# Patient Record
Sex: FEMALE | Race: WHITE | NOT HISPANIC OR LATINO | Employment: OTHER | URBAN - METROPOLITAN AREA
[De-identification: names, ages, dates, MRNs, and addresses within clinical notes are randomized per-mention and may not be internally consistent; named-entity substitution may affect disease eponyms.]

---

## 2017-01-09 ENCOUNTER — TELEPHONE (OUTPATIENT)
Dept: FAMILY MEDICINE | Facility: CLINIC | Age: 32
End: 2017-01-09

## 2017-01-09 ENCOUNTER — OFFICE VISIT (OUTPATIENT)
Dept: FAMILY MEDICINE | Facility: CLINIC | Age: 32
End: 2017-01-09
Payer: COMMERCIAL

## 2017-01-09 VITALS
TEMPERATURE: 98.1 F | DIASTOLIC BLOOD PRESSURE: 78 MMHG | HEART RATE: 80 BPM | WEIGHT: 293 LBS | BODY MASS INDEX: 47.09 KG/M2 | SYSTOLIC BLOOD PRESSURE: 130 MMHG | HEIGHT: 66 IN

## 2017-01-09 DIAGNOSIS — F32.1 MODERATE SINGLE CURRENT EPISODE OF MAJOR DEPRESSIVE DISORDER (H): ICD-10-CM

## 2017-01-09 DIAGNOSIS — F17.200 TOBACCO USE DISORDER: ICD-10-CM

## 2017-01-09 DIAGNOSIS — Z30.015 ENCOUNTER FOR INITIAL PRESCRIPTION OF VAGINAL RING HORMONAL CONTRACEPTIVE: ICD-10-CM

## 2017-01-09 PROCEDURE — 99214 OFFICE O/P EST MOD 30 MIN: CPT | Performed by: PHYSICIAN ASSISTANT

## 2017-01-09 RX ORDER — BUPROPION HYDROCHLORIDE 150 MG/1
150 TABLET ORAL EVERY MORNING
Qty: 30 TABLET | Refills: 11 | Status: SHIPPED | OUTPATIENT
Start: 2017-01-09 | End: 2017-11-27

## 2017-01-09 RX ORDER — ETONOGESTREL AND ETHINYL ESTRADIOL VAGINAL RING .015; .12 MG/D; MG/D
RING VAGINAL
Qty: 3 EACH | Refills: 1 | Status: SHIPPED | OUTPATIENT
Start: 2017-01-09 | End: 2017-09-01

## 2017-01-09 ASSESSMENT — ANXIETY QUESTIONNAIRES
GAD7 TOTAL SCORE: 18
3. WORRYING TOO MUCH ABOUT DIFFERENT THINGS: MORE THAN HALF THE DAYS
7. FEELING AFRAID AS IF SOMETHING AWFUL MIGHT HAPPEN: NEARLY EVERY DAY
2. NOT BEING ABLE TO STOP OR CONTROL WORRYING: NEARLY EVERY DAY
6. BECOMING EASILY ANNOYED OR IRRITABLE: NEARLY EVERY DAY
1. FEELING NERVOUS, ANXIOUS, OR ON EDGE: NEARLY EVERY DAY
5. BEING SO RESTLESS THAT IT IS HARD TO SIT STILL: SEVERAL DAYS
IF YOU CHECKED OFF ANY PROBLEMS ON THIS QUESTIONNAIRE, HOW DIFFICULT HAVE THESE PROBLEMS MADE IT FOR YOU TO DO YOUR WORK, TAKE CARE OF THINGS AT HOME, OR GET ALONG WITH OTHER PEOPLE: SOMEWHAT DIFFICULT

## 2017-01-09 ASSESSMENT — PATIENT HEALTH QUESTIONNAIRE - PHQ9: 5. POOR APPETITE OR OVEREATING: NEARLY EVERY DAY

## 2017-01-09 NOTE — PROGRESS NOTES
"  SUBJECTIVE:                                                    Janine Salas is a 31 year old female who presents to clinic today for the following health issues:        Depression and Anxiety Follow-Up    Status since last visit: Worsened    Other associated symptoms: Panicking, not sleeping, sweating a lot     Complicating factors:     Significant life event: Yes- still having troubles with her son and holidays are always stressful for her       Current substance abuse: None    PHQ-9 SCORE 5/11/2016 6/8/2016   Total Score 4 2     No flowsheet data found.     PHQ-9  English      PHQ-9   Any Language     GAD7         Amount of exercise or physical activity: None    Problems taking medications regularly: No    Medication side effects: Side effects from birth control possibly?     Diet: regular (no restrictions)    Her period is also 6 days late and she is nervous about that.       Not doing well - says \"it's hard\"   Baby 8.5 months old - growing well. Standing on his own. Not sleeping well yet at night - best night was a few nights ago when he slept for 5 hours, woke, then went back to sleep for 6 more. Patient was able to get 3 hours and then another 4  Still living with her fiance and his dad which continues to be difficult  Baby shares a room with the father in law so they worry when baby cries  She has no car anymore - one of their cars doesn't run so esther takes her car which is the only one that fits a carseat so patient is unable to get out of the house  She thought about coming to clinic a few weeks ago with the baby to take us up on the offer to help her out for a little bit but she had no way to get here    She has no family support  Says over the holidays she went to her family's and as soon as she walked in the door she handed the baby over with his careseat and clothes and said \"take him, I can't do this anymore\" and then broke down crying  She says that not one famiy member has called to check in " "with her since that day to see if she is doing okay    She has not returned to work as they do not have the money to pay for   Her fiance works then comes home and rather than help with the baby he says he is tired and needs to rest, play video games and \"unwind\"   Mom will leave the baby with him so she can shower and baby will cry so she feels she can never get a break  She has no idea where they are with finances but says just today their internet went out so figures bills are not being paid    She mentions several times how hard it is but also mentions several times how much she loves him and has several pictures of them together  She says when she starts getting overwhelmed she will look at her son and think to herself that he would be so much better with a better mom than her which makes her feel worse    She doesn't feel the wellbutrin is doing much anymore  That is the first and only medication she has been on  She notes a long h/o depression in her teens and says she was even suicidal at one point (had thoughts, never acted) - says even then her mom ignored her concerns and just kept telling her she \"would be fine\"     She is also on the micronor for birth control but thinks it might be making her mood worse  Has had issues with the depo shot in the past - made her \"crazy\"   WAs put on the micronor because she was breastfeeding  Says she is only breastfeeding for his comfort but says he doesn't seem to eat much anymore  She has no time to pump and really feels she is ready to be over breastfeeding  She has been inconsistent with taking the pill - says she will miss doses and have to double up    Not involved in any support groups  Says she did go to a support group for post partum depression but when she started talking about how hard things were and not wanting to do it anymore one of the other participants told her to \"just drop her baby off or give it up for adoption.\"   Has been thinking about " "the [facility] but again transportation is an issue      Problem list and histories reviewed & adjusted, as indicated.  Additional history: as documented    Current Outpatient Prescriptions   Medication Sig Dispense Refill     buPROPion (WELLBUTRIN XL) 150 MG 24 hr tablet Take 1 tablet (150 mg) by mouth every morning Please schedule follow up appointment for further refills. 30 tablet 11     sertraline (ZOLOFT) 50 MG tablet Take 1/2 tablet (25 mg) for 1-2 weeks, then increase to 1 tablet orally daily 30 tablet 0     etonogestrel-ethinyl estradiol (NUVARING) 0.12-0.015 MG/24HR vaginal ring Insert 1 ring vaginally every 21 days then remove for 1 week then repeat with new ring. 3 each 1     labetalol (NORMODYNE) 100 MG tablet Take 3 tablets (300 mg) by mouth 2 times daily Follow up with PCP for further refills. 180 tablet 0     cyclobenzaprine (FLEXERIL) 10 MG tablet Take 0.5-1 tablets (5-10 mg) by mouth 3 times daily as needed for muscle spasms 60 tablet 1     Flax Oil-Fish Oil-Borage Oil (FISH OIL-FLAX OIL-BORAGE OIL PO)        ibuprofen (ADVIL,MOTRIN) 400 MG tablet Take 1-2 tablets (400-800 mg) by mouth every 6 hours as needed for other (cramping) 120 tablet 0     Prenatal Vit-Fe Fumarate-FA (PRENATAL MULTIVITAMIN  PLUS IRON) 27-0.8 MG TABS Take 1 tablet by mouth daily       LACTOBACILLUS PO Take 1 tablet by mouth daily       FOLIC ACID PO Take 400 mcg by mouth daily        glucosamine-chondroitin 500-400 MG CAPS Take 1 capsule by mouth daily       vitamin E 400 UNIT capsule Take 400 Units by mouth daily        Allergies   Allergen Reactions     Codeine Itching     Zofran [Ondansetron] Other (See Comments)     Headaches        ROS:  Remainder of ROS obtained and found to be negative other than that which was documented above      OBJECTIVE:                                                    /78 mmHg  Pulse 80  Temp(Src) 98.1  F (36.7  C) (Tympanic)  Ht 5' 6\" (1.676 m)  Wt 358 lb (162.388 kg)  BMI 57.81 kg/m2  Body " mass index is 57.81 kg/(m^2).  GENERAL: healthy, alert and no distress  PSYCH: Patient pleasant, engaged in conversation- tearful on and off throughout the exam. Stressed but also lights up when talking about her son and shows several pictures of him on her phone. She denies any thoughts of harming herself or her baby    Diagnostic Test Results:  none      ASSESSMENT/PLAN:                                                    (F53) Post partum depression  (primary encounter diagnosis)  Comment: New mom, no support at home or with family or friends. Patient unable to travel given lack of transportation. High level of concern for patient - she denies any thoughts of harming herself or her baby but patient needs any support we can offer. I encouraged several times that if she needs to reach out or needs help, that she can come to clinic. If it meant her getting a few moments to herself we will help out. I want her to know she has some place or somewhere she can go. I really encouraged her to look into the [facility] programs - they have free childcare which would again provide her with some time to herself. I also encouraged her to look in to [facility] programs. She needs to find a support group - other moms or other resources that she can turn to because she is unfortunately not getting any of that through her fiance or family. She is very isolated which is concerning. I am going to have our social work continue to reach out to her. We have had issues in the past where she had called in a panic and when we try to reach out to her she doesn't answer so we have to do a welfare check which has made her unhappy in the past. I explained today why we have to do this and that we are just very concerned. She seemd to understand.   We then discussed medication changes. We discussed starting a new medication which she was in agreement with understanding it will not work right away. Unclear if wellbutrin is not working at all or just not enough  but will not make changes to this right now until zoloft in system in the chance that it has been helping a little.   Plan: sertraline (ZOLOFT) 50 MG tablet    Patient made it clear she has no intention of hurting herself or her baby  We will start zoloft and I would like to see her back in 3-4 weeks but patient knows she can come to clinic whenever she needs   She will start therapy - first session scheduled for tomorrow  Aware that  will be in contact with her to help identify resources or other support systems    (Z30.015) Encounter for initial prescription of vaginal ring hormonal contraceptive  Comment: given she is nearly done with breastfeeding and concerned that pill may be contributing to mood lability, will switch to nuvaring which she has had in the past and done well with  Plan: etonogestrel-ethinyl estradiol (NUVARING)         0.12-0.015 MG/24HR vaginal ring            Total visit time today was 35 minutes with over 50% spent in face to face discussion of the above topics        Kavya Haider PA-C  [facility]

## 2017-01-09 NOTE — Clinical Note
[facility]  [address] [address]  Phone: [phone]    January 30, 2017        Janine Salas  [address]  [place] [address]          To whom it may concern:    RE: Janine Salas    This patient has been denied coverage of Nuvaring. I recommend approval of the Nuvaring for contraception for this patient.  This patient has PCOS and mental health concerns, has been on multiple combination and progestin only contraceptive pills in the past, all of which either worsened or did not help these medical issues.  She has done well with the nuvaring in the past and should be able to continue this medication. She needs this for these medical issues as well as contraception.    Please contact me for questions or concerns.      Sincerely,        Kavya Haider PA-C

## 2017-01-09 NOTE — TELEPHONE ENCOUNTER
wellbutrin       Last Written Prescription Date: 12/7/16  Last Fill Quantity: 30; # refills: 0  Last Office Visit with [facility], [facility] or [facility] prescribing provider:  8/7/15   Next 5 appointments (look out 90 days)     Jan 09, 2017  4:00 PM   SHORT with Kavya Haider PA-C   [facility]    [address] [address] [phone]                   Last PHQ-9 score on record=   PHQ-9 SCORE 6/8/2016   Total Score 2       AST       31   3/9/2016  ALT       41   3/9/2016    Routing refill request to provider for review/approval because:  Patient needs to be seen because it has been more than 1 year since last office visit.  Will also send to PCP

## 2017-01-09 NOTE — TELEPHONE ENCOUNTER
Prior Authorization Required on: nuvaring  Insurance: [employer]  Insurance Phone: [phone]  Patient ID: [id number]  Please Contact the Pharmacy with Prior Auth Status (approval/denial).   Thank You!    Cristina Nolan   Pharmacy Technician  [facility]  [phone]

## 2017-01-09 NOTE — MR AVS SNAPSHOT
"              After Visit Summary   1/9/2017    Janine Salas    MRN: [id number]           Patient Information     Date Of Birth          1985        Visit Information        Provider Department      1/9/2017 4:00 PM Kavya Haider PA-C [facility]        Today's Diagnoses     Post partum depression    -  1     Encounter for initial prescription of vaginal ring hormonal contraceptive            Follow-ups after your visit        Who to contact     Normal or non-critical lab and imaging results will be communicated to you by [employer], letter or phone within 4 business days after the clinic has received the results. If you do not hear from us within 7 days, please contact the clinic through exoro systemt or phone. If you have a critical or abnormal lab result, we will notify you by phone as soon as possible.  Submit refill requests through [employer]! or call your pharmacy and they will forward the refill request to us. Please allow 3 business days for your refill to be completed.          If you need to speak with a  for additional information , please call: [phone]             Additional Information About Your Visit        [employer] Board of Addiction Medicine (ABAM) Information     Q[employer]! gives you secure access to your electronic health record. If you see a primary care provider, you can also send messages to your care team and make appointments. If you have questions, please call your primary care clinic.  If you do not have a primary care provider, please call [phone] and they will assist you.        Care EveryWhere ID     This is your Care EveryWhere ID. This could be used by other organizations to access your [facility] medical records  [id number]        Your Vitals Were     Pulse Temperature Height BMI (Body Mass Index)          80 98.1  F (36.7  C) (Tympanic) 5' 6\" (1.676 m) 57.81 kg/m2         Blood Pressure from Last 3 Encounters:   01/09/17 130/78   10/28/16 130/84   08/09/16 132/82    Weight " from Last 3 Encounters:   01/09/17 358 lb (162.388 kg)   10/28/16 365 lb (165.563 kg)   08/09/16 363 lb (164.656 kg)              Today, you had the following     No orders found for display         Today's Medication Changes          These changes are accurate as of: 1/9/17  5:07 PM.  If you have any questions, ask your nurse or doctor.               Start taking these medicines.        Dose/Directions    etonogestrel-ethinyl estradiol 0.12-0.015 MG/24HR vaginal ring   Commonly known as:  NUVARING   Used for:  Encounter for initial prescription of vaginal ring hormonal contraceptive   Started by:  Kavya Haider PA-C        Insert 1 ring vaginally every 21 days then remove for 1 week then repeat with new ring.   Quantity:  3 each   Refills:  1       sertraline 50 MG tablet   Commonly known as:  ZOLOFT   Used for:  Post partum depression   Started by:  Kavya Haider PA-C        Take 1/2 tablet (25 mg) for 1-2 weeks, then increase to 1 tablet orally daily   Quantity:  30 tablet   Refills:  0         Stop taking these medicines if you haven't already. Please contact your care team if you have questions.     norethindrone 0.35 MG per tablet   Commonly known as:  MICRONOR   Stopped by:  Kavya Haider PA-C                Where to get your medicines      These medications were sent to [facility] [address]  [address] [address] [address]     Phone:  [phone]    - buPROPion 150 MG 24 hr tablet  - etonogestrel-ethinyl estradiol 0.12-0.015 MG/24HR vaginal ring  - sertraline 50 MG tablet             Primary Care Provider Office Phone # Fax #    Kavya Haider PA-C [phone]       [facility] [id number] [place], [place] [address] [address]        Thank you!     Thank you for choosing [facility]  for your care. Our goal is always to provide you with excellent care. Hearing back from our patients  is one way we can continue to improve our services. Please take a few minutes to complete the written survey that you may receive in the mail after your visit with us. Thank you!             Your Updated Medication List - Protect others around you: Learn how to safely use, store and throw away your medicines at www.disposemymeds.org.          This list is accurate as of: 1/9/17  5:07 PM.  Always use your most recent med list.                   Brand Name Dispense Instructions for use    buPROPion 150 MG 24 hr tablet    WELLBUTRIN XL    30 tablet    Take 1 tablet (150 mg) by mouth every morning Please schedule follow up appointment for further refills.       cyclobenzaprine 10 MG tablet    FLEXERIL    60 tablet    Take 0.5-1 tablets (5-10 mg) by mouth 3 times daily as needed for muscle spasms       etonogestrel-ethinyl estradiol 0.12-0.015 MG/24HR vaginal ring    NUVARING    3 each    Insert 1 ring vaginally every 21 days then remove for 1 week then repeat with new ring.       FISH OIL-FLAX OIL-BORAGE OIL PO          FOLIC ACID PO      Take 400 mcg by mouth daily       glucosamine-chondroitin 500-400 MG Caps per capsule      Take 1 capsule by mouth daily       ibuprofen 400 MG tablet    ADVIL/MOTRIN    120 tablet    Take 1-2 tablets (400-800 mg) by mouth every 6 hours as needed for other (cramping)       labetalol 100 MG tablet    NORMODYNE    180 tablet    Take 3 tablets (300 mg) by mouth 2 times daily Follow up with PCP for further refills.       LACTOBACILLUS PO      Take 1 tablet by mouth daily       prenatal multivitamin  plus iron 27-0.8 MG Tabs per tablet      Take 1 tablet by mouth daily       sertraline 50 MG tablet    ZOLOFT    30 tablet    Take 1/2 tablet (25 mg) for 1-2 weeks, then increase to 1 tablet orally daily       vitamin E 400 UNIT capsule      Take 400 Units by mouth daily

## 2017-01-10 ASSESSMENT — ANXIETY QUESTIONNAIRES: GAD7 TOTAL SCORE: 18

## 2017-01-10 ASSESSMENT — PATIENT HEALTH QUESTIONNAIRE - PHQ9: SUM OF ALL RESPONSES TO PHQ QUESTIONS 1-9: 15

## 2017-01-12 ENCOUNTER — CARE COORDINATION (OUTPATIENT)
Dept: CARE COORDINATION | Facility: CLINIC | Age: 32
End: 2017-01-12

## 2017-01-12 NOTE — Clinical Note
[facility]  [address] [place] [address]      January 12, 2017      Janine Salas  [address] [address] [address]    Dear Janine,  I am the Clinic Care Coordinator that works with your primary care provider's clinic. I wanted to introduce myself and provide you with my contact information for you to be able to call me with any questions or concerns. Below is a description of what Clinic Care Coordination is and how I can further assist you.     The Clinic Care Coordinator role is a Registered Nurse and/or  who understands the health care system. The goal of Clinic Care Coordination is to help you manage your health and improve access to the [facility] system in the most efficient manner.  The Registered Nurse can assist you in meeting your health care goals by providing education, coordinating services, and strengthening the communication among your providers. The  can assist you with financial, behavioral, psychosocial, and chemical dependency and counseling/psychiatric resources.    Please feel free to keep this letter and contact information to contact me at [phone] with any further questions or concerns that may arise. We at [facility] are focused on providing you with the highest-quality healthcare experience possible and that all starts with you.       Sincerely,     Ofelia Yoder  Social Work Care Coordinator  [facility]  [phone]

## 2017-01-12 NOTE — Clinical Note
Health Care Home - Access Care Plan    About Me  Patient Name:  Janine Salas    YOB: 1985  Age:                            31 year old   [facility] MRN:         [id number] Telephone Information:     Home Phone [phone]   Mobile [phone]       Address:    [address] [place] [address] Email address:  [email or web address]      Emergency Contact(s)  Name Relationship Lgl Grd Work Phone Home Phone Mobile Phone   1. HARJINDER MITCHELL Significant ot*  none none [phone]   2. MARILOU,CLAUDETTE* Mother  none [phone]             Health Maintenance:      My Access Plan  Medical Emergency 911   Questions or concerns during clinic hours Primary Clinic Line, I will call the clinic directly:     24 Hour Appointment Line [phone] or  [phone])  (toll free)   24 Hour Nurse Line [phone] (toll free)   Questions or concerns outside clinic hours 24 Hour Appointment Line, I will call the after-hours on-call line:   [facility] [phone] or [phone] ([phone]) (toll-free)   Preferred Urgent Care     Preferred Hospital     Preferred Pharmacy [facility] [place] - [address]     Behavioral Health Crisis Line Crisis Connection, [phone] or 911     My Care Team Members  Patient Care Team       Relationship Specialty Notifications Start End    Kavya Haider PA-C PCP - General Physician Assistant  4/6/15     Phone: [phone] Fax: [phone]         [facility] [address] [address]    Ofelia Huber   Admissions 1/12/17     Phone: [phone] Fax: [phone]            My Medical and Care Information  Problem List   Patient Active Problem List   Diagnosis     CARDIOVASCULAR SCREENING; LDL GOAL LESS THAN 160     Depression     Back pain associated with peripheral numbness     Morbid obesity due to excess calories (H)      Excessive weight gain during pregnancy in third trimester     Anxiety attack     Prenatal care in third trimester     Supervision of normal first pregnancy     Cephalopelvic disproportion due to mixed maternal and fetal factors     Essential hypertension     S/P  section      Current Medications and Allergies:  See printed Medication Report

## 2017-01-12 NOTE — PROGRESS NOTES
Clinic Care Coordination Contact  [facility]/Voicemail    Referral Source: Care Team  Clinical Data: Care Coordinator Outreach  Outreach attempted x 1.  Left message on voicemail with call back information and requested return call.  Plan: Care Coordinator mailed out care coordination introduction letter on 1/12/17. Care Coordinator will try to reach patient again in 3-5 business days.    Ofelia Yoder  Social Work Care Coordinator  [facility]  [phone]

## 2017-01-16 NOTE — TELEPHONE ENCOUNTER
Patient's insurance has denied nuvaring.  State patient needs to try and fail formulary alternatives before it can be considered for coverage.    CLAUDIA GORDON

## 2017-01-27 ENCOUNTER — OFFICE VISIT (OUTPATIENT)
Dept: FAMILY MEDICINE | Facility: CLINIC | Age: 32
End: 2017-01-27
Payer: COMMERCIAL

## 2017-01-27 VITALS
HEART RATE: 95 BPM | BODY MASS INDEX: 47.09 KG/M2 | SYSTOLIC BLOOD PRESSURE: 140 MMHG | HEIGHT: 66 IN | TEMPERATURE: 99.1 F | WEIGHT: 293 LBS | DIASTOLIC BLOOD PRESSURE: 90 MMHG

## 2017-01-27 DIAGNOSIS — M54.50 ACUTE BILATERAL LOW BACK PAIN WITHOUT SCIATICA: Primary | ICD-10-CM

## 2017-01-27 DIAGNOSIS — M62.838 MUSCLE SPASM: ICD-10-CM

## 2017-01-27 DIAGNOSIS — Z30.09 BIRTH CONTROL COUNSELING: ICD-10-CM

## 2017-01-27 DIAGNOSIS — O13.3 PIH (PREGNANCY INDUCED HYPERTENSION), THIRD TRIMESTER: ICD-10-CM

## 2017-01-27 DIAGNOSIS — F41.0 ANXIETY ATTACK: ICD-10-CM

## 2017-01-27 DIAGNOSIS — F32.A DEPRESSION, UNSPECIFIED DEPRESSION TYPE: ICD-10-CM

## 2017-01-27 PROCEDURE — 99214 OFFICE O/P EST MOD 30 MIN: CPT | Performed by: PHYSICIAN ASSISTANT

## 2017-01-27 RX ORDER — HYDROCODONE BITARTRATE AND ACETAMINOPHEN 5; 325 MG/1; MG/1
1 TABLET ORAL EVERY 8 HOURS PRN
Qty: 15 TABLET | Refills: 0 | Status: SHIPPED | OUTPATIENT
Start: 2017-01-27 | End: 2017-02-01

## 2017-01-27 RX ORDER — CYCLOBENZAPRINE HCL 10 MG
5-10 TABLET ORAL 3 TIMES DAILY PRN
Qty: 60 TABLET | Refills: 0 | Status: SHIPPED | OUTPATIENT
Start: 2017-01-27 | End: 2017-03-10

## 2017-01-27 RX ORDER — LABETALOL 100 MG/1
300 TABLET, FILM COATED ORAL 2 TIMES DAILY
Qty: 180 TABLET | Refills: 0 | Status: SHIPPED | OUTPATIENT
Start: 2017-01-27 | End: 2017-03-10

## 2017-01-27 ASSESSMENT — PATIENT HEALTH QUESTIONNAIRE - PHQ9: 5. POOR APPETITE OR OVEREATING: SEVERAL DAYS

## 2017-01-27 ASSESSMENT — ANXIETY QUESTIONNAIRES
3. WORRYING TOO MUCH ABOUT DIFFERENT THINGS: NOT AT ALL
5. BEING SO RESTLESS THAT IT IS HARD TO SIT STILL: NOT AT ALL
2. NOT BEING ABLE TO STOP OR CONTROL WORRYING: NOT AT ALL
1. FEELING NERVOUS, ANXIOUS, OR ON EDGE: NOT AT ALL
GAD7 TOTAL SCORE: 2
6. BECOMING EASILY ANNOYED OR IRRITABLE: NOT AT ALL
7. FEELING AFRAID AS IF SOMETHING AWFUL MIGHT HAPPEN: SEVERAL DAYS

## 2017-01-27 NOTE — PATIENT INSTRUCTIONS
For the first few days, use ice several times a day  - after that, can use heat in the morning, ice at night  Exercises - range of motion testing  Ibuprofen 600-800 mg 3 times a day  Norco - do not drive, operate heavy machinery, or work with this  Flexeril - can make you sleepy  Follow up if worsening or if not improving - follow up next week for a recheck

## 2017-01-27 NOTE — PROGRESS NOTES
SUBJECTIVE:                                                    Janine Salas is a 31 year old female who presents to clinic today for the following health issues:    Back Pain      Duration: 3 days        Specific cause: none    Description:   Location of pain: low back mainly right side with tailbone pain  and hip right  Character of pain: sharp, dull ache, stabbing, fullness and constant  Pain radiation:none  New numbness or weakness in legs, not attributed to pain:  YES- weakness    Intensity: Currently 3-4/10 when sitting, with moving pain increases 8-9/10  moderate    History:   Pain interferes with job: YES  History of back problems:  has broken tailbone 2 times  Any previous MRI or X-rays: yes for tailbone  Sees a specialist for back pain:  No  Therapies tried without relief: acetaminophen (Tylenol), cold, muscle relaxants and NSAIDs    Alleviating factors:   Improved by: None      Precipitating factors:  Worsened by: Lifting, Bending, Standing, Lying Flat and Walking    Functional and Psychosocial Screen ([facility] STarT Back):      Not performed today       Accompanying Signs & Symptoms:  Risk of Fracture:  None  Risk of Cauda Equina:  None  Risk of Infection:  None  Risk of Cancer:  None  Risk of Ankylosing Spondylitis:  Onset at age <35, male, AND morning back stiffness. no                  Has flexeril as needed for back muscle spasm - has 3 left    Doing a lot better off the micronor - stopped 1/10/17  Has PCOS - switched OCP for years every 3 months to get them under control, didn't work. Also felt like a zombie, in a cloud  She did well with the nuvaring    PHQ-9 ([employer]) 1/9/2017 1/27/2017   1.  Little interest or pleasure in doing things 2 0   2.  Feeling down, depressed, or hopeless 3 0   3.  Trouble falling or staying asleep, or sleeping too much 2 2   4.  Feeling tired or having little energy 2 1   5.  Poor appetite or overeating 2 0   6.  Feeling bad about yourself 3 0   7.  Trouble  concentrating 0 1   8.  Moving slowly or restless 0 0   9.  Suicidal or self-harm thoughts 1 0   PHQ-9 Total Score 15 4       Problem list and histories reviewed & adjusted, as indicated.  Additional history: none    Patient Active Problem List   Diagnosis     CARDIOVASCULAR SCREENING; LDL GOAL LESS THAN 160     Depression     Back pain associated with peripheral numbness     Morbid obesity due to excess calories (H)     Excessive weight gain during pregnancy in third trimester     Anxiety attack     Prenatal care in third trimester     Supervision of normal first pregnancy     Cephalopelvic disproportion due to mixed maternal and fetal factors     Essential hypertension     S/P  section     Past Surgical History   Procedure Laterality Date     Appendectomy       Laparoscopic cholecystectomy  2013     Procedure: LAPAROSCOPIC CHOLECYSTECTOMY;  Laparoscopic Cholecystectomy;  Surgeon: Lasha Garcias MD;  Location: [facility] OR     Colonoscopy       Upper gi endoscopy       Mouth surgery       wisdom teeth      section N/A 2016     Procedure:  SECTION;  Surgeon: Marco Marin MD;  Location: [facility] OR       Social History   Substance Use Topics     Smoking status: Former Smoker -- 0.50 packs/day     Types: Cigarettes     Start date: 2015     Smokeless tobacco: Current User     Alcohol Use: No      Comment: rare- quit with pregnancy     Family History   Problem Relation Age of Onset     Hypertension Mother      Depression Mother      OSTEOPOROSIS Mother      Thyroid Disease Mother      Hypertension Father      Alcohol/Drug Father      recovered alcohol     CANCER Father      melanoma     Hypertension Maternal Grandmother      Alzheimer Disease Maternal Grandmother      Cardiovascular Maternal Grandmother      triple bypass     CANCER Maternal Grandfather      lung     Alcohol/Drug Paternal Grandmother      alcohol     Cancer - colorectal Paternal Grandmother      colon  "    Alcohol/Drug Paternal Grandfather      alcohol     CANCER Paternal Grandfather      leukemia - adult onset     Cardiovascular Paternal Grandfather      stent     Obesity Sister      CANCER Sister      cervical cancer, hysterectomy     Obesity Sister      Depression Sister      Obesity Sister      Thyroid Disease Sister      Breast Cancer Maternal Aunt      Substance Abuse Sister      recovered drugs     Autism Spectrum Disorder       Aspergers     Bipolar Disorder           Problem list, Medication list, Allergies, and Medical/Social/Surgical histories reviewed in [facility] and updated as appropriate.    ROS:  Other than noted above, general, HEENT, respiratory, cardiac and gastrointestinal systems are negative.     OBJECTIVE:                                                    /90 mmHg  Pulse 95  Temp(Src) 99.1  F (37.3  C) (Tympanic)  Ht 5' 6\" (1.676 m)  Wt 355 lb 9.6 oz (161.299 kg)  BMI 57.42 kg/m2  LMP 01/09/2017  Breastfeeding? Yes Body mass index is 57.42 kg/(m^2).   GENERAL: healthy, alert, well nourished, well hydrated, no distress  RESP: lungs clear to auscultation - no rales, no rhonchi, no wheezes  CV: regular rates and rhythm, normal S1 S2, no S3 or S4 and no murmur, no click or rub -  ABDOMEN: soft, no tenderness, no  hepatosplenomegaly, no masses, normal bowel sounds  MS: extremities- no gross deformities noted, no edema  Comprehensive back pain exam:  Tenderness of right paralumbar, SI area, and trochanter area, Pain limits the following motions: all, Lower extremity strength functional and equal on both sides, Lower extremity reflexes within normal limits bilaterally, Lower extremity sensation normal and equal on both sides and Straight leg raise negative bilaterally   Hips full ROM     PSYCH: Alert and oriented times 3; speech- coherent , normal rate and volume; able to articulate logical thoughts, able to abstract reason, no tangential thoughts, no hallucinations or delusions, affect- " normal       ASSESSMENT/PLAN:                                                      ASSESSMENT/PLAN:      ICD-10-CM    1. Acute bilateral low back pain without sciatica M54.5 HYDROcodone-acetaminophen (NORCO) 5-325 MG per tablet   2. Muscle spasm M62.838 cyclobenzaprine (FLEXERIL) 10 MG tablet   3. PIH (pregnancy induced hypertension), third trimester O13.3 labetalol (NORMODYNE) 100 MG tablet   4. Birth control counseling Z30.9    5. Anxiety attack F41.0    6. Depression, unspecified depression type F32.9      Patient in a lot of pain with movements, okay once she gets into a position or walks a little bit. She appears comfortable when sitting til she has to get up then is quite painful. Discussed risks/side effects of medications, she is planning to stop breastfeeding she thinks while on them.  Will refill blood pressure meds and monitor blood pressure  Mood much improved - patient attributes more to stopping mini pill than starting zoloft but thinks both contribute. Will work on prior auth for nuvaring as she does not want to restart combo OCP due to all the trouble she had with that and mood in the past. Recommended use condoms until then.  Still recommended close follow up - next week    Patient Instructions   For the first few days, use ice several times a day  - after that, can use heat in the morning, ice at night  Exercises - range of motion testing  Ibuprofen 600-800 mg 3 times a day  Norco - do not drive, operate heavy machinery, or work with this  Flexeril - can make you sleepy  Follow up if worsening or if not improving - follow up next week for a recheck    Elisha Calvin PA-C   [facility]

## 2017-01-27 NOTE — MR AVS SNAPSHOT
After Visit Summary   1/27/2017    Janine Salas    MRN: [id number]           Patient Information     Date Of Birth          1985        Visit Information        Provider Department      1/27/2017 1:00 PM Elisha Calvin PA-C [facility]        Today's Diagnoses     PIH (pregnancy induced hypertension), third trimester    -  1     Acute bilateral low back pain without sciatica         Muscle spasm           Care Instructions    For the first few days, use ice several times a day  - after that, can use heat in the morning, ice at night  Exercises - range of motion testing  Ibuprofen 600-800 mg 3 times a day  Norco - do not drive, operate heavy machinery, or work with this  Flexeril - can make you sleepy  Follow up if worsening or if not improving - follow up next week for a recheck        Follow-ups after your visit        Who to contact     Normal or non-critical lab and imaging results will be communicated to you by [employer], letter or phone within 4 business days after the clinic has received the results. If you do not hear from us within 7 days, please contact the clinic through [employer] or phone. If you have a critical or abnormal lab result, we will notify you by phone as soon as possible.  Submit refill requests through [facility] or call your pharmacy and they will forward the refill request to us. Please allow 3 business days for your refill to be completed.          If you need to speak with a  for additional information , please call: [phone]             Additional Information About Your Visit        [facility] Information     [facility] gives you secure access to your electronic health record. If you see a primary care provider, you can also send messages to your care team and make appointments. If you have questions, please call your primary care clinic.  If you do not have a primary care provider, please call [phone] and they will assist you.       "  Care EveryWhere ID     This is your Care EveryWhere ID. This could be used by other organizations to access your [facility] medical records  [id number]        Your Vitals Were     Pulse Temperature Height BMI (Body Mass Index) Last Period Breastfeeding?    95 99.1  F (37.3  C) (Tympanic) 5' 6\" (1.676 m) 57.42 kg/m2 01/09/2017 Yes       Blood Pressure from Last 3 Encounters:   01/27/17 140/90   01/09/17 130/78   10/28/16 130/84    Weight from Last 3 Encounters:   01/27/17 355 lb 9.6 oz (161.299 kg)   01/09/17 358 lb (162.388 kg)   10/28/16 365 lb (165.563 kg)              Today, you had the following     No orders found for display         Today's Medication Changes          These changes are accurate as of: 1/27/17  2:01 PM.  If you have any questions, ask your nurse or doctor.               Start taking these medicines.        Dose/Directions    HYDROcodone-acetaminophen 5-325 MG per tablet   Commonly known as:  NORCO   Used for:  Acute bilateral low back pain without sciatica   Started by:  Elisha Calvin PA-C        Dose:  1 tablet   Take 1 tablet by mouth every 8 hours as needed for moderate to severe pain   Quantity:  15 tablet   Refills:  0            Where to get your medicines      These medications were sent to [facility] - [address] [facility] [address]  [address] , [facility] [address]     Phone:  [phone]    - cyclobenzaprine 10 MG tablet  - labetalol 100 MG tablet      Some of these will need a paper prescription and others can be bought over the counter.  Ask your nurse if you have questions.     Bring a paper prescription for each of these medications    - HYDROcodone-acetaminophen 5-325 MG per tablet             Primary Care Provider Office Phone # Fax #    Kavya Haider PA-C [phone]       [facility] [address] [facility] [address]        Thank you!     Thank you for choosing [facility]  for your care. Our " goal is always to provide you with excellent care. Hearing back from our patients is one way we can continue to improve our services. Please take a few minutes to complete the written survey that you may receive in the mail after your visit with us. Thank you!             Your Updated Medication List - Protect others around you: Learn how to safely use, store and throw away your medicines at www.disposemymeds.org.          This list is accurate as of: 1/27/17  2:01 PM.  Always use your most recent med list.                   Brand Name Dispense Instructions for use    buPROPion 150 MG 24 hr tablet    WELLBUTRIN XL    30 tablet    Take 1 tablet (150 mg) by mouth every morning Please schedule follow up appointment for further refills.       cyclobenzaprine 10 MG tablet    FLEXERIL    60 tablet    Take 0.5-1 tablets (5-10 mg) by mouth 3 times daily as needed for muscle spasms       etonogestrel-ethinyl estradiol 0.12-0.015 MG/24HR vaginal ring    NUVARING    3 each    Insert 1 ring vaginally every 21 days then remove for 1 week then repeat with new ring.       FISH OIL-FLAX OIL-BORAGE OIL PO          FOLIC ACID PO      Take 400 mcg by mouth daily       glucosamine-chondroitin 500-400 MG Caps per capsule      Take 1 capsule by mouth daily       HYDROcodone-acetaminophen 5-325 MG per tablet    NORCO    15 tablet    Take 1 tablet by mouth every 8 hours as needed for moderate to severe pain       ibuprofen 400 MG tablet    ADVIL/MOTRIN    120 tablet    Take 1-2 tablets (400-800 mg) by mouth every 6 hours as needed for other (cramping)       labetalol 100 MG tablet    NORMODYNE    180 tablet    Take 3 tablets (300 mg) by mouth 2 times daily Follow up with PCP for further refills.       LACTOBACILLUS PO      Take 1 tablet by mouth daily       prenatal multivitamin  plus iron 27-0.8 MG Tabs per tablet      Take 1 tablet by mouth daily       sertraline 50 MG tablet    ZOLOFT    30 tablet    Take 1/2 tablet (25 mg) for 1-2  weeks, then increase to 1 tablet orally daily       vitamin E 400 UNIT capsule      Take 400 Units by mouth daily

## 2017-01-28 ENCOUNTER — MYC MEDICAL ADVICE (OUTPATIENT)
Dept: FAMILY MEDICINE | Facility: CLINIC | Age: 32
End: 2017-01-28

## 2017-01-28 ASSESSMENT — PATIENT HEALTH QUESTIONNAIRE - PHQ9: SUM OF ALL RESPONSES TO PHQ QUESTIONS 1-9: 4

## 2017-01-28 ASSESSMENT — ANXIETY QUESTIONNAIRES: GAD7 TOTAL SCORE: 2

## 2017-01-31 ENCOUNTER — TELEPHONE (OUTPATIENT)
Dept: FAMILY MEDICINE | Facility: CLINIC | Age: 32
End: 2017-01-31

## 2017-01-31 NOTE — TELEPHONE ENCOUNTER
Janine is reporting that she is having lower back pain that goes down in to her right hip.  She states that it's difficult to sit, stand, move around.  She is due to go back to work on Thursday and doesn't know what to do about the pain, if its still like this she states that it will be very difficult to work.  Please call and assess.  Thank you..Tona Nuñez

## 2017-01-31 NOTE — TELEPHONE ENCOUNTER
Message left on answering machine to call the [facility] RN back to triage note below. 1/27/17 office visit note said to return this week for a recheck. Work letter and back exercises from Jackie are at the  available for .   Gonzalo Ellison RN

## 2017-01-31 NOTE — TELEPHONE ENCOUNTER
Letter and back exercises are placed at the . Patient is scheduled to see Jackie tomorrow morning.  Gonzalo Ellison RN

## 2017-01-31 NOTE — TELEPHONE ENCOUNTER
Can someone print this and put it at the ?  Elisha Calvin PA-C              Low Back Pain            What is low back pain?   Low back pain is pain and stiffness in the lower back. It is one of the most common reasons people miss work.   How does it occur?   Your lower back is called your lumbar spine. It is made up of 5 bones called lumbar vertebrae. In between the vertebrae are shock absorbers called disks. Back pain can occur from an injury to the vertebrae or when a disk bulges or herniates.   Low back pain is usually caused when a ligament or muscle holding a vertebra in its proper position is strained. Vertebrae are bones that make up the spinal column through which the spinal cord passes. When these muscles or ligaments become weak or strained, the spine loses its stability, resulting in pain.   Low back pain can occur if your job involves lifting and carrying heavy objects, or if you spend a lot of time sitting or standing in one position or bending over. It can be caused by a fall or by unusually strenuous exercise. It can be brought on by the tension and stress that cause headaches in some people. It can even be brought on by violent sneezing or coughing.   People who are overweight may have low back pain because of the added stress on their back.   Back pain may occur when the muscles, joints, bones, and connective tissues of the back become inflamed as a result of an infection or an immune system problem. Arthritic disorders as well as some congenital and degenerative conditions may cause back pain.   Back pain accompanied by loss of bladder or bowel control, trouble moving your legs, or numbness or tingling in your arms or legs requires immediate medical treatment.   What are the symptoms?   Symptoms include:   pain in the back or legs   stiffness, spasm, or limited motion   The pain may be constant or may happen only in certain positions. It may get worse when you cough, sneeze, bend,  twist, or strain during a bowel movement. The pain may be in only one spot or may spread to other areas, most commonly down the buttocks and into the back of the thigh.   A low back strain typically does not produce pain past the knee into the calf or foot. Tingling or numbness in the calf or foot may indicate a herniated disk or pinched nerve.   Be sure to see your healthcare provider if:   You have weakness in your leg, especially if you cannot lift your foot, because this may be a sign of nerve damage.   You have new bowel or bladder problems as well as back pain, which may be a sign of severe injury to your spinal cord.   You have pain that gets worse despite treatment.   How is it diagnosed?   Your healthcare provider will review your medical history and examine you. You may have X-rays, an MRI, CT scan, or a bone scan.   How is it treated?   To treat this condition:   Put an ice pack, gel pack, or package of frozen vegetables, wrapped in a cloth on the area every 3 to 4 hours, for up to 20 minutes at a time for the first 2 or 3 days.   Use a heating pad or hot water bottle. Don't let the heating pad get too hot, and don't fall asleep with it. You could get a burn.   Rest in bed on a firm mattress. Often it helps to lie on your back with your knees raised on a pillow. However, some people prefer to lie on their side with their knees bent. It's best to try to stay active, so try not to rest in bed longer than 1 to 2 days.   Take muscle relaxants as recommended by your healthcare provider.   Take an anti-inflammatory such as ibuprofen, or other medicine as directed by your provider. Nonsteroidal anti-inflammatory medicines (NSAIDs) may cause stomach bleeding and other problems. These risks increase with age. Read the label and take as directed. Unless recommended by your healthcare provider, do not take for more than 10 days.   Get a back massage by a trained person.   Wear a belt or corset to support your back.    Do the exercises recommended by your provider. Your provider may also prescribe physical therapy.   Talk with a counselor, if your back pain is related to tension caused by emotional problems.   When the pain is gone, ask your healthcare provider about starting an exercise program such as the following:   Exercise moderately every day, using stretching and warm-up exercises suggested by your provider or physical therapist.   Exercise vigorously for about 30 minutes 3 times a week by walking, swimming, using a stationary bicycle, or doing low-impact aerobics.   Exercising regularly will not only help your back, it will also help keep you healthier overall.   How long will the effects last?   The effects of back pain last as long as the cause exists or until your body recovers from the strain, usually a day or two but sometimes weeks.   How can I take care of myself?   In addition to the treatment described above, keep in mind these suggestions:   Practice good posture. Stand with your head up, shoulders straight, chest forward, weight balanced evenly on both feet, and pelvis tucked in.   Lose weight if you are overweight   Keep your core muscles strong. These are your abdominal and back muscles.   Sleep without a pillow under your head.   Pain is the best way to  the pace you should set in increasing your activity and exercise. Minor discomfort, stiffness, soreness, and mild aches need not interfere with activity. However, limit your activities temporarily if:   Your symptoms return.   The pain increases when you are more active.   The pain increases within 24 hours after a new or higher level of activity.   When can I return to my normal activities?   Everyone recovers from an injury at a different rate. Return to your activities depends on how soon your back recovers, not by how many days or weeks it has been since your injury has occurred. In general, the longer you have symptoms before you start treatment,  the longer it will take to get better. The goal is to return to your normal activities as soon as is safely possible. If you return too soon you may worsen your injury.   It is important that you have fully recovered from your low back pain before you return to any strenuous activity. You must be able to have the same range of motion that you had before your injury. You must be able to walk and twist without pain.   What can I do to help prevent low back pain?   You can reduce the strain on your back by doing the following:   Don't push with your arms when you move a heavy object. Turn around and push backwards so the strain is taken by your legs.   Whenever you sit, sit in a straight-backed chair and hold your spine against the back of the chair.   Bend your knees and hips and keep your back straight when you lift a heavy object.   Avoid lifting heavy objects higher than your waist.   Hold packages you carry close to your body, with your arms bent.   Use a footrest for one foot when you stand or sit in one spot for a long time. This keeps your back straight.   Bend your knees when you bend over.   Sit close to the pedals when you drive and use your seat belt and a hard backrest or pillow.   Lie on your side with your knees bent when you sleep or rest. It may help to put a pillow between your knees.   Put a pillow under your knees when you sleep on your back.   Raise the foot of the bed 8 inches to discourage sleeping on your stomach unless you have other problems that require that you keep your head elevated.   To rest your back, hold each of these positions for 5?minutes or longer:   Lie on your back, bend your knees, and put pillows under your knees.   Lie on your back on the floor with a pillow under your neck. Bend your knees to a 90-degree angle, and put your lower legs and feet on a chair.   Lie on your back, bend your knees, and bring one knee up to your chest and hold it there. Repeat with the other knee,  then bring both knees to your chest. When holding your knee to your chest, grab your thigh rather than your lower leg to avoid over flexing your knee.     Published by [facility].  This content is reviewed periodically and is subject to change as new health information becomes available. The information is intended to inform and educate and is not a replacement for medical evaluation, advice, diagnosis or treatment by a healthcare professional.   Developed by Day Gomez RN, [place], and [facility].   ? 2010 [facility] and/or its affiliates. All Rights Reserved.           Low Back Pain Exercise          Standing hamstring stretch: Put the heel of one leg on a stool about 15 inches high. Keep your leg straight. Lean forward, bending at the hips until you feel a mild stretch in the back of your thigh. Make sure you do not roll your shoulders or bend at the waist when doing this. You want to stretch your leg, not your lower back. Hold the stretch for 15 to 30 seconds. Repeat with each leg 3 times.   Cat and camel: Get down on your hands and knees. Let your stomach sag, allowing your back to curve downward. Hold this position for 5 seconds. Then arch your back and hold for 5 seconds. Do 3 sets of 10.   Quadruped arm and leg raise: Get down on your hands and knees. Pull in your belly button and tighten your abdominal muscles to stiffen your spine. While keeping your abdominals tight, raise one arm and the opposite leg away from you. Hold this position for 5 seconds. Lower your arm and leg slowly and change sides. Do this 10 times on each side.   Pelvic tilt: Lie on your back with your knees bent and your feet flat on the floor. Tighten your abdominal muscles and push your lower back into the floor. Hold this position for 5 seconds, then relax. Do 3 sets of 10.   Partial curl: Lie on your back with your knees bent and your feet flat on the floor. Tighten your stomach muscles. Tuck your chin to your chest. With your  hands stretched out in front of you, curl your upper body forward until your shoulders clear the floor. Hold this position for 3 seconds. Don't hold your breath. It helps to breathe out as you lift your shoulders up. Relax back to the floor. Repeat 10 times. Build to 3 sets of 10. To challenge yourself, clasp your hands behind your head and keep your elbows out to the side.   Gluteal stretch: Lie on your back with both knees bent. Rest the ankle of one leg over the knee of your other leg. Grasp the thigh of the bottom leg and pull toward your chest. You will feel a stretch along the buttocks and possibly along the outside of your hip. Hold the stretch for 15 to 30 seconds. Repeat 3 times with each leg.   Extension exercise:   0. Lie face down on the floor for 5 minutes. If this hurts too much, lie face down with a pillow under your stomach. This should relieve your leg or back pain. When you can lie on your stomach for 5 minutes without a pillow, you can continue with Part B of this exercise.   0. After lying on your stomach for 5 minutes, prop yourself up on your elbows for another 5 minutes. If you can do this without having more leg or buttock pain, you can start doing part C of this exercise.   0. Lie on your stomach with your hands under your shoulders. Then press down on your hands and extend your elbows while keeping your hips flat on the floor. Hold for 1 second and lower yourself to the floor. Do 3 to 5 sets of 10 repetitions. Rest for 1 minute between sets. You should have no pain in your legs when you do this, but it is normal to feel some pain in your lower back.   Do this exercise several times a day.   Side plank: Lie on your side with your legs, hips, and shoulders in a straight line. Prop yourself up onto your forearm so your elbow is directly under your shoulder. Lift your hips off the floor and balance on your forearm and the outside of your foot. Try to hold this position for 15 seconds, then  slowly lower your hip to the ground. Switch sides and repeat. Work up to holding for 1 minute or longer. This exercise can be made easier by starting with your knees and hips flexed toward your chest.   Published by [employer].  This content is reviewed periodically and is subject to change as new health information becomes available. The information is intended to inform and educate and is not a replacement for medical evaluation, advice, diagnosis or treatment by a healthcare professional.   Written by Liliana Lee, MS, PT, and Day Mendoza PT, [facility], [facility], for [employer]   ? 2010 [facility] and/or its affiliates. All Rights Reserved.         Copyright   [employer] 2011

## 2017-01-31 NOTE — TELEPHONE ENCOUNTER
Patient reports that the pain is the same as it was on 1/27/17. Notified of letter and back exercises are at the  to . Appointment made for tomorrow morning for recheck.  Gonzalo Ellison RN

## 2017-02-01 ENCOUNTER — OFFICE VISIT (OUTPATIENT)
Dept: FAMILY MEDICINE | Facility: CLINIC | Age: 32
End: 2017-02-01
Payer: COMMERCIAL

## 2017-02-01 ENCOUNTER — THERAPY VISIT (OUTPATIENT)
Dept: PHYSICAL THERAPY | Facility: CLINIC | Age: 32
End: 2017-02-01
Payer: COMMERCIAL

## 2017-02-01 VITALS
HEART RATE: 86 BPM | HEIGHT: 66 IN | BODY MASS INDEX: 47.09 KG/M2 | WEIGHT: 293 LBS | TEMPERATURE: 98.2 F | SYSTOLIC BLOOD PRESSURE: 122 MMHG | DIASTOLIC BLOOD PRESSURE: 84 MMHG

## 2017-02-01 DIAGNOSIS — F41.0 ANXIETY ATTACK: ICD-10-CM

## 2017-02-01 DIAGNOSIS — M54.50 ACUTE BILATERAL LOW BACK PAIN WITHOUT SCIATICA: Primary | ICD-10-CM

## 2017-02-01 DIAGNOSIS — M54.41 ACUTE BILATERAL LOW BACK PAIN WITH RIGHT-SIDED SCIATICA: Primary | ICD-10-CM

## 2017-02-01 PROCEDURE — 97162 PT EVAL MOD COMPLEX 30 MIN: CPT | Mod: GP | Performed by: PHYSICAL THERAPIST

## 2017-02-01 PROCEDURE — 97035 APP MDLTY 1+ULTRASOUND EA 15: CPT | Mod: GP | Performed by: PHYSICAL THERAPIST

## 2017-02-01 PROCEDURE — 99213 OFFICE O/P EST LOW 20 MIN: CPT | Performed by: PHYSICIAN ASSISTANT

## 2017-02-01 PROCEDURE — 97110 THERAPEUTIC EXERCISES: CPT | Mod: GP | Performed by: PHYSICAL THERAPIST

## 2017-02-01 RX ORDER — HYDROXYZINE HYDROCHLORIDE 25 MG/1
25-50 TABLET, FILM COATED ORAL EVERY 6 HOURS PRN
Qty: 30 TABLET | Refills: 0 | Status: SHIPPED | OUTPATIENT
Start: 2017-02-01 | End: 2017-09-01

## 2017-02-01 RX ORDER — HYDROCODONE BITARTRATE AND ACETAMINOPHEN 5; 325 MG/1; MG/1
1 TABLET ORAL EVERY 8 HOURS PRN
Qty: 15 TABLET | Refills: 0 | Status: SHIPPED | OUTPATIENT
Start: 2017-02-01 | End: 2017-03-14

## 2017-02-01 RX ORDER — NAPROXEN 500 MG/1
500 TABLET ORAL 2 TIMES DAILY PRN
Qty: 30 TABLET | Refills: 1 | Status: SHIPPED | OUTPATIENT
Start: 2017-02-01 | End: 2017-03-10

## 2017-02-01 NOTE — PROGRESS NOTES
"SUBJECTIVE:                                                    Janine Salas is a 31 year old female who presents to clinic today for the following health issues:    *  Follow up on back, wondering if she can get something stronger for pain.  Tita Mendoza CMA    Had acupuncture at chiropractor Friday, he told her she was inflamed. Recommended ice.  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 's dad lives with them but is disabled    Has not been breastfeeding    We wrote a letter for nuvaring appeal - can be up to a couple weeks to see if approved.  Recommended condoms in the meantime    Problem list and histories reviewed & adjusted, as indicated.  Additional history: none     Problem list, Medication list, Allergies, and Medical/Social/Surgical histories reviewed in EPIC and updated as appropriate.    ROS:  Other than noted above, general, HEENT, respiratory, cardiac and gastrointestinal systems are negative.     OBJECTIVE:                                                    /94 mmHg  Pulse 86  Temp(Src) 98.2  F (36.8  C) (Tympanic)  Ht 5' 6\" (1.676 m)  Wt 355 lb (161.027 kg)  BMI 57.33 kg/m2  LMP 01/09/2017 Body mass index is 57.33 kg/(m^2).   GENERAL: healthy, alert, well nourished, well hydrated, no distress  RESP: lungs clear to auscultation - no rales, no rhonchi, no wheezes  CV: regular rates and rhythm, normal S1 S2, no S3 or S4 and no murmur, no click or rub -  ABDOMEN: soft, no tenderness, no  hepatosplenomegaly, no masses, normal bowel sounds  Comprehensive back pain exam:  Tenderness of bilateral paralumbar muscles, SI areas, Pain limits the following motions: all,  , Lower extremity strength functional and equal on both sides, Lower extremity reflexes within normal limits bilaterally, Lower extremity sensation normal and equal on both " sides and Straight leg raise negative bilaterally       ASSESSMENT/PLAN:                                                      ASSESSMENT/PLAN:      ICD-10-CM    1. Acute bilateral low back pain without sciatica M54.5 HYDROcodone-acetaminophen (NORCO) 5-325 MG per tablet     [facility]     naproxen (NAPROSYN) 500 MG tablet   2. Anxiety attack F41.0 hydrOXYzine (ATARAX) 25 MG tablet     Patient appeared in pain with movements but okay with a position once she was able to get comfortabel  She set up physical therapy appointment after the visit, discussed again my concerns about narcotic usage. Limit them; she has tried 1/2 tablet which was okay    Patient Instructions   Range of motion stretches  For the first few days, use ice several times a day  - after that, can use heat in the morning, ice at night  Exercises and physical therapy referral  - stretching and core strength  Naproxen - not ibuprofen. Take with food. Be seen right away if abdominal/stomach pain  Norco - do not drive, operate heavy machinery, or work with this  Can use hydroxyzine at night - make sure no other groggy medications with this,  is home  Follow up if worsening or if not improving   No breastfeeding with these medications    Elisha Calvin PA-C   [facility]

## 2017-02-01 NOTE — Clinical Note
[facility]  [address] [address]  Phone: [phone]    February 1, 2017        Janine Salas  [address]  [place] [address]          To whom it may concern:    RE: Janine Salas    Patient was seen and treated 2/1/17 at our clinic.  Patient may return to work with the following:    Bend: Not at all (0 hours)  Squat: Not at all (0 hours)  Walk/Stand: Occasionally (1-3 hours)  Lift, carry, push, and pull no more than: 0-10 lbs.   These will be in place through 2/6/17.    Please contact me for questions or concerns.      Sincerely,        Elisha Calvin PA-C

## 2017-02-01 NOTE — PROGRESS NOTES
[facility] Initial Evaluation  Subjective:    Janine Salas is a 31 year old female with a lumbar condition.  Condition occurred with:  Insidious onset.  Condition occurred: for unknown reasons.  This is a new condition  Had upper back pain over the past 4-5 years. Has history of right hip pain from hockey injury. Has had lower back pain and tailbone pain for a week..    Patient reports pain:  Lower lumbar spine.  Radiates to:  Gluteals right and thigh right.  Pain is described as aching and shooting  and reported as 3/10 and 9/10.   Pain is the same all the time (not sleeping well).  Symptoms are exacerbated by bending, certain positions, lying down, sitting and walking and relieved by activity/movement, muscle relaxants and ice.  Since onset symptoms are unchanged.    Previous treatment includes chiropractic (acupuncture).  There was mild improvement following previous treatment.  General health as reported by patient is good.  Pertinent medical history includes:  Overweight.        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                                  Objective:    System         Lumbar/SI Evaluation  ROM:    AROM Lumbar:   Flexion:          Hands not even to mid thigh - pain in lower right SI  Ext:                    Severe loss in standing - shooting pain   Side Bend:        Left:     Right:   Rotation:           Left:     Right:   Side Glide:        Left:  Major loss, pain on right    Right:  Moderate loss - pain on right          Lumbar Myotomes:  normal                Lumbar Dermtomes:  normal                Neural Tension/Mobility:  Lumbar:  Not assessed        Lumbar Palpation:      Tenderness present at Right: Erector Spinae and PSIS        SI joint/Sacrum:    Too painful to do good testing           Sacral conclusion right:  Posterior inominate                                               Ezequiel Lumbar Evaluation        Test Movements:        EIL: During: centralizing   After: better  Mechanical Response: IncROM  Repeat EIL: During: centralizing  After: better  Mechanical Response: IncROM  SGIS R: During: produces  After: no worse    Repeat SGIS R: During: decreases  After: better  Mechanical Response: IncROM                                               ROS    Assessment/Plan:      Patient is a 31 year old female with lumbar complaints.    Patient has the following significant findings with corresponding treatment plan.                Diagnosis 1:  Low back pain, discogenic  Pain -  US and education  Decreased ROM/flexibility - manual therapy and therapeutic exercise    Therapy Evaluation Codes:   1) History comprised of:   Personal factors that impact the plan of care:      Past/current experiences.    Comorbidity factors that impact the plan of care are:      Overweight.     Medications impacting care: Anti-inflammatory, Muscle relaxant and Pain.  2) Examination of Body Systems comprised of:   Body structures and functions that impact the plan of care:      Hip, Lumbar spine, Pelvis and Sacral illiac joint.   Activity limitations that impact the plan of care are:      Bathing, Bending, Lifting, Sitting, Squatting/kneeling and Laying down.  3) Clinical presentation characteristics are:   Evolving/Changing.  4) Decision-Making    Moderate complexity using standardized patient assessment instrument and/or measureable assessment of functional outcome.  Cumulative Therapy Evaluation is: Moderate complexity.    Previous and current functional limitations:  (See Goal Flow Sheet for this information)    Short term and Long term goals: (See Goal Flow Sheet for this information)     Communication ability:  Patient appears to be able to clearly communicate and understand verbal and written communication and follow directions correctly.  Treatment Explanation - The following has been discussed with the patient:   RX ordered/plan of care  Anticipated outcomes  Possible risks and side  effects  This patient would benefit from PT intervention to resume normal activities.   Rehab potential is excellent.    Frequency:  1 X week, once daily  Duration:  for 4 weeks  Discharge Plan:  Achieve all LTG.  Independent in home treatment program.  Reach maximal therapeutic benefit.    Please refer to the daily flowsheet for treatment today, total treatment time and time spent performing 1:1 timed codes.

## 2017-02-01 NOTE — PATIENT INSTRUCTIONS
Range of motion stretches  For the first few days, use ice several times a day  - after that, can use heat in the morning, ice at night  Exercises and physical therapy referral  - stretching and core strength  Naproxen - not ibuprofen. Take with food. Be seen right away if abdominal/stomach pain  Norco - do not drive, operate heavy machinery, or work with this  Can use hydroxyzine at night - make sure no other groggy medications with this,  is home  Follow up if worsening or if not improving   No breastfeeding with these medications

## 2017-02-01 NOTE — NURSING NOTE
"Chief Complaint   Patient presents with     Back Pain       Initial /94 mmHg  Pulse 86  Temp(Src) 98.2  F (36.8  C) (Tympanic)  Ht 5' 6\" (1.676 m)  Wt 355 lb (161.027 kg)  BMI 57.33 kg/m2  LMP 01/09/2017 Estimated body mass index is 57.33 kg/(m^2) as calculated from the following:    Height as of this encounter: 5' 6\" (1.676 m).    Weight as of this encounter: 355 lb (161.027 kg).  BP completed using cuff size: regular  Tita Mendoza CMA  "

## 2017-02-01 NOTE — MR AVS SNAPSHOT
After Visit Summary   2/1/2017    Janine Salas    MRN: [id number]           Patient Information     Date Of Birth          1985        Visit Information        Provider Department      2/1/2017 8:20 AM Elisha Calvin PA-C [facility]        Today's Diagnoses     Acute bilateral low back pain without sciatica    -  1     Anxiety attack           Care Instructions    Range of motion stretches  For the first few days, use ice several times a day  - after that, can use heat in the morning, ice at night  Exercises and physical therapy referral  - stretching and core strength  Naproxen - not ibuprofen. Take with food. Be seen right away if abdominal/stomach pain  Norco - do not drive, operate heavy machinery, or work with this  Can use hydroxyzine at night - make sure no other groggy medications with this,  is home  Follow up if worsening or if not improving   No breastfeeding with these medications        Follow-ups after your visit        Additional Services     [facility] PT, HAND, AND CHIROPRACTIC REFERRAL       **This order will print in the [facility] Scheduling Office**    Physical Therapy, Hand Therapy and Chiropractic Care are available through:    *[facility]  *[facility]  *[facility]    Call one number to schedule at any of the above locations: [phone].    Your provider has referred you to: Physical Therapy at [facility] or [facility]    Indication/Reason for Referral: Low Back Pain  Onset of Illness: issues in the past, past week worse  Therapy Orders: Evaluate and Treat  Special Programs: None  Special Request: None    Carolina Figueroa      Additional Comments for the Therapist or Chiropractor: none    Please be aware that coverage of these services is subject to the terms and limitations of your health insurance plan.  Call member services at your health plan with any benefit or coverage questions.      Please bring the following  "to your appointment:    *Your personal calendar for scheduling future appointments  *Comfortable clothing                  Who to contact     Normal or non-critical lab and imaging results will be communicated to you by HighGroundhart, letter or phone within 4 business days after the clinic has received the results. If you do not hear from us within 7 days, please contact the clinic through HighGroundhart or phone. If you have a critical or abnormal lab result, we will notify you by phone as soon as possible.  Submit refill requests through Syapse or call your pharmacy and they will forward the refill request to us. Please allow 3 business days for your refill to be completed.          If you need to speak with a  for additional information , please call: [phone]             Additional Information About Your Visit        Syapse Information     Syapse gives you secure access to your electronic health record. If you see a primary care provider, you can also send messages to your care team and make appointments. If you have questions, please call your primary care clinic.  If you do not have a primary care provider, please call [phone] and they will assist you.        Care EveryWhere ID     This is your Care EveryWhere ID. This could be used by other organizations to access your [place] medical records  [id number]        Your Vitals Were     Pulse Temperature Height BMI (Body Mass Index) Last Period Breastfeeding?    86 98.2  F (36.8  C) (Tympanic) 5' 6\" (1.676 m) 57.33 kg/m2 01/09/2017 No       Blood Pressure from Last 3 Encounters:   02/01/17 122/84   01/27/17 140/90   01/09/17 130/78    Weight from Last 3 Encounters:   02/01/17 355 lb (161.027 kg)   01/27/17 355 lb 9.6 oz (161.299 kg)   01/09/17 358 lb (162.388 kg)              We Performed the Following     [facility] PT, HAND, AND CHIROPRACTIC REFERRAL          Today's Medication Changes          These changes are accurate as of: 2/1/17  9:20 AM.  If " you have any questions, ask your nurse or doctor.               Start taking these medicines.        Dose/Directions    hydrOXYzine 25 MG tablet   Commonly known as:  ATARAX   Used for:  Anxiety attack   Started by:  Elisha Calvin PA-C        Dose:  25-50 mg   Take 1-2 tablets (25-50 mg) by mouth every 6 hours as needed for itching   Quantity:  30 tablet   Refills:  0       naproxen 500 MG tablet   Commonly known as:  NAPROSYN   Used for:  Acute bilateral low back pain without sciatica   Started by:  Elisha Calvin PA-C        Dose:  500 mg   Take 1 tablet (500 mg) by mouth 2 times daily as needed for moderate pain   Quantity:  30 tablet   Refills:  1            Where to get your medicines      These medications were sent to [facility] [place], [place] - [address]  [address] [facility] [address]     Phone:  [phone]    - hydrOXYzine 25 MG tablet  - naproxen 500 MG tablet      Some of these will need a paper prescription and others can be bought over the counter.  Ask your nurse if you have questions.     Bring a paper prescription for each of these medications    - HYDROcodone-acetaminophen 5-325 MG per tablet             Primary Care Provider Office Phone # Fax #    Kavya Haider PA-C [phone]       [facility] [address] [facility] [address]        Thank you!     Thank you for choosing [facility]  for your care. Our goal is always to provide you with excellent care. Hearing back from our patients is one way we can continue to improve our services. Please take a few minutes to complete the written survey that you may receive in the mail after your visit with us. Thank you!             Your Updated Medication List - Protect others around you: Learn how to safely use, store and throw away your medicines at www.disposemymeds.org.          This list is accurate as of: 2/1/17  9:20 AM.  Always use your most recent med  list.                   Brand Name Dispense Instructions for use    buPROPion 150 MG 24 hr tablet    WELLBUTRIN XL    30 tablet    Take 1 tablet (150 mg) by mouth every morning Please schedule follow up appointment for further refills.       cyclobenzaprine 10 MG tablet    FLEXERIL    60 tablet    Take 0.5-1 tablets (5-10 mg) by mouth 3 times daily as needed for muscle spasms       etonogestrel-ethinyl estradiol 0.12-0.015 MG/24HR vaginal ring    NUVARING    3 each    Insert 1 ring vaginally every 21 days then remove for 1 week then repeat with new ring.       FISH OIL-FLAX OIL-BORAGE OIL PO          FOLIC ACID PO      Take 400 mcg by mouth daily       glucosamine-chondroitin 500-400 MG Caps per capsule      Take 1 capsule by mouth daily       HYDROcodone-acetaminophen 5-325 MG per tablet    NORCO    15 tablet    Take 1 tablet by mouth every 8 hours as needed for moderate to severe pain       hydrOXYzine 25 MG tablet    ATARAX    30 tablet    Take 1-2 tablets (25-50 mg) by mouth every 6 hours as needed for itching       ibuprofen 400 MG tablet    ADVIL/MOTRIN    120 tablet    Take 1-2 tablets (400-800 mg) by mouth every 6 hours as needed for other (cramping)       labetalol 100 MG tablet    NORMODYNE    180 tablet    Take 3 tablets (300 mg) by mouth 2 times daily Follow up with PCP for further refills.       LACTOBACILLUS PO      Take 1 tablet by mouth daily       naproxen 500 MG tablet    NAPROSYN    30 tablet    Take 1 tablet (500 mg) by mouth 2 times daily as needed for moderate pain       prenatal multivitamin  plus iron 27-0.8 MG Tabs per tablet      Take 1 tablet by mouth daily       sertraline 50 MG tablet    ZOLOFT    30 tablet    Take 1/2 tablet (25 mg) for 1-2 weeks, then increase to 1 tablet orally daily       vitamin E 400 UNIT capsule      Take 400 Units by mouth daily

## 2017-02-05 ENCOUNTER — MYC MEDICAL ADVICE (OUTPATIENT)
Dept: FAMILY MEDICINE | Facility: CLINIC | Age: 32
End: 2017-02-05

## 2017-02-06 ENCOUNTER — THERAPY VISIT (OUTPATIENT)
Dept: PHYSICAL THERAPY | Facility: CLINIC | Age: 32
End: 2017-02-06
Payer: COMMERCIAL

## 2017-02-06 DIAGNOSIS — M54.41 ACUTE BILATERAL LOW BACK PAIN WITH RIGHT-SIDED SCIATICA: Primary | ICD-10-CM

## 2017-02-06 PROCEDURE — 97035 APP MDLTY 1+ULTRASOUND EA 15: CPT | Mod: GP | Performed by: PHYSICAL THERAPIST

## 2017-02-06 PROCEDURE — 97140 MANUAL THERAPY 1/> REGIONS: CPT | Mod: GP | Performed by: PHYSICAL THERAPIST

## 2017-02-06 PROCEDURE — 97110 THERAPEUTIC EXERCISES: CPT | Mod: GP | Performed by: PHYSICAL THERAPIST

## 2017-02-06 NOTE — TELEPHONE ENCOUNTER
Kavya Haider,   Please review the my chart below as it looks like patient is responding only. Advise if you would want any further triage.  Thank you, Lizzy

## 2017-02-09 ENCOUNTER — CARE COORDINATION (OUTPATIENT)
Dept: CARE COORDINATION | Facility: CLINIC | Age: 32
End: 2017-02-09

## 2017-02-13 ENCOUNTER — THERAPY VISIT (OUTPATIENT)
Dept: PHYSICAL THERAPY | Facility: CLINIC | Age: 32
End: 2017-02-13
Payer: COMMERCIAL

## 2017-02-13 DIAGNOSIS — M54.41 ACUTE BILATERAL LOW BACK PAIN WITH RIGHT-SIDED SCIATICA: ICD-10-CM

## 2017-02-13 PROCEDURE — 97035 APP MDLTY 1+ULTRASOUND EA 15: CPT | Mod: GP | Performed by: PHYSICAL THERAPIST

## 2017-02-13 PROCEDURE — 97110 THERAPEUTIC EXERCISES: CPT | Mod: GP | Performed by: PHYSICAL THERAPIST

## 2017-02-13 NOTE — TELEPHONE ENCOUNTER
sertraline     Last Written Prescription Date: 1/9/17  Last Fill Quantity: 30, # refills: 0  [place] Office Visit with [facility] [place] provider:  2/1/17        Last PHQ-9 score on record=   PHQ-9 SCORE 1/27/2017   Total Score 4       Routing refill request to provider for review/approval because:  Tapering dose - not sure if patient is taking 50mg daily

## 2017-02-17 ENCOUNTER — TELEPHONE (OUTPATIENT)
Dept: OBGYN | Facility: CLINIC | Age: 32
End: 2017-02-17

## 2017-02-17 NOTE — TELEPHONE ENCOUNTER
Phentermine 37.5mg      Last Written Prescription Date:  N/A  Last Fill Quantity: N/A,   # refills: N/A  Last Office Visit with [facility] provider: 8/7/15  Future Office visit:       Routing refill request to provider for review/approval because:  Drug not on the [facility] refill protocol or controlled substance  Drug not active on patient's medication list    Kandi Edward CPhT  [facility]    On behalf of [facility]

## 2017-02-20 ENCOUNTER — THERAPY VISIT (OUTPATIENT)
Dept: PHYSICAL THERAPY | Facility: CLINIC | Age: 32
End: 2017-02-20
Payer: COMMERCIAL

## 2017-02-20 DIAGNOSIS — M54.41 ACUTE BILATERAL LOW BACK PAIN WITH RIGHT-SIDED SCIATICA: ICD-10-CM

## 2017-02-20 PROCEDURE — 97110 THERAPEUTIC EXERCISES: CPT | Mod: GP | Performed by: PHYSICAL THERAPY ASSISTANT

## 2017-02-20 PROCEDURE — 97140 MANUAL THERAPY 1/> REGIONS: CPT | Mod: GP | Performed by: PHYSICAL THERAPY ASSISTANT

## 2017-02-20 PROCEDURE — 97035 APP MDLTY 1+ULTRASOUND EA 15: CPT | Mod: GP | Performed by: PHYSICAL THERAPY ASSISTANT

## 2017-02-21 NOTE — TELEPHONE ENCOUNTER
Left message for patient to return call to clinic for below info.  Pharmacy notified.    Mary Painting   [facility]yn [facility]  RN

## 2017-03-01 NOTE — TELEPHONE ENCOUNTER
Will close encounter.  Unable to reach patient.  Patient not returning call.    Mary Painting   Ob[facility]Gyn Clinic  RN

## 2017-03-06 ENCOUNTER — THERAPY VISIT (OUTPATIENT)
Dept: PHYSICAL THERAPY | Facility: CLINIC | Age: 32
End: 2017-03-06
Payer: COMMERCIAL

## 2017-03-06 DIAGNOSIS — M54.41 ACUTE BILATERAL LOW BACK PAIN WITH RIGHT-SIDED SCIATICA: ICD-10-CM

## 2017-03-06 PROCEDURE — 97110 THERAPEUTIC EXERCISES: CPT | Mod: GP | Performed by: PHYSICAL THERAPY ASSISTANT

## 2017-03-06 PROCEDURE — 97530 THERAPEUTIC ACTIVITIES: CPT | Mod: GP | Performed by: PHYSICAL THERAPY ASSISTANT

## 2017-03-10 ENCOUNTER — MYC MEDICAL ADVICE (OUTPATIENT)
Dept: FAMILY MEDICINE | Facility: CLINIC | Age: 32
End: 2017-03-10

## 2017-03-10 DIAGNOSIS — M54.50 ACUTE BILATERAL LOW BACK PAIN WITHOUT SCIATICA: ICD-10-CM

## 2017-03-10 DIAGNOSIS — M62.838 MUSCLE SPASM: ICD-10-CM

## 2017-03-10 DIAGNOSIS — O13.3 PIH (PREGNANCY INDUCED HYPERTENSION), THIRD TRIMESTER: ICD-10-CM

## 2017-03-10 RX ORDER — NAPROXEN 500 MG/1
500 TABLET ORAL 2 TIMES DAILY PRN
Qty: 30 TABLET | Refills: 1 | Status: SHIPPED | OUTPATIENT
Start: 2017-03-10 | End: 2018-01-30

## 2017-03-10 RX ORDER — LABETALOL 100 MG/1
300 TABLET, FILM COATED ORAL 2 TIMES DAILY
Qty: 180 TABLET | Refills: 0 | Status: SHIPPED | OUTPATIENT
Start: 2017-03-10 | End: 2017-09-01

## 2017-03-10 RX ORDER — CYCLOBENZAPRINE HCL 10 MG
5-10 TABLET ORAL 3 TIMES DAILY PRN
Qty: 60 TABLET | Refills: 0 | Status: SHIPPED | OUTPATIENT
Start: 2017-03-10 | End: 2017-09-01

## 2017-03-14 ENCOUNTER — TELEPHONE (OUTPATIENT)
Dept: FAMILY MEDICINE | Facility: CLINIC | Age: 32
End: 2017-03-14

## 2017-03-14 ENCOUNTER — OFFICE VISIT (OUTPATIENT)
Dept: FAMILY MEDICINE | Facility: CLINIC | Age: 32
End: 2017-03-14
Payer: COMMERCIAL

## 2017-03-14 VITALS
BODY MASS INDEX: 47.09 KG/M2 | HEART RATE: 78 BPM | SYSTOLIC BLOOD PRESSURE: 128 MMHG | WEIGHT: 293 LBS | HEIGHT: 66 IN | DIASTOLIC BLOOD PRESSURE: 78 MMHG | TEMPERATURE: 98.4 F

## 2017-03-14 DIAGNOSIS — N97.9 FEMALE INFERTILITY: ICD-10-CM

## 2017-03-14 DIAGNOSIS — M54.41 ACUTE BILATERAL LOW BACK PAIN WITH RIGHT-SIDED SCIATICA: ICD-10-CM

## 2017-03-14 DIAGNOSIS — E66.01 MORBID OBESITY, UNSPECIFIED OBESITY TYPE (H): ICD-10-CM

## 2017-03-14 DIAGNOSIS — L85.8 KERATOSIS PILARIS: ICD-10-CM

## 2017-03-14 DIAGNOSIS — I10 ESSENTIAL HYPERTENSION: ICD-10-CM

## 2017-03-14 PROCEDURE — 99214 OFFICE O/P EST MOD 30 MIN: CPT | Performed by: PHYSICIAN ASSISTANT

## 2017-03-14 RX ORDER — PHENTERMINE HYDROCHLORIDE 37.5 MG/1
37.5 CAPSULE ORAL EVERY MORNING
Qty: 30 CAPSULE | Refills: 1 | Status: SHIPPED | OUTPATIENT
Start: 2017-03-14 | End: 2017-06-21

## 2017-03-14 NOTE — TELEPHONE ENCOUNTER
Please do not close this encounter until this has been addressed.  (prior auth approved/denied, prescriber refusal to complete prior auth or medication changed/discontinued)    Prior Authorization needed on: Phentermine 37.5mg Tabs  Drug NDC: [address]-99     Insurance: [facility]  Member ID: [id number]   Insurance phone #: [phone]    Pharmacy NPI: [id number]  Pharmacy Phone #: [phone]  Pharmacy Fax #: [phone]    Please send new RX for tabs, we have rx for capsules    Please let us know if the PA gets approved or denied or if medication is changed.     Tang Carrillo Pharmacy Technician  [facility]

## 2017-03-14 NOTE — MR AVS SNAPSHOT
After Visit Summary   3/14/2017    Janine Salas    MRN: [id number]           Patient Information     Date Of Birth          1985        Visit Information        Provider Department      3/14/2017 9:20 AM Kavya Haider PA-C [facility]        Today's Diagnoses     Keratosis pilaris    -  1    Essential hypertension        Obesity        Female infertility           Follow-ups after your visit        Additional Services     DERMATOLOGY REFERRAL       Your provider has referred you to: [facility]: [facility] [phone]   [email or web address]    Please be aware that coverage of these services is subject to the terms and limitations of your health insurance plan.  Call member services at your health plan with any benefit or coverage questions.      Please bring the following with you to your appointment:    (1) Any X-Rays, CTs or MRIs which have been performed.  Contact the facility where they were done to arrange for  prior to your scheduled appointment.    (2) List of current medications  (3) This referral request   (4) Any documents/labs given to you for this referral                  Your next 10 appointments already scheduled     Mar 20, 2017  2:50 PM CDT   [facility] Spine with Kim Britt, PT   [facility] ([facility])    [address] [address] [phone]              Who to contact     Normal or non-critical lab and imaging results will be communicated to you by MyChart, letter or phone within 4 business days after the clinic has received the results. If you do not hear from us within 7 days, please contact the clinic through MyChart or phone. If you have a critical or abnormal lab result, we will notify you by phone as soon as possible.  Submit refill requests through [email or web address] or call your pharmacy and they will forward the refill request to us. Please allow 3 business days for  "your refill to be completed.          If you need to speak with a  for additional information , please call: [phone]             Additional Information About Your Visit        Kinsa Inchart Information     Gigathlete gives you secure access to your electronic health record. If you see a primary care provider, you can also send messages to your care team and make appointments. If you have questions, please call your primary care clinic.  If you do not have a primary care provider, please call [phone] and they will assist you.        Care EveryWhere ID     This is your Care EveryWhere ID. This could be used by other organizations to access your [place] medical records  [id number]        Your Vitals Were     Pulse Temperature Height BMI (Body Mass Index)          78 98.4  F (36.9  C) (Tympanic) 5' 6\" (1.676 m) 56.01 kg/m2         Blood Pressure from Last 3 Encounters:   03/14/17 128/78   02/01/17 122/84   01/27/17 140/90    Weight from Last 3 Encounters:   03/14/17 (!) 347 lb (157.4 kg)   02/01/17 (!) 355 lb (161 kg)   01/27/17 (!) 355 lb 9.6 oz (161.3 kg)              We Performed the Following     Comprehensive metabolic panel (BMP + Alb, Alk Phos, ALT, AST, Total. Bili, TP)     DERMATOLOGY REFERRAL          Today's Medication Changes          These changes are accurate as of: 3/14/17 10:12 AM.  If you have any questions, ask your nurse or doctor.               Start taking these medicines.        Dose/Directions    phentermine 37.5 MG capsule   Used for:  Morbid obesity, unspecified obesity type (H), Female infertility   Started by:  Kavya Haider PA-C        Dose:  37.5 mg   Take 1 capsule (37.5 mg) by mouth every morning   Quantity:  30 capsule   Refills:  1            Where to get your medicines      Some of these will need a paper prescription and others can be bought over the counter.  Ask your nurse if you have questions.     Bring a paper prescription for each of these " medications     phentermine 37.5 MG capsule                Primary Care Provider Office Phone # Fax #    Kavay Haider PA-C [phone]       [facility] [address] [address] [address]        Thank you!     Thank you for choosing [facility]  for your care. Our goal is always to provide you with excellent care. Hearing back from our patients is one way we can continue to improve our services. Please take a few minutes to complete the written survey that you may receive in the mail after your visit with us. Thank you!             Your Updated Medication List - Protect others around you: Learn how to safely use, store and throw away your medicines at www.disposemymeds.org.          This list is accurate as of: 3/14/17 10:12 AM.  Always use your most recent med list.                   Brand Name Dispense Instructions for use    buPROPion 150 MG 24 hr tablet    WELLBUTRIN XL    30 tablet    Take 1 tablet (150 mg) by mouth every morning Please schedule follow up appointment for further refills.       cyclobenzaprine 10 MG tablet    FLEXERIL    60 tablet    Take 0.5-1 tablets (5-10 mg) by mouth 3 times daily as needed for muscle spasms       etonogestrel-ethinyl estradiol 0.12-0.015 MG/24HR vaginal ring    NUVARING    3 each    Insert 1 ring vaginally every 21 days then remove for 1 week then repeat with new ring.       FISH OIL-FLAX OIL-BORAGE OIL PO          FOLIC ACID PO      Take 400 mcg by mouth daily       glucosamine-chondroitin 500-400 MG Caps per capsule      Take 1 capsule by mouth daily       hydrOXYzine 25 MG tablet    ATARAX    30 tablet    Take 1-2 tablets (25-50 mg) by mouth every 6 hours as needed for itching       ibuprofen 400 MG tablet    ADVIL/MOTRIN    120 tablet    Take 1-2 tablets (400-800 mg) by mouth every 6 hours as needed for other (cramping)       labetalol 100 MG tablet    NORMODYNE    180 tablet    Take 3 tablets (300 mg) by mouth 2  times daily Follow up with PCP for further refills.       LACTOBACILLUS PO      Take 1 tablet by mouth daily       naproxen 500 MG tablet    NAPROSYN    30 tablet    Take 1 tablet (500 mg) by mouth 2 times daily as needed for moderate pain       phentermine 37.5 MG capsule     30 capsule    Take 1 capsule (37.5 mg) by mouth every morning       prenatal multivitamin  plus iron 27-0.8 MG Tabs per tablet      Take 1 tablet by mouth daily       sertraline 50 MG tablet    ZOLOFT    30 tablet    Take 1 tablet (50 mg) by mouth daily       vitamin E 400 UNIT capsule      Take 400 Units by mouth daily

## 2017-03-14 NOTE — PROGRESS NOTES
"  SUBJECTIVE:                                                    Janine Salas is a 31 year old female who presents to clinic today for the following health issues:      Medication Followup of Zoloft     Taking Medication as prescribed: yes, wondering if she should go down to a half of a dose     Side Effects: Headaches?     Medication Helping Symptoms:  yes     ? Headache from zoloft  Would like to try decreasing the dose anyway as she feels like her mood issues stemmed from the birth control - since stopping, mood improved significantly   Currently not using any birth control - relying on the rhythm method or \"pull out\"   Tried to get script for nuva ring but required a prior auth which is still processing      Back Pain      Duration: About 1-2 months         Specific cause: none    Description:   Location of pain: low back both  Character of pain: sharp at times and dull ache  Pain radiation:Both hips   New numbness or weakness in legs, not attributed to pain:  no     Intensity: moderate, severe    History:   Pain interferes with job: No  History of back problems: no prior back problems  Any previous MRI or X-rays: None  Sees a specialist for back pain:  No  Therapies tried without relief: She has tried physical therapy and it has helped somewhat     Alleviating factors:   Improved by: Physical Therapy, has helped a little bit      Precipitating factors:  Worsened by: Lifting, Bending and Walking    Functional and Psychosocial Screen (Carolina STarT Back):      Not performed today       Accompanying Signs & Symptoms:  Risk of Fracture:  None  Risk of Cauda Equina:  None  Risk of Infection:  None  Risk of Cancer:  None  Risk of Ankylosing Spondylitis:  Onset at age <35, male, AND morning back stiffness. no                      She also wants to discuss weight loss today.   Needs to lose weight  Would like to have at least one more child and was told she needs to lose weight beforehand  Was prescribed phentermine " by OB/GYN and did lose 10-15lb while on it. Would like to restart    She would like a dermatology referral too. She got one from Dr. Alvarado but thinks she needs another one.   She also has a wart on her left elbow she wants looked at.     Not on her blood pressure medications currently  Was placed on labetolol during pregnancy for HTN  Ran out and has been out of medication  Not checking bp's at home but have been okay in clinic  No headaches, dizziness, chest pain or pressure    Problem list and histories reviewed & adjusted, as indicated.  Additional history: as documented    Current Outpatient Prescriptions   Medication Sig Dispense Refill     naproxen (NAPROSYN) 500 MG tablet Take 1 tablet (500 mg) by mouth 2 times daily as needed for moderate pain 30 tablet 1     labetalol (NORMODYNE) 100 MG tablet Take 3 tablets (300 mg) by mouth 2 times daily Follow up with PCP for further refills. 180 tablet 0     cyclobenzaprine (FLEXERIL) 10 MG tablet Take 0.5-1 tablets (5-10 mg) by mouth 3 times daily as needed for muscle spasms 60 tablet 0     sertraline (ZOLOFT) 50 MG tablet Take 1 tablet (50 mg) by mouth daily 30 tablet 1     hydrOXYzine (ATARAX) 25 MG tablet Take 1-2 tablets (25-50 mg) by mouth every 6 hours as needed for itching 30 tablet 0     buPROPion (WELLBUTRIN XL) 150 MG 24 hr tablet Take 1 tablet (150 mg) by mouth every morning Please schedule follow up appointment for further refills. 30 tablet 11     Flax Oil-Fish Oil-Borage Oil (FISH OIL-FLAX OIL-BORAGE OIL PO)        ibuprofen (ADVIL,MOTRIN) 400 MG tablet Take 1-2 tablets (400-800 mg) by mouth every 6 hours as needed for other (cramping) 120 tablet 0     Prenatal Vit-Fe Fumarate-FA (PRENATAL MULTIVITAMIN  PLUS IRON) 27-0.8 MG TABS Take 1 tablet by mouth daily       LACTOBACILLUS PO Take 1 tablet by mouth daily       glucosamine-chondroitin 500-400 MG CAPS Take 1 capsule by mouth daily       vitamin E 400 UNIT capsule Take 400 Units by mouth daily         "FOLIC ACID PO Take 400 mcg by mouth daily        etonogestrel-ethinyl estradiol (NUVARING) 0.12-0.015 MG/24HR vaginal ring Insert 1 ring vaginally every 21 days then remove for 1 week then repeat with new ring. (Patient not taking: Reported on 3/14/2017) 3 each 1     Allergies   Allergen Reactions     Codeine Itching     Zofran [Ondansetron] Other (See Comments)     Headaches        Reviewed and updated as needed this visit by clinical staff       Reviewed and updated as needed this visit by Provider         ROS:  Remainder of ROS obtained and found to be negative other than that which was documented above      OBJECTIVE:                                                    /78 (BP Location: Right arm, Patient Position: Chair, Cuff Size: Adult Large)  Pulse 78  Temp 98.4  F (36.9  C) (Tympanic)  Ht 5' 6\" (1.676 m)  Wt (!) 347 lb (157.4 kg)  BMI 56.01 kg/m2  Body mass index is 56.01 kg/(m^2).  GENERAL: healthy, alert and no distress  RESP: lungs clear to auscultation - no rales, rhonchi or wheezes  CV: regular rates and rhythm, normal S1 S2, no S3 or S4 and no murmur, click or rub  Comprehensive back pain exam:  No tenderness, Range of motion not limited by pain, Lower extremity strength functional and equal on both sides, Lower extremity reflexes within normal limits bilaterally and Lower extremity sensation normal and equal on both sides  PSYCH: mentation appears normal, affect normal/bright    Diagnostic Test Results:  none      ASSESSMENT/PLAN:                                                    (F53) Post-partum depression  (primary encounter diagnosis)  Comment: on zoloft. Patient feels related to contraception as mood improved dramatically when it was stopped  Plan: Will decrease dose to 25mg  Also advised she move the medication to nighttime dosing so that if she does have a headache it occurs while sleeping  Monitor closely for change in mood with reduction in dose    (I10) Essential " hypertension  Comment: bp well controlled today. Will hold on restarting beta blocker and follow closely - will need to monitor with restarting phentermine  Plan: CANCELED: Comprehensive metabolic panel (BMP +         Alb, Alk Phos, ALT, AST, Total. Bili, TP)            (L85.8) Keratosis pilaris  Comment:   Plan: DERMATOLOGY REFERRAL            (E66.01) Obesity  Comment: has been on before with + weight loss. Will restart. Referral to meet with dietician as well  Plan: phentermine 37.5 MG capsule, NUTRITION REFERRAL            (N97.9) Female infertility  Comment:   Plan: phentermine 37.5 MG capsule            (M54.41) Acute bilateral low back pain with right-sided sciatica  Comment:   Plan: Needs to continue with PT as she has been doing - has noticed some improvement. WEight loss would also benefit         Kavya Haider PA-C  [facility]  марина

## 2017-03-14 NOTE — NURSING NOTE
"Chief Complaint   Patient presents with     Back Pain       Initial /78 (BP Location: Right arm, Patient Position: Chair, Cuff Size: Adult Large)  Pulse 78  Temp 98.4  F (36.9  C) (Tympanic)  Ht 5' 6\" (1.676 m)  Wt (!) 347 lb (157.4 kg)  BMI 56.01 kg/m2 Estimated body mass index is 56.01 kg/(m^2) as calculated from the following:    Height as of this encounter: 5' 6\" (1.676 m).    Weight as of this encounter: 347 lb (157.4 kg).  Medication Reconciliation: complete   Isra Cervantes CMA    "

## 2017-03-28 NOTE — TELEPHONE ENCOUNTER
Prior authorization denied    See Judah  Pharmacy Technician, [place]  [facility]  Phone: [phone]  Fax: [phone]

## 2017-04-11 NOTE — TELEPHONE ENCOUNTER
SERTRALINE HCL 50MG TABS       Last Written Prescription Date: 2/14/17  Last Fill Quantity: 30, # refills: 1  Last Office Visit with [facility] primary care provider:  3/14/17        Last PHQ-9 score on record=   PHQ-9 SCORE 1/27/2017   Total Score 4

## 2017-05-23 ENCOUNTER — TELEPHONE (OUTPATIENT)
Dept: FAMILY MEDICINE | Facility: CLINIC | Age: 32
End: 2017-05-23

## 2017-05-23 ENCOUNTER — OFFICE VISIT (OUTPATIENT)
Dept: DERMATOLOGY | Facility: CLINIC | Age: 32
End: 2017-05-23
Payer: COMMERCIAL

## 2017-05-23 DIAGNOSIS — D22.9 NEVUS: ICD-10-CM

## 2017-05-23 DIAGNOSIS — R10.11 ABDOMINAL PAIN, RIGHT UPPER QUADRANT: Primary | ICD-10-CM

## 2017-05-23 DIAGNOSIS — L85.8 KP (KERATOSIS PILARIS): ICD-10-CM

## 2017-05-23 DIAGNOSIS — L92.0 GRANULOMA ANNULARE: ICD-10-CM

## 2017-05-23 DIAGNOSIS — L30.9 CHRONIC DERMATITIS OF HANDS: Primary | ICD-10-CM

## 2017-05-23 PROCEDURE — 99203 OFFICE O/P NEW LOW 30 MIN: CPT | Performed by: DERMATOLOGY

## 2017-05-23 RX ORDER — FLUOCINONIDE 0.5 MG/G
CREAM TOPICAL
Qty: 120 G | Refills: 0 | Status: SHIPPED | OUTPATIENT
Start: 2017-05-23 | End: 2017-07-31

## 2017-05-23 RX ORDER — DOXYCYCLINE 100 MG/1
100 CAPSULE ORAL 2 TIMES DAILY
Qty: 60 CAPSULE | Refills: 3 | Status: SHIPPED | OUTPATIENT
Start: 2017-05-23 | End: 2017-08-01

## 2017-05-23 RX ORDER — CLINDAMYCIN AND BENZOYL PEROXIDE 10; 50 MG/G; MG/G
GEL TOPICAL 2 TIMES DAILY
Qty: 50 G | Refills: 11 | Status: SHIPPED | OUTPATIENT
Start: 2017-05-23 | End: 2017-06-05

## 2017-05-23 RX ORDER — DOXYCYCLINE 100 MG/1
100 CAPSULE ORAL 2 TIMES DAILY
Qty: 60 CAPSULE | Refills: 3 | Status: SHIPPED | OUTPATIENT
Start: 2017-05-23 | End: 2017-05-23

## 2017-05-23 RX ORDER — CLINDAMYCIN AND BENZOYL PEROXIDE 10; 50 MG/G; MG/G
GEL TOPICAL 2 TIMES DAILY
Qty: 50 G | Refills: 11 | Status: SHIPPED | OUTPATIENT
Start: 2017-05-23 | End: 2017-05-23

## 2017-05-23 RX ORDER — FLUOCINONIDE 0.5 MG/G
CREAM TOPICAL
Qty: 120 G | Refills: 0 | Status: SHIPPED | OUTPATIENT
Start: 2017-05-23 | End: 2017-05-23

## 2017-05-23 NOTE — PATIENT INSTRUCTIONS
**A prescription cream was sent to your primary pharmacy to put on your arms and the affected areas    **A prescription antibiotic was also sent to treat the possible Granuloma Annulare    **A prescription cream was sent to treat the eczema on your fingers/hands    **We will you have you complete blood work today and then contact you with the results    **Please follow up with Dr. DANNA Khan in 4-6 weeks

## 2017-05-23 NOTE — NURSING NOTE
Chief Complaint   Patient presents with     Derm Problem     multiple concerns       There were no vitals filed for this visit.  Wt Readings from Last 1 Encounters:   03/14/17 (!) 157.4 kg (347 lb)       Kanchan Pichardo LPN.................5/23/2017

## 2017-05-23 NOTE — TELEPHONE ENCOUNTER
I guess I'm not sure what labs she is referring to.   I had wanted her to come back to clinic to monitor her blood pressure as we started a new medication (phentermine) at her last visit on 3/14 and we need to monitor blood pressure closely, especially with her history of high blood pressure.     Kavya

## 2017-05-23 NOTE — PROGRESS NOTES
Janine Salas is a 31 year old year old female patient here today for spots on skin.   .  Patient states this has been present for a while.  .  Patient reports the following symptoms:  Spreaing.  Worse in sun.  .  Patient reports the following previous treatments none.  Patient reports the following modifying factors none.  Associated symptoms: none.  Patient has no other skin complaints today.  Remainder of the HPI, Meds, PMH, Allergies, FH, and SH was reviewed in chart.      Past Medical History:   Diagnosis Date     Acute pancreatitis 1/15     Anxiety      Chickenpox      PONV (postoperative nausea and vomiting)        Past Surgical History:   Procedure Laterality Date     APPENDECTOMY        SECTION N/A 2016    Procedure:  SECTION;  Surgeon: Marco Marin MD;  Location: [facility] OR     COLONOSCOPY       LAPAROSCOPIC CHOLECYSTECTOMY  2013    Procedure: LAPAROSCOPIC CHOLECYSTECTOMY;  Laparoscopic Cholecystectomy;  Surgeon: Lasha Garcias MD;  Location: [facility] OR     MOUTH SURGERY      wisdom teeth     UPPER GI ENDOSCOPY          Family History   Problem Relation Age of Onset     Hypertension Mother      Depression Mother      OSTEOPOROSIS Mother      Thyroid Disease Mother      Hypertension Father      Alcohol/Drug Father      recovered alcohol     CANCER Father      melanoma     Hypertension Maternal Grandmother      Alzheimer Disease Maternal Grandmother      Cardiovascular Maternal Grandmother      triple bypass     CANCER Maternal Grandfather      lung     Alcohol/Drug Paternal Grandmother      alcohol     Cancer - colorectal Paternal Grandmother      colon     Alcohol/Drug Paternal Grandfather      alcohol     CANCER Paternal Grandfather      leukemia - adult onset     Cardiovascular Paternal Grandfather      stent     Obesity Sister      Obesity Sister      Obesity Sister      Thyroid Disease Sister      Breast Cancer Maternal Aunt      CANCER Sister      cervical  cancer, hysterectomy     Substance Abuse Sister      recovered drugs     Depression Sister      Autism Spectrum Disorder Other      Aspergers     Bipolar Disorder Other        Social History     Social History     Marital status: Single     Spouse name: N/A     Number of children: 1     Years of education: N/A     Occupational History     Not on file.     Social History Main Topics     Smoking status: Former Smoker     Packs/day: 0.50     Types: Cigarettes     Start date: 5/7/2015     Smokeless tobacco: Current User     Alcohol use No      Comment: rare- quit with pregnancy     Drug use: No     Sexual activity: Yes     Partners: Male     Birth control/ protection: Pill     Other Topics Concern     Parent/Sibling W/ Cabg, Mi Or Angioplasty Before 65f 55m? No     Social History Narrative       Outpatient Encounter Prescriptions as of 5/23/2017   Medication Sig Dispense Refill     fluocinonide (LIDEX) 0.05 % cream Apply sparingly to affected area twice daily as needed.  Do not apply to face. 120 g 0     doxycycline (VIBRAMYCIN) 100 MG capsule Take 1 capsule (100 mg) by mouth 2 times daily 60 capsule 3     clindamycin-benzoyl peroxide (BENZACLIN) gel Apply topically 2 times daily 50 g 11     phentermine 37.5 MG capsule Take 1 capsule (37.5 mg) by mouth every morning 30 capsule 1     naproxen (NAPROSYN) 500 MG tablet Take 1 tablet (500 mg) by mouth 2 times daily as needed for moderate pain 30 tablet 1     buPROPion (WELLBUTRIN XL) 150 MG 24 hr tablet Take 1 tablet (150 mg) by mouth every morning Please schedule follow up appointment for further refills. 30 tablet 11     Flax Oil-Fish Oil-Borage Oil (FISH OIL-FLAX OIL-BORAGE OIL PO)        ibuprofen (ADVIL,MOTRIN) 400 MG tablet Take 1-2 tablets (400-800 mg) by mouth every 6 hours as needed for other (cramping) 120 tablet 0     Prenatal Vit-Fe Fumarate-FA (PRENATAL MULTIVITAMIN  PLUS IRON) 27-0.8 MG TABS Take 1 tablet by mouth daily       sertraline (ZOLOFT) 50 MG tablet  TAKE ONE TABLET BY MOUTH EVERY DAY (Patient not taking: Reported on 5/23/2017) 90 tablet 1     labetalol (NORMODYNE) 100 MG tablet Take 3 tablets (300 mg) by mouth 2 times daily Follow up with PCP for further refills. (Patient not taking: Reported on 5/23/2017) 180 tablet 0     cyclobenzaprine (FLEXERIL) 10 MG tablet Take 0.5-1 tablets (5-10 mg) by mouth 3 times daily as needed for muscle spasms (Patient not taking: Reported on 5/23/2017) 60 tablet 0     hydrOXYzine (ATARAX) 25 MG tablet Take 1-2 tablets (25-50 mg) by mouth every 6 hours as needed for itching (Patient not taking: Reported on 5/23/2017) 30 tablet 0     etonogestrel-ethinyl estradiol (NUVARING) 0.12-0.015 MG/24HR vaginal ring Insert 1 ring vaginally every 21 days then remove for 1 week then repeat with new ring. (Patient not taking: Reported on 3/14/2017) 3 each 1     LACTOBACILLUS PO Take 1 tablet by mouth daily Reported on 5/23/2017       glucosamine-chondroitin 500-400 MG CAPS Take 1 capsule by mouth daily Reported on 5/23/2017       vitamin E 400 UNIT capsule Take 400 Units by mouth daily Reported on 5/23/2017       FOLIC ACID PO Take 400 mcg by mouth daily Reported on 5/23/2017       No facility-administered encounter medications on file as of 5/23/2017.              Review Of Systems  Skin: As above  Eyes: negative  Ears/Nose/Throat: negative  Respiratory: No shortness of breath, dyspnea on exertion, cough, or hemoptysis  Cardiovascular: negative  Gastrointestinal: negative  Genitourinary: negative  Musculoskeletal: negative  Neurologic: negative  Psychiatric: negative  Hematologic/Lymphatic/Immunologic: negative  Endocrine: negative      O:   NAD, WDWN, Alert & Oriented, Mood & Affect wnl, Vitals stable   Here today alone   There were no vitals taken for this visit.   General appearance normal   Vitals stable   Alert, oriented and in no acute distress      Following lymph nodes palpated: Occipital, Cervical, Supraclavicular no lad   keraottic  papules on arms, annular red plaque on left elbow, crusted red papules valerie iqra and back,    Fried egg papules and 2-3mm pigmented macules on trunk and ext    Eczematous plaque son hands         The remainder of the full exam was unremarkable; the following areas were examined:  conjunctiva/lids, oral mucosa, neck, peripheral vascular system, abdomen, lymph nodes, digits/nails, eccrine and apocrine glands, scalp/hair, face, neck, chest, abdomen, buttocks, back, RUE, LUE, RLE, LLE       Eyes: Conjunctivae/lids:Normal     ENT: Lips, buccal mucosa, tongue: normal    MSK:Normal    Cardiovascular: peripheral edema none    Pulm: Breathing Normal    Lymph Nodes: No Head and Neck Lymphadenopathy     Neuro/Psych: Orientation:Normal; Mood/Affect:Normal      A/P:  1. Hand dermatitis   Lidex prn  Skin care discussed with patient   2. 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 Interstitial on back   Lidex twice daily  Pathophysiology discussed with pateint   3. keratotis pilaris , nevi  4. Folliclitis?  Doxy twice daily  benzaclin daily to trunk  Return to clinic 6 weeks  BENIGN LESIONS DISCUSSED WITH PATIENT:  I discussed the specifics of tumor, prognosis, and genetics of benign lesions.  I explained that treatment of these lesions would be purely cosmetic and not medically neccessary.  I discussed with patient different removal options including excision, cautery and /or laser.      Nature and genetics of benign skin lesions dicussed with patient.  Signs and Symptoms of skin cancer discussed with patient.  Patient encouraged to perform monthly skin exams.  UV precautions reviewed with patient.  Skin care regimen reviewed with patient: Eliminate harsh soaps, i.e. Dial, zest, irsih spring; Mild soaps such as Cetaphil or Dove sensitive skin, avoid hot or cold showers, aggressive use of emollients including vanicream, cetaphil or cerave discussed with patient.    Risks of non-melanoma skin cancer discussed with patient   Return to clinic 6  weeks

## 2017-05-23 NOTE — MR AVS SNAPSHOT
After Visit Summary   5/23/2017    Janine Salas    MRN: [id number]           Patient Information     Date Of Birth          1985        Visit Information        Provider Department      5/23/2017 12:30 PM Miguel Khan MD [facility]        Care Instructions    **A prescription cream was sent to your primary pharmacy to put on your arms and the affected areas    **A prescription antibiotic was also sent to treat the possible Granuloma Annulare    **A prescription cream was sent to treat the eczema on your fingers/hands    **We will you have you complete blood work today and then contact you with the results    **Please follow up with Dr. DANNA Khan in 4-6 weeks        Follow-ups after your visit        Who to contact     If you have questions or need follow up information about today's clinic visit or your schedule please contact [facility] directly at [phone].  Normal or non-critical lab and imaging results will be communicated to you by MyChart, letter or phone within 4 business days after the clinic has received the results. If you do not hear from us within 7 days, please contact the clinic through MyChart or phone. If you have a critical or abnormal lab result, we will notify you by phone as soon as possible.  Submit refill requests through [employer] or call your pharmacy and they will forward the refill request to us. Please allow 3 business days for your refill to be completed.          Additional Information About Your Visit        MyChart Information     [employer] gives you secure access to your electronic health record. If you see a primary care provider, you can also send messages to your care team and make appointments. If you have questions, please call your primary care clinic.  If you do not have a primary care provider, please call [phone] and they will assist you.        Care EveryWhere ID     This is your Care EveryWhere ID. This  could be used by other organizations to access your [place] medical records  [id number]         Blood Pressure from Last 3 Encounters:   03/14/17 128/78   02/01/17 122/84   01/27/17 140/90    Weight from Last 3 Encounters:   03/14/17 (!) 157.4 kg (347 lb)   02/01/17 (!) 161 kg (355 lb)   01/27/17 (!) 161.3 kg (355 lb 9.6 oz)              Today, you had the following     No orders found for display       Primary Care Provider Office Phone # Fax #    Kavya Haider PA-C [phone]       [facility] [address] [facility] [address]        Thank you!     Thank you for choosing [facility]  for your care. Our goal is always to provide you with excellent care. Hearing back from our patients is one way we can continue to improve our services. Please take a few minutes to complete the written survey that you may receive in the mail after your visit with us. Thank you!             Your Updated Medication List - Protect others around you: Learn how to safely use, store and throw away your medicines at www.disposemymeds.org.          This list is accurate as of: 5/23/17  1:30 PM.  Always use your most recent med list.                   Brand Name Dispense Instructions for use    buPROPion 150 MG 24 hr tablet    WELLBUTRIN XL    30 tablet    Take 1 tablet (150 mg) by mouth every morning Please schedule follow up appointment for further refills.       cyclobenzaprine 10 MG tablet    FLEXERIL    60 tablet    Take 0.5-1 tablets (5-10 mg) by mouth 3 times daily as needed for muscle spasms       etonogestrel-ethinyl estradiol 0.12-0.015 MG/24HR vaginal ring    NUVARING    3 each    Insert 1 ring vaginally every 21 days then remove for 1 week then repeat with new ring.       FISH OIL-FLAX OIL-BORAGE OIL PO          FOLIC ACID PO      Take 400 mcg by mouth daily Reported on 5/23/2017       glucosamine-chondroitin 500-400 MG Caps per capsule      Take 1 capsule by mouth daily  Reported on 5/23/2017       hydrOXYzine 25 MG tablet    ATARAX    30 tablet    Take 1-2 tablets (25-50 mg) by mouth every 6 hours as needed for itching       ibuprofen 400 MG tablet    ADVIL/MOTRIN    120 tablet    Take 1-2 tablets (400-800 mg) by mouth every 6 hours as needed for other (cramping)       labetalol 100 MG tablet    NORMODYNE    180 tablet    Take 3 tablets (300 mg) by mouth 2 times daily Follow up with PCP for further refills.       LACTOBACILLUS PO      Take 1 tablet by mouth daily Reported on 5/23/2017       naproxen 500 MG tablet    NAPROSYN    30 tablet    Take 1 tablet (500 mg) by mouth 2 times daily as needed for moderate pain       phentermine 37.5 MG capsule     30 capsule    Take 1 capsule (37.5 mg) by mouth every morning       prenatal multivitamin  plus iron 27-0.8 MG Tabs per tablet      Take 1 tablet by mouth daily       sertraline 50 MG tablet    ZOLOFT    90 tablet    TAKE ONE TABLET BY MOUTH EVERY DAY       vitamin E 400 UNIT capsule      Take 400 Units by mouth daily Reported on 5/23/2017

## 2017-05-23 NOTE — TELEPHONE ENCOUNTER
"Anaid in lab said that patient stopped to get blood drawn \"because Kavya had ordered lab work.\" Not sure what to order. Janine told Anaid that she will come back to get blood drawn after orders are placed. Thank you  Gonzalo Ellison RN    "

## 2017-05-23 NOTE — TELEPHONE ENCOUNTER
Message left on answering machine to call the [facility] RN back.  Gonzalo Ellison RN

## 2017-05-24 ENCOUNTER — ALLIED HEALTH/NURSE VISIT (OUTPATIENT)
Dept: FAMILY MEDICINE | Facility: CLINIC | Age: 32
End: 2017-05-24
Payer: COMMERCIAL

## 2017-05-24 VITALS
SYSTOLIC BLOOD PRESSURE: 123 MMHG | WEIGHT: 293 LBS | HEART RATE: 76 BPM | DIASTOLIC BLOOD PRESSURE: 78 MMHG | BODY MASS INDEX: 53.88 KG/M2

## 2017-05-24 DIAGNOSIS — L30.9 CHRONIC DERMATITIS OF HANDS: ICD-10-CM

## 2017-05-24 DIAGNOSIS — R10.11 ABDOMINAL PAIN, RIGHT UPPER QUADRANT: ICD-10-CM

## 2017-05-24 DIAGNOSIS — I10 ESSENTIAL HYPERTENSION: ICD-10-CM

## 2017-05-24 DIAGNOSIS — E66.01 MORBID OBESITY DUE TO EXCESS CALORIES (H): Primary | ICD-10-CM

## 2017-05-24 DIAGNOSIS — L85.8 KP (KERATOSIS PILARIS): ICD-10-CM

## 2017-05-24 DIAGNOSIS — L92.0 GRANULOMA ANNULARE: ICD-10-CM

## 2017-05-24 DIAGNOSIS — D22.9 NEVUS: ICD-10-CM

## 2017-05-24 LAB
ALBUMIN SERPL-MCNC: 3.8 G/DL (ref 3.4–5)
ALP SERPL-CCNC: 94 U/L (ref 40–150)
ALT SERPL W P-5'-P-CCNC: 39 U/L (ref 0–50)
ANION GAP SERPL CALCULATED.3IONS-SCNC: 7 MMOL/L (ref 3–14)
AST SERPL W P-5'-P-CCNC: 29 U/L (ref 0–45)
BILIRUB SERPL-MCNC: 0.5 MG/DL (ref 0.2–1.3)
BUN SERPL-MCNC: 12 MG/DL (ref 7–30)
CALCIUM SERPL-MCNC: 9.1 MG/DL (ref 8.5–10.1)
CHLORIDE SERPL-SCNC: 108 MMOL/L (ref 94–109)
CO2 SERPL-SCNC: 25 MMOL/L (ref 20–32)
CREAT SERPL-MCNC: 0.71 MG/DL (ref 0.52–1.04)
GFR SERPL CREATININE-BSD FRML MDRD: NORMAL ML/MIN/1.7M2
GLUCOSE SERPL-MCNC: 82 MG/DL (ref 70–99)
LIPASE SERPL-CCNC: 101 U/L (ref 73–393)
POTASSIUM SERPL-SCNC: 3.9 MMOL/L (ref 3.4–5.3)
PROT SERPL-MCNC: 7 G/DL (ref 6.8–8.8)
SODIUM SERPL-SCNC: 140 MMOL/L (ref 133–144)

## 2017-05-24 PROCEDURE — 36415 COLL VENOUS BLD VENIPUNCTURE: CPT | Performed by: DERMATOLOGY

## 2017-05-24 PROCEDURE — 83690 ASSAY OF LIPASE: CPT | Performed by: DERMATOLOGY

## 2017-05-24 PROCEDURE — 99207 ZZC NO CHARGE NURSE ONLY: CPT

## 2017-05-24 PROCEDURE — 80053 COMPREHEN METABOLIC PANEL: CPT | Performed by: DERMATOLOGY

## 2017-05-24 PROCEDURE — 86038 ANTINUCLEAR ANTIBODIES: CPT | Performed by: DERMATOLOGY

## 2017-05-24 NOTE — MR AVS SNAPSHOT
After Visit Summary   5/24/2017    Janine Salas    MRN: [id number]           Patient Information     Date Of Birth          1985        Visit Information        Provider Department      5/24/2017 2:30 PM [place]  CMA/LPN [facility]        Today's Diagnoses     Morbid obesity due to excess calories (H)    -  1    Essential hypertension           Follow-ups after your visit        Follow-up notes from your care team     Return for Nurse.      Your next 10 appointments already scheduled     May 30, 2017 10:40 AM CDT   SHORT with Kavya Haider PA-C   [facility]    [address]  [facility] [address]   [phone]            Jun 20, 2017  2:15 PM CDT   [facility]    [address] [address]   [phone]              Who to contact     Normal or non-critical lab and imaging results will be communicated to you by [employer], letter or phone within 4 business days after the clinic has received the results. If you do not hear from us within 7 days, please contact the clinic through OneToucht or phone. If you have a critical or abnormal lab result, we will notify you by phone as soon as possible.  Submit refill requests through [employer] Traders or call your pharmacy and they will forward the refill request to us. Please allow 3 business days for your refill to be completed.          If you need to speak with a  for additional information , please call: [phone]             Additional Information About Your Visit        [employer] Information     [employer] gives you secure access to your electronic health record. If you see a primary care provider, you can also send messages to your care team and make appointments. If you have questions, please call your primary care clinic.  If you do not have a primary care provider, please  call [phone] and they will assist you.        Care EveryWhere ID     This is your Care EveryWhere ID. This could be used by other organizations to access your [place] medical records  [id number]        Your Vitals Were     Pulse BMI (Body Mass Index)                76 53.88 kg/m2           Blood Pressure from Last 3 Encounters:   05/24/17 123/78   03/14/17 128/78   02/01/17 122/84    Weight from Last 3 Encounters:   05/24/17 (!) 333 lb 12.8 oz (151.4 kg)   03/14/17 (!) 347 lb (157.4 kg)   02/01/17 (!) 355 lb (161 kg)              Today, you had the following     No orders found for display       Primary Care Provider Office Phone # Fax #    Kavya Haider PA-C [phone]       [facility] [address] [facility] [address]        Thank you!     Thank you for choosing [facility]  for your care. Our goal is always to provide you with excellent care. Hearing back from our patients is one way we can continue to improve our services. Please take a few minutes to complete the written survey that you may receive in the mail after your visit with us. Thank you!             Your Updated Medication List - Protect others around you: Learn how to safely use, store and throw away your medicines at www.disposemymeds.org.          This list is accurate as of: 5/24/17  2:40 PM.  Always use your most recent med list.                   Brand Name Dispense Instructions for use    buPROPion 150 MG 24 hr tablet    WELLBUTRIN XL    30 tablet    Take 1 tablet (150 mg) by mouth every morning Please schedule follow up appointment for further refills.       clindamycin-benzoyl peroxide gel    BENZACLIN    50 g    Apply topically 2 times daily       cyclobenzaprine 10 MG tablet    FLEXERIL    60 tablet    Take 0.5-1 tablets (5-10 mg) by mouth 3 times daily as needed for muscle spasms       doxycycline 100 MG capsule    VIBRAMYCIN    60 capsule    Take 1 capsule (100 mg) by mouth 2  times daily       etonogestrel-ethinyl estradiol 0.12-0.015 MG/24HR vaginal ring    NUVARING    3 each    Insert 1 ring vaginally every 21 days then remove for 1 week then repeat with new ring.       FISH OIL-FLAX OIL-BORAGE OIL PO          fluocinonide 0.05 % cream    LIDEX    120 g    Apply sparingly to affected area twice daily as needed.  Do not apply to face.       FOLIC ACID PO      Take 400 mcg by mouth daily Reported on 5/23/2017       glucosamine-chondroitin 500-400 MG Caps per capsule      Take 1 capsule by mouth daily Reported on 5/23/2017       hydrOXYzine 25 MG tablet    ATARAX    30 tablet    Take 1-2 tablets (25-50 mg) by mouth every 6 hours as needed for itching       ibuprofen 400 MG tablet    ADVIL/MOTRIN    120 tablet    Take 1-2 tablets (400-800 mg) by mouth every 6 hours as needed for other (cramping)       labetalol 100 MG tablet    NORMODYNE    180 tablet    Take 3 tablets (300 mg) by mouth 2 times daily Follow up with PCP for further refills.       LACTOBACILLUS PO      Take 1 tablet by mouth daily Reported on 5/23/2017       naproxen 500 MG tablet    NAPROSYN    30 tablet    Take 1 tablet (500 mg) by mouth 2 times daily as needed for moderate pain       phentermine 37.5 MG capsule     30 capsule    Take 1 capsule (37.5 mg) by mouth every morning       prenatal multivitamin  plus iron 27-0.8 MG Tabs per tablet      Take 1 tablet by mouth daily       sertraline 50 MG tablet    ZOLOFT    90 tablet    TAKE ONE TABLET BY MOUTH EVERY DAY       vitamin E 400 UNIT capsule      Take 400 Units by mouth daily Reported on 5/23/2017

## 2017-05-24 NOTE — TELEPHONE ENCOUNTER
Labs placed for CMP and lipase. She can have those drawn but would suggest a follow up office visit sometime after that to review results and follow up on the abdominal pain if it's persisting  Kavya

## 2017-05-24 NOTE — TELEPHONE ENCOUNTER
Kavya, called the patient back and patient stated she remembers at the last March visit that PCP discussed ordering labs to check hemoglobin, pancreatic labs as patient states intermittent right sided abdominal pain  was an issue then. Patient states her blood sugar drops and she gets dizzy. Per last 3-14-17 office visit note it states the following:    Plan: CANCELED: Comprehensive metabolic panel (BMP +         Alb, Alk Phos, ALT, AST, Total. Bili, TP)    Notified patient should have B/P check as stated below, but patient is wondering if PCP wants to have other labs as well. Patient also states saw dermatology yesterday and that her new diagnosis could be related to pancreas issues and was wondering if PCP could review the notes from Dr. Khan to see if there is an association with right sided pain.  Please advise.  Lizzy

## 2017-05-24 NOTE — TELEPHONE ENCOUNTER
Patient called and notified, has scheduled a lab and B/P for today, and a office visit with Kavya next Tuesday for intermittent abdominal concerns. Lizzy

## 2017-05-24 NOTE — PROGRESS NOTES
Patient in clinic today for Blood Pressure recheck, was recently prescribed phentermine and needs to have BP monitored.   Tita Mendoza CMA

## 2017-05-26 LAB — ANA SER QL IA: NORMAL

## 2017-06-04 ENCOUNTER — MYC MEDICAL ADVICE (OUTPATIENT)
Dept: FAMILY MEDICINE | Facility: CLINIC | Age: 32
End: 2017-06-04

## 2017-06-04 ENCOUNTER — MYC MEDICAL ADVICE (OUTPATIENT)
Dept: DERMATOLOGY | Facility: CLINIC | Age: 32
End: 2017-06-04

## 2017-06-04 DIAGNOSIS — D22.9 NEVUS: ICD-10-CM

## 2017-06-04 DIAGNOSIS — R39.15 URINARY URGENCY: Primary | ICD-10-CM

## 2017-06-04 DIAGNOSIS — N89.8 VAGINAL DISCHARGE: ICD-10-CM

## 2017-06-04 DIAGNOSIS — L30.9 CHRONIC DERMATITIS OF HANDS: ICD-10-CM

## 2017-06-04 DIAGNOSIS — L92.0 GRANULOMA ANNULARE: ICD-10-CM

## 2017-06-04 DIAGNOSIS — N89.8 VAGINAL ITCHING: ICD-10-CM

## 2017-06-04 DIAGNOSIS — L85.8 KP (KERATOSIS PILARIS): ICD-10-CM

## 2017-06-05 RX ORDER — CLINDAMYCIN AND BENZOYL PEROXIDE 10; 50 MG/G; MG/G
GEL TOPICAL 2 TIMES DAILY
Qty: 200 G | Refills: 11 | Status: SHIPPED | OUTPATIENT
Start: 2017-06-05 | End: 2017-08-01

## 2017-06-05 NOTE — TELEPHONE ENCOUNTER
Dr Khan-    I called her Pharmacy to check how much was dispensed and they gave her 50 g on 5-23-17.     See my chart message. Increase quantity to 100 g? (sign order cued in case) Or?..    Please advise. Tabby Patel RN

## 2017-06-06 DIAGNOSIS — R39.15 URINARY URGENCY: ICD-10-CM

## 2017-06-06 DIAGNOSIS — N89.8 VAGINAL ITCHING: ICD-10-CM

## 2017-06-06 DIAGNOSIS — N89.8 VAGINAL DISCHARGE: ICD-10-CM

## 2017-06-06 LAB
ALBUMIN UR-MCNC: NEGATIVE MG/DL
APPEARANCE UR: CLEAR
BACTERIA #/AREA URNS HPF: ABNORMAL /HPF
BILIRUB UR QL STRIP: NEGATIVE
COLOR UR AUTO: YELLOW
GLUCOSE UR STRIP-MCNC: NEGATIVE MG/DL
HGB UR QL STRIP: ABNORMAL
KETONES UR STRIP-MCNC: NEGATIVE MG/DL
LEUKOCYTE ESTERASE UR QL STRIP: NEGATIVE
MICRO REPORT STATUS: NORMAL
NITRATE UR QL: NEGATIVE
PH UR STRIP: 5.5 PH (ref 5–7)
RBC #/AREA URNS AUTO: ABNORMAL /HPF (ref 0–2)
SP GR UR STRIP: 1.02 (ref 1–1.03)
SPECIMEN SOURCE: NORMAL
URN SPEC COLLECT METH UR: ABNORMAL
UROBILINOGEN UR STRIP-ACNC: 0.2 EU/DL (ref 0.2–1)
WBC #/AREA URNS AUTO: ABNORMAL /HPF (ref 0–2)
WET PREP SPEC: NORMAL

## 2017-06-06 PROCEDURE — 81001 URINALYSIS AUTO W/SCOPE: CPT | Performed by: PHYSICIAN ASSISTANT

## 2017-06-06 PROCEDURE — 87210 SMEAR WET MOUNT SALINE/INK: CPT | Performed by: PHYSICIAN ASSISTANT

## 2017-06-21 DIAGNOSIS — N97.9 FEMALE INFERTILITY: ICD-10-CM

## 2017-06-21 DIAGNOSIS — E66.01 MORBID OBESITY, UNSPECIFIED OBESITY TYPE (H): ICD-10-CM

## 2017-06-21 NOTE — TELEPHONE ENCOUNTER
phentermine      Last Written Prescription Date:  3/14/17  Last Fill Quantity: 30,   # refills: 1  Last Office Visit with [facility] prescribing provider: 3/14/17  Future Office visit:       Routing refill request to provider for review/approval because:  Drug not on the [facility], [facility] or [facility] refill protocol or controlled substance    [facility]   [phone]

## 2017-06-22 RX ORDER — PHENTERMINE HYDROCHLORIDE 37.5 MG/1
37.5 CAPSULE ORAL EVERY MORNING
Qty: 30 CAPSULE | Refills: 1 | Status: SHIPPED | OUTPATIENT
Start: 2017-06-22 | End: 2017-09-01

## 2017-07-31 ENCOUNTER — TELEPHONE (OUTPATIENT)
Dept: DERMATOLOGY | Facility: CLINIC | Age: 32
End: 2017-07-31

## 2017-07-31 DIAGNOSIS — L85.8 KP (KERATOSIS PILARIS): ICD-10-CM

## 2017-07-31 DIAGNOSIS — L30.9 CHRONIC DERMATITIS OF HANDS: ICD-10-CM

## 2017-07-31 DIAGNOSIS — L92.0 GRANULOMA ANNULARE: Primary | ICD-10-CM

## 2017-07-31 DIAGNOSIS — L92.0 GRANULOMA ANNULARE: ICD-10-CM

## 2017-07-31 DIAGNOSIS — D22.9 NEVUS: ICD-10-CM

## 2017-07-31 RX ORDER — FLUOCINONIDE 0.5 MG/G
CREAM TOPICAL
Qty: 120 G | Refills: 0 | Status: SHIPPED | OUTPATIENT
Start: 2017-07-31 | End: 2017-11-27

## 2017-07-31 NOTE — LETTER
[facility]  [address]  [facility] [address]  Phone: [phone]    July 31, 2017    Janine Salas                                                                                                               [address]  [facility] [address]            Dear Ms. Salas,    We are concerned about your health care.  We recently provided you with a medication refill.  Many medications require routine follow-up with your Dermatology Provider.      At this time we ask that: You schedule a routine office visit with your Dermatology Provider to follow your Hand dermatitis.    Your prescription: Has been refilled so you may have time for the above noted follow-up.  Per 5-23-17 Dermatology office visit dictation, you were to return in 6 weeks for a recheck Hand dermatitis appointment.    We are currently booking appointments 4 weeks in advance.    Thank you,      Miguel Khan MD/ [facility]

## 2017-07-31 NOTE — TELEPHONE ENCOUNTER
Please do not close encounter until Prior Authorization has been addressed    Prior Authorization Required on: Doxycycline & Clindamycin  Insurance: [facility]  Insurance Phone: [phone]  Patient ID: [id number]  Please Contact the Pharmacy with Prior Auth Status (approval/denial).     Rejection: MR-Product Not on Formulary     Thank You!      Cristina Nolan   Pharmacy Technician  [facility]  [phone]

## 2017-08-01 ENCOUNTER — TELEPHONE (OUTPATIENT)
Dept: DERMATOLOGY | Facility: CLINIC | Age: 32
End: 2017-08-01

## 2017-08-01 DIAGNOSIS — L30.9 CHRONIC DERMATITIS: Primary | ICD-10-CM

## 2017-08-01 RX ORDER — DOXYCYCLINE 100 MG/1
1 CAPSULE ORAL 2 TIMES DAILY
Qty: 60 CAPSULE | Refills: 0 | Status: SHIPPED | OUTPATIENT
Start: 2017-08-01 | End: 2017-09-25

## 2017-08-01 RX ORDER — BENZOYL PEROXIDE 5 G/100G
GEL TOPICAL DAILY
Qty: 180 G | Refills: 0 | Status: SHIPPED | OUTPATIENT
Start: 2017-08-01 | End: 2017-12-04

## 2017-08-01 RX ORDER — CLINDAMYCIN PHOSPHATE 10 MG/G
GEL TOPICAL
Qty: 180 G | Refills: 11 | Status: SHIPPED | OUTPATIENT
Start: 2017-08-01 | End: 2017-12-04

## 2017-08-01 RX ORDER — CLINDAMYCIN PHOSPHATE 11.9 MG/ML
SOLUTION TOPICAL 2 TIMES DAILY
Qty: 180 ML | Refills: 0 | Status: SHIPPED | OUTPATIENT
Start: 2017-08-01 | End: 2017-12-04

## 2017-08-01 NOTE — TELEPHONE ENCOUNTER
Jonathan Herman,     Can you clarify the refills on the clindamycin gel for Janine? The initial Cleocin solution you stated 0 refills and needed to be seen, however with the new order for the gel you sent you ordered 11 refills, but looks as though she is needing to follow up.    Thanks,    Becca Engel, Pharm. D.  [facility]  [facility]  [phone]

## 2017-08-01 NOTE — TELEPHONE ENCOUNTER
Patient was to return in 6 weeks for recheck appt and has no appt scheduled....     Please sign cued orders if appropriate.     Tabby Patel RN

## 2017-08-01 NOTE — TELEPHONE ENCOUNTER
Janine's insurance will cover the Benzoyl peroxide 5% gel (or wash) to mix with the clindamycin.. Can you please send an order for that? Just wondering if you wanted to change that clindamycin solution to a gel? It'll be easier to mix both gels together than a solution and a wash/gel.    See Judah  Pharmacy Technician, [facility]  Phone: [phone]  Fax: [phone]

## 2017-08-01 NOTE — TELEPHONE ENCOUNTER
Contacted insurance and spoke with Tiffanie who informed that insurance prefers to separate the clindamycin and benzoyl peroxide and change doxycycline to hcl rather than mono

## 2017-09-01 ENCOUNTER — OFFICE VISIT (OUTPATIENT)
Dept: FAMILY MEDICINE | Facility: CLINIC | Age: 32
End: 2017-09-01
Payer: COMMERCIAL

## 2017-09-01 VITALS
DIASTOLIC BLOOD PRESSURE: 74 MMHG | WEIGHT: 293 LBS | HEART RATE: 82 BPM | SYSTOLIC BLOOD PRESSURE: 130 MMHG | TEMPERATURE: 99 F | HEIGHT: 66 IN | BODY MASS INDEX: 47.09 KG/M2

## 2017-09-01 DIAGNOSIS — H92.02 EAR PAIN, LEFT: ICD-10-CM

## 2017-09-01 DIAGNOSIS — E66.01 MORBID OBESITY, UNSPECIFIED OBESITY TYPE (H): Primary | ICD-10-CM

## 2017-09-01 DIAGNOSIS — R10.11 ABDOMINAL PAIN, RIGHT UPPER QUADRANT: ICD-10-CM

## 2017-09-01 PROCEDURE — 99214 OFFICE O/P EST MOD 30 MIN: CPT | Performed by: PHYSICIAN ASSISTANT

## 2017-09-01 RX ORDER — PHENTERMINE HYDROCHLORIDE 37.5 MG/1
37.5 CAPSULE ORAL EVERY MORNING
Qty: 30 CAPSULE | Refills: 1 | Status: SHIPPED | OUTPATIENT
Start: 2017-09-01 | End: 2017-10-30

## 2017-09-01 NOTE — NURSING NOTE
"Chief Complaint   Patient presents with     Recheck Medication     Weight Problem       Initial /74 (BP Location: Right arm, Patient Position: Chair, Cuff Size: Adult Large)  Pulse 82  Temp 99  F (37.2  C) (Tympanic)  Ht 5' 6\" (1.676 m)  Wt (!) 320 lb (145.2 kg)  BMI 51.65 kg/m2 Estimated body mass index is 51.65 kg/(m^2) as calculated from the following:    Height as of this encounter: 5' 6\" (1.676 m).    Weight as of this encounter: 320 lb (145.2 kg).  Medication Reconciliation: complete     Isra Cervantes CMA    "

## 2017-09-01 NOTE — MR AVS SNAPSHOT
After Visit Summary   9/1/2017    Janine Salas    MRN: [id number]           Patient Information     Date Of Birth          1985        Visit Information        Provider Department      9/1/2017 9:20 AM Kavya Haider PA-C [facility]        Today's Diagnoses     Abdominal pain, right upper quadrant    -  1    Obesity        Female infertility        Ear pain, left          Care Instructions    Continue the phentermine    Check your weight 1-2x/month      For stomach pains    - start zantac 300mg at night    - check back with me in 2-3 weeks. If no change or if pain worsening, let me know so we can work up further          Follow-ups after your visit        Additional Services     BARIATRIC ADULT REFERRAL       Your provider has referred you to: [facility] [place]: [facility] [place] [phone]   [email or web address]    Please be aware that coverage of these services is subject to the terms and limitations of your health insurance plan.  Call member services at your health plan with any benefit or coverage questions.      Please bring the following with you to your appointment:      (1) List of current medications   (2) This referral request   (3) Any documents/labs given to you for this referral            OTOLARYNGOLOGY REFERRAL       Your provider has referred you to: [facility]: [facility] [place] [phone]   [email or web address]    Please be aware that coverage of these services is subject to the terms and limitations of your health insurance plan.  Call member services at your health plan with any benefit or coverage questions.      Please bring the following with you to your appointment:    (1) Any X-Rays, CTs or MRIs which have been performed.  Contact the facility where they were done to arrange for  prior to your scheduled appointment.   (2) List of  "current medications  (3) This referral request   (4) Any documents/labs given to you for this referral                  Your next 10 appointments already scheduled     Oct 02, 2017  1:30 PM CDT   [facility] Return with Miguel Khan MD   [facility] ([facility]    [address] [address]   [phone]              Who to contact     Normal or non-critical lab and imaging results will be communicated to you by Proxeonhart, letter or phone within 4 business days after the clinic has received the results. If you do not hear from us within 7 days, please contact the clinic through Pepperdatat or phone. If you have a critical or abnormal lab result, we will notify you by phone as soon as possible.  Submit refill requests through W-locate or call your pharmacy and they will forward the refill request to us. Please allow 3 business days for your refill to be completed.          If you need to speak with a  for additional information , please call: [phone]             Additional Information About Your Visit        W-locate Information     W-locate gives you secure access to your electronic health record. If you see a primary care provider, you can also send messages to your care team and make appointments. If you have questions, please call your primary care clinic.  If you do not have a primary care provider, please call [phone] and they will assist you.        Care EveryWhere ID     This is your Care EveryWhere ID. This could be used by other organizations to access your [facility] medical records  [id number]        Your Vitals Were     Pulse Temperature Height BMI (Body Mass Index)          82 99  F (37.2  C) (Tympanic) 5' 6\" (1.676 m) 51.65 kg/m2         Blood Pressure from Last 3 Encounters:   09/01/17 130/74   05/24/17 123/78   03/14/17 128/78    Weight from Last 3 Encounters:   09/01/17 (!) 320 lb (145.2 kg)   05/24/17 (!) 333 lb " 12.8 oz (151.4 kg)   03/14/17 (!) 347 lb (157.4 kg)              We Performed the Following     BARIATRIC ADULT REFERRAL     OTOLARYNGOLOGY REFERRAL          Today's Medication Changes          These changes are accurate as of: 9/1/17 10:27 AM.  If you have any questions, ask your nurse or doctor.               Start taking these medicines.        Dose/Directions    ranitidine 300 MG tablet   Commonly known as:  ZANTAC   Used for:  Abdominal pain, right upper quadrant   Started by:  Kavya Haider PA-C        Dose:  300 mg   Take 1 tablet (300 mg) by mouth At Bedtime   Quantity:  30 tablet   Refills:  1            Where to get your medicines      [facility] [place] [facility] [address]  [address] [place] [address]     Phone:  [phone]     ranitidine 300 MG tablet         Some of these will need a paper prescription and others can be bought over the counter.  Ask your nurse if you have questions.     Bring a paper prescription for each of these medications     phentermine 37.5 MG capsule                Primary Care Provider Office Phone # Fax #    Kavya Haider PA-C [phone] [address] [place] [address]        Equal Access to Services     GEO BONILLA AH: Hadii melvi ku hadasho Soomaali, waaxda luqadaha, qaybta kaalmada adeegyada, waxay idiin haykhurramn david goldberg. So [facility] [phone].    ATENCIÓN: Si habla español, tiene a pacheco disposición servicios gratuitos de asistencia lingüística. Llame al [phone].    We comply with applicable federal civil rights laws and [place] laws. We do not discriminate on the basis of race, color, national origin, age, disability sex, sexual orientation or gender identity.            Thank you!     Thank you for choosing [facility]  for your care. Our goal is always to provide you with excellent care. Hearing back from our patients is one way  we can continue to improve our services. Please take a few minutes to complete the written survey that you may receive in the mail after your visit with us. Thank you!             Your Updated Medication List - Protect others around you: Learn how to safely use, store and throw away your medicines at www.disposemymeds.org.          This list is accurate as of: 9/1/17 10:27 AM.  Always use your most recent med list.                   Brand Name Dispense Instructions for use Diagnosis    benzoyl peroxide 5 % topical gel     180 g    Apply topically daily Apply topically 2 times daily Mix with equal parts clindamycin 1%    Chronic dermatitis       buPROPion 150 MG 24 hr tablet    WELLBUTRIN XL    30 tablet    Take 1 tablet (150 mg) by mouth every morning Please schedule follow up appointment for further refills.    Tobacco use disorder, Moderate single current episode of major depressive disorder (H)       * clindamycin 1 % solution    CLEOCIN T    180 mL    Apply topically 2 times daily Mix with equal parts 5% over the counter Benzoyl peroxide. Needs follow up appt: [phone] to schedule.    Chronic dermatitis of hands       * clindamycin 1 % topical gel    CLINDAMAX    180 g    Apply topically 2 times daily Mix with equal parts of benzoyl peroxide    Chronic dermatitis       doxycycline Monohydrate 100 MG Caps     60 capsule    Take 1 capsule (100 mg) by mouth 2 times daily    Chronic dermatitis of hands       FISH OIL-FLAX OIL-BORAGE OIL PO           fluocinonide 0.05 % cream    LIDEX    120 g    Apply sparingly to affected area twice daily as needed.  Do not apply to face.    Chronic dermatitis of hands, Granuloma annulare, KP (keratosis pilaris), Nevus       FOLIC ACID PO      Take 400 mcg by mouth daily Reported on 5/23/2017        glucosamine-chondroitin 500-400 MG Caps per capsule      Take 1 capsule by mouth daily Reported on 5/23/2017    Other sign and symptom in breast, Mastodynia       ibuprofen 400 MG  tablet    ADVIL/MOTRIN    120 tablet    Take 1-2 tablets (400-800 mg) by mouth every 6 hours as needed for other (cramping)    S/P  section       LACTOBACILLUS PO      Take 1 tablet by mouth daily Reported on 2017        naproxen 500 MG tablet    NAPROSYN    30 tablet    Take 1 tablet (500 mg) by mouth 2 times daily as needed for moderate pain    Acute bilateral low back pain without sciatica       phentermine 37.5 MG capsule     30 capsule    Take 1 capsule (37.5 mg) by mouth every morning    Morbid obesity, unspecified obesity type (H), Female infertility       prenatal multivitamin plus iron 27-0.8 MG Tabs per tablet      Take 1 tablet by mouth daily        ranitidine 300 MG tablet    ZANTAC    30 tablet    Take 1 tablet (300 mg) by mouth At Bedtime    Abdominal pain, right upper quadrant       sertraline 50 MG tablet    ZOLOFT    90 tablet    TAKE ONE TABLET BY MOUTH EVERY DAY    Post partum depression       vitamin E 400 UNIT capsule      Take 400 Units by mouth daily Reported on 2017    Other sign and symptom in breast, Mastodynia       * Notice:  This list has 2 medication(s) that are the same as other medications prescribed for you. Read the directions carefully, and ask your doctor or other care provider to review them with you.

## 2017-09-01 NOTE — PROGRESS NOTES
SUBJECTIVE:   Janine Salas is a 31 year old female who presents to clinic today for the following health issues:      Medication Followup of Phentermine 37.5 mg     Taking Medication as prescribed: yes    Side Effects: Having some issues with nausea and throwing up, wondering if it is from that. Also restless legs.    Medication Helping Symptoms:  yes     -Patient is here today also to get weight checked and her blood pressure checked.   -She also is having some pain in her left ear that has been going on more frequently.    Started phentermine in mid March  Weight down 27lbs  Tolerating well  Blood pressures stable    Not set exercise plan but gets a lot of physical activity with her son and with household chores  Admits she needs to work on diet - skips meals    Goal weight - 170-180    Also having pain that comes and goes in her left ear  A few years ago was exposed to a loud noise  Ended up in urgent care because she was having a lot of pain  They told her it would get better  Has noticed that she can't stand loud noises since - will get pain      Also having issues with right upper quadrant pain  Has had pain before post cholecystectomy  New is that she is having bouts of nausea and emesis  Not related to eating - at times her stomach will be empty  Will get a wave of nausea and then mouth will start watering so she knows she is going to vomit  Only a small amount of phlegm or white saliva will come out  Occurring about 1x/week  Started about 4 months ago      Problem list and histories reviewed & adjusted, as indicated.  Additional history: as documented    Current Outpatient Prescriptions   Medication Sig Dispense Refill     doxycycline Monohydrate 100 MG CAPS Take 1 capsule (100 mg) by mouth 2 times daily 60 capsule 0     clindamycin (CLEOCIN T) 1 % solution Apply topically 2 times daily Mix with equal parts 5% over the counter Benzoyl peroxide. Needs follow up appt: [phone] to schedule. 180 mL 0      clindamycin (CLINDAMAX) 1 % topical gel Apply topically 2 times daily Mix with equal parts of benzoyl peroxide 180 g 11     benzoyl peroxide 5 % topical gel Apply topically daily Apply topically 2 times daily Mix with equal parts clindamycin 1% 180 g 0     fluocinonide (LIDEX) 0.05 % cream Apply sparingly to affected area twice daily as needed.  Do not apply to face. 120 g 0     phentermine 37.5 MG capsule Take 1 capsule (37.5 mg) by mouth every morning 30 capsule 1     sertraline (ZOLOFT) 50 MG tablet TAKE ONE TABLET BY MOUTH EVERY DAY 90 tablet 1     buPROPion (WELLBUTRIN XL) 150 MG 24 hr tablet Take 1 tablet (150 mg) by mouth every morning Please schedule follow up appointment for further refills. 30 tablet 11     Flax Oil-Fish Oil-Borage Oil (FISH OIL-FLAX OIL-BORAGE OIL PO)        ibuprofen (ADVIL,MOTRIN) 400 MG tablet Take 1-2 tablets (400-800 mg) by mouth every 6 hours as needed for other (cramping) 120 tablet 0     Prenatal Vit-Fe Fumarate-FA (PRENATAL MULTIVITAMIN  PLUS IRON) 27-0.8 MG TABS Take 1 tablet by mouth daily       LACTOBACILLUS PO Take 1 tablet by mouth daily Reported on 5/23/2017       glucosamine-chondroitin 500-400 MG CAPS Take 1 capsule by mouth daily Reported on 5/23/2017       vitamin E 400 UNIT capsule Take 400 Units by mouth daily Reported on 5/23/2017       FOLIC ACID PO Take 400 mcg by mouth daily Reported on 5/23/2017       naproxen (NAPROSYN) 500 MG tablet Take 1 tablet (500 mg) by mouth 2 times daily as needed for moderate pain (Patient not taking: Reported on 9/1/2017) 30 tablet 1     Allergies   Allergen Reactions     Codeine Itching     Zofran [Ondansetron] Other (See Comments)     Headaches        Reviewed and updated as needed this visit by clinical staff       Reviewed and updated as needed this visit by Provider         ROS:  Remainder of ROS obtained and found to be negative other than that which was documented above      OBJECTIVE:     /74 (BP Location: Right arm,  "Patient Position: Chair, Cuff Size: Adult Large)  Pulse 82  Temp 99  F (37.2  C) (Tympanic)  Ht 5' 6\" (1.676 m)  Wt (!) 320 lb (145.2 kg)  BMI 51.65 kg/m2  Body mass index is 51.65 kg/(m^2).  GENERAL: healthy, alert and no distress  EYES: Eyes grossly normal to inspection  ENT - ear canals and TM normal b/l  RESP: lungs clear to auscultation - no rales, rhonchi or wheezes  CV: regular rates and rhythm, normal S1 S2, no S3 or S4 and no murmur, click or rub  ABDOMEN: bowel sounds normal, mild right upper quadrant tenderness as well as just above umbilicus. No masses. No rebound    Diagnostic Test Results:  none     ASSESSMENT/PLAN:     (E66.01) Obesity  (primary encounter diagnosis)  Comment: 27lb weight loss in 6 months. Discussed this is the average weight loss with this medication meaning some people do lose more but some lose less so she has had a good response so far. Will continue but realizing at some point we may not continue to see further loss and will need to decide on another course of action. I encouraged her to follow up in the bariatric clinic for ongoing guidance and the nutrition component which is necessary.   Plan: phentermine 37.5 MG capsule, BARIATRIC ADULT         REFERRAL            (R10.11) Abdominal pain, right upper quadrant  Comment: unclear cause. Will try nightly zantac for possible reflux or esophagitis component. If no change or worsening despite this, could consider imaging or GI referral  Plan: ranitidine (ZANTAC) 300 MG tablet            (H92.02) Ear pain, left  Comment: exam unremarkable. H/o exposure to loud noise. Unclear if related or not. Given sx bothersome and worsening, will refer to ENT  Plan: OTOLARYNGOLOGY REFERRAL                Kavya Haider PA-C  [facility]  "

## 2017-09-01 NOTE — PATIENT INSTRUCTIONS
Continue the phentermine    Check your weight 1-2x/month      For stomach pains    - start zantac 300mg at night    - check back with me in 2-3 weeks. If no change or if pain worsening, let me know so we can work up further

## 2017-09-25 DIAGNOSIS — L30.9 CHRONIC DERMATITIS OF HANDS: ICD-10-CM

## 2017-09-26 RX ORDER — DOXYCYCLINE 100 MG/1
1 CAPSULE ORAL 2 TIMES DAILY
Qty: 60 CAPSULE | Refills: 0 | Status: SHIPPED | OUTPATIENT
Start: 2017-09-26 | End: 2017-10-03

## 2017-10-02 ENCOUNTER — MYC MEDICAL ADVICE (OUTPATIENT)
Dept: DERMATOLOGY | Facility: CLINIC | Age: 32
End: 2017-10-02

## 2017-10-03 ENCOUNTER — OFFICE VISIT (OUTPATIENT)
Dept: DERMATOLOGY | Facility: CLINIC | Age: 32
End: 2017-10-03
Payer: COMMERCIAL

## 2017-10-03 VITALS — HEART RATE: 79 BPM | SYSTOLIC BLOOD PRESSURE: 134 MMHG | DIASTOLIC BLOOD PRESSURE: 87 MMHG | HEIGHT: 66 IN

## 2017-10-03 DIAGNOSIS — L73.9 FOLLICULITIS: ICD-10-CM

## 2017-10-03 DIAGNOSIS — L30.9 CHRONIC DERMATITIS OF HANDS: ICD-10-CM

## 2017-10-03 DIAGNOSIS — L92.0 GRANULOMA ANNULARE: Primary | ICD-10-CM

## 2017-10-03 PROCEDURE — 11900 INJECT SKIN LESIONS </W 7: CPT | Performed by: DERMATOLOGY

## 2017-10-03 PROCEDURE — 99213 OFFICE O/P EST LOW 20 MIN: CPT | Mod: 25 | Performed by: DERMATOLOGY

## 2017-10-03 RX ORDER — DOXYCYCLINE 100 MG/1
100 CAPSULE ORAL 2 TIMES DAILY
Qty: 60 CAPSULE | Refills: 3 | Status: SHIPPED | OUTPATIENT
Start: 2017-10-03 | End: 2017-11-27

## 2017-10-03 NOTE — PROGRESS NOTES
Janine Salas is a 31 year old year old female patient here today for f/u folliculitis and GA was doing ok on doxy but stopped and lesions came back,  Back and elbow have gotten slightly better with topical lidex for GA.  Patient reports the following modifying factors none.  Associated symptoms: none.  Patient has no other skin complaints today.  Remainder of the HPI, Meds, PMH, Allergies, FH, and SH was reviewed in chart.      Past Medical History:   Diagnosis Date     Acute pancreatitis 1/15     Anxiety      Chickenpox      PONV (postoperative nausea and vomiting)        Past Surgical History:   Procedure Laterality Date     APPENDECTOMY        SECTION N/A 2016    Procedure:  SECTION;  Surgeon: Marco Marin MD;  Location: [facility]     COLONOSCOPY       LAPAROSCOPIC CHOLECYSTECTOMY  2013    Procedure: LAPAROSCOPIC CHOLECYSTECTOMY;  Laparoscopic Cholecystectomy;  Surgeon: Lasha Garcias MD;  Location: [facility]     MOUTH SURGERY      wisdom teeth     UPPER GI ENDOSCOPY          Family History   Problem Relation Age of Onset     Hypertension Mother      Depression Mother      OSTEOPOROSIS Mother      Thyroid Disease Mother      Hypertension Father      Alcohol/Drug Father      recovered alcohol     CANCER Father      melanoma     Hypertension Maternal Grandmother      Alzheimer Disease Maternal Grandmother      Cardiovascular Maternal Grandmother      triple bypass     CANCER Maternal Grandfather      lung     Alcohol/Drug Paternal Grandmother      alcohol     Cancer - colorectal Paternal Grandmother      colon     Alcohol/Drug Paternal Grandfather      alcohol     CANCER Paternal Grandfather      leukemia - adult onset     Cardiovascular Paternal Grandfather      stent     Obesity Sister      Obesity Sister      Obesity Sister      Thyroid Disease Sister      Breast Cancer Maternal Aunt      CANCER Sister      cervical cancer, hysterectomy     Substance Abuse Sister       recovered drugs     Depression Sister      Autism Spectrum Disorder Other      Aspergers     Bipolar Disorder Other        Social History     Social History     Marital status: Single     Spouse name: N/A     Number of children: 1     Years of education: N/A     Occupational History     Not on file.     Social History Main Topics     Smoking status: Former Smoker     Packs/day: 0.50     Types: Cigarettes     Start date: 5/7/2015     Smokeless tobacco: Current User     Alcohol use No      Comment: rare- quit with pregnancy     Drug use: No     Sexual activity: Yes     Partners: Male     Birth control/ protection: Pill     Other Topics Concern     Parent/Sibling W/ Cabg, Mi Or Angioplasty Before 65f 55m? No     Social History Narrative       Outpatient Encounter Prescriptions as of 10/3/2017   Medication Sig Dispense Refill     doxycycline Monohydrate 100 MG CAPS Take 1 capsule (100 mg) by mouth 2 times daily 60 capsule 0     phentermine 37.5 MG capsule Take 1 capsule (37.5 mg) by mouth every morning 30 capsule 1     ranitidine (ZANTAC) 300 MG tablet Take 1 tablet (300 mg) by mouth At Bedtime 30 tablet 1     clindamycin (CLEOCIN T) 1 % solution Apply topically 2 times daily Mix with equal parts 5% over the counter Benzoyl peroxide. Needs follow up appt: [phone] to schedule. 180 mL 0     clindamycin (CLINDAMAX) 1 % topical gel Apply topically 2 times daily Mix with equal parts of benzoyl peroxide 180 g 11     benzoyl peroxide 5 % topical gel Apply topically daily Apply topically 2 times daily Mix with equal parts clindamycin 1% 180 g 0     fluocinonide (LIDEX) 0.05 % cream Apply sparingly to affected area twice daily as needed.  Do not apply to face. 120 g 0     sertraline (ZOLOFT) 50 MG tablet TAKE ONE TABLET BY MOUTH EVERY DAY 90 tablet 1     naproxen (NAPROSYN) 500 MG tablet Take 1 tablet (500 mg) by mouth 2 times daily as needed for moderate pain (Patient not taking: Reported on 9/1/2017) 30 tablet 1      "buPROPion (WELLBUTRIN XL) 150 MG 24 hr tablet Take 1 tablet (150 mg) by mouth every morning Please schedule follow up appointment for further refills. 30 tablet 11     Flax Oil-Fish Oil-Borage Oil (FISH OIL-FLAX OIL-BORAGE OIL PO)        ibuprofen (ADVIL,MOTRIN) 400 MG tablet Take 1-2 tablets (400-800 mg) by mouth every 6 hours as needed for other (cramping) 120 tablet 0     Prenatal Vit-Fe Fumarate-FA (PRENATAL MULTIVITAMIN  PLUS IRON) 27-0.8 MG TABS Take 1 tablet by mouth daily       LACTOBACILLUS PO Take 1 tablet by mouth daily Reported on 5/23/2017       glucosamine-chondroitin 500-400 MG CAPS Take 1 capsule by mouth daily Reported on 5/23/2017       vitamin E 400 UNIT capsule Take 400 Units by mouth daily Reported on 5/23/2017       FOLIC ACID PO Take 400 mcg by mouth daily Reported on 5/23/2017       No facility-administered encounter medications on file as of 10/3/2017.              Review Of Systems  Skin: As above  Eyes: negative  Ears/Nose/Throat: negative  Respiratory: No shortness of breath, dyspnea on exertion, cough, or hemoptysis  Cardiovascular: negative  Gastrointestinal: negative  Genitourinary: negative  Musculoskeletal: negative  Neurologic: negative  Psychiatric: negative  Hematologic/Lymphatic/Immunologic: negative  Endocrine: negative      O:   NAD, WDWN, Alert & Oriented, Mood & Affect wnl, Vitals stable   Here today alone   /87  Pulse 79  Ht 1.676 m (5' 6\")   General appearance normal   Vitals stable   Alert, oriented and in no acute distress     Post inflammatory changes with rare inflammatory papules on back   L elbow annular plaque        The remainder of expanded problem focused exam was unremarkable; the following areas were examined:  scalp/hair, conjunctiva/lids, face, neck, lips, chest, digits/nails, RUE, LUE.      Eyes: Conjunctivae/lids:Normal     ENT: Lips, buccal mucosa, tongue: normal    MSK:Normal    Cardiovascular: peripheral edema none    Pulm: Breathing " Normal    Neuro/Psych: Orientation:Normal; Mood/Affect:Normal      A/P:  1. folllicultis  Cont bpo wash and doxy   2. GA  IL TAC: PGACAC discussed.  Risks including but not limited to injection site reaction, bruising, no resolution.  All questions answered and entertained to patient s satisfaction.  Informed consent obtained.  IL TAC in concentration of 40mg/ml was injected ID to L elbow.  Total injected was  0.3 ml.  Patient tolerated without complications and given wound care instructions, including not to move product around.  Return in 4 weeks for follow-up and possible additional IL TAC.      Skin care regimen reviewed with patient: Eliminate harsh soaps, i.e. Dial, zest, irsih spring; Mild soaps such as Cetaphil or Dove sensitive skin, avoid hot or cold showers, aggressive use of emollients including vanicream, cetaphil or cerave discussed with patient.

## 2017-10-03 NOTE — MR AVS SNAPSHOT
"              After Visit Summary   10/3/2017    Janine Salas    MRN: [id number]           Patient Information     Date Of Birth          1985        Visit Information        Provider Department      10/3/2017 1:00 PM Miguel Khan MD [facility]        Today's Diagnoses     Granuloma annulare    -  1    Folliculitis        Chronic dermatitis of hands           Follow-ups after your visit        Who to contact     If you have questions or need follow up information about today's clinic visit or your schedule please contact [facility] directly at [phone].  Normal or non-critical lab and imaging results will be communicated to you by INFIMEThart, letter or phone within 4 business days after the clinic has received the results. If you do not hear from us within 7 days, please contact the clinic through Sweetie Hight or phone. If you have a critical or abnormal lab result, we will notify you by phone as soon as possible.  Submit refill requests through [employer] or call your pharmacy and they will forward the refill request to us. Please allow 3 business days for your refill to be completed.          Additional Information About Your Visit        MyChart Information     [employer] gives you secure access to your electronic health record. If you see a primary care provider, you can also send messages to your care team and make appointments. If you have questions, please call your primary care clinic.  If you do not have a primary care provider, please call [phone] and they will assist you.        Care EveryWhere ID     This is your Care EveryWhere ID. This could be used by other organizations to access your [place] medical records  [id number]        Your Vitals Were     Pulse Height                79 1.676 m (5' 6\")           Blood Pressure from Last 3 Encounters:   10/03/17 134/87   09/01/17 130/74   05/24/17 123/78    Weight from Last 3 Encounters:   09/01/17 (!) 145.2 kg " (320 lb)   05/24/17 (!) 151.4 kg (333 lb 12.8 oz)   03/14/17 (!) 157.4 kg (347 lb)              We Performed the Following     INJECTION INTO SKIN LESIONS <=7     TRIAMCINOLONE ACET INJ NOS          Today's Medication Changes          These changes are accurate as of: 10/3/17  1:49 PM.  If you have any questions, ask your nurse or doctor.               Start taking these medicines.        Dose/Directions    doxycycline 100 MG capsule   Commonly known as:  VIBRAMYCIN   Used for:  Chronic dermatitis of hands, Granuloma annulare, Folliculitis   Replaces:  doxycycline Monohydrate 100 MG Caps        Dose:  100 mg   Take 1 capsule (100 mg) by mouth 2 times daily   Quantity:  60 capsule   Refills:  3         Stop taking these medicines if you haven't already. Please contact your care team if you have questions.     doxycycline Monohydrate 100 MG Caps   Replaced by:  doxycycline 100 MG capsule                Where to get your medicines      These medications were sent to [facility] [place], [place] - [address] [facility]  [address] [facility] [address]     Phone:  [phone]     doxycycline 100 MG capsule                Primary Care Provider Office Phone # Fax #    Kavya Haider PA-C [phone] [address] [facility] [address]        Equal Access to Services     GEO BONILLA AH: Hadii melvi mann hadasho Soomaali, waaxda luqadaha, qaybta kaalmada adeegyada, anali goldberg. So [facility] [phone].    ATENCIÓN: Si habla español, tiene a pacheco disposición servicios gratuitos de asistencia lingüística. Llame al [phone].    We comply with applicable federal civil rights laws and [place] laws. We do not discriminate on the basis of race, color, national origin, age, disability, sex, sexual orientation, or gender identity.            Thank you!     Thank you for choosing [facility]  for your care. Our goal is always to provide you with  excellent care. Hearing back from our patients is one way we can continue to improve our services. Please take a few minutes to complete the written survey that you may receive in the mail after your visit with us. Thank you!             Your Updated Medication List - Protect others around you: Learn how to safely use, store and throw away your medicines at www.disposemymeds.org.          This list is accurate as of: 10/3/17  1:49 PM.  Always use your most recent med list.                   Brand Name Dispense Instructions for use Diagnosis    benzoyl peroxide 5 % topical gel     180 g    Apply topically daily Apply topically 2 times daily Mix with equal parts clindamycin 1%    Chronic dermatitis       buPROPion 150 MG 24 hr tablet    WELLBUTRIN XL    30 tablet    Take 1 tablet (150 mg) by mouth every morning Please schedule follow up appointment for further refills.    Tobacco use disorder, Moderate single current episode of major depressive disorder (H)       * clindamycin 1 % solution    CLEOCIN T    180 mL    Apply topically 2 times daily Mix with equal parts 5% over the counter Benzoyl peroxide. Needs follow up appt: [phone] to schedule.    Chronic dermatitis of hands       * clindamycin 1 % topical gel    CLINDAMAX    180 g    Apply topically 2 times daily Mix with equal parts of benzoyl peroxide    Chronic dermatitis       doxycycline 100 MG capsule    VIBRAMYCIN    60 capsule    Take 1 capsule (100 mg) by mouth 2 times daily    Chronic dermatitis of hands, Granuloma annulare, Folliculitis       FISH OIL-FLAX OIL-BORAGE OIL PO           fluocinonide 0.05 % cream    LIDEX    120 g    Apply sparingly to affected area twice daily as needed.  Do not apply to face.    Chronic dermatitis of hands, Granuloma annulare, KP (keratosis pilaris), Nevus       FOLIC ACID PO      Take 400 mcg by mouth daily Reported on 5/23/2017        glucosamine-chondroitin 500-400 MG Caps per capsule      Take 1 capsule by mouth  daily Reported on 2017    Other sign and symptom in breast, Mastodynia       ibuprofen 400 MG tablet    ADVIL/MOTRIN    120 tablet    Take 1-2 tablets (400-800 mg) by mouth every 6 hours as needed for other (cramping)    S/P  section       LACTOBACILLUS PO      Take 1 tablet by mouth daily Reported on 2017        naproxen 500 MG tablet    NAPROSYN    30 tablet    Take 1 tablet (500 mg) by mouth 2 times daily as needed for moderate pain    Acute bilateral low back pain without sciatica       phentermine 37.5 MG capsule     30 capsule    Take 1 capsule (37.5 mg) by mouth every morning    Morbid obesity, unspecified obesity type (H)       prenatal multivitamin plus iron 27-0.8 MG Tabs per tablet      Take 1 tablet by mouth daily        ranitidine 300 MG tablet    ZANTAC    30 tablet    Take 1 tablet (300 mg) by mouth At Bedtime    Abdominal pain, right upper quadrant       sertraline 50 MG tablet    ZOLOFT    90 tablet    TAKE ONE TABLET BY MOUTH EVERY DAY    Post partum depression       vitamin E 400 UNIT capsule      Take 400 Units by mouth daily Reported on 2017    Other sign and symptom in breast, Mastodynia       * Notice:  This list has 2 medication(s) that are the same as other medications prescribed for you. Read the directions carefully, and ask your doctor or other care provider to review them with you.

## 2017-10-03 NOTE — NURSING NOTE
"Initial /87  Pulse 79  Ht 1.676 m (5' 6\") Estimated body mass index is 51.65 kg/(m^2) as calculated from the following:    Height as of 9/1/17: 1.676 m (5' 6\").    Weight as of 9/1/17: 145.2 kg (320 lb). .      "

## 2017-10-24 NOTE — TELEPHONE ENCOUNTER
Prescription approved per [facility] Refill Protocol.  Gonzalo Ellison RN

## 2017-10-26 ENCOUNTER — HOSPITAL ENCOUNTER (EMERGENCY)
Facility: CLINIC | Age: 32
Discharge: HOME | End: 2017-10-26
Attending: PHYSICIAN ASSISTANT | Admitting: PHYSICIAN ASSISTANT
Payer: COMMERCIAL

## 2017-10-26 ENCOUNTER — APPOINTMENT (OUTPATIENT)
Dept: GENERAL RADIOLOGY | Facility: CLINIC | Age: 32
End: 2017-10-26
Attending: PHYSICIAN ASSISTANT
Payer: COMMERCIAL

## 2017-10-26 VITALS
DIASTOLIC BLOOD PRESSURE: 97 MMHG | HEIGHT: 66 IN | OXYGEN SATURATION: 98 % | HEART RATE: 95 BPM | BODY MASS INDEX: 47.09 KG/M2 | TEMPERATURE: 98 F | RESPIRATION RATE: 18 BRPM | SYSTOLIC BLOOD PRESSURE: 174 MMHG | WEIGHT: 293 LBS

## 2017-10-26 DIAGNOSIS — M79.604 PAIN OF RIGHT LOWER EXTREMITY: ICD-10-CM

## 2017-10-26 PROCEDURE — 99213 OFFICE O/P EST LOW 20 MIN: CPT

## 2017-10-26 PROCEDURE — 99213 OFFICE O/P EST LOW 20 MIN: CPT | Performed by: PHYSICIAN ASSISTANT

## 2017-10-26 PROCEDURE — 73590 X-RAY EXAM OF LOWER LEG: CPT | Mod: RT

## 2017-10-26 NOTE — ED PROVIDER NOTES
History     Chief Complaint   Patient presents with     Leg Pain     RLE, lump and pain R anterior shin area     HPI  Janine Salas is a 31 year old female who resents to the urgent care with concerns of her right lower leg pain for the last week.  Patient states that she has had persistent localized swelling in the leg since she injured it  More than 15 years ago.  She states that several months ago she hit the anterior shin and then swelling, ecchymosis, tenderness to palpation which had been resolving.  In the last three days she has noted increased pain which is exacerbated by dorsiflexion of the foot and palpation and alleviated by nothing.  He also states that area is warm to the touch.  She has not noticed any overlying erythema, rashes or skin changes.  She denies any pain in the calf itself.  No fever, chills, myalgias, cough, dyspnea, wheezing.  She initially denied any significant changes in footwear, activity level however then did later admit that she recently helped to re roof her house and was walking on incline.  She states she did have some pains at that time which felt somewhat similar to what she has in the last week.      Problem List:    Patient Active Problem List    Diagnosis Date Noted     Acute bilateral low back pain with right-sided sciatica 2017     Priority: Medium     S/P  section 2016     Priority: Medium     Cephalopelvic disproportion due to mixed maternal and fetal factors 2016     Priority: Medium     Essential hypertension 2016     Priority: Medium     Supervision of normal first pregnancy 2016     Priority: Medium     Prenatal care in third trimester 2016     Priority: Medium     SANDRA 2016 by LMP c/w 6w0d        Excessive weight gain during pregnancy in third trimester 02/10/2016     Priority: Medium     2016: 30+5 weeks 51 # weight gain to date Body mass index is 53.18 kg/(m^2).   Evaluated by anesthesia and  tentatively approved. They would like to see her again as her term approaches  Marco Marin         Anxiety attack 02/10/2016     Priority: Medium     Morbid obesity due to excess calories (H) 2016     Priority: Medium     Back pain associated with peripheral numbness 2016     Priority: Medium     Depression 2015     Priority: Medium     wellbutrin  Counseling         CARDIOVASCULAR SCREENING; LDL GOAL LESS THAN 160 2012     Priority: Medium      Past Medical History:    Past Medical History:   Diagnosis Date     Acute pancreatitis 1/15     Anxiety      Chickenpox      PONV (postoperative nausea and vomiting)      Past Surgical History:    Past Surgical History:   Procedure Laterality Date     APPENDECTOMY        SECTION N/A 2016    Procedure:  SECTION;  Surgeon: Marco Marin MD;  Location: [facility]     COLONOSCOPY       LAPAROSCOPIC CHOLECYSTECTOMY  2013    Procedure: LAPAROSCOPIC CHOLECYSTECTOMY;  Laparoscopic Cholecystectomy;  Surgeon: Lasha Garcias MD;  Location: [facility]     MOUTH SURGERY      wisdom teeth     UPPER GI ENDOSCOPY       Family History:    Family History   Problem Relation Age of Onset     Hypertension Mother      Depression Mother      OSTEOPOROSIS Mother      Thyroid Disease Mother      Hypertension Father      Alcohol/Drug Father      recovered alcohol     CANCER Father      melanoma     Hypertension Maternal Grandmother      Alzheimer Disease Maternal Grandmother      Cardiovascular Maternal Grandmother      triple bypass     CANCER Maternal Grandfather      lung     Alcohol/Drug Paternal Grandmother      alcohol     Cancer - colorectal Paternal Grandmother      colon     Alcohol/Drug Paternal Grandfather      alcohol     CANCER Paternal Grandfather      leukemia - adult onset     Cardiovascular Paternal Grandfather      stent     Obesity Sister      Obesity Sister      Obesity Sister      Thyroid Disease Sister       "Breast Cancer Maternal Aunt      CANCER Sister      cervical cancer, hysterectomy     Substance Abuse Sister      recovered drugs     Depression Sister      Autism Spectrum Disorder Other      Aspergers     Bipolar Disorder Other      Social History:  Marital Status:  Single [1]  Social History   Substance Use Topics     Smoking status: Former Smoker     Packs/day: 0.50     Types: Cigarettes     Start date: 5/7/2015     Smokeless tobacco: Current User     Alcohol use No      Comment: rare- quit with pregnancy      Medications:      sertraline (ZOLOFT) 50 MG tablet   doxycycline (VIBRAMYCIN) 100 MG capsule   phentermine 37.5 MG capsule   ranitidine (ZANTAC) 300 MG tablet   clindamycin (CLEOCIN T) 1 % solution   clindamycin (CLINDAMAX) 1 % topical gel   benzoyl peroxide 5 % topical gel   fluocinonide (LIDEX) 0.05 % cream   naproxen (NAPROSYN) 500 MG tablet   buPROPion (WELLBUTRIN XL) 150 MG 24 hr tablet   Flax Oil-Fish Oil-Borage Oil (FISH OIL-FLAX OIL-BORAGE OIL PO)   ibuprofen (ADVIL,MOTRIN) 400 MG tablet   Prenatal Vit-Fe Fumarate-FA (PRENATAL MULTIVITAMIN  PLUS IRON) 27-0.8 MG TABS   LACTOBACILLUS PO   glucosamine-chondroitin 500-400 MG CAPS   vitamin E 400 UNIT capsule   FOLIC ACID PO     Review of Systems  CONSTITUTIONAL:NEGATIVE for fever, chills, change in weight  INTEGUMENTARY/SKIN: POSITIVE for resolved ecchymosis NEGATIVE for erythema, worrisome rashes or skin changes   RESP:NEGATIVE for significant cough or SOB  MUSCULOSKELETAL: POSITIVE  for right lower leg pain and NEGATIVE for other significant arthralgias or myalgias   NEURO: NEGATIVE for numbness or paresthesias of lower leg  Physical Exam   BP: (!) 174/97  Pulse: 95  Temp: 98  F (36.7  C)  Resp: 18  Height: 167.6 cm (5' 6\")  Weight: (!) 145.2 kg (320 lb)  SpO2: 98 %    Physical Exam   Constitutional: She appears well-developed and well-nourished. She appears distressed (patient anxious appearing, crying).   Cardiovascular: Normal rate, regular rhythm " and normal heart sounds.  Exam reveals no gallop and no friction rub.    No murmur heard.  Pulmonary/Chest: Effort normal and breath sounds normal. No respiratory distress. She has no wheezes. She has no rales.   Musculoskeletal:        Right knee: Normal.        Right ankle: She exhibits decreased range of motion (actively wtih dorsiflexion secondary to pain). She exhibits no swelling, no ecchymosis, no deformity, no laceration and normal pulse. No tenderness.        Right lower leg: She exhibits tenderness and swelling. She exhibits no edema, no deformity and no laceration.        Legs:  Neurological: No cranial nerve deficit or sensory deficit.   Skin: Skin is warm and dry. No rash noted. No erythema.     ED Course     ED Course     Procedures          Critical Care time:  none            Results for orders placed or performed during the hospital encounter of 10/26/17   Tib/Fib XR, right    Narrative    RIGHT TIBIA-FIBULA TWO VIEWS 10/26/2017 5:49 PM     HISTORY: pain, injury several weeks ago    COMPARISON: None.      Impression    IMPRESSION: Normal.    JATIN DENSON MD     Labs Ordered and Resulted from Time of ED Arrival Up to the Time of Departure from the ED - No data to display    Assessments & Plan (with Medical Decision Making)     I have reviewed the nursing notes.    I have reviewed the findings, diagnosis, plan and need for follow up with the patient.       Discharge Medication List as of 10/26/2017  6:08 PM        Final diagnoses:   Pain of right lower extremity     31-year-old female presents to urgent care with concerns over right lower leg pain, localized swelling for the last week with several remote traumas to the leg previously.  She had stable vital signs upon arrival.  Physical exam findings as described above.  As part of evaluation x-ray of her right lower leg which is negative for acute fracture, dislocation, radiopaque foreign body.  She did not have any generalized swelling, edema and  leg.  I do not suspect DVT however I did discuss risk/benefits of getting ultrasound and patient agreed to defer.  She did not have any erythema to suggest cellulitis, abscess or other acute infectious process.  Differential for her pain and swelling would be consistent with a small lipoma, cyst.  Would also consider possibility of tendonitis from walking on uneven surface while [place] house.  She was discharged in stable instructions for since her treatment is needed with ice/heat and ibuprofen/tylenol.  Follow up with PCP if no improvement in 5-7 days.  Worrisome reasons to return to ER/UC sooner discussed.      Disclaimer: This note consists of symbols derived from keyboarding, dictation, and/or voice recognition software. As a result, there may be errors in the script that have gone undetected.  Please consider this when interpreting information found in the chart.    10/26/2017   [facility] EMERGENCY DEPARTMENT     Phoebe Peter PA-C  10/29/17 1451

## 2017-10-26 NOTE — ED AVS SNAPSHOT
[facility] Emergency Department    [address] [address]    Phone:  [phone]    Fax:  [phone]                                       Janine Salas   MRN: [id number]    Department:  [facility] Emergency Department   Date of Visit:  10/26/2017           After Visit Summary Signature Page     I have received my discharge instructions, and my questions have been answered. I have discussed any challenges I see with this plan with the nurse or doctor.    ..........................................................................................................................................  Patient/Patient Representative Signature      ..........................................................................................................................................  Patient Representative Print Name and Relationship to Patient    ..................................................               ................................................  Date                                            Time    ..........................................................................................................................................  Reviewed by Signature/Title    ...................................................              ..............................................  Date                                                            Time

## 2017-10-26 NOTE — ED AVS SNAPSHOT
[facility] Emergency Department    [address] [address]    Phone:  [phone]    Fax:  [phone]                                       Janine Salas   MRN: [id number]    Department:  [facility] Emergency Department   Date of Visit:  10/26/2017           Patient Information     Date Of Birth          1985        Your diagnoses for this visit were:     Pain of right lower extremity        You were seen by Phoebe Peter PA-C.      Follow-up Information     Follow up with Kavya Haider PA-C In 1 week.    Specialty:  Physician Assistant    Why:  if no improvement or sooner if new or worsening symptoms     Contact information:    [address] [facility] [address]  [phone]        Discharge References/Attachments     TENDONITIS (ENGLISH)    PLANTARFLEXION (STRENGTH) (ENGLISH)      Future Appointments        Provider Department Dept Phone Center    11/27/2017 11:00 AM Miguel Khan MD [facility] [phone] [facility]      24 Hour Appointment Hotline       To make an appointment at any [facility] call [phone] ([phone]). If you don't have a family doctor or clinic, we will help you find one. [facility] are conveniently located to serve the needs of you and your family.             Review of your medicines      Our records show that you are taking the medicines listed below. If these are incorrect, please call your family doctor or clinic.        Dose / Directions Last dose taken    benzoyl peroxide 5 % topical gel   Quantity:  180 g        Apply topically daily Apply topically 2 times daily Mix with equal parts clindamycin 1%   Refills:  0        buPROPion 150 MG 24 hr tablet   Commonly known as:  WELLBUTRIN XL   Dose:  150 mg   Quantity:  30 tablet        Take 1 tablet (150 mg) by mouth every morning Please schedule follow up appointment for further refills.   Refills:  11        * clindamycin 1 % solution    Commonly known as:  CLEOCIN T   Quantity:  180 mL        Apply topically 2 times daily Mix with equal parts 5% over the counter Benzoyl peroxide. Needs follow up appt: [phone] to schedule.   Refills:  0        * clindamycin 1 % topical gel   Commonly known as:  CLINDAMAX   Quantity:  180 g        Apply topically 2 times daily Mix with equal parts of benzoyl peroxide   Refills:  11        doxycycline 100 MG capsule   Commonly known as:  VIBRAMYCIN   Dose:  100 mg   Quantity:  60 capsule        Take 1 capsule (100 mg) by mouth 2 times daily   Refills:  3        FISH OIL-FLAX OIL-BORAGE OIL PO        Refills:  0        fluocinonide 0.05 % cream   Commonly known as:  LIDEX   Quantity:  120 g        Apply sparingly to affected area twice daily as needed.  Do not apply to face.   Refills:  0        FOLIC ACID PO   Dose:  400 mcg        Take 400 mcg by mouth daily Reported on 5/23/2017   Refills:  0        glucosamine-chondroitin 500-400 MG Caps per capsule   Dose:  1 capsule        Take 1 capsule by mouth daily Reported on 5/23/2017   Refills:  0        ibuprofen 400 MG tablet   Commonly known as:  ADVIL/MOTRIN   Dose:  400-800 mg   Quantity:  120 tablet        Take 1-2 tablets (400-800 mg) by mouth every 6 hours as needed for other (cramping)   Refills:  0        LACTOBACILLUS PO   Dose:  1 tablet        Take 1 tablet by mouth daily Reported on 5/23/2017   Refills:  0        naproxen 500 MG tablet   Commonly known as:  NAPROSYN   Dose:  500 mg   Quantity:  30 tablet        Take 1 tablet (500 mg) by mouth 2 times daily as needed for moderate pain   Refills:  1        phentermine 37.5 MG capsule   Dose:  37.5 mg   Quantity:  30 capsule        Take 1 capsule (37.5 mg) by mouth every morning   Refills:  1        prenatal multivitamin plus iron 27-0.8 MG Tabs per tablet   Dose:  1 tablet        Take 1 tablet by mouth daily   Refills:  0        ranitidine 300 MG tablet   Commonly known as:  ZANTAC   Dose:  300 mg    Quantity:  30 tablet        Take 1 tablet (300 mg) by mouth At Bedtime   Refills:  1        sertraline 50 MG tablet   Commonly known as:  ZOLOFT   Quantity:  90 tablet        TAKE ONE TABLET BY MOUTH EVERY DAY   Refills:  0        vitamin E 400 UNIT capsule   Dose:  400 Units        Take 400 Units by mouth daily Reported on 5/23/2017   Refills:  0        * Notice:  This list has 2 medication(s) that are the same as other medications prescribed for you. Read the directions carefully, and ask your doctor or other care provider to review them with you.            Procedures and tests performed during your visit     Tib/Fib XR, right      Orders Needing Specimen Collection     None      Pending Results     No orders found from 10/24/2017 to 10/27/2017.            Pending Culture Results     No orders found from 10/24/2017 to 10/27/2017.            Pending Results Instructions     If you had any lab results that were not finalized at the time of your Discharge, you can call the ED Lab Result RN at [phone]. You will be contacted by this team for any positive Lab results or changes in treatment. The nurses are available 7 days a week from 10A to 6:30P.  You can leave a message 24 hours per day and they will return your call.        Test Results From Your Hospital Stay        10/26/2017  5:51 PM      Narrative     RIGHT TIBIA-FIBULA TWO VIEWS 10/26/2017 5:49 PM     HISTORY: pain, injury several weeks ago    COMPARISON: None.        Impression     IMPRESSION: Normal.    JATIN DENSON MD                Thank you for choosing [facility]       Thank you for choosing [facility] for your care. Our goal is always to provide you with excellent care. Hearing back from our patients is one way we can continue to improve our services. Please take a few minutes to complete the written survey that you may receive in the mail after you visit with us. Thank you!        MetaCDN[employer] Information     [employer] gives you secure access to your  electronic health record. If you see a primary care provider, you can also send messages to your care team and make appointments. If you have questions, please call your primary care clinic.  If you do not have a primary care provider, please call [phone] and they will assist you.        Care EveryWhere ID     This is your Care EveryWhere ID. This could be used by other organizations to access your [facility] medical records  [id number]        Equal Access to Services     GEO BONILLA : Darin Pierce, alexis craft, khari lee, anali dick . So [facility] [phone].    ATENCIÓN: Si habla español, tiene a pacheco disposición servicios gratuitos de asistencia lingüística. Llame al [phone].    We comply with applicable federal civil rights laws and [place] laws. We do not discriminate on the basis of race, color, national origin, age, disability, sex, sexual orientation, or gender identity.            After Visit Summary       This is your record. Keep this with you and show to your community pharmacist(s) and doctor(s) at your next visit.

## 2017-10-30 DIAGNOSIS — E66.01 MORBID OBESITY, UNSPECIFIED OBESITY TYPE (H): ICD-10-CM

## 2017-10-31 RX ORDER — PHENTERMINE HYDROCHLORIDE 37.5 MG/1
37.5 CAPSULE ORAL EVERY MORNING
Qty: 30 CAPSULE | Refills: 0 | Status: SHIPPED | OUTPATIENT
Start: 2017-10-31 | End: 2017-12-04

## 2017-10-31 NOTE — TELEPHONE ENCOUNTER
Refill x1 more month then need to have visit to discuss plans/ongoing treatment as well as weight check  yovanny

## 2017-10-31 NOTE — TELEPHONE ENCOUNTER
Patient called back and verbalized understanding. She will make a future visit with Carol Mario   [facility]

## 2017-11-27 ENCOUNTER — OFFICE VISIT (OUTPATIENT)
Dept: DERMATOLOGY | Facility: CLINIC | Age: 32
End: 2017-11-27
Payer: COMMERCIAL

## 2017-11-27 VITALS — DIASTOLIC BLOOD PRESSURE: 90 MMHG | OXYGEN SATURATION: 97 % | SYSTOLIC BLOOD PRESSURE: 150 MMHG | HEART RATE: 105 BPM

## 2017-11-27 DIAGNOSIS — L92.0 GRANULOMA ANNULARE: Primary | ICD-10-CM

## 2017-11-27 DIAGNOSIS — L70.0 ACNE VULGARIS: ICD-10-CM

## 2017-11-27 PROCEDURE — 11900 INJECT SKIN LESIONS </W 7: CPT | Performed by: DERMATOLOGY

## 2017-11-27 PROCEDURE — 99213 OFFICE O/P EST LOW 20 MIN: CPT | Mod: 25 | Performed by: DERMATOLOGY

## 2017-11-27 RX ORDER — DOXYCYCLINE HYCLATE 100 MG
TABLET ORAL
COMMUNITY
Start: 2017-11-24 | End: 2017-11-27

## 2017-11-27 RX ORDER — DOXYCYCLINE 100 MG/1
100 CAPSULE ORAL 2 TIMES DAILY
Qty: 60 CAPSULE | Refills: 3 | Status: SHIPPED | OUTPATIENT
Start: 2017-11-27 | End: 2018-04-23

## 2017-11-27 RX ORDER — BUPROPION HYDROCHLORIDE 150 MG/1
150 TABLET ORAL
COMMUNITY
End: 2018-01-25

## 2017-11-27 NOTE — LETTER
2017         RE: Janine Salas  [address]  [place] [address]        Dear Colleague,    Thank you for referring your patient, Janine Salas, to the [facility]. Please see a copy of my visit note below.    Janine Salas is a 32 year old year old female patient here today for f/u GA and acne.  GA almost clear after ILTAC,  She notes doxy did control her acne but she stopped and it camce.  Back.  She plans to try and get pregnant in about 3-4 months.  Associated symptoms: none.  Patient has no other skin complaints today.  Remainder of the HPI, Meds, PMH, Allergies, FH, and SH was reviewed in chart.      Past Medical History:   Diagnosis Date     Acute pancreatitis 1/15     Anxiety      Chickenpox      PONV (postoperative nausea and vomiting)        Past Surgical History:   Procedure Laterality Date     APPENDECTOMY        SECTION N/A 2016    Procedure:  SECTION;  Surgeon: Marco Marin MD;  Location: [facility] OR     COLONOSCOPY       LAPAROSCOPIC CHOLECYSTECTOMY  2013    Procedure: LAPAROSCOPIC CHOLECYSTECTOMY;  Laparoscopic Cholecystectomy;  Surgeon: Lasha Garcias MD;  Location: [facility] OR     MOUTH SURGERY      wisdom teeth     UPPER GI ENDOSCOPY          Family History   Problem Relation Age of Onset     Hypertension Mother      Depression Mother      OSTEOPOROSIS Mother      Thyroid Disease Mother      Hypertension Father      Alcohol/Drug Father      recovered alcohol     CANCER Father      melanoma     Hypertension Maternal Grandmother      Alzheimer Disease Maternal Grandmother      Cardiovascular Maternal Grandmother      triple bypass     CANCER Maternal Grandfather      lung     Alcohol/Drug Paternal Grandmother      alcohol     Cancer - colorectal Paternal Grandmother      colon     Alcohol/Drug Paternal Grandfather      alcohol     CANCER Paternal Grandfather      leukemia - adult onset     Cardiovascular Paternal  Grandfather      stent     Obesity Sister      Obesity Sister      Obesity Sister      Thyroid Disease Sister      Breast Cancer Maternal Aunt      CANCER Sister      cervical cancer, hysterectomy     Substance Abuse Sister      recovered drugs     Depression Sister      Autism Spectrum Disorder Other      Aspergers     Bipolar Disorder Other        Social History     Social History     Marital status: Single     Spouse name: N/A     Number of children: 1     Years of education: N/A     Occupational History     Not on file.     Social History Main Topics     Smoking status: Former Smoker     Packs/day: 0.50     Types: Cigarettes     Start date: 5/7/2015     Smokeless tobacco: Current User     Alcohol use No      Comment: rare- quit with pregnancy     Drug use: No     Sexual activity: Yes     Partners: Male     Birth control/ protection: Pill     Other Topics Concern     Parent/Sibling W/ Cabg, Mi Or Angioplasty Before 65f 55m? No     Social History Narrative       Outpatient Encounter Prescriptions as of 11/27/2017   Medication Sig Dispense Refill     buPROPion (WELLBUTRIN XL) 150 MG 24 hr tablet Take 150 mg by mouth       azelaic acid (AZELEX) 20 % cream Apply topically 2 times daily 50 g 11     doxycycline (VIBRAMYCIN) 100 MG capsule Take 1 capsule (100 mg) by mouth 2 times daily 60 capsule 3     phentermine 37.5 MG capsule Take 1 capsule (37.5 mg) by mouth every morning 30 capsule 0     sertraline (ZOLOFT) 50 MG tablet TAKE ONE TABLET BY MOUTH EVERY DAY 90 tablet 0     naproxen (NAPROSYN) 500 MG tablet Take 1 tablet (500 mg) by mouth 2 times daily as needed for moderate pain 30 tablet 1     Prenatal Vit-Fe Fumarate-FA (PRENATAL MULTIVITAMIN  PLUS IRON) 27-0.8 MG TABS Take 1 tablet by mouth daily       [DISCONTINUED] doxycycline (VIBRA-TABS) 100 MG tablet        [DISCONTINUED] doxycycline (VIBRAMYCIN) 100 MG capsule Take 1 capsule (100 mg) by mouth 2 times daily 60 capsule 3     [DISCONTINUED] ranitidine (ZANTAC)  300 MG tablet Take 1 tablet (300 mg) by mouth At Bedtime 30 tablet 1     clindamycin (CLEOCIN T) 1 % solution Apply topically 2 times daily Mix with equal parts 5% over the counter Benzoyl peroxide. Needs follow up appt: [phone] to schedule. (Patient not taking: Reported on 11/27/2017) 180 mL 0     clindamycin (CLINDAMAX) 1 % topical gel Apply topically 2 times daily Mix with equal parts of benzoyl peroxide (Patient not taking: Reported on 11/27/2017) 180 g 11     benzoyl peroxide 5 % topical gel Apply topically daily Apply topically 2 times daily Mix with equal parts clindamycin 1% (Patient not taking: Reported on 11/27/2017) 180 g 0     [DISCONTINUED] fluocinonide (LIDEX) 0.05 % cream Apply sparingly to affected area twice daily as needed.  Do not apply to face. 120 g 0     [DISCONTINUED] buPROPion (WELLBUTRIN XL) 150 MG 24 hr tablet Take 1 tablet (150 mg) by mouth every morning Please schedule follow up appointment for further refills. 30 tablet 11     [DISCONTINUED] Flax Oil-Fish Oil-Borage Oil (FISH OIL-FLAX OIL-BORAGE OIL PO)        [DISCONTINUED] ibuprofen (ADVIL,MOTRIN) 400 MG tablet Take 1-2 tablets (400-800 mg) by mouth every 6 hours as needed for other (cramping) 120 tablet 0     [DISCONTINUED] LACTOBACILLUS PO Take 1 tablet by mouth daily Reported on 5/23/2017       [DISCONTINUED] glucosamine-chondroitin 500-400 MG CAPS Take 1 capsule by mouth daily Reported on 5/23/2017       [DISCONTINUED] vitamin E 400 UNIT capsule Take 400 Units by mouth daily Reported on 5/23/2017       [DISCONTINUED] FOLIC ACID PO Take 400 mcg by mouth daily Reported on 5/23/2017       No facility-administered encounter medications on file as of 11/27/2017.              Review Of Systems  Skin: As above  Eyes: negative  Ears/Nose/Throat: negative  Respiratory: No shortness of breath, dyspnea on exertion, cough, or hemoptysis  Cardiovascular: negative  Gastrointestinal: negative  Genitourinary: negative  Musculoskeletal:  negative  Neurologic: negative  Psychiatric: negative  Hematologic/Lymphatic/Immunologic: negative  Endocrine: negative      O:   NAD, WDWN, Alert & Oriented, Mood & Affect wnl, Vitals stable   Here today alone   /90  Pulse 105  SpO2 97%   General appearance normal   Vitals stable   Alert, oriented and in no acute distress     Annular pih with rim of firm plaque on left elbow  Inflammatory papules onjawline an dback       The remainder of expanded problem focused exam was unremarkable; the following areas were examined:  scalp/hair, conjunctiva/lids, face, neck, lips, back , chest, digits/nails, RUE, LUE.      Eyes: Conjunctivae/lids:Normal     ENT: Lips, buccal mucosa, tongue: normal    MSK:Normal    Cardiovascular: peripheral edema none    Pulm: Breathing Normal    Neuro/Psych: Orientation:Normal; Mood/Affect:Normal      A/P:  1. GA  IL TAC: PGACAC discussed.  Risks including but not limited to injection site reaction, bruising, no resolution.  All questions answered and entertained to patient s satisfaction.  Informed consent obtained.  IL TAC in concentration of 40mg/ml was injected ID to L elbow .  Total injected was  0.1 ml.  Patient tolerated without complications and given wound care instructions, including not to move product around.  Return in 4 weeks for follow-up and possible additional IL TAC.      2. Acne  Acne vulgaris    Pathophysiology discussed with pateint and information provided   I discussed with patient Oral Abx, Aldactone, Topical creams, light therapies and OCT  Treating acne is preventative    Acne can be effectively treated, although response may sometimes be slow.   Where possible, avoid excessively humid conditions such as a sauna, working in an unventilated kitchen or tropical vacations.   If you smoke, stop. Nicotine increases sebum retention and increased scale within the follicles, forming comedones (black and whiteheads).    Minimize the application of oils and cosmetics to  the affected skin.   Abrasive skin treatments can aggravate acne.   Try not to scratch or pick the spots.   To avoid sunburn, protect your skin outdoors using a sunscreen and protective clothing.  No relationship between particular foods and acne has been proven. However, reports suggest low glycemic and low dairy diet are helpful for some people.    May take 3-4 months to see 50% improvement  May get worse during initial phase of treatment    Pregnacy and safe acne meds discussed with patient   Cont doxy for now  STOP DOXY ONCE SHE TRYS TO GET PREGNANT  finacea twice daily  OTC bpo wash prn  Return to clinic once she completes pregnancy      Again, thank you for allowing me to participate in the care of your patient.        Sincerely,        Miguel Khan MD

## 2017-11-27 NOTE — MR AVS SNAPSHOT
After Visit Summary   11/27/2017    Janine Salas    MRN: [id number]           Patient Information     Date Of Birth          1985        Visit Information        Provider Department      11/27/2017 11:00 AM Miguel Khan MD [facility]        Today's Diagnoses     Granuloma annulare    -  1    Acne vulgaris           Follow-ups after your visit        Who to contact     If you have questions or need follow up information about today's clinic visit or your schedule please contact [facility] directly at [phone].  Normal or non-critical lab and imaging results will be communicated to you by Vesti[employer]hart, letter or phone within 4 business days after the clinic has received the results. If you do not hear from us within 7 days, please contact the clinic through [employer] or phone. If you have a critical or abnormal lab result, we will notify you by phone as soon as possible.  Submit refill requests through [employer] or call your pharmacy and they will forward the refill request to us. Please allow 3 business days for your refill to be completed.          Additional Information About Your Visit        MyChart Information     [employer] gives you secure access to your electronic health record. If you see a primary care provider, you can also send messages to your care team and make appointments. If you have questions, please call your primary care clinic.  If you do not have a primary care provider, please call [phone] and they will assist you.        Care EveryWhere ID     This is your Care EveryWhere ID. This could be used by other organizations to access your [facility] medical records  [id number]        Your Vitals Were     Pulse Pulse Oximetry                105 97%           Blood Pressure from Last 3 Encounters:   11/27/17 150/90   10/26/17 (!) 174/97   10/03/17 134/87    Weight from Last 3 Encounters:   10/26/17 (!) 145.2 kg (320 lb)   09/01/17 (!) 145.2  kg (320 lb)   05/24/17 (!) 151.4 kg (333 lb 12.8 oz)              We Performed the Following     INJECTION INTO SKIN LESIONS <=7     TRIAMCINOLONE ACET INJ NOS          Today's Medication Changes          These changes are accurate as of: 11/27/17 11:43 AM.  If you have any questions, ask your nurse or doctor.               Start taking these medicines.        Dose/Directions    azelaic acid 20 % cream   Commonly known as:  AZELEX   Used for:  Granuloma annulare, Acne vulgaris   Started by:  Miguel Khan MD        Apply topically 2 times daily   Quantity:  50 g   Refills:  11            Where to get your medicines      These medications were sent to [facility]  [place] [address]  [address] [place] [address]     Phone:  [phone]     azelaic acid 20 % cream    doxycycline 100 MG capsule                Primary Care Provider Office Phone # Fax #    Kavya Haider PA-C [phone]       [address] [facility] [address]        Equal Access to Services     [facility] AH: Hadii aad ku hadasho Soomaali, waaxda luqadaha, qaybta kaalmada adeegyada, waxay idiin hayaan david dick . So [facility] [phone].    ATENCIÓN: Si habla español, tiene a pacheco disposición servicios gratuitos de asistencia lingüística. Llame al [phone].    We comply with applicable federal civil rights laws and [place] laws. We do not discriminate on the basis of race, color, national origin, age, disability, sex, sexual orientation, or gender identity.            Thank you!     Thank you for choosing [facility]  for your care. Our goal is always to provide you with excellent care. Hearing back from our patients is one way we can continue to improve our services. Please take a few minutes to complete the written survey that you may receive in the mail after your visit with us. Thank you!             Your Updated Medication List - Protect  others around you: Learn how to safely use, store and throw away your medicines at www.disposemymeds.org.          This list is accurate as of: 11/27/17 11:43 AM.  Always use your most recent med list.                   Brand Name Dispense Instructions for use Diagnosis    azelaic acid 20 % cream    AZELEX    50 g    Apply topically 2 times daily    Granuloma annulare, Acne vulgaris       benzoyl peroxide 5 % topical gel     180 g    Apply topically daily Apply topically 2 times daily Mix with equal parts clindamycin 1%    Chronic dermatitis       buPROPion 150 MG 24 hr tablet    WELLBUTRIN XL     Take 150 mg by mouth    Granuloma annulare, Acne vulgaris       * clindamycin 1 % solution    CLEOCIN T    180 mL    Apply topically 2 times daily Mix with equal parts 5% over the counter Benzoyl peroxide. Needs follow up appt: [phone] to schedule.    Chronic dermatitis of hands       * clindamycin 1 % topical gel    CLINDAMAX    180 g    Apply topically 2 times daily Mix with equal parts of benzoyl peroxide    Chronic dermatitis       doxycycline 100 MG capsule    VIBRAMYCIN    60 capsule    Take 1 capsule (100 mg) by mouth 2 times daily    Granuloma annulare, Acne vulgaris       naproxen 500 MG tablet    NAPROSYN    30 tablet    Take 1 tablet (500 mg) by mouth 2 times daily as needed for moderate pain    Acute bilateral low back pain without sciatica       phentermine 37.5 MG capsule     30 capsule    Take 1 capsule (37.5 mg) by mouth every morning    Morbid obesity, unspecified obesity type (H)       prenatal multivitamin plus iron 27-0.8 MG Tabs per tablet      Take 1 tablet by mouth daily        sertraline 50 MG tablet    ZOLOFT    90 tablet    TAKE ONE TABLET BY MOUTH EVERY DAY    Post partum depression       * Notice:  This list has 2 medication(s) that are the same as other medications prescribed for you. Read the directions carefully, and ask your doctor or other care provider to review them with you.

## 2017-11-27 NOTE — PROGRESS NOTES
Janine Salas is a 32 year old year old female patient here today for f/u GA and acne.  GA almost clear after ILTAC,  She notes doxy did control her acne but she stopped and it camce.  Back.  She plans to try and get pregnant in about 3-4 months.  Associated symptoms: none.  Patient has no other skin complaints today.  Remainder of the HPI, Meds, PMH, Allergies, FH, and SH was reviewed in chart.      Past Medical History:   Diagnosis Date     Acute pancreatitis 1/15     Anxiety      Chickenpox      PONV (postoperative nausea and vomiting)        Past Surgical History:   Procedure Laterality Date     APPENDECTOMY        SECTION N/A 2016    Procedure:  SECTION;  Surgeon: Marco Marin MD;  Location: [facility]     COLONOSCOPY       LAPAROSCOPIC CHOLECYSTECTOMY  2013    Procedure: LAPAROSCOPIC CHOLECYSTECTOMY;  Laparoscopic Cholecystectomy;  Surgeon: Lasha Garcias MD;  Location: [facility]     MOUTH SURGERY      wisdom teeth     UPPER GI ENDOSCOPY          Family History   Problem Relation Age of Onset     Hypertension Mother      Depression Mother      OSTEOPOROSIS Mother      Thyroid Disease Mother      Hypertension Father      Alcohol/Drug Father      recovered alcohol     CANCER Father      melanoma     Hypertension Maternal Grandmother      Alzheimer Disease Maternal Grandmother      Cardiovascular Maternal Grandmother      triple bypass     CANCER Maternal Grandfather      lung     Alcohol/Drug Paternal Grandmother      alcohol     Cancer - colorectal Paternal Grandmother      colon     Alcohol/Drug Paternal Grandfather      alcohol     CANCER Paternal Grandfather      leukemia - adult onset     Cardiovascular Paternal Grandfather      stent     Obesity Sister      Obesity Sister      Obesity Sister      Thyroid Disease Sister      Breast Cancer Maternal Aunt      CANCER Sister      cervical cancer, hysterectomy     Substance Abuse Sister      recovered drugs      Depression Sister      Autism Spectrum Disorder Other      Aspergers     Bipolar Disorder Other        Social History     Social History     Marital status: Single     Spouse name: N/A     Number of children: 1     Years of education: N/A     Occupational History     Not on file.     Social History Main Topics     Smoking status: Former Smoker     Packs/day: 0.50     Types: Cigarettes     Start date: 5/7/2015     Smokeless tobacco: Current User     Alcohol use No      Comment: rare- quit with pregnancy     Drug use: No     Sexual activity: Yes     Partners: Male     Birth control/ protection: Pill     Other Topics Concern     Parent/Sibling W/ Cabg, Mi Or Angioplasty Before 65f 55m? No     Social History Narrative       Outpatient Encounter Prescriptions as of 11/27/2017   Medication Sig Dispense Refill     buPROPion (WELLBUTRIN XL) 150 MG 24 hr tablet Take 150 mg by mouth       azelaic acid (AZELEX) 20 % cream Apply topically 2 times daily 50 g 11     doxycycline (VIBRAMYCIN) 100 MG capsule Take 1 capsule (100 mg) by mouth 2 times daily 60 capsule 3     phentermine 37.5 MG capsule Take 1 capsule (37.5 mg) by mouth every morning 30 capsule 0     sertraline (ZOLOFT) 50 MG tablet TAKE ONE TABLET BY MOUTH EVERY DAY 90 tablet 0     naproxen (NAPROSYN) 500 MG tablet Take 1 tablet (500 mg) by mouth 2 times daily as needed for moderate pain 30 tablet 1     Prenatal Vit-Fe Fumarate-FA (PRENATAL MULTIVITAMIN  PLUS IRON) 27-0.8 MG TABS Take 1 tablet by mouth daily       [DISCONTINUED] doxycycline (VIBRA-TABS) 100 MG tablet        [DISCONTINUED] doxycycline (VIBRAMYCIN) 100 MG capsule Take 1 capsule (100 mg) by mouth 2 times daily 60 capsule 3     [DISCONTINUED] ranitidine (ZANTAC) 300 MG tablet Take 1 tablet (300 mg) by mouth At Bedtime 30 tablet 1     clindamycin (CLEOCIN T) 1 % solution Apply topically 2 times daily Mix with equal parts 5% over the counter Benzoyl peroxide. Needs follow up appt: [phone] to schedule.  (Patient not taking: Reported on 11/27/2017) 180 mL 0     clindamycin (CLINDAMAX) 1 % topical gel Apply topically 2 times daily Mix with equal parts of benzoyl peroxide (Patient not taking: Reported on 11/27/2017) 180 g 11     benzoyl peroxide 5 % topical gel Apply topically daily Apply topically 2 times daily Mix with equal parts clindamycin 1% (Patient not taking: Reported on 11/27/2017) 180 g 0     [DISCONTINUED] fluocinonide (LIDEX) 0.05 % cream Apply sparingly to affected area twice daily as needed.  Do not apply to face. 120 g 0     [DISCONTINUED] buPROPion (WELLBUTRIN XL) 150 MG 24 hr tablet Take 1 tablet (150 mg) by mouth every morning Please schedule follow up appointment for further refills. 30 tablet 11     [DISCONTINUED] Flax Oil-Fish Oil-Borage Oil (FISH OIL-FLAX OIL-BORAGE OIL PO)        [DISCONTINUED] ibuprofen (ADVIL,MOTRIN) 400 MG tablet Take 1-2 tablets (400-800 mg) by mouth every 6 hours as needed for other (cramping) 120 tablet 0     [DISCONTINUED] LACTOBACILLUS PO Take 1 tablet by mouth daily Reported on 5/23/2017       [DISCONTINUED] glucosamine-chondroitin 500-400 MG CAPS Take 1 capsule by mouth daily Reported on 5/23/2017       [DISCONTINUED] vitamin E 400 UNIT capsule Take 400 Units by mouth daily Reported on 5/23/2017       [DISCONTINUED] FOLIC ACID PO Take 400 mcg by mouth daily Reported on 5/23/2017       No facility-administered encounter medications on file as of 11/27/2017.              Review Of Systems  Skin: As above  Eyes: negative  Ears/Nose/Throat: negative  Respiratory: No shortness of breath, dyspnea on exertion, cough, or hemoptysis  Cardiovascular: negative  Gastrointestinal: negative  Genitourinary: negative  Musculoskeletal: negative  Neurologic: negative  Psychiatric: negative  Hematologic/Lymphatic/Immunologic: negative  Endocrine: negative      O:   NAD, WDWN, Alert & Oriented, Mood & Affect wnl, Vitals stable   Here today alone   /90  Pulse 105  SpO2  97%   General appearance normal   Vitals stable   Alert, oriented and in no acute distress     Annular pih with rim of firm plaque on left elbow  Inflammatory papules onjawline an dback       The remainder of expanded problem focused exam was unremarkable; the following areas were examined:  scalp/hair, conjunctiva/lids, face, neck, lips, back , chest, digits/nails, RUE, LUE.      Eyes: Conjunctivae/lids:Normal     ENT: Lips, buccal mucosa, tongue: normal    MSK:Normal    Cardiovascular: peripheral edema none    Pulm: Breathing Normal    Neuro/Psych: Orientation:Normal; Mood/Affect:Normal      A/P:  1. GA  IL TAC: PGACAC discussed.  Risks including but not limited to injection site reaction, bruising, no resolution.  All questions answered and entertained to patient s satisfaction.  Informed consent obtained.  IL TAC in concentration of 40mg/ml was injected ID to L elbow .  Total injected was  0.1 ml.  Patient tolerated without complications and given wound care instructions, including not to move product around.  Return in 4 weeks for follow-up and possible additional IL TAC.      2. Acne  Acne vulgaris    Pathophysiology discussed with pateint and information provided   I discussed with patient Oral Abx, Aldactone, Topical creams, light therapies and OCT  Treating acne is preventative    Acne can be effectively treated, although response may sometimes be slow.   Where possible, avoid excessively humid conditions such as a sauna, working in an unventilated kitchen or tropical vacations.   If you smoke, stop. Nicotine increases sebum retention and increased scale within the follicles, forming comedones (black and whiteheads).    Minimize the application of oils and cosmetics to the affected skin.   Abrasive skin treatments can aggravate acne.   Try not to scratch or pick the spots.   To avoid sunburn, protect your skin outdoors using a sunscreen and protective clothing.  No relationship between particular foods and  acne has been proven. However, reports suggest low glycemic and low dairy diet are helpful for some people.    May take 3-4 months to see 50% improvement  May get worse during initial phase of treatment    Pregnacy and safe acne meds discussed with patient   Cont doxy for now  STOP DOXY ONCE SHE TRYS TO GET PREGNANT  finacea twice daily  OTC bpo wash prn  Return to clinic once she completes pregnancy

## 2017-12-04 ENCOUNTER — OFFICE VISIT (OUTPATIENT)
Dept: FAMILY MEDICINE | Facility: CLINIC | Age: 32
End: 2017-12-04
Payer: COMMERCIAL

## 2017-12-04 VITALS
SYSTOLIC BLOOD PRESSURE: 130 MMHG | WEIGHT: 293 LBS | TEMPERATURE: 98.5 F | BODY MASS INDEX: 47.09 KG/M2 | DIASTOLIC BLOOD PRESSURE: 72 MMHG | HEIGHT: 66 IN | HEART RATE: 78 BPM

## 2017-12-04 DIAGNOSIS — G44.219 EPISODIC TENSION-TYPE HEADACHE, NOT INTRACTABLE: ICD-10-CM

## 2017-12-04 DIAGNOSIS — E66.01 MORBID OBESITY, UNSPECIFIED OBESITY TYPE (H): ICD-10-CM

## 2017-12-04 DIAGNOSIS — M54.50 ACUTE BILATERAL LOW BACK PAIN WITHOUT SCIATICA: Primary | ICD-10-CM

## 2017-12-04 PROCEDURE — 99214 OFFICE O/P EST MOD 30 MIN: CPT | Performed by: PHYSICIAN ASSISTANT

## 2017-12-04 RX ORDER — PHENTERMINE HYDROCHLORIDE 37.5 MG/1
37.5 CAPSULE ORAL EVERY MORNING
Qty: 30 CAPSULE | Refills: 0 | Status: SHIPPED | OUTPATIENT
Start: 2017-12-04 | End: 2018-01-09

## 2017-12-04 NOTE — NURSING NOTE
"Chief Complaint   Patient presents with     Multiple Concerns       Initial /72  Pulse 78  Temp 98.5  F (36.9  C) (Tympanic)  Ht 5' 6\" (1.676 m)  Wt (!) 309 lb (140.2 kg)  BMI 49.87 kg/m2 Estimated body mass index is 49.87 kg/(m^2) as calculated from the following:    Height as of this encounter: 5' 6\" (1.676 m).    Weight as of this encounter: 309 lb (140.2 kg).  Medication Reconciliation: complete     Isra Cervantes CMA    "

## 2017-12-04 NOTE — MR AVS SNAPSHOT
After Visit Summary   12/4/2017    Janine Salas    MRN: [id number]           Patient Information     Date Of Birth          1985        Visit Information        Provider Department      12/4/2017 2:40 PM K[facility]ider [facility]C [facility]        Today's Diagnoses     Acute bilateral low back pain without sciatica    -  1    Obesity           Follow-ups after your visit        Additional Services     [facility] PT, HAND, AND CHIROPRACTIC REFERRAL       **This order will print in the [facility] Scheduling Office**    Physical Therapy, Hand Therapy and Chiropractic Care are available through:    *[facility]  *[facility]  *[facility]    Call one number to schedule at any of the above locations: [phone].    Your provider has referred you to: Physical Therapy at [facility] or [facility]    Indication/Reason for Referral: Low back pain; SI joint pain ? Pelvic floor weakness  Onset of Illness: 1+ years - was seen before for SI joint issues  Therapy Orders: Evaluate and Treat  Special Programs: None; possibly assess pelvic floor weakness/dysfunction  Special Request: None    Carolina Figueroa      Additional Comments for the Therapist or Chiropractor: was evaluated before for SI joint issues - continues to have pain that starts over the left SI area and then shoots upward to her neck, triggering migraines. Wondering if there isn't some pelvic floor dysfunction as well?     Please be aware that coverage of these services is subject to the terms and limitations of your health insurance plan.  Call member services at your health plan with any benefit or coverage questions.      Please bring the following to your appointment:    *Your personal calendar for scheduling future appointments  *Comfortable clothing                  Your next 10 appointments already scheduled     Dec 06, 2017  8:10 AM CST   (Arrive by 7:55 AM)   [facility] with [facility]  [facility]    [address]  [facility] [address]   [phone]            Dec 13, 2017  9:30 AM CST   [facility] with [facility]    [address] [address]   [phone]            Dec 20, 2017  9:30 AM CST   [facility] with [facility]    [address]  [facility] [address]   [phone]            Dec 27, 2017  8:50 AM CST   [facility] with Angle [facility]    [address]  [facility] [address]   [phone]              Who to contact     Normal or non-critical lab and imaging results will be communicated to you by Cantimerhart, letter or phone within 4 business days after the clinic has received the results. If you do not hear from us within 7 days, please contact the clinic through [facility] or phone. If you have a critical or abnormal lab result, we will notify you by phone as soon as possible.  Submit refill requests through Mango or call your pharmacy and they will forward the refill request to us. Please allow 3 business days for your refill to be completed.          If you need to speak with a  for additional information , please call: [phone]             Additional Information About Your Visit        Mango Information     Mango gives you secure access to your electronic health record. If you see a primary care provider, you can also send messages to your care team and make appointments. If you have questions, please call your primary care clinic.  If you do not have a primary care provider, please call [phone] and they will assist you.        Care EveryWhere ID     This is your Care EveryWhere ID. This could be used by other organizations to access your [place] medical records  [id number]        Your Vitals Were     Pulse  "Temperature Height BMI (Body Mass Index)          78 98.5  F (36.9  C) (Tympanic) 5' 6\" (1.676 m) 49.87 kg/m2         Blood Pressure from Last 3 Encounters:   12/04/17 130/72   11/27/17 150/90   10/26/17 (!) 174/97    Weight from Last 3 Encounters:   12/04/17 (!) 309 lb (140.2 kg)   10/26/17 (!) 320 lb (145.2 kg)   09/01/17 (!) 320 lb (145.2 kg)              We Performed the Following     [facility] PT, HAND, AND CHIROPRACTIC REFERRAL          Today's Medication Changes          These changes are accurate as of: 12/4/17  3:42 PM.  If you have any questions, ask your nurse or doctor.               Start taking these medicines.        Dose/Directions    tiZANidine 4 MG tablet   Commonly known as:  ZANAFLEX   Used for:  Acute bilateral low back pain without sciatica   Started by:  Kavya Haider PA-C        Dose:  4-8 mg   Take 1-2 tablets (4-8 mg) by mouth 3 times daily   Quantity:  90 tablet   Refills:  1            Where to get your medicines      [facility] [address] [facility] [facility]  [address] [facility] [address]     Phone:  [phone]     tiZANidine 4 MG tablet         Some of these will need a paper prescription and others can be bought over the counter.  Ask your nurse if you have questions.     Bring a paper prescription for each of these medications     phentermine 37.5 MG capsule                Primary Care Provider Office Phone # Fax #    Kavya Haider PA-C [phone] [address] [facility] [address]        Equal Access to Services     [facility] AH: Darin Pierce, waaxda luqadaha, qaybta kaalmada adeegyada, anali goldberg. So [facility] [phone].    ATENCIÓN: Si habla español, tiene a pacheco disposición servicios gratuitos de asistencia lingüística. Llame al [phone].    We comply with applicable federal civil rights laws and [place] laws. We do not " discriminate on the basis of race, color, national origin, age, disability, sex, sexual orientation, or gender identity.            Thank you!     Thank you for choosing [facility]  for your care. Our goal is always to provide you with excellent care. Hearing back from our patients is one way we can continue to improve our services. Please take a few minutes to complete the written survey that you may receive in the mail after your visit with us. Thank you!             Your Updated Medication List - Protect others around you: Learn how to safely use, store and throw away your medicines at www.disposemymeds.org.          This list is accurate as of: 12/4/17  3:42 PM.  Always use your most recent med list.                   Brand Name Dispense Instructions for use Diagnosis    azelaic acid 20 % cream    AZELEX    50 g    Apply topically 2 times daily    Granuloma annulare, Acne vulgaris       buPROPion 150 MG 24 hr tablet    WELLBUTRIN XL     Take 150 mg by mouth    Granuloma annulare, Acne vulgaris       doxycycline 100 MG capsule    VIBRAMYCIN    60 capsule    Take 1 capsule (100 mg) by mouth 2 times daily    Granuloma annulare, Acne vulgaris       naproxen 500 MG tablet    NAPROSYN    30 tablet    Take 1 tablet (500 mg) by mouth 2 times daily as needed for moderate pain    Acute bilateral low back pain without sciatica       phentermine 37.5 MG capsule     30 capsule    Take 1 capsule (37.5 mg) by mouth every morning    Morbid obesity, unspecified obesity type (H)       prenatal multivitamin plus iron 27-0.8 MG Tabs per tablet      Take 1 tablet by mouth daily        sertraline 50 MG tablet    ZOLOFT    90 tablet    TAKE ONE TABLET BY MOUTH EVERY DAY    Post partum depression       tiZANidine 4 MG tablet    ZANAFLEX    90 tablet    Take 1-2 tablets (4-8 mg) by mouth 3 times daily    Acute bilateral low back pain without sciatica

## 2017-12-04 NOTE — PROGRESS NOTES
SUBJECTIVE:   Janine Salas is a 32 year old female who presents to clinic today for the following health issues:      Patient is here today to address multiple concerns:     -Patient would like her phentermine refilled today.     -She would like to discuss migraines. Has been getting them once or twice a month but they will last for 3-7 days.    -Has been having issues with her back as well.     -Wondering if she is having a hormone imbalance with after ovulating.     Here for follow up of phentermine. Weight down over 30lb since the start  Tolerating well    Tearful today in clinic due to back pain  Says she has been having pain that shoots up from her left low back/SI region all the way up to her neck. Muscles are tight in her shoulders and neck, triggering headaches  H/o migraines but occurring more often now because of pain in back. Lasting longer  Tired of being in pain  Has been going to chiropractor with little relief  Has been trying her PT exercises to reposition her SI joint (has been seen before for that issue). Feels like it helps briefly but if she turns or sits the wrong way, or twists the wrong way, she will get a sharp stabbing pain shooting up ot her head/neck    Also feeling like her hormones are all over, especially around the time of her period  Wondering if there is something she can take prn during that time      Problem list and histories reviewed & adjusted, as indicated.  Additional history: as documented    Current Outpatient Prescriptions   Medication Sig Dispense Refill     buPROPion (WELLBUTRIN XL) 150 MG 24 hr tablet Take 150 mg by mouth       azelaic acid (AZELEX) 20 % cream Apply topically 2 times daily 50 g 11     doxycycline (VIBRAMYCIN) 100 MG capsule Take 1 capsule (100 mg) by mouth 2 times daily 60 capsule 3     phentermine 37.5 MG capsule Take 1 capsule (37.5 mg) by mouth every morning 30 capsule 0     sertraline (ZOLOFT) 50 MG tablet TAKE ONE TABLET BY MOUTH EVERY DAY 90  "tablet 0     naproxen (NAPROSYN) 500 MG tablet Take 1 tablet (500 mg) by mouth 2 times daily as needed for moderate pain 30 tablet 1     Prenatal Vit-Fe Fumarate-FA (PRENATAL MULTIVITAMIN  PLUS IRON) 27-0.8 MG TABS Take 1 tablet by mouth daily       Allergies   Allergen Reactions     Codeine Itching     Zofran [Ondansetron] Other (See Comments)     Other reaction(s): Headache  Headaches        Reviewed and updated as needed this visit by clinical staff       Reviewed and updated as needed this visit by Provider         ROS:  Remainder of ROS obtained and found to be negative other than that which was documented above      OBJECTIVE:     /72  Pulse 78  Temp 98.5  F (36.9  C) (Tympanic)  Ht 5' 6\" (1.676 m)  Wt (!) 309 lb (140.2 kg)  BMI 49.87 kg/m2  Body mass index is 49.87 kg/(m^2).  GENERAL: healthy, alert and no distress  EYES: Eyes grossly normal to inspection. EOMI. PERRL  RESP: lungs clear to auscultation - no rales, rhonchi or wheezes  CV: regular rates and rhythm, normal S1 S2, no S3 or S4 and no murmur, click or rub  BACK: tender over left SI joint. Tender to touch along multiple muscles up and down spine, around trapezius      Diagnostic Test Results:  none     ASSESSMENT/PLAN:     (M54.5) Acute bilateral low back pain without sciatica  (primary encounter diagnosis)  Comment: has been an issue in the past. Advised going back to PT to re look at exercises. Consider looking at pelvic floor dysfunction as possible component.   Plan: tiZANidine (ZANAFLEX) 4 MG tablet, [facility] PT,         HAND, AND CHIROPRACTIC REFERRAL            (E66.01) Obesity  Comment: down 30lb, tolerating well. Weight loss beneficial for overall health, especially with ongoing low back issues  Plan: phentermine 37.5 MG capsule            (G44.219) Episodic tension-type headache, not intractable  Comment: h/o headaches, worse lately likely due to tension/muscle spasm  Plan: try tizanidine as noted above. Treating back with " PT        Kavya Haider PA-C  [facility]

## 2018-01-09 DIAGNOSIS — E66.01 MORBID OBESITY, UNSPECIFIED OBESITY TYPE (H): ICD-10-CM

## 2018-01-10 NOTE — TELEPHONE ENCOUNTER
Phentermine      Last Written Prescription Date:  12/04/2017  Last Fill Quantity: 30,   # refills: 0  Last Office Visit:   Future Office visit:       Routing refill request to provider for review/approval because:  Drug not on the [facility], [facility] or [facility] refill protocol or controlled substance      See Judah  Pharmacy Technician, [facility]  Phone: [phone]  Fax: [phone]

## 2018-01-11 RX ORDER — PHENTERMINE HYDROCHLORIDE 37.5 MG/1
37.5 CAPSULE ORAL EVERY MORNING
Qty: 30 CAPSULE | Refills: 0 | Status: SHIPPED | OUTPATIENT
Start: 2018-01-11 | End: 2018-03-09

## 2018-01-21 DIAGNOSIS — M54.50 ACUTE BILATERAL LOW BACK PAIN WITHOUT SCIATICA: ICD-10-CM

## 2018-01-22 NOTE — TELEPHONE ENCOUNTER
"TIZANIDINE HCL 4MG TABS        Last Written Prescription Date:  12/4/17  Last Fill Quantity: 90,   # refills: 1  Last Office Visit: 12/4/17  Future Office visit:       Routing refill request to provider for review/approval because:  Drug not on the [facility] or [facility] refill protocol or controlled substance    sertraline (ZOLOFT) 50 MG tablet      Last Written Prescription Date:  10/24/17  Last Fill Quantity: 90,   # refills: 0  Last Office Visit: 12/4/17  Future Office visit:       Requested Prescriptions   Pending Prescriptions Disp Refills     tiZANidine (ZANAFLEX) 4 MG tablet [Pharmacy Med Name: TIZANIDINE HCL 4MG TABS] 90 tablet 1     Sig: TAKE ONE TO TWO TABLETS BY MOUTH THREE TIMES A DAY    There is no refill protocol information for this order        sertraline (ZOLOFT) 50 MG tablet [Pharmacy Med Name: SERTRALINE HCL 50MG TABS] 90 tablet 0     Sig: TAKE ONE TABLET BY MOUTH EVERY DAY    SSRIs Protocol Failed    1/21/2018  8:50 AM       Failed - PHQ-9 score less than 5 in past 6 months    The PHQ-9 criteria is meant to fail. It requires a PHQ-9 score review  PHQ-9 SCORE 6/8/2016 1/9/2017 1/27/2017   Total Score 2 15 4            Passed - Patient is age 18 or older       Passed - No active pregnancy on record       Passed - No positive pregnancy test in last 12 months       Passed - Recent (6 mo) or future visit with authorizing provider's specialty    Patient had office visit in the last 6 months or has a visit in the next 30 days with authorizing provider.  See \"Patient Info\" tab in inbasket, or \"Choose Columns\" in Meds & Orders section of the refill encounter.                "

## 2018-01-25 DIAGNOSIS — L70.0 ACNE VULGARIS: ICD-10-CM

## 2018-01-25 DIAGNOSIS — L92.0 GRANULOMA ANNULARE: ICD-10-CM

## 2018-01-25 NOTE — TELEPHONE ENCOUNTER
Call to pt to notify of below.  Unable to reach.  Left message for pt to call back     Mary Painting   [facility]  RN      Medication was DC'd on 11/27/17 at Dermatology appointment.    Is patient currently taking?  PHQ-9 and ELAINA-7 need updating.  Patient needs appointment to be seen by Dr. Marin for additional refills-patient  may want to consider having PCP assume prescription.    Patient has not seen Dr. Marin since 6/2016.

## 2018-01-26 RX ORDER — BUPROPION HYDROCHLORIDE 150 MG/1
150 TABLET ORAL EVERY MORNING
Qty: 90 TABLET | Refills: 0 | Status: SHIPPED | OUTPATIENT
Start: 2018-01-26 | End: 2018-03-09

## 2018-01-26 NOTE — TELEPHONE ENCOUNTER
Pt returns call and reports that Wellbutrin was originally prescribed by Dr. Bustos then Dr. Marin authorized the last refill.  She was advised that additional refills with have to come from her pcp.  She reports she had called her pcp office and reports she has not heard anything back yet and is now completely out of this medication.  Advised pt that she will need to contact her pcp office again and if her pcp is out of the office there should be another provider that would authorized refills in her absence.  Pt will contact her pcp office.    Janeth Sethi  [facility] RN

## 2018-01-26 NOTE — TELEPHONE ENCOUNTER
Patient calling stating she spoke to Michelle Haider about this in September. Kavya told her to finish her old rx that was still valid and she would send in a new rx when needed.     Patient took her last pill today.

## 2018-01-26 NOTE — TELEPHONE ENCOUNTER
This medication has not been filled by a provider here before. Please advise. Thank you.  Stephanie Collins RN

## 2018-01-30 ENCOUNTER — OFFICE VISIT (OUTPATIENT)
Dept: AUDIOLOGY | Facility: CLINIC | Age: 33
End: 2018-01-30
Payer: COMMERCIAL

## 2018-01-30 ENCOUNTER — OFFICE VISIT (OUTPATIENT)
Dept: OTOLARYNGOLOGY | Facility: CLINIC | Age: 33
End: 2018-01-30
Payer: COMMERCIAL

## 2018-01-30 VITALS
TEMPERATURE: 98.3 F | HEIGHT: 66 IN | SYSTOLIC BLOOD PRESSURE: 136 MMHG | BODY MASS INDEX: 47.09 KG/M2 | WEIGHT: 293 LBS | HEART RATE: 56 BPM | DIASTOLIC BLOOD PRESSURE: 89 MMHG

## 2018-01-30 DIAGNOSIS — H93.212: Primary | ICD-10-CM

## 2018-01-30 DIAGNOSIS — H93.13 TINNITUS, BILATERAL: Primary | ICD-10-CM

## 2018-01-30 PROCEDURE — 92550 TYMPANOMETRY & REFLEX THRESH: CPT | Performed by: AUDIOLOGIST

## 2018-01-30 PROCEDURE — 92556 SPEECH AUDIOMETRY COMPLETE: CPT | Performed by: AUDIOLOGIST

## 2018-01-30 PROCEDURE — 92552 PURE TONE AUDIOMETRY AIR: CPT | Performed by: AUDIOLOGIST

## 2018-01-30 PROCEDURE — 99203 OFFICE O/P NEW LOW 30 MIN: CPT | Performed by: OTOLARYNGOLOGY

## 2018-01-30 ASSESSMENT — PAIN SCALES - GENERAL: PAINLEVEL: MILD PAIN (2)

## 2018-01-30 NOTE — PROGRESS NOTES
History of Present Illness - Janine Salas is a 32 year old female who presents with left intermittent ear pain.  This began following exposure to a loud alarm at her place of work.  Since that time, she frequently has stabbing pain in her left ear.  She also finds that some sounds are very uncomfortable to her.  Sometimes she has some nonpulsatile tinnitus which accompanies the pain in the ear.  She denies any ear drainage.  She has no prior ear surgery.    Past Medical History -   Patient Active Problem List   Diagnosis     CARDIOVASCULAR SCREENING; LDL GOAL LESS THAN 160     Depression     Back pain associated with peripheral numbness     Morbid obesity due to excess calories (H)     Excessive weight gain during pregnancy in third trimester     Anxiety attack     Prenatal care in third trimester     Supervision of normal first pregnancy     Cephalopelvic disproportion due to mixed maternal and fetal factors     Essential hypertension     S/P  section     Acute bilateral low back pain with right-sided sciatica       Current Medications -   Current Outpatient Prescriptions:      buPROPion (WELLBUTRIN XL) 150 MG 24 hr tablet, Take 1 tablet (150 mg) by mouth every morning, Disp: 90 tablet, Rfl: 0     tiZANidine (ZANAFLEX) 4 MG tablet, TAKE ONE TO TWO TABLETS BY MOUTH THREE TIMES A DAY, Disp: 90 tablet, Rfl: 1     sertraline (ZOLOFT) 50 MG tablet, TAKE ONE TABLET BY MOUTH EVERY DAY, Disp: 90 tablet, Rfl: 3     phentermine 37.5 MG capsule, Take 1 capsule (37.5 mg) by mouth every morning, Disp: 30 capsule, Rfl: 0     azelaic acid (AZELEX) 20 % cream, Apply topically 2 times daily, Disp: 50 g, Rfl: 11     doxycycline (VIBRAMYCIN) 100 MG capsule, Take 1 capsule (100 mg) by mouth 2 times daily, Disp: 60 capsule, Rfl: 3     Prenatal Vit-Fe Fumarate-FA (PRENATAL MULTIVITAMIN  PLUS IRON) 27-0.8 MG TABS, Take 1 tablet by mouth daily, Disp: , Rfl:     Allergies -   Allergies   Allergen Reactions     Codeine  Itching     Zofran [Ondansetron] Other (See Comments)     Other reaction(s): Headache  Headaches        Social History -   Social History     Social History     Marital status: Single     Spouse name: N/A     Number of children: 1     Years of education: N/A     Social History Main Topics     Smoking status: Former Smoker     Packs/day: 0.50     Types: Cigarettes     Start date: 5/7/2015     Smokeless tobacco: Current User     Alcohol use No      Comment: rare- quit with pregnancy     Drug use: No     Sexual activity: Yes     Partners: Male     Birth control/ protection: Pill     Other Topics Concern     Parent/Sibling W/ Cabg, Mi Or Angioplasty Before 65f 55m? No     Social History Narrative     Family History -   Family History   Problem Relation Age of Onset     Hypertension Mother      Depression Mother      OSTEOPOROSIS Mother      Thyroid Disease Mother      Hypertension Father      Alcohol/Drug Father      recovered alcohol     CANCER Father      melanoma     Hypertension Maternal Grandmother      Alzheimer Disease Maternal Grandmother      Cardiovascular Maternal Grandmother      triple bypass     CANCER Maternal Grandfather      lung     Alcohol/Drug Paternal Grandmother      alcohol     Cancer - colorectal Paternal Grandmother      colon     Alcohol/Drug Paternal Grandfather      alcohol     CANCER Paternal Grandfather      leukemia - adult onset     Cardiovascular Paternal Grandfather      stent     Obesity Sister      Obesity Sister      Obesity Sister      Thyroid Disease Sister      Breast Cancer Maternal Aunt      CANCER Sister      cervical cancer, hysterectomy     Substance Abuse Sister      recovered drugs     Depression Sister      Autism Spectrum Disorder Other      Aspergers     Bipolar Disorder Other        Review of Systems - As per HPI and PMHx, otherwise 7 system review of the head and neck negative. 10+ system review negative.    Physical Exam  /89 (BP Location: Left arm, Patient  "Position: Sitting, Cuff Size: Adult Large)  Pulse 56  Temp 98.3  F (36.8  C) (Oral)  Ht 1.676 m (5' 6\")  Wt 134.3 kg (296 lb)  BMI 47.78 kg/m2  General - The patient is well nourished and well developed, and appears to have good nutritional status.  Alert and oriented to person and place, answers questions and cooperates with examination appropriately.   Head and Face - Normocephalic and atraumatic, with no gross asymmetry noted of the contour of the facial features.  The facial nerve is intact, with strong symmetric movements.  Voice and Breathing - The patient was breathing comfortably without the use of accessory muscles. There was no wheezing, stridor, or stertor.  The patients voice was clear and strong, and had appropriate pitch and quality.  Ears - Bilateral pinna and EACs with normal appearing overlying skin. Tympanic membrane intact with good mobility on pneumatic otoscopy bilaterally. Bony landmarks of the ossicular chain are normal. The tympanic membranes are normal in appearance. No retraction, perforation, or masses.  No fluid or purulence was seen in the external canal or the middle ear.   Eyes - Extraocular movements intact.  Sclera were not icteric or injected, conjunctiva were pink and moist.  Mouth - Examination of the oral cavity showed pink, healthy oral mucosa. No lesions or ulcerations noted.  The tongue was mobile and midline, and the dentition were in good condition.    Throat - The walls of the oropharynx were smooth, pink, moist, symmetric, and had no lesions or ulcerations.  The tonsillar pillars and soft palate were symmetric.  The uvula was midline on elevation.  Neck - Normal midline excursion of the laryngotracheal complex during swallowing.  Full range of motion on passive movement.  Palpation of the occipital, submental, submandibular, internal jugular chain, and supraclavicular nodes did not demonstrate any abnormal lymph nodes or masses.  The carotid pulse was palpable " bilaterally.  Palpation of the thyroid was soft and smooth, with no nodules or goiter appreciated.  The trachea was mobile and midline.  Nose - External contour is symmetric, no gross deflection or scars.  Nasal mucosa is pink and moist with no abnormal mucus.  The septum was midline and non-obstructive, turbinates of normal size and position.  No polyps, masses, or purulence noted on examination.    Audiogram 01/30/18  Normal hearing bilaterally.  No evidence of noise-induced hearing loss.        Assessment - Janine Salas is a 32 year old female with intermittent left ear pain and tinnitus.  She also seems to have to have some symptoms of hyperacusis.  However, her hearing appears to be normal.  I reassured her that there is no dangerous etiology identified on examination.  Unfortunately, there is also no clear path to effective treatment for her symptoms.    We spent the remainder of today's visit discussing the current leading theory on tinnitus, in that it originates from the central nervous system itself, similar to Phantom Limb Syndrome.  Also discussed were steps that can be taken to mask the noise, such as a low volume de-tuned radio, a fan in the background, and hearing aids.  Correlation with stress, anxiety, depression, and high blood pressure were also discussed.  The patient was also cautioned on the numerous expensive non-pharmaceutical options that are advertised, and have no proven benefit.    The patient will follow up as necessary for worsening symptoms, changes in symptoms, or signs of infection.  I have also recommended yearly audiograms, and consideration of a hearing aid evaluation.        Dr. Isabel Nuñez MD  Otolaryngology  [facility]

## 2018-01-30 NOTE — LETTER
2018         RE: Janine Salas  [address]  [place] [address]        Dear Colleague,    Thank you for referring your patient, Janine Salas, to the [facility]. Please see a copy of my visit note below.        History of Present Illness - Jainne Salas is a 32 year old female who presents with left intermittent ear pain.  This began following exposure to a loud alarm at her place of work.  Since that time, she frequently has stabbing pain in her left ear.  She also finds that some sounds are very uncomfortable to her.  Sometimes she has some nonpulsatile tinnitus which accompanies the pain in the ear.  She denies any ear drainage.  She has no prior ear surgery.    Past Medical History -   Patient Active Problem List   Diagnosis     CARDIOVASCULAR SCREENING; LDL GOAL LESS THAN 160     Depression     Back pain associated with peripheral numbness     Morbid obesity due to excess calories (H)     Excessive weight gain during pregnancy in third trimester     Anxiety attack     Prenatal care in third trimester     Supervision of normal first pregnancy     Cephalopelvic disproportion due to mixed maternal and fetal factors     Essential hypertension     S/P  section     Acute bilateral low back pain with right-sided sciatica       Current Medications -   Current Outpatient Prescriptions:      buPROPion (WELLBUTRIN XL) 150 MG 24 hr tablet, Take 1 tablet (150 mg) by mouth every morning, Disp: 90 tablet, Rfl: 0     tiZANidine (ZANAFLEX) 4 MG tablet, TAKE ONE TO TWO TABLETS BY MOUTH THREE TIMES A DAY, Disp: 90 tablet, Rfl: 1     sertraline (ZOLOFT) 50 MG tablet, TAKE ONE TABLET BY MOUTH EVERY DAY, Disp: 90 tablet, Rfl: 3     phentermine 37.5 MG capsule, Take 1 capsule (37.5 mg) by mouth every morning, Disp: 30 capsule, Rfl: 0     azelaic acid (AZELEX) 20 % cream, Apply topically 2 times daily, Disp: 50 g, Rfl: 11     doxycycline (VIBRAMYCIN) 100 MG capsule, Take 1  capsule (100 mg) by mouth 2 times daily, Disp: 60 capsule, Rfl: 3     Prenatal Vit-Fe Fumarate-FA (PRENATAL MULTIVITAMIN  PLUS IRON) 27-0.8 MG TABS, Take 1 tablet by mouth daily, Disp: , Rfl:     Allergies -   Allergies   Allergen Reactions     Codeine Itching     Zofran [Ondansetron] Other (See Comments)     Other reaction(s): Headache  Headaches        Social History -   Social History     Social History     Marital status: Single     Spouse name: N/A     Number of children: 1     Years of education: N/A     Social History Main Topics     Smoking status: Former Smoker     Packs/day: 0.50     Types: Cigarettes     Start date: 5/7/2015     Smokeless tobacco: Current User     Alcohol use No      Comment: rare- quit with pregnancy     Drug use: No     Sexual activity: Yes     Partners: Male     Birth control/ protection: Pill     Other Topics Concern     Parent/Sibling W/ Cabg, Mi Or Angioplasty Before 65f 55m? No     Social History Narrative     Family History -   Family History   Problem Relation Age of Onset     Hypertension Mother      Depression Mother      OSTEOPOROSIS Mother      Thyroid Disease Mother      Hypertension Father      Alcohol/Drug Father      recovered alcohol     CANCER Father      melanoma     Hypertension Maternal Grandmother      Alzheimer Disease Maternal Grandmother      Cardiovascular Maternal Grandmother      triple bypass     CANCER Maternal Grandfather      lung     Alcohol/Drug Paternal Grandmother      alcohol     Cancer - colorectal Paternal Grandmother      colon     Alcohol/Drug Paternal Grandfather      alcohol     CANCER Paternal Grandfather      leukemia - adult onset     Cardiovascular Paternal Grandfather      stent     Obesity Sister      Obesity Sister      Obesity Sister      Thyroid Disease Sister      Breast Cancer Maternal Aunt      CANCER Sister      cervical cancer, hysterectomy     Substance Abuse Sister      recovered drugs     Depression Sister      Autism Spectrum  "Disorder Other      Aspergers     Bipolar Disorder Other        Review of Systems - As per HPI and PMHx, otherwise 7 system review of the head and neck negative. 10+ system review negative.    Physical Exam  /89 (BP Location: Left arm, Patient Position: Sitting, Cuff Size: Adult Large)  Pulse 56  Temp 98.3  F (36.8  C) (Oral)  Ht 1.676 m (5' 6\")  Wt 134.3 kg (296 lb)  BMI 47.78 kg/m2  General - The patient is well nourished and well developed, and appears to have good nutritional status.  Alert and oriented to person and place, answers questions and cooperates with examination appropriately.   Head and Face - Normocephalic and atraumatic, with no gross asymmetry noted of the contour of the facial features.  The facial nerve is intact, with strong symmetric movements.  Voice and Breathing - The patient was breathing comfortably without the use of accessory muscles. There was no wheezing, stridor, or stertor.  The patients voice was clear and strong, and had appropriate pitch and quality.  Ears - Bilateral pinna and EACs with normal appearing overlying skin. Tympanic membrane intact with good mobility on pneumatic otoscopy bilaterally. Bony landmarks of the ossicular chain are normal. The tympanic membranes are normal in appearance. No retraction, perforation, or masses.  No fluid or purulence was seen in the external canal or the middle ear.   Eyes - Extraocular movements intact.  Sclera were not icteric or injected, conjunctiva were pink and moist.  Mouth - Examination of the oral cavity showed pink, healthy oral mucosa. No lesions or ulcerations noted.  The tongue was mobile and midline, and the dentition were in good condition.    Throat - The walls of the oropharynx were smooth, pink, moist, symmetric, and had no lesions or ulcerations.  The tonsillar pillars and soft palate were symmetric.  The uvula was midline on elevation.  Neck - Normal midline excursion of the laryngotracheal complex during " swallowing.  Full range of motion on passive movement.  Palpation of the occipital, submental, submandibular, internal jugular chain, and supraclavicular nodes did not demonstrate any abnormal lymph nodes or masses.  The carotid pulse was palpable bilaterally.  Palpation of the thyroid was soft and smooth, with no nodules or goiter appreciated.  The trachea was mobile and midline.  Nose - External contour is symmetric, no gross deflection or scars.  Nasal mucosa is pink and moist with no abnormal mucus.  The septum was midline and non-obstructive, turbinates of normal size and position.  No polyps, masses, or purulence noted on examination.    Audiogram 01/30/18  Normal hearing bilaterally.  No evidence of noise-induced hearing loss.        Assessment - Janine Salas is a 32 year old female with intermittent left ear pain and tinnitus.  She also seems to have to have some symptoms of hyperacusis.  However, her hearing appears to be normal.  I reassured her that there is no dangerous etiology identified on examination.  Unfortunately, there is also no clear path to effective treatment for her symptoms.    We spent the remainder of today's visit discussing the current leading theory on tinnitus, in that it originates from the central nervous system itself, similar to Phantom Limb Syndrome.  Also discussed were steps that can be taken to mask the noise, such as a low volume de-tuned radio, a fan in the background, and hearing aids.  Correlation with stress, anxiety, depression, and high blood pressure were also discussed.  The patient was also cautioned on the numerous expensive non-pharmaceutical options that are advertised, and have no proven benefit.    The patient will follow up as necessary for worsening symptoms, changes in symptoms, or signs of infection.  I have also recommended yearly audiograms, and consideration of a hearing aid evaluation.        Dr. Isabel Nuñez MD  Otolaryngology  [facility]        Again, thank you for allowing me to participate in the care of your patient.        Sincerely,        Isabel Nuñez MD

## 2018-01-30 NOTE — MR AVS SNAPSHOT
After Visit Summary   1/30/2018    Janine Salas    MRN: [id number]           Patient Information     Date Of Birth          1985        Visit Information        Provider Department      1/30/2018 3:00 PM Isabel Nuñez MD [facility]        Today's Diagnoses     Tinnitus, bilateral    -  1      Care Instructions    Per Physician's instructions            Follow-ups after your visit        Additional Services     AUDIOLOGY ADULT REFERRAL       Your provider has referred you to: [facility]: [facility] [phone]   [email or web address]    Treatment:  Evaluation & Treatment  Specialty Testing:  Audiogram w/Tymps and Reflexes    Please be aware that coverage of these services is subject to the terms and limitations of your health insurance plan.  Call member services at your health plan with any benefit or coverage questions.      Please bring the following to your appointment:  >>   Any x-rays, CTs or MRIs which have been performed.  Contact the facility where they were done to arrange for  prior to your scheduled appointment.  Any new CT, MRI or other procedures ordered by your specialist must be performed at a [place] facility or coordinated by your clinic's referral office.   >>   List of current medications  >>   This referral request   >>   Any documents/labs given to you for this referral                  Your next 10 appointments already scheduled     Feb 28, 2018  2:15 PM CST   MyChart Dermatology Return with Miguel Khan MD   [facility] ([facility])    [address] [address] [phone]              Who to contact     If you have questions or need follow up information about today's clinic visit or your schedule please contact [facility] directly at [phone].  Normal or non-critical lab and imaging results will be communicated to you by  "MyChart, letter or phone within 4 business days after the clinic has received the results. If you do not hear from us within 7 days, please contact the clinic through [employer] or phone. If you have a critical or abnormal lab result, we will notify you by phone as soon as possible.  Submit refill requests through [employer] or call your pharmacy and they will forward the refill request to us. Please allow 3 business days for your refill to be completed.          Additional Information About Your Visit        [employer] Information     [employer] gives you secure access to your electronic health record. If you see a primary care provider, you can also send messages to your care team and make appointments. If you have questions, please call your primary care clinic.  If you do not have a primary care provider, please call [phone] and they will assist you.        Care EveryWhere ID     This is your Care EveryWhere ID. This could be used by other organizations to access your [place] medical records  [id number]        Your Vitals Were     Pulse Temperature Height BMI (Body Mass Index)          56 98.3  F (36.8  C) (Oral) 1.676 m (5' 6\") 47.78 kg/m2         Blood Pressure from Last 3 Encounters:   01/30/18 136/89   12/04/17 130/72   11/27/17 150/90    Weight from Last 3 Encounters:   01/30/18 134.3 kg (296 lb)   12/04/17 (!) 140.2 kg (309 lb)   10/26/17 (!) 145.2 kg (320 lb)              We Performed the Following     AUDIOLOGY ADULT REFERRAL          Today's Medication Changes          These changes are accurate as of 1/30/18  4:38 PM.  If you have any questions, ask your nurse or doctor.               Stop taking these medicines if you haven't already. Please contact your care team if you have questions.     naproxen 500 MG tablet   Commonly known as:  NAPROSYN   Stopped by:  Isabel Nuñez MD                    Primary Care Provider Office Phone # Fax #    Kavya Haider PA-C [phone]       " [address] [facility] [address]        Equal Access to Services     ASHLIERASHEED IRENE : Hadii melvi mann emanuelterence Soarturali, waaxda luqadaha, qaybta kaalmaseamus lee, anali vyasizaiahpeewee goldberg. So [facility] [phone].    ATENCIÓN: Si habla español, tiene a pacheco disposición servicios gratuitos de asistencia lingüística. [facility] [phone].    We comply with applicable federal civil rights laws and [place] laws. We do not discriminate on the basis of race, color, national origin, age, disability, sex, sexual orientation, or gender identity.            Thank you!     Thank you for choosing [facility]  for your care. Our goal is always to provide you with excellent care. Hearing back from our patients is one way we can continue to improve our services. Please take a few minutes to complete the written survey that you may receive in the mail after your visit with us. Thank you!             Your Updated Medication List - Protect others around you: Learn how to safely use, store and throw away your medicines at www.disposemymeds.org.          This list is accurate as of 1/30/18  4:38 PM.  Always use your most recent med list.                   Brand Name Dispense Instructions for use Diagnosis    azelaic acid 20 % cream    AZELEX    50 g    Apply topically 2 times daily    Granuloma annulare, Acne vulgaris       buPROPion 150 MG 24 hr tablet    WELLBUTRIN XL    90 tablet    Take 1 tablet (150 mg) by mouth every morning    Granuloma annulare, Acne vulgaris       doxycycline 100 MG capsule    VIBRAMYCIN    60 capsule    Take 1 capsule (100 mg) by mouth 2 times daily    Granuloma annulare, Acne vulgaris       phentermine 37.5 MG capsule     30 capsule    Take 1 capsule (37.5 mg) by mouth every morning    Morbid obesity, unspecified obesity type (H)       prenatal multivitamin plus iron 27-0.8 MG Tabs per tablet      Take 1 tablet by mouth daily        sertraline 50 MG tablet    ZOLOFT    90 tablet     TAKE ONE TABLET BY MOUTH EVERY DAY    Post partum depression       tiZANidine 4 MG tablet    ZANAFLEX    90 tablet    TAKE ONE TO TWO TABLETS BY MOUTH THREE TIMES A DAY    Acute bilateral low back pain without sciatica

## 2018-01-30 NOTE — PROGRESS NOTES
AUDIOLOGY REPORT    SUBJECTIVE:  Janine Salas is a 32 year old female who was seen in the Audiology Clinic at [facility] for an audiologic evaluation, referred by Dr. Nuñez.  No previous audiograms are available at today's appointment.  The patient reports sensitivity to loud sounds in the left ear following an acoustic trauma 3 years ago. She reports the left ear feels muffled with otalgia when in louder noise. The patient denies bilateral tinnitus and bilateral drainage.     OBJECTIVE:  Otoscopic exam indicates ears are clear of cerumen bilaterally     Pure Tone Thresholds assessed using conventional audiometry with good  reliability from 250-8000 Hz bilaterally using circumaural headphones     RIGHT:  normal hearing thresholds    LEFT:    normal hearing thresholds    Tympanogram:    RIGHT: normal eardrum mobility, 1000 Hz ipsi/contra reflexes present    LEFT:   normal eardrum mobility, 1000 Hz ipsi/contra reflexes present    Speech Reception Threshold:    RIGHT: 10 dB HL    LEFT:   15 dB HL  Word Recognition Score:     RIGHT: 100% at 50 dB HL using W22 recorded word list.    LEFT:   100% at 55 dB HL using W22 recorded word list.      ASSESSMENT:   Normal hearing sensitivity bilaterally.    Today s results were discussed with the patient in detail.     PLAN: It is recommended that the patient be seen by Dr. Nuñez for medical evaluation of their ears and hearing evaluation. Reviewed when to use ear protection.  Please call this clinic with questions regarding these results or recommendations.        Audelia Mcfadden M.A. F-AAA  Clinical audiologist [place] # [id number]  1/30/2018

## 2018-01-30 NOTE — NURSING NOTE
"Initial /89 (BP Location: Left arm, Patient Position: Sitting, Cuff Size: Adult Large)  Pulse 56  Temp 98.3  F (36.8  C) (Oral)  Ht 1.676 m (5' 6\")  Wt 134.3 kg (296 lb)  BMI 47.78 kg/m2 Estimated body mass index is 47.78 kg/(m^2) as calculated from the following:    Height as of this encounter: 1.676 m (5' 6\").    Weight as of this encounter: 134.3 kg (296 lb). .    Bianca De Oliveira CMA    "

## 2018-01-30 NOTE — MR AVS SNAPSHOT
After Visit Summary   1/30/2018    Janine Salas    MRN: [id number]           Patient Information     Date Of Birth          1985        Visit Information        Provider Department      1/30/2018 2:30 PM Audelia Mcfadden AuD [facility]        Today's Diagnoses     Auditory recruitment of left ear    -  1       Follow-ups after your visit        Your next 10 appointments already scheduled     Feb 28, 2018  2:15 PM CST   MyChart Dermatology Return with Miguel Khan MD   [facility] ([facility])    [address] [address] [phone]              Who to contact     If you have questions or need follow up information about today's clinic visit or your schedule please contact [facility] directly at [phone].  Normal or non-critical lab and imaging results will be communicated to you by [email or web address], letter or phone within 4 business days after the clinic has received the results. If you do not hear from us within 7 days, please contact the clinic through [email or web address] or phone. If you have a critical or abnormal lab result, we will notify you by phone as soon as possible.  Submit refill requests through [employer] or call your pharmacy and they will forward the refill request to us. Please allow 3 business days for your refill to be completed.          Additional Information About Your Visit        MyChart Information     [employer] gives you secure access to your electronic health record. If you see a primary care provider, you can also send messages to your care team and make appointments. If you have questions, please call your primary care clinic.  If you do not have a primary care provider, please call [phone] and they will assist you.        Care EveryWhere ID     This is your Care EveryWhere ID. This could be used by other organizations to access your [place] medical records  [id number]         Blood  Pressure from Last 3 Encounters:   01/30/18 136/89   12/04/17 130/72   11/27/17 150/90    Weight from Last 3 Encounters:   01/30/18 296 lb (134.3 kg)   12/04/17 (!) 309 lb (140.2 kg)   10/26/17 (!) 320 lb (145.2 kg)              We Performed the Following     AUDIOGRAM/TYMPANOGRAM - INTERFACE     PURE TONE AUDIOMETRY, AIR     SPEECH AUDIOMETRY, COMPLETE     TYMPANOMETRY AND REFLEX THRESHOLD MEASUREMENTS          Today's Medication Changes          These changes are accurate as of 1/30/18  3:24 PM.  If you have any questions, ask your nurse or doctor.               Stop taking these medicines if you haven't already. Please contact your care team if you have questions.     naproxen 500 MG tablet   Commonly known as:  NAPROSYN   Stopped by:  Isabel Nuñez MD                    Primary Care Provider Office Phone # Fax #    Kavya Thelma Haider PA-C [phone] [address] [facility] [address]        Equal Access to Services     [facility] AH: Hadii aad ku hadasho Soomaali, waaxda luqadaha, qaybta kaalmada adeegyada, waxay linusin hayesequiel dick . So [facility] [phone].    ATENCIÓN: Si habla español, tiene a pacheco disposición servicios gratuitos de asistencia lingüística. Llame al [phone].    We comply with applicable federal civil rights laws and [place] laws. We do not discriminate on the basis of race, color, national origin, age, disability, sex, sexual orientation, or gender identity.            Thank you!     Thank you for choosing [facility]  for your care. Our goal is always to provide you with excellent care. Hearing back from our patients is one way we can continue to improve our services. Please take a few minutes to complete the written survey that you may receive in the mail after your visit with us. Thank you!             Your Updated Medication List - Protect others around you: Learn how to safely use, store and throw away your medicines at  www.disposemymeds.org.          This list is accurate as of 1/30/18  3:24 PM.  Always use your most recent med list.                   Brand Name Dispense Instructions for use Diagnosis    azelaic acid 20 % cream    AZELEX    50 g    Apply topically 2 times daily    Granuloma annulare, Acne vulgaris       buPROPion 150 MG 24 hr tablet    WELLBUTRIN XL    90 tablet    Take 1 tablet (150 mg) by mouth every morning    Granuloma annulare, Acne vulgaris       doxycycline 100 MG capsule    VIBRAMYCIN    60 capsule    Take 1 capsule (100 mg) by mouth 2 times daily    Granuloma annulare, Acne vulgaris       phentermine 37.5 MG capsule     30 capsule    Take 1 capsule (37.5 mg) by mouth every morning    Morbid obesity, unspecified obesity type (H)       prenatal multivitamin plus iron 27-0.8 MG Tabs per tablet      Take 1 tablet by mouth daily        sertraline 50 MG tablet    ZOLOFT    90 tablet    TAKE ONE TABLET BY MOUTH EVERY DAY    Post partum depression       tiZANidine 4 MG tablet    ZANAFLEX    90 tablet    TAKE ONE TO TWO TABLETS BY MOUTH THREE TIMES A DAY    Acute bilateral low back pain without sciatica

## 2018-02-16 DIAGNOSIS — E66.01 MORBID OBESITY, UNSPECIFIED OBESITY TYPE (H): ICD-10-CM

## 2018-02-16 NOTE — TELEPHONE ENCOUNTER
Phentermine      Last Written Prescription Date:  01/11/2018  Last Fill Quantity: 30,   # refills: 0  Last Office Visit:   Future Office visit:       Routing refill request to provider for review/approval because:  Drug not on the [facility], [facility] or [facility] refill protocol or controlled substance    See Judah  Pharmacy Technician, [facility]  Phone: [phone]  Fax: [phone]

## 2018-02-22 RX ORDER — PHENTERMINE HYDROCHLORIDE 37.5 MG/1
37.5 CAPSULE ORAL EVERY MORNING
Qty: 30 CAPSULE | Refills: 0 | OUTPATIENT
Start: 2018-02-22

## 2018-02-22 NOTE — TELEPHONE ENCOUNTER
Patient needs appointment as it has been a couple months since I have seen her and we need to check vitals/follow up

## 2018-03-09 ENCOUNTER — OFFICE VISIT (OUTPATIENT)
Dept: FAMILY MEDICINE | Facility: CLINIC | Age: 33
End: 2018-03-09
Payer: COMMERCIAL

## 2018-03-09 ENCOUNTER — RADIANT APPOINTMENT (OUTPATIENT)
Dept: GENERAL RADIOLOGY | Facility: CLINIC | Age: 33
End: 2018-03-09
Attending: PHYSICIAN ASSISTANT
Payer: COMMERCIAL

## 2018-03-09 VITALS
DIASTOLIC BLOOD PRESSURE: 83 MMHG | HEIGHT: 66 IN | BODY MASS INDEX: 47.09 KG/M2 | HEART RATE: 86 BPM | SYSTOLIC BLOOD PRESSURE: 142 MMHG | WEIGHT: 293 LBS

## 2018-03-09 DIAGNOSIS — E66.01 MORBID OBESITY, UNSPECIFIED OBESITY TYPE (H): Primary | ICD-10-CM

## 2018-03-09 DIAGNOSIS — F33.1 MODERATE EPISODE OF RECURRENT MAJOR DEPRESSIVE DISORDER (H): ICD-10-CM

## 2018-03-09 DIAGNOSIS — M25.532 LEFT WRIST PAIN: ICD-10-CM

## 2018-03-09 PROCEDURE — 73110 X-RAY EXAM OF WRIST: CPT | Mod: LT

## 2018-03-09 PROCEDURE — 99214 OFFICE O/P EST MOD 30 MIN: CPT | Performed by: PHYSICIAN ASSISTANT

## 2018-03-09 RX ORDER — PHENTERMINE HYDROCHLORIDE 37.5 MG/1
37.5 CAPSULE ORAL EVERY MORNING
Qty: 30 CAPSULE | Refills: 0 | Status: SHIPPED | OUTPATIENT
Start: 2018-03-09 | End: 2018-04-06

## 2018-03-09 RX ORDER — BUPROPION HYDROCHLORIDE 150 MG/1
150 TABLET ORAL EVERY MORNING
Qty: 90 TABLET | Refills: 1 | Status: SHIPPED | OUTPATIENT
Start: 2018-03-09 | End: 2018-07-19

## 2018-03-09 RX ORDER — IBUPROFEN 800 MG/1
800 TABLET, FILM COATED ORAL EVERY 8 HOURS PRN
Qty: 90 TABLET | Refills: 1 | Status: SHIPPED | OUTPATIENT
Start: 2018-03-09 | End: 2018-05-21

## 2018-03-09 NOTE — PROGRESS NOTES
SUBJECTIVE:   Janine Salas is a 32 year old female who presents to clinic today for the following health issues:      Patient is here today for a follow up.     -Needs a refill on the Phentermine 37.5 mg.     -Also needs to talk about getting you to be the prescriber on her Wellbutrin 150 mg.     -She slipped and fell on her front step this morning. Her left hand and wrist are bothering her. Landed on her butt and it is radiating down in to her legs. Wondering if she can get prescription ibuprofen.      Was losing weight on the phentermine. Would gregorio to continue for now  No headaches  No side effects that she can tell    Slipped this morning on her front step   Was holding her son's hand in the right and fell back landing on her outstretched left hand  Sore in the wrist and pain in the butt down her leg    Needs a refill on wellbutrin  Currently working with a therapist who has been helping her process some tough issues  Does feel the wellbutrin is helpful        Problem list and histories reviewed & adjusted, as indicated.  Additional history: as documented    Current Outpatient Prescriptions   Medication Sig Dispense Refill     phentermine 37.5 MG capsule Take 1 capsule (37.5 mg) by mouth every morning 30 capsule 0     buPROPion (WELLBUTRIN XL) 150 MG 24 hr tablet Take 1 tablet (150 mg) by mouth every morning 90 tablet 1     ibuprofen (ADVIL/MOTRIN) 800 MG tablet Take 1 tablet (800 mg) by mouth every 8 hours as needed for moderate pain 90 tablet 1     sertraline (ZOLOFT) 50 MG tablet TAKE ONE TABLET BY MOUTH EVERY DAY 90 tablet 3     azelaic acid (AZELEX) 20 % cream Apply topically 2 times daily 50 g 11     doxycycline (VIBRAMYCIN) 100 MG capsule Take 1 capsule (100 mg) by mouth 2 times daily 60 capsule 3     Prenatal Vit-Fe Fumarate-FA (PRENATAL MULTIVITAMIN  PLUS IRON) 27-0.8 MG TABS Take 1 tablet by mouth daily       tiZANidine (ZANAFLEX) 4 MG tablet TAKE ONE TO TWO TABLETS BY MOUTH THREE TIMES A DAY 90  "tablet 1     Allergies   Allergen Reactions     Codeine Itching     Zofran [Ondansetron] Other (See Comments)     Other reaction(s): Headache  Headaches      BP Readings from Last 3 Encounters:   03/09/18 142/83   01/30/18 136/89   12/04/17 130/72    Wt Readings from Last 3 Encounters:   03/09/18 293 lb (132.9 kg)   01/30/18 296 lb (134.3 kg)   12/04/17 (!) 309 lb (140.2 kg)                    Reviewed and updated as needed this visit by clinical staff       Reviewed and updated as needed this visit by Provider         ROS:  Remainder of ROS obtained and found to be negative other than that which was documented above      OBJECTIVE:     /83  Pulse 86  Ht 5' 6\" (1.676 m)  Wt 293 lb (132.9 kg)  BMI 47.29 kg/m2  Body mass index is 47.29 kg/(m^2).  GENERAL: healthy, alert and no distress  EYES: Eyes grossly normal to inspection  RESP: lungs clear to auscultation - no rales, rhonchi or wheezes  CV: regular rates and rhythm, normal S1 S2, no S3 or S4 and no murmur, click or rub  WRIST, left:   No significant swelling or redness  Decreased ROM in all directions due to pain  Tender to touch more on thumb side of wrist    Diagnostic Test Results:  Recent Results (from the past 744 hour(s))   XR Wrist Left G/E 3 Views    Narrative    WRIST LEFT THREE OR MORE VIEWS 3/9/2018 4:12 PM     HISTORY: Fall on outstretched hand. Left wrist pain.       Impression    IMPRESSION: Three views left wrist. No fracture or dislocation. No  significant soft tissue swelling. Scaphoid bone appears intact.    TU MARTIN MD       ASSESSMENT/PLAN:     (E66.01) Obesity  (primary encounter diagnosis)  Comment: has been losing weight. Need to contniue to monitor blood pressure   Plan: phentermine 37.5 MG capsule            (M25.532) Left wrist pain  Comment: no fracture on xray. Ice, ibuprofen prn, avoid aggravating activities  Plan: ibuprofen (ADVIL/MOTRIN) 800 MG tablet, XR         Wrist Left G/E 3 Views            (F33.1) Moderate " episode of recurrent major depressive disorder (H)  Comment: working with a  Therapist - dx with PTSD and currently working through some things which she does feel is helping  Plan: buPROPion (WELLBUTRIN XL) 150 MG 24 hr tablet                  Kavya Haider PA-C  [facility]

## 2018-03-09 NOTE — NURSING NOTE
"Chief Complaint   Patient presents with     Follow Up       Initial /83  Pulse 86  Ht 5' 6\" (1.676 m)  Wt 293 lb (132.9 kg)  BMI 47.29 kg/m2 Estimated body mass index is 47.29 kg/(m^2) as calculated from the following:    Height as of this encounter: 5' 6\" (1.676 m).    Weight as of this encounter: 293 lb (132.9 kg).  Medication Reconciliation: jennifer Cervantes CMA    "

## 2018-03-09 NOTE — MR AVS SNAPSHOT
After Visit Summary   3/9/2018    Janine Salas    MRN: [id number]           Patient Information     Date Of Birth          1985        Visit Information        Provider Department      3/9/2018 3:20 PM Kavya Haider PA-C [facility]        Today's Diagnoses     Left wrist pain    -  1    Obesity        Granuloma annulare        Acne vulgaris           Follow-ups after your visit        Your next 10 appointments already scheduled     Apr 03, 2018  1:15 PM CDT   [facility] Dermatology Return with Miguel Khan MD   [facility]    [address] [address]   [phone]              Who to contact     Normal or non-critical lab and imaging results will be communicated to you by Zeerhart, letter or phone within 4 business days after the clinic has received the results. If you do not hear from us within 7 days, please contact the clinic through Zeer[employer] or phone. If you have a critical or abnormal lab result, we will notify you by phone as soon as possible.  Submit refill requests through [employer] or call your pharmacy and they will forward the refill request to us. Please allow 3 business days for your refill to be completed.          If you need to speak with a  for additional information , please call: [phone]             Additional Information About Your Visit        [employer] Information     [employer] gives you secure access to your electronic health record. If you see a primary care provider, you can also send messages to your care team and make appointments. If you have questions, please call your primary care clinic.  If you do not have a primary care provider, please call [phone] and they will assist you.        Care EveryWhere ID     This is your Care EveryWhere ID. This could be used by other organizations to access your [place] medical records  [id number]        Your  "Vitals Were     Pulse Height BMI (Body Mass Index)             86 5' 6\" (1.676 m) 47.29 kg/m2          Blood Pressure from Last 3 Encounters:   03/09/18 142/83   01/30/18 136/89   12/04/17 130/72    Weight from Last 3 Encounters:   03/09/18 293 lb (132.9 kg)   01/30/18 296 lb (134.3 kg)   12/04/17 (!) 309 lb (140.2 kg)                 Today's Medication Changes          These changes are accurate as of 3/9/18  4:22 PM.  If you have any questions, ask your nurse or doctor.               Start taking these medicines.        Dose/Directions    ibuprofen 800 MG tablet   Commonly known as:  ADVIL/MOTRIN   Used for:  Left wrist pain   Started by:  Kavya Haider PA-C        Dose:  800 mg   Take 1 tablet (800 mg) by mouth every 8 hours as needed for moderate pain   Quantity:  90 tablet   Refills:  1            Where to get your medicines      These medications were sent to [facility] [place], [place] - [address]  [address] [facility] [address]     Phone:  [phone]     buPROPion 150 MG 24 hr tablet    ibuprofen 800 MG tablet         Some of these will need a paper prescription and others can be bought over the counter.  Ask your nurse if you have questions.     Bring a paper prescription for each of these medications     phentermine 37.5 MG capsule                Primary Care Provider Office Phone # Fax #    Kavya Haider PA-C [phone] [address] [facility] [address]        Equal Access to Services     [facility] AH: Hadii melvi ku hadasho Soomaali, waaxda luqadaha, qaybta kaalmada adeegyada, waxay idibipin goldberg. So [facility] [phone].    ATENCIÓN: Si habla español, tiene a pacheco disposición servicios gratuitos de asistencia lingüística. Llame al [phone].    We comply with applicable federal civil rights laws and [place] laws. We do not discriminate on the basis of race, color, national origin, age, disability, " sex, sexual orientation, or gender identity.            Thank you!     Thank you for choosing [facility]  for your care. Our goal is always to provide you with excellent care. Hearing back from our patients is one way we can continue to improve our services. Please take a few minutes to complete the written survey that you may receive in the mail after your visit with us. Thank you!             Your Updated Medication List - Protect others around you: Learn how to safely use, store and throw away your medicines at www.disposemymeds.org.          This list is accurate as of 3/9/18  4:22 PM.  Always use your most recent med list.                   Brand Name Dispense Instructions for use Diagnosis    azelaic acid 20 % cream    AZELEX    50 g    Apply topically 2 times daily    Granuloma annulare, Acne vulgaris       buPROPion 150 MG 24 hr tablet    WELLBUTRIN XL    90 tablet    Take 1 tablet (150 mg) by mouth every morning    Granuloma annulare, Acne vulgaris       doxycycline 100 MG capsule    VIBRAMYCIN    60 capsule    Take 1 capsule (100 mg) by mouth 2 times daily    Granuloma annulare, Acne vulgaris       ibuprofen 800 MG tablet    ADVIL/MOTRIN    90 tablet    Take 1 tablet (800 mg) by mouth every 8 hours as needed for moderate pain    Left wrist pain       phentermine 37.5 MG capsule     30 capsule    Take 1 capsule (37.5 mg) by mouth every morning    Morbid obesity, unspecified obesity type (H)       prenatal multivitamin plus iron 27-0.8 MG Tabs per tablet      Take 1 tablet by mouth daily        sertraline 50 MG tablet    ZOLOFT    90 tablet    TAKE ONE TABLET BY MOUTH EVERY DAY    Post partum depression       tiZANidine 4 MG tablet    ZANAFLEX    90 tablet    TAKE ONE TO TWO TABLETS BY MOUTH THREE TIMES A DAY    Acute bilateral low back pain without sciatica

## 2018-03-11 DIAGNOSIS — M54.50 ACUTE BILATERAL LOW BACK PAIN WITHOUT SCIATICA: ICD-10-CM

## 2018-03-12 NOTE — TELEPHONE ENCOUNTER
TIZANIDINE HCL 4MG TABS        Last Written Prescription Date:  1/25/18  Last Fill Quantity: 90,   # refills: 1  Last Office Visit: 3/9/18  Future Office visit:       Routing refill request to provider for review/approval because:  Drug not on the [facility], [facility] or [facility] refill protocol or controlled substance

## 2018-03-12 NOTE — TELEPHONE ENCOUNTER
Routing refill request to provider for review/approval because:  Drug not on the [facility] refill protocol     Celine SALDIVAR RN

## 2018-03-15 PROBLEM — F33.1 MODERATE EPISODE OF RECURRENT MAJOR DEPRESSIVE DISORDER (H): Status: ACTIVE | Noted: 2018-03-15

## 2018-04-06 ENCOUNTER — OFFICE VISIT (OUTPATIENT)
Dept: FAMILY MEDICINE | Facility: CLINIC | Age: 33
End: 2018-04-06
Payer: COMMERCIAL

## 2018-04-06 VITALS
WEIGHT: 293 LBS | TEMPERATURE: 98.2 F | BODY MASS INDEX: 47.09 KG/M2 | SYSTOLIC BLOOD PRESSURE: 130 MMHG | HEART RATE: 74 BPM | HEIGHT: 66 IN | DIASTOLIC BLOOD PRESSURE: 76 MMHG

## 2018-04-06 DIAGNOSIS — E66.01 MORBID OBESITY, UNSPECIFIED OBESITY TYPE (H): ICD-10-CM

## 2018-04-06 DIAGNOSIS — R10.13 ABDOMINAL PAIN, EPIGASTRIC: Primary | ICD-10-CM

## 2018-04-06 PROCEDURE — 80053 COMPREHEN METABOLIC PANEL: CPT | Performed by: PHYSICIAN ASSISTANT

## 2018-04-06 PROCEDURE — 83690 ASSAY OF LIPASE: CPT | Performed by: PHYSICIAN ASSISTANT

## 2018-04-06 PROCEDURE — 36415 COLL VENOUS BLD VENIPUNCTURE: CPT | Performed by: PHYSICIAN ASSISTANT

## 2018-04-06 PROCEDURE — 99214 OFFICE O/P EST MOD 30 MIN: CPT | Performed by: PHYSICIAN ASSISTANT

## 2018-04-06 RX ORDER — LANSOPRAZOLE 30 MG/1
30 CAPSULE, DELAYED RELEASE ORAL DAILY
Qty: 90 CAPSULE | Refills: 1 | Status: SHIPPED | OUTPATIENT
Start: 2018-04-06 | End: 2018-05-07

## 2018-04-06 RX ORDER — PHENTERMINE HYDROCHLORIDE 37.5 MG/1
37.5 CAPSULE ORAL EVERY MORNING
Qty: 30 CAPSULE | Refills: 0 | Status: SHIPPED | OUTPATIENT
Start: 2018-04-06 | End: 2018-05-07

## 2018-04-06 ASSESSMENT — ANXIETY QUESTIONNAIRES
6. BECOMING EASILY ANNOYED OR IRRITABLE: SEVERAL DAYS
3. WORRYING TOO MUCH ABOUT DIFFERENT THINGS: NOT AT ALL
2. NOT BEING ABLE TO STOP OR CONTROL WORRYING: NOT AT ALL
1. FEELING NERVOUS, ANXIOUS, OR ON EDGE: SEVERAL DAYS
GAD7 TOTAL SCORE: 3
7. FEELING AFRAID AS IF SOMETHING AWFUL MIGHT HAPPEN: NOT AT ALL
5. BEING SO RESTLESS THAT IT IS HARD TO SIT STILL: NOT AT ALL
IF YOU CHECKED OFF ANY PROBLEMS ON THIS QUESTIONNAIRE, HOW DIFFICULT HAVE THESE PROBLEMS MADE IT FOR YOU TO DO YOUR WORK, TAKE CARE OF THINGS AT HOME, OR GET ALONG WITH OTHER PEOPLE: SOMEWHAT DIFFICULT

## 2018-04-06 ASSESSMENT — PATIENT HEALTH QUESTIONNAIRE - PHQ9: 5. POOR APPETITE OR OVEREATING: SEVERAL DAYS

## 2018-04-06 NOTE — NURSING NOTE
"Chief Complaint   Patient presents with     Recheck Medication     Abdominal Pain       Initial /76  Pulse 74  Temp 98.2  F (36.8  C) (Tympanic)  Ht 5' 6\" (1.676 m)  Wt 294 lb (133.4 kg)  BMI 47.45 kg/m2 Estimated body mass index is 47.45 kg/(m^2) as calculated from the following:    Height as of this encounter: 5' 6\" (1.676 m).    Weight as of this encounter: 294 lb (133.4 kg).  Medication Reconciliation: complete     Isra Cervantes, ADEN    "

## 2018-04-06 NOTE — PROGRESS NOTES
SUBJECTIVE:   Janine Salas is a 32 year old female who presents to clinic today for the following health issues:      Medication Followup of Phentermine 37.5 mg     Taking Medication as prescribed: yes    Side Effects: Not sure     Medication Helping Symptoms:  yes       ABDOMINAL PAIN     Onset: off and on for a few weeks     Description:   Character: Sharp and Stabbing at times, Dull otherwise   Location: right upper quadrant  Radiation: None    Intensity: moderate    Progression of Symptoms:  intermittent    Accompanying Signs & Symptoms:  Fever/Chills?: YES- off and on   Gas/Bloating: no   Nausea: YES- at ;east once a day   Vomitting: YES- off and on   Diarrhea?: no   Constipation:no   Dysuria or Hematuria: no    History:   Trauma: no   Previous similar pain: YES- she has had it before    Previous tests done: Labs     Precipitating factors:   Does the pain change with:     Food: no      BM: no     Urination: no     Alleviating factors:  None    Therapies Tried and outcome: Antacids     LMP: She will be getting it tomorrow        Problem list and histories reviewed & adjusted, as indicated.  Additional history: as documented    Current Outpatient Prescriptions   Medication Sig Dispense Refill     phentermine 37.5 MG capsule Take 1 capsule (37.5 mg) by mouth every morning 30 capsule 0     LANsoprazole (PREVACID) 30 MG CR capsule Take 1 capsule (30 mg) by mouth daily Take 30-60 minutes before a meal. 90 capsule 1     tiZANidine (ZANAFLEX) 4 MG tablet TAKE ONE TO TWO TABLETS BY MOUTH THREE TIMES A DAY 90 tablet 1     buPROPion (WELLBUTRIN XL) 150 MG 24 hr tablet Take 1 tablet (150 mg) by mouth every morning 90 tablet 1     ibuprofen (ADVIL/MOTRIN) 800 MG tablet Take 1 tablet (800 mg) by mouth every 8 hours as needed for moderate pain 90 tablet 1     sertraline (ZOLOFT) 50 MG tablet TAKE ONE TABLET BY MOUTH EVERY DAY 90 tablet 3     azelaic acid (AZELEX) 20 % cream Apply topically 2 times daily 50 g 11      "doxycycline (VIBRAMYCIN) 100 MG capsule Take 1 capsule (100 mg) by mouth 2 times daily 60 capsule 3     Prenatal Vit-Fe Fumarate-FA (PRENATAL MULTIVITAMIN  PLUS IRON) 27-0.8 MG TABS Take 1 tablet by mouth daily       Allergies   Allergen Reactions     Codeine Itching     Zofran [Ondansetron] Other (See Comments)     Other reaction(s): Headache  Headaches        Reviewed and updated as needed this visit by clinical staff       Reviewed and updated as needed this visit by Provider         ROS:  Remainder of ROS obtained and found to be negative other than that which was documented above      OBJECTIVE:     /76  Pulse 74  Temp 98.2  F (36.8  C) (Tympanic)  Ht 5' 6\" (1.676 m)  Wt 294 lb (133.4 kg)  BMI 47.45 kg/m2  Body mass index is 47.45 kg/(m^2).  GENERAL: healthy, alert and no distress  EYES: Eyes grossly normal to inspection  RESP: lungs clear to auscultation - no rales, rhonchi or wheezes  CV: regular rates and rhythm, normal S1 S2, no S3 or S4 and no murmur, click or rub  ABDOMEN: soft, nontender and bowel sounds normal    Diagnostic Test Results:  none     ASSESSMENT/PLAN:     (R10.13) Abdominal pain, epigastric  (primary encounter diagnosis)  Comment: on/off since cholecystectomy. Suspect weight loss will help. Labs today and will try prevacid  Plan: Comprehensive metabolic panel (BMP + Alb, Alk         Phos, ALT, AST, Total. Bili, TP), Lipase,         LANsoprazole (PREVACID) 30 MG CR capsule            (E66.01) Obesity  Comment: losing weight - does help with appetite suppression. Vitals okay. Continue for now with close monitoring  Plan: phentermine 37.5 MG capsule                Kavya Haider PA-C  [facility]    "

## 2018-04-06 NOTE — MR AVS SNAPSHOT
After Visit Summary   4/6/2018    Janine Salas    MRN: [id number]           Patient Information     Date Of Birth          1985        Visit Information        Provider Department      4/6/2018 2:20 PM Kavya Haider PA-C [facility]        Today's Diagnoses     Abdominal pain, epigastric    -  1    Obesity           Follow-ups after your visit        Your next 10 appointments already scheduled     May 01, 2018  1:30 PM CDT   MyChart Dermatology Return with Miguel Khan MD   [facility] ([facility]    [address] [address]   [phone]              Who to contact     Normal or non-critical lab and imaging results will be communicated to you by Forensic Logichart, letter or phone within 4 business days after the clinic has received the results. If you do not hear from us within 7 days, please contact the clinic through Forensic Logichart or phone. If you have a critical or abnormal lab result, we will notify you by phone as soon as possible.  Submit refill requests through Semprius or call your pharmacy and they will forward the refill request to us. Please allow 3 business days for your refill to be completed.          If you need to speak with a  for additional information , please call: [phone]             Additional Information About Your Visit        Semprius Information     Semprius gives you secure access to your electronic health record. If you see a primary care provider, you can also send messages to your care team and make appointments. If you have questions, please call your primary care clinic.  If you do not have a primary care provider, please call [phone] and they will assist you.        Care EveryWhere ID     This is your Care EveryWhere ID. This could be used by other organizations to access your [place] medical records  [id number]        Your Vitals Were     Pulse Temperature  "Height BMI (Body Mass Index)          74 98.2  F (36.8  C) (Tympanic) 5' 6\" (1.676 m) 47.45 kg/m2         Blood Pressure from Last 3 Encounters:   04/06/18 130/76   03/09/18 142/83   01/30/18 136/89    Weight from Last 3 Encounters:   04/06/18 294 lb (133.4 kg)   03/09/18 293 lb (132.9 kg)   01/30/18 296 lb (134.3 kg)              We Performed the Following     Comprehensive metabolic panel (BMP + Alb, Alk Phos, ALT, AST, Total. Bili, TP)     DEPRESSION ACTION PLAN (DAP)     Lipase          Today's Medication Changes          These changes are accurate as of 4/6/18  3:16 PM.  If you have any questions, ask your nurse or doctor.               Start taking these medicines.        Dose/Directions    LANsoprazole 30 MG CR capsule   Commonly known as:  PREVACID   Used for:  Abdominal pain, epigastric   Started by:  Kavya Haider PA-C        Dose:  30 mg   Take 1 capsule (30 mg) by mouth daily Take 30-60 minutes before a meal.   Quantity:  90 capsule   Refills:  1            Where to get your medicines      These medications were sent to [facility] [address]  [address] [facility] [address]     Phone:  [phone]     LANsoprazole 30 MG CR capsule         Some of these will need a paper prescription and others can be bought over the counter.  Ask your nurse if you have questions.     Bring a paper prescription for each of these medications     phentermine 37.5 MG capsule                Primary Care Provider Office Phone # Fax #    Kavya Haider PA-C [phone] [address]  [facility] [address]        Equal Access to Services     [facility] Center[facility]IAELENA AH: Darin Pierce, wacarmeloda luqadaha, qaybta kaalmada adeegyada, anali goldberg. So [facility] [phone].    ATENCIÓN: Si habla español, tiene a pacheco disposición servicios gratuitos de asistencia lingüística. Llame al [phone].    We comply with " applicable federal civil rights laws and [place] laws. We do not discriminate on the basis of race, color, national origin, age, disability, sex, sexual orientation, or gender identity.            Thank you!     Thank you for choosing [facility]  for your care. Our goal is always to provide you with excellent care. Hearing back from our patients is one way we can continue to improve our services. Please take a few minutes to complete the written survey that you may receive in the mail after your visit with us. Thank you!             Your Updated Medication List - Protect others around you: Learn how to safely use, store and throw away your medicines at www.disposemymeds.org.          This list is accurate as of 4/6/18  3:16 PM.  Always use your most recent med list.                   Brand Name Dispense Instructions for use Diagnosis    azelaic acid 20 % cream    AZELEX    50 g    Apply topically 2 times daily    Granuloma annulare, Acne vulgaris       buPROPion 150 MG 24 hr tablet    WELLBUTRIN XL    90 tablet    Take 1 tablet (150 mg) by mouth every morning    Moderate episode of recurrent major depressive disorder (H)       doxycycline 100 MG capsule    VIBRAMYCIN    60 capsule    Take 1 capsule (100 mg) by mouth 2 times daily    Granuloma annulare, Acne vulgaris       ibuprofen 800 MG tablet    ADVIL/MOTRIN    90 tablet    Take 1 tablet (800 mg) by mouth every 8 hours as needed for moderate pain    Left wrist pain       LANsoprazole 30 MG CR capsule    PREVACID    90 capsule    Take 1 capsule (30 mg) by mouth daily Take 30-60 minutes before a meal.    Abdominal pain, epigastric       phentermine 37.5 MG capsule     30 capsule    Take 1 capsule (37.5 mg) by mouth every morning    Morbid obesity, unspecified obesity type (H)       prenatal multivitamin plus iron 27-0.8 MG Tabs per tablet      Take 1 tablet by mouth daily        sertraline 50 MG tablet    ZOLOFT    90 tablet    TAKE ONE TABLET BY MOUTH  EVERY DAY    Post partum depression       tiZANidine 4 MG tablet    ZANAFLEX    90 tablet    TAKE ONE TO TWO TABLETS BY MOUTH THREE TIMES A DAY    Acute bilateral low back pain without sciatica

## 2018-04-06 NOTE — LETTER
My Depression Action Plan  Name: Janine Salas   Date of Birth 1985  Date: 4/6/2018    My doctor: Kavya Haider   My clinic: [facility]  [id number] [place] [address] [phone]          GREEN    ZONE   Good Control    What it looks like:     Things are going generally well. You have normal up s and down s. You may even feel depressed from time to time, but bad moods usually last less than a day.   What you need to do:  1. Continue to care for yourself (see self care plan)  2. Check your depression survival kit and update it as needed  3. Follow your physician s recommendations including any medication.  4. Do not stop taking medication unless you consult with your physician first.           YELLOW         ZONE Getting Worse    What it looks like:     Depression is starting to interfere with your life.     It may be hard to get out of bed; you may be starting to isolate yourself from others.    Symptoms of depression are starting to last most all day and this has happened for several days.     You may have suicidal thoughts but they are not constant.   What you need to do:     1. Call your care team, your response to treatment will improve if you keep your care team informed of your progress. Yellow periods are signs an adjustment may need to be made.     2. Continue your self-care, even if you have to fake it!    3. Talk to someone in your support network    4. Open up your depression survival kit           RED    ZONE Medical Alert - Get Help    What it looks like:     Depression is seriously interfering with your life.     You may experience these or other symptoms: You can t get out of bed most days, can t work or engage in other necessary activities, you have trouble taking care of basic hygiene, or basic responsibilities, thoughts of suicide or death that will not go away, self-injurious behavior.     What you need to do:  1. Call your care team  and request a same-day appointment. If they are not available (weekends or after hours) call your local crisis line, emergency room or 911.            Depression Self Care Plan / Survival Kit    Self-Care for Depression  Here s the deal. Your body and mind are really not as separate as most people think.  What you do and think affects how you feel and how you feel influences what you do and think. This means if you do things that people who feel good do, it will help you feel better.  Sometimes this is all it takes.  There is also a place for medication and therapy depending on how severe your depression is, so be sure to consult with your medical provider and/ or Behavioral Health Consultant if your symptoms are worsening or not improving.     In order to better manage my stress, I will:    Exercise  Get some form of exercise, every day. This will help reduce pain and release endorphins, the  feel good  chemicals in your brain. This is almost as good as taking antidepressants!  This is not the same as joining a gym and then never going! (they count on that by the way ) It can be as simple as just going for a walk or doing some gardening, anything that will get you moving.      Hygiene   Maintain good hygiene (Get out of bed in the morning, Make your bed, Brush your teeth, Take a shower, and Get dressed like you were going to work, even if you are unemployed).  If your clothes don't fit try to get ones that do.    Diet  I will strive to eat foods that are good for me, drink plenty of water, and avoid excessive sugar, caffeine, alcohol, and other mood-altering substances.  Some foods that are helpful in depression are: complex carbohydrates, B vitamins, flaxseed, fish or fish oil, fresh fruits and vegetables.    Psychotherapy  I agree to participate in Individual Therapy (if recommended).    Medication  If prescribed medications, I agree to take them.  Missing doses can result in serious side effects.  I understand  that drinking alcohol, or other illicit drug use, may cause potential side effects.  I will not stop my medication abruptly without first discussing it with my provider.    Staying Connected With Others  I will stay in touch with my friends, family members, and my primary care provider/team.    Use your imagination  Be creative.  We all have a creative side; it doesn t matter if it s oil painting, sand castles, or mud pies! This will also kick up the endorphins.    Witness Beauty  (AKA stop and smell the roses) Take a look outside, even in mid-winter. Notice colors, textures. Watch the squirrels and birds.     Service to others  Be of service to others.  There is always someone else in need.  By helping others we can  get out of ourselves  and remember the really important things.  This also provides opportunities for practicing all the other parts of the program.    Humor  Laugh and be silly!  Adjust your TV habits for less news and crime-drama and more comedy.    Control your stress  Try breathing deep, massage therapy, biofeedback, and meditation. Find time to relax each day.     My support system    Clinic Contact:  Phone number:    Contact 1:  Phone number:    Contact 2:  Phone number:    Caodaism/:  Phone number:    Therapist:  Phone number:    Local crisis center:    Phone number:    Other community support:  Phone number:

## 2018-04-07 LAB
ALBUMIN SERPL-MCNC: 4.1 G/DL (ref 3.4–5)
ALP SERPL-CCNC: 87 U/L (ref 40–150)
ALT SERPL W P-5'-P-CCNC: 30 U/L (ref 0–50)
ANION GAP SERPL CALCULATED.3IONS-SCNC: 5 MMOL/L (ref 3–14)
AST SERPL W P-5'-P-CCNC: 25 U/L (ref 0–45)
BILIRUB SERPL-MCNC: 0.4 MG/DL (ref 0.2–1.3)
BUN SERPL-MCNC: 18 MG/DL (ref 7–30)
CALCIUM SERPL-MCNC: 8.7 MG/DL (ref 8.5–10.1)
CHLORIDE SERPL-SCNC: 107 MMOL/L (ref 94–109)
CO2 SERPL-SCNC: 27 MMOL/L (ref 20–32)
CREAT SERPL-MCNC: 0.73 MG/DL (ref 0.52–1.04)
GFR SERPL CREATININE-BSD FRML MDRD: >90 ML/MIN/1.7M2
GLUCOSE SERPL-MCNC: 81 MG/DL (ref 70–99)
LIPASE SERPL-CCNC: 109 U/L (ref 73–393)
POTASSIUM SERPL-SCNC: 4.2 MMOL/L (ref 3.4–5.3)
PROT SERPL-MCNC: 7.1 G/DL (ref 6.8–8.8)
SODIUM SERPL-SCNC: 139 MMOL/L (ref 133–144)

## 2018-04-07 ASSESSMENT — PATIENT HEALTH QUESTIONNAIRE - PHQ9: SUM OF ALL RESPONSES TO PHQ QUESTIONS 1-9: 7

## 2018-04-07 ASSESSMENT — ANXIETY QUESTIONNAIRES: GAD7 TOTAL SCORE: 3

## 2018-04-15 DIAGNOSIS — M54.50 ACUTE BILATERAL LOW BACK PAIN WITHOUT SCIATICA: ICD-10-CM

## 2018-04-16 NOTE — TELEPHONE ENCOUNTER
Routing refill request to provider for review/approval because:  Drug not on the [facility] refill protocol   Gonzalo Ellison RN

## 2018-04-16 NOTE — TELEPHONE ENCOUNTER
TIZANIDINE HCL 4MG TABS        Last Written Prescription Date:  3/12/18  Last Fill Quantity: 90,   # refills: 1  Last Office Visit: 4/6/18  Future Office visit:       Routing refill request to provider for review/approval because:  Drug not on the [facility], [facility] or [facility] refill protocol or controlled substance

## 2018-04-23 DIAGNOSIS — L70.0 ACNE VULGARIS: ICD-10-CM

## 2018-04-23 DIAGNOSIS — L92.0 GRANULOMA ANNULARE: ICD-10-CM

## 2018-04-24 RX ORDER — DOXYCYCLINE 100 MG/1
100 CAPSULE ORAL 2 TIMES DAILY
Qty: 60 CAPSULE | Refills: 3 | Status: SHIPPED | OUTPATIENT
Start: 2018-04-24 | End: 2018-06-20

## 2018-04-24 NOTE — TELEPHONE ENCOUNTER
LOV 11/2017. Per office visit:     Pregnacy and safe acne meds discussed with patient   Cont doxy for now  STOP DOXY ONCE SHE TRYS TO GET PREGNANT  finacea twice daily  OTC bpo wash prn  Return to clinic once she completes pregnancy    Called patient. She is not currently pregnant and not trying to get pregnant. Refill sent to [facility] pharmacy per request. Has follow-up appointment with Dr. Khan 5/1/18.     Lukas FRASER   [facility] RN

## 2018-05-07 ENCOUNTER — OFFICE VISIT (OUTPATIENT)
Dept: FAMILY MEDICINE | Facility: CLINIC | Age: 33
End: 2018-05-07
Payer: COMMERCIAL

## 2018-05-07 VITALS
BODY MASS INDEX: 46.93 KG/M2 | SYSTOLIC BLOOD PRESSURE: 134 MMHG | HEART RATE: 86 BPM | WEIGHT: 292 LBS | TEMPERATURE: 98.3 F | DIASTOLIC BLOOD PRESSURE: 68 MMHG | HEIGHT: 66 IN

## 2018-05-07 DIAGNOSIS — E66.01 MORBID OBESITY, UNSPECIFIED OBESITY TYPE (H): ICD-10-CM

## 2018-05-07 DIAGNOSIS — Z30.011 ENCOUNTER FOR INITIAL PRESCRIPTION OF CONTRACEPTIVE PILLS: ICD-10-CM

## 2018-05-07 DIAGNOSIS — K21.9 GASTROESOPHAGEAL REFLUX DISEASE, ESOPHAGITIS PRESENCE NOT SPECIFIED: Primary | ICD-10-CM

## 2018-05-07 PROCEDURE — 99214 OFFICE O/P EST MOD 30 MIN: CPT | Performed by: PHYSICIAN ASSISTANT

## 2018-05-07 RX ORDER — LANSOPRAZOLE 30 MG/1
30 CAPSULE, DELAYED RELEASE ORAL DAILY
Qty: 30 CAPSULE | Refills: 1 | Status: SHIPPED | OUTPATIENT
Start: 2018-05-07 | End: 2018-06-20

## 2018-05-07 RX ORDER — PHENTERMINE HYDROCHLORIDE 37.5 MG/1
37.5 CAPSULE ORAL EVERY MORNING
Qty: 30 CAPSULE | Refills: 0 | Status: SHIPPED | OUTPATIENT
Start: 2018-05-07 | End: 2018-06-20

## 2018-05-07 RX ORDER — LEVONORGESTREL/ETHIN.ESTRADIOL 0.1-0.02MG
1 TABLET ORAL DAILY
Qty: 84 TABLET | Refills: 1 | Status: SHIPPED | OUTPATIENT
Start: 2018-05-07 | End: 2018-06-20

## 2018-05-07 NOTE — PROGRESS NOTES
"  SUBJECTIVE:   Janine Salas is a 32 year old female who presents to clinic today for the following health issues:      Medication Followup of Phentermine 37.5 mg     Taking Medication as prescribed: yes    Side Effects:  None    Medication Helping Symptoms:  yes       -Would like to talk about acid reflux.   -Also wondering about birth control and wanting to maybe start it again, low dose.    Problem list and histories reviewed & adjusted, as indicated.  Additional history:     Doing well on phentermine.   Blood pressures have bene okay  No effect on sleep  No headaches  Has helped with her appetite suppression    Would like to get back on meds for acid reflux  Was started on prevacid and zantac but misunderstood the instructions and was taking more prevacid than she should have so ran out early  Does feel like it was helping  Ran out of zantac a couple days ago and has felt her symptoms returning    Did have an EGD through [facility] a few years ago that was normal    Would like to discuss birth control  Doesn't think she wants to get pregnant right now  Worried though that she will go \"crazy\" again on hormones  Does not do well on progesterone only contraception  Does not smoke    Current Outpatient Prescriptions   Medication Sig Dispense Refill     azelaic acid (AZELEX) 20 % cream Apply topically 2 times daily 50 g 11     buPROPion (WELLBUTRIN XL) 150 MG 24 hr tablet Take 1 tablet (150 mg) by mouth every morning 90 tablet 1     doxycycline (VIBRAMYCIN) 100 MG capsule Take 1 capsule (100 mg) by mouth 2 times daily 60 capsule 3     ibuprofen (ADVIL/MOTRIN) 800 MG tablet Take 1 tablet (800 mg) by mouth every 8 hours as needed for moderate pain 90 tablet 1     LANsoprazole (PREVACID) 30 MG CR capsule Take 1 capsule (30 mg) by mouth daily Take 30-60 minutes before a meal. 30 capsule 1     levonorgestrel-ethinyl estradiol (AVIANE,ALESSE,LESSINA) 0.1-20 MG-MCG per tablet Take 1 tablet by mouth daily 84 tablet 1     " "phentermine 37.5 MG capsule Take 1 capsule (37.5 mg) by mouth every morning 30 capsule 0     Prenatal Vit-Fe Fumarate-FA (PRENATAL MULTIVITAMIN  PLUS IRON) 27-0.8 MG TABS Take 1 tablet by mouth daily       ranitidine (ZANTAC) 300 MG tablet Take 1 tablet (300 mg) by mouth At Bedtime 30 tablet 1     sertraline (ZOLOFT) 50 MG tablet TAKE ONE TABLET BY MOUTH EVERY DAY 90 tablet 3     tiZANidine (ZANAFLEX) 4 MG tablet TAKE ONE TO TWO TABLETS BY MOUTH THREE TIMES A DAY 90 tablet 1     Allergies   Allergen Reactions     Codeine Itching     Zofran [Ondansetron] Other (See Comments)     Other reaction(s): Headache  Headaches      BP Readings from Last 3 Encounters:   05/07/18 134/68   04/06/18 130/76   03/09/18 142/83    Wt Readings from Last 3 Encounters:   05/07/18 292 lb (132.5 kg)   04/06/18 294 lb (133.4 kg)   03/09/18 293 lb (132.9 kg)                    Reviewed and updated as needed this visit by clinical staff       Reviewed and updated as needed this visit by Provider         ROS:  Remainder of ROS obtained and found to be negative other than that which was documented above      OBJECTIVE:     /68  Pulse 86  Temp 98.3  F (36.8  C) (Tympanic)  Ht 5' 6\" (1.676 m)  Wt 292 lb (132.5 kg)  BMI 47.13 kg/m2  Body mass index is 47.13 kg/(m^2).  GENERAL: healthy, alert and no distress  EYES: Eyes grossly normal to inspection  RESP: lungs clear to auscultation - no rales, rhonchi or wheezes  CV: regular rates and rhythm, normal S1 S2, no S3 or S4 and no murmur, click or rub    Diagnostic Test Results:  none     ASSESSMENT/PLAN:     (K21.9) Gastroesophageal reflux disease, esophagitis presence not specified  (primary encounter diagnosis)  Comment: continue prevacid and zantac for 1 month then will try to decrease dose of prevacid. If unable to tolerate taper or symptoms not controlled on medications, may need to consider repeat EGD  Plan: ranitidine (ZANTAC) 300 MG tablet, LANsoprazole        (PREVACID) 30 MG CR " capsule            (E66.01) Obesity  Comment: helping with appetite suppression. Continue for now. Will need to reassess in the next 2 months for continuation of therapy if no further weight loss noted  Plan: phentermine 37.5 MG capsule            (Z30.011) Encounter for initial prescription of contraceptive pills  Comment: try low dose hormone, monitor mood  Plan: levonorgestrel-ethinyl estradiol         (AVIANE,ALESSE,LESSINA) 0.1-20 MG-MCG per         tablet                    Kavya Haider PA-C  [facility]

## 2018-05-07 NOTE — MR AVS SNAPSHOT
After Visit Summary   5/7/2018    Janine Salas    MRN: [id number]           Patient Information     Date Of Birth          1985        Visit Information        Provider Department      5/7/2018 11:20 AM Kavya Haider PA-C [facility]        Today's Diagnoses     Gastroesophageal reflux disease, esophagitis presence not specified    -  1    Abdominal pain, epigastric        Obesity        Encounter for initial prescription of contraceptive pills           Follow-ups after your visit        Your next 10 appointments already scheduled     May 16, 2018 11:00 AM CDT   [facility] Dermatology Return with Miguel Khan MD   [facility] ([facility]    [address] [address]   [phone]              Who to contact     Normal or non-critical lab and imaging results will be communicated to you by Tandemhart, letter or phone within 4 business days after the clinic has received the results. If you do not hear from us within 7 days, please contact the clinic through Tandemhart or phone. If you have a critical or abnormal lab result, we will notify you by phone as soon as possible.  Submit refill requests through Little1 or call your pharmacy and they will forward the refill request to us. Please allow 3 business days for your refill to be completed.          If you need to speak with a  for additional information , please call: [phone]             Additional Information About Your Visit        Tandem[employer] Information     Little1 gives you secure access to your electronic health record. If you see a primary care provider, you can also send messages to your care team and make appointments. If you have questions, please call your primary care clinic.  If you do not have a primary care provider, please call [phone] and they will assist you.        Care EveryWhere ID     This is your Care EveryWhere ID. This  "could be used by other organizations to access your [place] medical records  [id number]        Your Vitals Were     Pulse Temperature Height BMI (Body Mass Index)          86 98.3  F (36.8  C) (Tympanic) 5' 6\" (1.676 m) 47.13 kg/m2         Blood Pressure from Last 3 Encounters:   05/07/18 134/68   04/06/18 130/76   03/09/18 142/83    Weight from Last 3 Encounters:   05/07/18 292 lb (132.5 kg)   04/06/18 294 lb (133.4 kg)   03/09/18 293 lb (132.9 kg)              Today, you had the following     No orders found for display         Today's Medication Changes          These changes are accurate as of 5/7/18  2:30 PM.  If you have any questions, ask your nurse or doctor.               Start taking these medicines.        Dose/Directions    levonorgestrel-ethinyl estradiol 0.1-20 MG-MCG per tablet   Commonly known as:  AVIANE,ALESSE,JAYDEN   Used for:  Encounter for initial prescription of contraceptive pills   Started by:  Kavya Haider PA-C        Dose:  1 tablet   Take 1 tablet by mouth daily   Quantity:  84 tablet   Refills:  1       ranitidine 300 MG tablet   Commonly known as:  ZANTAC   Used for:  Gastroesophageal reflux disease, esophagitis presence not specified   Started by:  Kavya Haider PA-C        Dose:  300 mg   Take 1 tablet (300 mg) by mouth At Bedtime   Quantity:  30 tablet   Refills:  1            Where to get your medicines      These medications were sent to [facility] [place] - [address]  [address] [place] [address]     Phone:  [phone]     LANsoprazole 30 MG CR capsule    levonorgestrel-ethinyl estradiol 0.1-20 MG-MCG per tablet    ranitidine 300 MG tablet         Some of these will need a paper prescription and others can be bought over the counter.  Ask your nurse if you have questions.     Bring a paper prescription for each of these medications     phentermine 37.5 MG capsule                Primary Care Provider Office " Phone # Fax #    Kavya Haider PA-C [phone] [address] [address]        Equal Access to Services     GEO BONILLA : Darin melvi mann mary kate Pierce, wacarmeloda luqadaha, qaybta kamehnazda rosa, anali chavez jenniferbrittany dean laLongesequiel goldberg. So [facility] [phone].    ATENCIÓN: Si habla español, tiene a pacheco disposición servicios gratuitos de asistencia lingüística. Llame al [phone].    We comply with applicable federal civil rights laws and [place] laws. We do not discriminate on the basis of race, color, national origin, age, disability, sex, sexual orientation, or gender identity.            Thank you!     Thank you for choosing [facility]  for your care. Our goal is always to provide you with excellent care. Hearing back from our patients is one way we can continue to improve our services. Please take a few minutes to complete the written survey that you may receive in the mail after your visit with us. Thank you!             Your Updated Medication List - Protect others around you: Learn how to safely use, store and throw away your medicines at www.disposemymeds.org.          This list is accurate as of 5/7/18  2:30 PM.  Always use your most recent med list.                   Brand Name Dispense Instructions for use Diagnosis    azelaic acid 20 % cream    AZELEX    50 g    Apply topically 2 times daily    Granuloma annulare, Acne vulgaris       buPROPion 150 MG 24 hr tablet    WELLBUTRIN XL    90 tablet    Take 1 tablet (150 mg) by mouth every morning    Moderate episode of recurrent major depressive disorder (H)       doxycycline 100 MG capsule    VIBRAMYCIN    60 capsule    Take 1 capsule (100 mg) by mouth 2 times daily    Granuloma annulare, Acne vulgaris       ibuprofen 800 MG tablet    ADVIL/MOTRIN    90 tablet    Take 1 tablet (800 mg) by mouth every 8 hours as needed for moderate pain    Left wrist pain       LANsoprazole 30 MG CR capsule     PREVACID    30 capsule    Take 1 capsule (30 mg) by mouth daily Take 30-60 minutes before a meal.    Abdominal pain, epigastric       levonorgestrel-ethinyl estradiol 0.1-20 MG-MCG per tablet    AVIANE,ALESSE,LESSINA    84 tablet    Take 1 tablet by mouth daily    Encounter for initial prescription of contraceptive pills       phentermine 37.5 MG capsule     30 capsule    Take 1 capsule (37.5 mg) by mouth every morning    Morbid obesity, unspecified obesity type (H)       prenatal multivitamin plus iron 27-0.8 MG Tabs per tablet      Take 1 tablet by mouth daily        ranitidine 300 MG tablet    ZANTAC    30 tablet    Take 1 tablet (300 mg) by mouth At Bedtime    Gastroesophageal reflux disease, esophagitis presence not specified       sertraline 50 MG tablet    ZOLOFT    90 tablet    TAKE ONE TABLET BY MOUTH EVERY DAY    Post partum depression       tiZANidine 4 MG tablet    ZANAFLEX    90 tablet    TAKE ONE TO TWO TABLETS BY MOUTH THREE TIMES A DAY    Acute bilateral low back pain without sciatica

## 2018-05-13 ENCOUNTER — HEALTH MAINTENANCE LETTER (OUTPATIENT)
Age: 33
End: 2018-05-13

## 2018-05-16 ENCOUNTER — OFFICE VISIT (OUTPATIENT)
Dept: DERMATOLOGY | Facility: CLINIC | Age: 33
End: 2018-05-16
Payer: COMMERCIAL

## 2018-05-16 VITALS — SYSTOLIC BLOOD PRESSURE: 139 MMHG | HEART RATE: 96 BPM | DIASTOLIC BLOOD PRESSURE: 87 MMHG | OXYGEN SATURATION: 98 %

## 2018-05-16 DIAGNOSIS — L30.9 DERMATITIS: ICD-10-CM

## 2018-05-16 DIAGNOSIS — D23.9 DERMATOFIBROMA: Primary | ICD-10-CM

## 2018-05-16 DIAGNOSIS — L92.0 GRANULOMA ANNULARE: ICD-10-CM

## 2018-05-16 PROCEDURE — 11900 INJECT SKIN LESIONS </W 7: CPT | Performed by: DERMATOLOGY

## 2018-05-16 PROCEDURE — 99213 OFFICE O/P EST LOW 20 MIN: CPT | Mod: 25 | Performed by: DERMATOLOGY

## 2018-05-16 RX ORDER — FLUOCINONIDE 0.5 MG/G
CREAM TOPICAL
Qty: 60 G | Refills: 0 | Status: SHIPPED | OUTPATIENT
Start: 2018-05-16 | End: 2018-06-20

## 2018-05-16 NOTE — MR AVS SNAPSHOT
After Visit Summary   5/16/2018    Janine Salas    MRN: [id number]           Patient Information     Date Of Birth          1985        Visit Information        Provider Department      5/16/2018 11:00 AM Miguel Khan MD [facility]        Care Instructions          Wound Care Instructions     FOR SUPERFICIAL WOUNDS     [facility] [phone]    [facility] [phone]                       AFTER 24 HOURS YOU SHOULD REMOVE THE BANDAGE AND BEGIN DAILY DRESSING CHANGES AS FOLLOWS:     1) Remove Dressing.     2) Clean and dry the area with tap water using a Q-tip or sterile gauze pad.     3) Apply Vaseline, Aquaphor, Polysporin ointment or Bacitracin ointment over entire wound.  Do NOT use Neosporin ointment.     4) Cover the wound with a band-aid, or a sterile non-stick gauze pad and micropore paper tape      REPEAT THESE INSTRUCTIONS AT LEAST ONCE A DAY UNTIL THE WOUND HAS COMPLETELY HEALED.    It is an old wives tale that a wound heals better when it is exposed to air and allowed to dry out. The wound will heal faster with a better cosmetic result if it is kept moist with ointment and covered with a bandage.    **Do not let the wound dry out.**      Supplies Needed:      *Cotton tipped applicators (Q-tips)    *Polysporin Ointment or Bacitracin Ointment (NOT NEOSPORIN)    *Band-aids or non-stick gauze pads and micropore paper tape.      PATIENT INFORMATION:    During the healing process you will notice a number of changes. All wounds develop a small halo of redness surrounding the wound.  This means healing is occurring. Severe itching with extensive redness usually indicates sensitivity to the ointment or bandage tape used to dress the wound.  You should call our office if this develops.      Swelling  and/or discoloration around your surgical site is common, particularly when performed around the eye.    All wounds normally drain.  The  larger the wound the more drainage there will be.  After 7-10 days, you will notice the wound beginning to shrink and new skin will begin to grow.  The wound is healed when you can see skin has formed over the entire area.  A healed wound has a healthy, shiny look to the surface and is red to dark pink in color to normalize.  Wounds may take approximately 4-6 weeks to heal.  Larger wounds may take 6-8 weeks.  After the wound is healed you may discontinue dressing changes.    You may experience a sensation of tightness as your wound heals. This is normal and will gradually subside.    Your healed wound may be sensitive to temperature changes. This sensitivity improves with time, but if you re having a lot of discomfort, try to avoid temperature extremes.    Patients frequently experience itching after their wound appears to have healed because of the continue healing under the skin.  Plain Vaseline will help relieve the itching.        POSSIBLE COMPLICATIONS    BLEEDIN. Leave the bandage in place.  2. Use tightly rolled up gauze or a cloth to apply direct pressure over the bandage for 30  minutes.  3. Reapply pressure for an additional 30 minutes if necessary  4. Use additional gauze and tape to maintain pressure once the bleeding has stopped.            Follow-ups after your visit        Who to contact     If you have questions or need follow up information about today's clinic visit or your schedule please contact [facility] directly at [phone].  Normal or non-critical lab and imaging results will be communicated to you by [employer], letter or phone within 4 business days after the clinic has received the results. If you do not hear from us within 7 days, please contact the clinic through MyChart or phone. If you have a critical or abnormal lab result, we will notify you by phone as soon as possible.  Submit refill requests through [employer] or call your pharmacy and they will forward the  refill request to us. Please allow 3 business days for your refill to be completed.          Additional Information About Your Visit        MyChart Information     [employer] gives you secure access to your electronic health record. If you see a primary care provider, you can also send messages to your care team and make appointments. If you have questions, please call your primary care clinic.  If you do not have a primary care provider, please call [phone] and they will assist you.        Care EveryWhere ID     This is your Care EveryWhere ID. This could be used by other organizations to access your [facility] medical records  [id number]        Your Vitals Were     Pulse Pulse Oximetry                96 98%           Blood Pressure from Last 3 Encounters:   05/16/18 139/87   05/07/18 134/68   04/06/18 130/76    Weight from Last 3 Encounters:   05/07/18 132.5 kg (292 lb)   04/06/18 133.4 kg (294 lb)   03/09/18 132.9 kg (293 lb)              Today, you had the following     No orders found for display       Primary Care Provider Office Phone # Fax #    Kavya Haider PA-C [phone] [id number] [facility] [address]        Equal Access to Services     GEO BONILLA AH: Hadii melvi castellanoso Somert, waaxda luqadaha, qaybta kaalmada adebrittanyyada, anali goldberg. So [facility] [phone].    ATENCIÓN: Si habla español, tiene a pacheco disposición servicios gratuitos de asistencia lingüística. Christiana al [phone].    We comply with applicable federal civil rights laws and [place] laws. We do not discriminate on the basis of race, color, national origin, age, disability, sex, sexual orientation, or gender identity.            Thank you!     Thank you for choosing [facility]  for your care. Our goal is always to provide you with excellent care. Hearing back from our patients is one way we can continue to improve our services. Please take a few minutes  to complete the written survey that you may receive in the mail after your visit with us. Thank you!             Your Updated Medication List - Protect others around you: Learn how to safely use, store and throw away your medicines at www.disposemymeds.org.          This list is accurate as of 5/16/18 11:55 AM.  Always use your most recent med list.                   Brand Name Dispense Instructions for use Diagnosis    azelaic acid 20 % cream    AZELEX    50 g    Apply topically 2 times daily    Granuloma annulare, Acne vulgaris       buPROPion 150 MG 24 hr tablet    WELLBUTRIN XL    90 tablet    Take 1 tablet (150 mg) by mouth every morning    Moderate episode of recurrent major depressive disorder (H)       doxycycline 100 MG capsule    VIBRAMYCIN    60 capsule    Take 1 capsule (100 mg) by mouth 2 times daily    Granuloma annulare, Acne vulgaris       ibuprofen 800 MG tablet    ADVIL/MOTRIN    90 tablet    Take 1 tablet (800 mg) by mouth every 8 hours as needed for moderate pain    Left wrist pain       LANsoprazole 30 MG CR capsule    PREVACID    30 capsule    Take 1 capsule (30 mg) by mouth daily Take 30-60 minutes before a meal.    Gastroesophageal reflux disease, esophagitis presence not specified       levonorgestrel-ethinyl estradiol 0.1-20 MG-MCG per tablet    AVIANE,ALESSE,LESSINA    84 tablet    Take 1 tablet by mouth daily    Encounter for initial prescription of contraceptive pills       phentermine 37.5 MG capsule     30 capsule    Take 1 capsule (37.5 mg) by mouth every morning    Morbid obesity, unspecified obesity type (H)       prenatal multivitamin plus iron 27-0.8 MG Tabs per tablet      Take 1 tablet by mouth daily        ranitidine 300 MG tablet    ZANTAC    30 tablet    Take 1 tablet (300 mg) by mouth At Bedtime    Gastroesophageal reflux disease, esophagitis presence not specified       sertraline 50 MG tablet    ZOLOFT    90 tablet    TAKE ONE TABLET BY MOUTH EVERY DAY    Post partum  depression       tiZANidine 4 MG tablet    ZANAFLEX    90 tablet    TAKE ONE TO TWO TABLETS BY MOUTH THREE TIMES A DAY    Acute bilateral low back pain without sciatica

## 2018-05-16 NOTE — PROGRESS NOTES
Janine Salas is a 32 year old year old female patient here today for IL TAC for GA on left elbow.  She alos notes spot on right leg, injury on right finger from garden and rash on back from sunscreen.   .  Patient states this has been present for a while on back .  Patient reports the following symptoms:  itching.  Patient reports the following previous treatments none.  Patient reports the following modifying factors none.  Associated symptoms: none.  Patient has no other skin complaints today.  Remainder of the HPI, Meds, PMH, Allergies, FH, and SH was reviewed in chart.      Past Medical History:   Diagnosis Date     Acute pancreatitis 1/15     Anxiety      Chickenpox      PONV (postoperative nausea and vomiting)        Past Surgical History:   Procedure Laterality Date     APPENDECTOMY        SECTION N/A 2016    Procedure:  SECTION;  Surgeon: Marco Marin MD;  Location: [facility] OR     COLONOSCOPY       LAPAROSCOPIC CHOLECYSTECTOMY  2013    Procedure: LAPAROSCOPIC CHOLECYSTECTOMY;  Laparoscopic Cholecystectomy;  Surgeon: Lasha Garcias MD;  Location: [facility] OR     MOUTH SURGERY      wisdom teeth     UPPER GI ENDOSCOPY          Family History   Problem Relation Age of Onset     Hypertension Mother      Depression Mother      OSTEOPOROSIS Mother      Thyroid Disease Mother      Hypertension Father      Alcohol/Drug Father      recovered alcohol     CANCER Father      melanoma     Hypertension Maternal Grandmother      Alzheimer Disease Maternal Grandmother      Cardiovascular Maternal Grandmother      triple bypass     CANCER Maternal Grandfather      lung     Alcohol/Drug Paternal Grandmother      alcohol     Cancer - colorectal Paternal Grandmother      colon     Alcohol/Drug Paternal Grandfather      alcohol     CANCER Paternal Grandfather      leukemia - adult onset     Cardiovascular Paternal Grandfather      stent     Obesity Sister      Obesity Sister       Obesity Sister      Thyroid Disease Sister      Breast Cancer Maternal Aunt      CANCER Sister      cervical cancer, hysterectomy     Substance Abuse Sister      recovered drugs     Depression Sister      Autism Spectrum Disorder Other      Aspergers     Bipolar Disorder Other        Social History     Social History     Marital status: Single     Spouse name: N/A     Number of children: 1     Years of education: N/A     Occupational History     Not on file.     Social History Main Topics     Smoking status: Former Smoker     Packs/day: 0.50     Types: Cigarettes     Start date: 5/7/2015     Smokeless tobacco: Current User     Alcohol use No      Comment: rare- quit with pregnancy     Drug use: No     Sexual activity: Yes     Partners: Male     Birth control/ protection: Pill     Other Topics Concern     Parent/Sibling W/ Cabg, Mi Or Angioplasty Before 65f 55m? No     Social History Narrative       Outpatient Encounter Prescriptions as of 5/16/2018   Medication Sig Dispense Refill     azelaic acid (AZELEX) 20 % cream Apply topically 2 times daily 50 g 11     buPROPion (WELLBUTRIN XL) 150 MG 24 hr tablet Take 1 tablet (150 mg) by mouth every morning 90 tablet 1     doxycycline (VIBRAMYCIN) 100 MG capsule Take 1 capsule (100 mg) by mouth 2 times daily 60 capsule 3     ibuprofen (ADVIL/MOTRIN) 800 MG tablet Take 1 tablet (800 mg) by mouth every 8 hours as needed for moderate pain 90 tablet 1     LANsoprazole (PREVACID) 30 MG CR capsule Take 1 capsule (30 mg) by mouth daily Take 30-60 minutes before a meal. 30 capsule 1     levonorgestrel-ethinyl estradiol (AVIANE,ALESSE,LESSINA) 0.1-20 MG-MCG per tablet Take 1 tablet by mouth daily 84 tablet 1     phentermine 37.5 MG capsule Take 1 capsule (37.5 mg) by mouth every morning 30 capsule 0     Prenatal Vit-Fe Fumarate-FA (PRENATAL MULTIVITAMIN  PLUS IRON) 27-0.8 MG TABS Take 1 tablet by mouth daily       ranitidine (ZANTAC) 300 MG tablet Take 1 tablet (300 mg) by mouth At  Bedtime 30 tablet 1     sertraline (ZOLOFT) 50 MG tablet TAKE ONE TABLET BY MOUTH EVERY DAY 90 tablet 3     tiZANidine (ZANAFLEX) 4 MG tablet TAKE ONE TO TWO TABLETS BY MOUTH THREE TIMES A DAY 90 tablet 1     No facility-administered encounter medications on file as of 5/16/2018.              Review Of Systems  Skin: As above  Eyes: negative  Ears/Nose/Throat: negative  Respiratory: No shortness of breath, dyspnea on exertion, cough, or hemoptysis  Cardiovascular: negative  Gastrointestinal: negative  Genitourinary: negative  Musculoskeletal: negative  Neurologic: negative  Psychiatric: negative  Hematologic/Lymphatic/Immunologic: negative  Endocrine: negative      O:   NAD, WDWN, Alert & Oriented, Mood & Affect wnl, Vitals stable   Here today alone   /87  Pulse 96  SpO2 98%   General appearance normal   Vitals stable   Alert, oriented and in no acute distress     R lower leg firm papule   Red scaly papule on back and upper arms  Firm annular plaque on left elbow thinning with steorid.         The remainder of expanded problem focused exam was unremarkable; the following areas were examined:  scalp/hair, conjunctiva/lids, face, neck, lps, chest, digits/nails, RUE, LUE.      Eyes: Conjunctivae/lids:Normal     ENT: Lips, buccal mucosa, tongue: normal    MSK:Normal    Cardiovascular: peripheral edema none    Pulm: Breathing Normal    Neuro/Psych: Orientation:Normal; Mood/Affect:Normal      A/P:  1. L elbow GA  IL TAC: PGACAC discussed.  Risks including but not limited to injection site reaction, bruising, no resolution.  All questions answered and entertained to patient s satisfaction.  Informed consent obtained.  IL TAC in concentration of 40mg/ml was injected ID to L elbow.  Total injected was  0.5 ml.  Patient tolerated without complications and given wound care instructions, including not to move product around.  Return in 4 weeks for follow-up and possible additional IL TAC.      [id number]  Feb  2019      2. R leg dermatofibroma  3. Dermatitis  Lidex prn    BENIGN LESIONS DISCUSSED WITH PATIENT:  I discussed the specifics of tumor, prognosis, and genetics of benign lesions.  I explained that treatment of these lesions would be purely cosmetic and not medically neccessary.  I discussed with patient different removal options including excision, cautery and /or laser.      Nature and genetics of benign skin lesions dicussed with patient.  Signs and Symptoms of skin cancer discussed with patient.  Patient to follow up with Primary Care provider regarding elevated blood pressure.  Patient encouraged to perform monthly skin exams.  UV precautions reviewed with patient.  Patient to follow up with Primary Care provider regarding elevated blood pressure.  Skin care regimen reviewed with patient: Eliminate harsh soaps, i.e. Dial, zest, irsih spring; Mild soaps such as Cetaphil or Dove sensitive skin, avoid hot or cold showers, aggressive use of emollients including vanicream, cetaphil or cerave discussed with patient.    Risks of non-melanoma skin cancer discussed with patient   Return to clinic 3 months

## 2018-05-16 NOTE — PATIENT INSTRUCTIONS
Wound Care Instructions     FOR SUPERFICIAL WOUNDS     [facility] [phone]    [facility] [phone]                       AFTER 24 HOURS YOU SHOULD REMOVE THE BANDAGE AND BEGIN DAILY DRESSING CHANGES AS FOLLOWS:     1) Remove Dressing.     2) Clean and dry the area with tap water using a Q-tip or sterile gauze pad.     3) Apply Vaseline, Aquaphor, Polysporin ointment or Bacitracin ointment over entire wound.  Do NOT use Neosporin ointment.     4) Cover the wound with a band-aid, or a sterile non-stick gauze pad and micropore paper tape      REPEAT THESE INSTRUCTIONS AT LEAST ONCE A DAY UNTIL THE WOUND HAS COMPLETELY HEALED.    It is an old wives tale that a wound heals better when it is exposed to air and allowed to dry out. The wound will heal faster with a better cosmetic result if it is kept moist with ointment and covered with a bandage.    **Do not let the wound dry out.**      Supplies Needed:      *Cotton tipped applicators (Q-tips)    *Polysporin Ointment or Bacitracin Ointment (NOT NEOSPORIN)    *Band-aids or non-stick gauze pads and micropore paper tape.      PATIENT INFORMATION:    During the healing process you will notice a number of changes. All wounds develop a small halo of redness surrounding the wound.  This means healing is occurring. Severe itching with extensive redness usually indicates sensitivity to the ointment or bandage tape used to dress the wound.  You should call our office if this develops.      Swelling  and/or discoloration around your surgical site is common, particularly when performed around the eye.    All wounds normally drain.  The larger the wound the more drainage there will be.  After 7-10 days, you will notice the wound beginning to shrink and new skin will begin to grow.  The wound is healed when you can see skin has formed over the entire area.  A healed wound has a healthy, shiny look to the surface and is red to dark pink in color  to normalize.  Wounds may take approximately 4-6 weeks to heal.  Larger wounds may take 6-8 weeks.  After the wound is healed you may discontinue dressing changes.    You may experience a sensation of tightness as your wound heals. This is normal and will gradually subside.    Your healed wound may be sensitive to temperature changes. This sensitivity improves with time, but if you re having a lot of discomfort, try to avoid temperature extremes.    Patients frequently experience itching after their wound appears to have healed because of the continue healing under the skin.  Plain Vaseline will help relieve the itching.        POSSIBLE COMPLICATIONS    BLEEDIN. Leave the bandage in place.  2. Use tightly rolled up gauze or a cloth to apply direct pressure over the bandage for 30  minutes.  3. Reapply pressure for an additional 30 minutes if necessary  4. Use additional gauze and tape to maintain pressure once the bleeding has stopped.

## 2018-05-16 NOTE — NURSING NOTE
"Initial /87  Pulse 96  SpO2 98% Estimated body mass index is 47.13 kg/(m^2) as calculated from the following:    Height as of 5/7/18: 1.676 m (5' 6\").    Weight as of 5/7/18: 132.5 kg (292 lb). .      "

## 2018-05-16 NOTE — LETTER
2018         RE: Janine Salas  [address] [address]  [facility] [address]        Dear Colleague,    Thank you for referring your patient, Janine Salas, to the [facility]. Please see a copy of my visit note below.    Janine Salas is a 32 year old year old female patient here today for IL TAC for GA on left elbow.  She alos notes spot on right leg, injury on right finger from garden and rash on back from sunscreen.   .  Patient states this has been present for a while on back .  Patient reports the following symptoms:  itching.  Patient reports the following previous treatments none.  Patient reports the following modifying factors none.  Associated symptoms: none.  Patient has no other skin complaints today.  Remainder of the HPI, Meds, PMH, Allergies, FH, and SH was reviewed in chart.      Past Medical History:   Diagnosis Date     Acute pancreatitis 1/15     Anxiety      Chickenpox      PONV (postoperative nausea and vomiting)        Past Surgical History:   Procedure Laterality Date     APPENDECTOMY        SECTION N/A 2016    Procedure:  SECTION;  Surgeon: Marco Marin MD;  Location: [facility] OR     COLONOSCOPY       LAPAROSCOPIC CHOLECYSTECTOMY  2013    Procedure: LAPAROSCOPIC CHOLECYSTECTOMY;  Laparoscopic Cholecystectomy;  Surgeon: Lasha Garcias MD;  Location: [facility] OR     MOUTH SURGERY      wisdom teeth     UPPER GI ENDOSCOPY          Family History   Problem Relation Age of Onset     Hypertension Mother      Depression Mother      OSTEOPOROSIS Mother      Thyroid Disease Mother      Hypertension Father      Alcohol/Drug Father      recovered alcohol     CANCER Father      melanoma     Hypertension Maternal Grandmother      Alzheimer Disease Maternal Grandmother      Cardiovascular Maternal Grandmother      triple bypass     CANCER Maternal Grandfather      lung     Alcohol/Drug Paternal Grandmother      alcohol     Cancer -  colorectal Paternal Grandmother      colon     Alcohol/Drug Paternal Grandfather      alcohol     CANCER Paternal Grandfather      leukemia - adult onset     Cardiovascular Paternal Grandfather      stent     Obesity Sister      Obesity Sister      Obesity Sister      Thyroid Disease Sister      Breast Cancer Maternal Aunt      CANCER Sister      cervical cancer, hysterectomy     Substance Abuse Sister      recovered drugs     Depression Sister      Autism Spectrum Disorder Other      Aspergers     Bipolar Disorder Other        Social History     Social History     Marital status: Single     Spouse name: N/A     Number of children: 1     Years of education: N/A     Occupational History     Not on file.     Social History Main Topics     Smoking status: Former Smoker     Packs/day: 0.50     Types: Cigarettes     Start date: 5/7/2015     Smokeless tobacco: Current User     Alcohol use No      Comment: rare- quit with pregnancy     Drug use: No     Sexual activity: Yes     Partners: Male     Birth control/ protection: Pill     Other Topics Concern     Parent/Sibling W/ Cabg, Mi Or Angioplasty Before 65f 55m? No     Social History Narrative       Outpatient Encounter Prescriptions as of 5/16/2018   Medication Sig Dispense Refill     azelaic acid (AZELEX) 20 % cream Apply topically 2 times daily 50 g 11     buPROPion (WELLBUTRIN XL) 150 MG 24 hr tablet Take 1 tablet (150 mg) by mouth every morning 90 tablet 1     doxycycline (VIBRAMYCIN) 100 MG capsule Take 1 capsule (100 mg) by mouth 2 times daily 60 capsule 3     ibuprofen (ADVIL/MOTRIN) 800 MG tablet Take 1 tablet (800 mg) by mouth every 8 hours as needed for moderate pain 90 tablet 1     LANsoprazole (PREVACID) 30 MG CR capsule Take 1 capsule (30 mg) by mouth daily Take 30-60 minutes before a meal. 30 capsule 1     levonorgestrel-ethinyl estradiol (AVIANE,ALESSE,LESSINA) 0.1-20 MG-MCG per tablet Take 1 tablet by mouth daily 84 tablet 1     phentermine 37.5 MG capsule  Take 1 capsule (37.5 mg) by mouth every morning 30 capsule 0     Prenatal Vit-Fe Fumarate-FA (PRENATAL MULTIVITAMIN  PLUS IRON) 27-0.8 MG TABS Take 1 tablet by mouth daily       ranitidine (ZANTAC) 300 MG tablet Take 1 tablet (300 mg) by mouth At Bedtime 30 tablet 1     sertraline (ZOLOFT) 50 MG tablet TAKE ONE TABLET BY MOUTH EVERY DAY 90 tablet 3     tiZANidine (ZANAFLEX) 4 MG tablet TAKE ONE TO TWO TABLETS BY MOUTH THREE TIMES A DAY 90 tablet 1     No facility-administered encounter medications on file as of 5/16/2018.              Review Of Systems  Skin: As above  Eyes: negative  Ears/Nose/Throat: negative  Respiratory: No shortness of breath, dyspnea on exertion, cough, or hemoptysis  Cardiovascular: negative  Gastrointestinal: negative  Genitourinary: negative  Musculoskeletal: negative  Neurologic: negative  Psychiatric: negative  Hematologic/Lymphatic/Immunologic: negative  Endocrine: negative      O:   NAD, WDWN, Alert & Oriented, Mood & Affect wnl, Vitals stable   Here today alone   /87  Pulse 96  SpO2 98%   General appearance normal   Vitals stable   Alert, oriented and in no acute distress     R lower leg firm papule   Red scaly papule on back and upper arms  Firm annular plaque on left elbow thinning with steorid.         The remainder of expanded problem focused exam was unremarkable; the following areas were examined:  scalp/hair, conjunctiva/lids, face, neck, lps, chest, digits/nails, RUE, LUE.      Eyes: Conjunctivae/lids:Normal     ENT: Lips, buccal mucosa, tongue: normal    MSK:Normal    Cardiovascular: peripheral edema none    Pulm: Breathing Normal    Neuro/Psych: Orientation:Normal; Mood/Affect:Normal      A/P:  1. L elbow GA  IL TAC: PGACAC discussed.  Risks including but not limited to injection site reaction, bruising, no resolution.  All questions answered and entertained to patient s satisfaction.  Informed consent obtained.  IL TAC in concentration of 40mg/ml was injected ID to L  elbow.  Total injected was  0.5 ml.  Patient tolerated without complications and given wound care instructions, including not to move product around.  Return in 4 weeks for follow-up and possible additional IL TAC.      [id number]  Feb 2019      2. R leg dermatofibroma  3. Dermatitis  Lidex prn    BENIGN LESIONS DISCUSSED WITH PATIENT:  I discussed the specifics of tumor, prognosis, and genetics of benign lesions.  I explained that treatment of these lesions would be purely cosmetic and not medically neccessary.  I discussed with patient different removal options including excision, cautery and /or laser.      Nature and genetics of benign skin lesions dicussed with patient.  Signs and Symptoms of skin cancer discussed with patient.  Patient to follow up with Primary Care provider regarding elevated blood pressure.  Patient encouraged to perform monthly skin exams.  UV precautions reviewed with patient.  Patient to follow up with Primary Care provider regarding elevated blood pressure.  Skin care regimen reviewed with patient: Eliminate harsh soaps, i.e. Dial, zest, irsih spring; Mild soaps such as Cetaphil or Dove sensitive skin, avoid hot or cold showers, aggressive use of emollients including vanicream, cetaphil or cerave discussed with patient.    Risks of non-melanoma skin cancer discussed with patient   Return to clinic 3 months      Again, thank you for allowing me to participate in the care of your patient.        Sincerely,        Miguel Khan MD

## 2018-05-21 DIAGNOSIS — M25.532 LEFT WRIST PAIN: ICD-10-CM

## 2018-05-21 NOTE — TELEPHONE ENCOUNTER
"IBUPROFEN 800MG TABS        Last Written Prescription Date:  3/9/18  Last Fill Quantity: 90,   # refills: 1  Last Office Visit: 5/7/18  Future Office visit:       Requested Prescriptions   Pending Prescriptions Disp Refills     ibuprofen (ADVIL/MOTRIN) 800 MG tablet [Pharmacy Med Name: IBUPROFEN 800MG TABS] 90 tablet 1     Sig: TAKE ONE TABLET BY MOUTH EVERY 8 HOURS AS NEEDED FOR MODERATE PAIN    NSAID Medications Failed    5/21/2018  3:10 PM       Failed - Normal CBC on file in past 12 months    Recent Labs   Lab Test  04/23/16   0720   03/09/16   1659   WBC   --    --   16.1*   RBC   --    --   4.36   HGB  12.2   < >  13.3   HCT   --    --   38.9   PLT   --    --   265    < > = values in this interval not displayed.            Passed - Blood pressure under 140/90 in past 12 months    BP Readings from Last 3 Encounters:   05/16/18 139/87   05/07/18 134/68   04/06/18 130/76                Passed - Normal ALT on file in past 12 months    Recent Labs   Lab Test  04/06/18   1511   ALT  30            Passed - Normal AST on file in past 12 months    Recent Labs   Lab Test  04/06/18   1511   AST  25            Passed - Recent (12 mo) or future (30 days) visit within the authorizing provider's specialty    Patient had office visit in the last 12 months or has a visit in the next 30 days with authorizing provider or within the authorizing provider's specialty.  See \"Patient Info\" tab in inbasket, or \"Choose Columns\" in Meds & Orders section of the refill encounter.           Passed - Patient is age 6-64 years       Passed - No active pregnancy on record       Passed - Normal serum creatinine on file in past 12 months    Recent Labs   Lab Test  04/06/18   1511   CR  0.73            Passed - No positive pregnancy test in past 12 months          "

## 2018-05-22 RX ORDER — IBUPROFEN 800 MG/1
TABLET, FILM COATED ORAL
Qty: 90 TABLET | Refills: 0 | Status: SHIPPED | OUTPATIENT
Start: 2018-05-22 | End: 2018-06-20

## 2018-05-22 NOTE — TELEPHONE ENCOUNTER
Prescription approved per [facility] Refill Protocol.  Gonzalo Ellison RN

## 2018-05-28 DIAGNOSIS — M54.50 ACUTE BILATERAL LOW BACK PAIN WITHOUT SCIATICA: ICD-10-CM

## 2018-05-29 NOTE — TELEPHONE ENCOUNTER
TIZANIDINE HCL 4MG TABS        Last Written Prescription Date:  4/16/18  Last Fill Quantity: 90,   # refills: 1  Last Office Visit: 5/7/18  Future Office visit:       Requested Prescriptions   Pending Prescriptions Disp Refills     tiZANidine (ZANAFLEX) 4 MG tablet [Pharmacy Med Name: TIZANIDINE HCL 4MG TABS] 90 tablet 1     Sig: TAKE ONE TO TWO TABLETS BY MOUTH THREE TIMES A DAY    There is no refill protocol information for this order

## 2018-06-20 ENCOUNTER — PRENATAL OFFICE VISIT (OUTPATIENT)
Dept: OBGYN | Facility: CLINIC | Age: 33
End: 2018-06-20

## 2018-06-20 DIAGNOSIS — Z53.9 ERRONEOUS ENCOUNTER--DISREGARD: Primary | ICD-10-CM

## 2018-06-20 NOTE — MR AVS SNAPSHOT
After Visit Summary   6/20/2018    Janine Salas    MRN: [id number]           Patient Information     Date Of Birth          1985        Visit Information        Provider Department      6/20/2018 4:42 PM Marco Marin MD [facility]        Today's Diagnoses     ERRONEOUS ENCOUNTER--DISREGARD    -  1       Follow-ups after your visit        Your next 10 appointments already scheduled     Jul 19, 2018  9:00 AM CDT   New Prenatal with Marco Marin MD, [facility]   [facility] ([facility]    [address] [address]   [phone]              Who to contact     If you have questions or need follow up information about today's clinic visit or your schedule please contact [facility] directly at [phone].  Normal or non-critical lab and imaging results will be communicated to you by MyChart, letter or phone within 4 business days after the clinic has received the results. If you do not hear from us within 7 days, please contact the clinic through MyChart or phone. If you have a critical or abnormal lab result, we will notify you by phone as soon as possible.  Submit refill requests through Insync or call your pharmacy and they will forward the refill request to us. Please allow 3 business days for your refill to be completed.          Additional Information About Your Visit        MyChart Information     Insync gives you secure access to your electronic health record. If you see a primary care provider, you can also send messages to your care team and make appointments. If you have questions, please call your primary care clinic.  If you do not have a primary care provider, please call [phone] and they will assist you.        Care EveryWhere ID     This is your Care EveryWhere ID. This could be used by other organizations to access your [place] medical records  [id number]        Your  Vitals Were     Last Period                   05/23/2018            Blood Pressure from Last 3 Encounters:   05/16/18 139/87   05/07/18 134/68   04/06/18 130/76    Weight from Last 3 Encounters:   05/07/18 292 lb (132.5 kg)   04/06/18 294 lb (133.4 kg)   03/09/18 293 lb (132.9 kg)              Today, you had the following     No orders found for display       Primary Care Provider Office Phone # Fax #    Kavya Haider PA-C [phone]       [id number]  [facility] [address]        Equal Access to Services     [facility] : Hadii melvi [place], waaxda luana, qaybta kaalmada rosa, anali dick . So [facility] [phone].    ATENCIÓN: Si habla español, tiene a pacheco disposición servicios gratuitos de asistencia lingüística. LlSt[facility] [phone].    We comply with applicable federal civil rights laws and [place] laws. We do not discriminate on the basis of race, color, national origin, age, disability, sex, sexual orientation, or gender identity.            Thank you!     Thank you for choosing [facility]  for your care. Our goal is always to provide you with excellent care. Hearing back from our patients is one way we can continue to improve our services. Please take a few minutes to complete the written survey that you may receive in the mail after your visit with us. Thank you!             Your Updated Medication List - Protect others around you: Learn how to safely use, store and throw away your medicines at www.disposemymeds.org.          This list is accurate as of 6/20/18 11:59 PM.  Always use your most recent med list.                   Brand Name Dispense Instructions for use Diagnosis    azelaic acid 20 % cream    AZELEX    50 g    Apply topically 2 times daily    Granuloma annulare, Acne vulgaris       buPROPion 150 MG 24 hr tablet    WELLBUTRIN XL    90 tablet    Take 1 tablet (150 mg) by mouth every morning    Moderate  episode of recurrent major depressive disorder (H)       prenatal multivitamin plus iron 27-0.8 MG Tabs per tablet      Take 1 tablet by mouth daily

## 2018-07-17 NOTE — ADDENDUM NOTE
Addended by: KB SHAW on: 5/23/2017 04:31 PM     Modules accepted: Orders     Shyann Armenta  : 1966  Payor: Brian Gilbert / Plan: [facility] Prom / Product Type: Workers Comp /  [address]  at [facility] [place] [employer]  [address] [place] [address],  [place] Reyna.  [phone]   [phone]       OUTPATIENT PHYSICAL THERAPY:Daily Note 2018      ICD-10: Treatment Diagnosis: Sprain of posterior cruciate ligament of right knee, sequela (S83.521S); Pain in right knee (M25.561); Stiffness of right knee, not elsewhere classified (M25.661); Difficulty in walking, not elsewhere classified (R26.2)  Precautions/Allergies:   Review of patient's allergies indicates no known allergies. Fall Risk Score: 1 (? 5 = High Risk)  MD Orders:Evaluate and treat, HEP, ROM, strengthening MEDICAL/REFERRING DIAGNOSIS:  S/p R knee scope, PCL reconstruction   DATE OF ONSET: Surgery 17  REFERRING PHYSICIAN: Jimmy Benson MD  RETURN PHYSICIAN APPOINTMENT: 18 ASSESSMENT:  Ms. Jaylin Echols presents s/p right knee scope with PCL reconstruction. She has made excellent progress toward the goals established at her initial evaluation, and demonstrates good R knee ROM. She does demonstrate R quad weakness, ongoing pain in R knee, and difficulty descending stairs. R quadriceps eccentric weakness is evident with stair descent. Patient needs continued PT to optimize R LE function to facilitate return to work. PROBLEM LIST (Impacting functional limitations):  1. Post-op pain and swelling right knee  2. Decreased ROM right knee   3. Decreased functional strength right knee/LE   4. Decreased gait skills INTERVENTIONS PLANNED:  1. aquatic therapy  2. Thermal and electric modalities, manual therapies for pain. 3. Manual therapies and therapeutic exercises for ROM and strength. 4. Therapeutic exercises for gait and balance   TREATMENT PLAN:  Effective Dates: 18 to 18.   Frequency/Duration: 2-3 times a week for 8 more weeks  GOALS: (Goals have been discussed and agreed upon with patient.)   Short-Term Functional Goals: Time Frame: 6 weeks  1. Pt will be able to perform a good quad set and SLR with no extensor lag with for improved strength for gait. MET 7-6-18  2. Pt will increase R knee ROM to 0-90 for improved mobility. GOAL MET  3. Pt will improved R ankle DF AROM to 0 for improved gait. GOAL MET  4. Pt will report pain 5/10 with modified daily activities. GOAL MET  Discharge Goals: Time Frame: 12 weeks   1. Pt will increase R LE strength to 5/5 with manual muscle testing for improved strength for ADLs and work. Progressing towards, 7-6-18  2. Pt will negotiate 1 flight of stairs with step over step with good control. Progressing towards 7-6-18  3. Pt will increase R knee ROM to 0-120 for mobility. GOAL MET  4. Pt will report pain 3/10 with daily activities. Progressing towards  5. Pt will score 45/80 on LEFS. Progressing towards  Rehabilitation Potential For Stated Goals: Good                HISTORY:   History of Present Injury/Illness (Reason for Referral): She works at [employer] and she was entering the cooler and there was oil on the floor. She slipped and the feet slipped out from under her. She tried to get up and slipped again. Injury was August 14-17. She did go to physical therapy but it was making her worse. They decided to have surgery. Surgery 12-21-17. Stayed 2 nights in the hospital. She did have home health physical therapy 3-4x. Pain increases with walking. Past Medical History/Comorbidities: diabetes type 2 controlled with diet, HTN, L LE varicose vein surgery  Social History/Living Environment: Lives with . 2 steps to get inside. Prior Level of Function/Work/Activity: Works at [employer]. She needs to be able to walk a lot, standing, and food prep. Job does require lifting. Would like to get back to walking and walking dogs.    Current Medications:       Current Outpatient Prescriptions:     magnesium hydroxide (BEAUCHAMP MILK OF MAGNESIA) 400 mg/5 mL suspension, Take 15 mL by mouth as needed for Constipation. as needed daily, Disp: , Rfl:    tramodol   Steroid *    atorvastatin (LIPITOR) 20 mg tablet, Take 20 mg by mouth nightly., Disp: , Rfl:     lisinopril-hydroCHLOROthiazide (PRINZIDE, ZESTORETIC) 10-12.5 mg per tablet, Take  by mouth daily. Indications: hypertension, Disp: , Rfl:     gemfibrozil (LOPID) 600 mg tablet, Take 600 mg by mouth two (2) times a day., Disp: , Rfl:     Omega-3 Fatty Acids 60- mg cpDR, Take  by mouth daily. , Disp: , Rfl:     acetaminophen (TYLENOL) 325 mg tablet, Take 325 mg by mouth every four (4) hours as needed for Pain., Disp: , Rfl:    Date Last Reviewed:  7/17/2018   Number of Personal Factors/Comorbidities that affect the Plan of Care: 1-2: MODERATE COMPLEXITY   EXAMINATION:     Observation/Orthostatic Postural Assessment: Comes into clinic with no assistive device, knee functional brace on R knee. Palpation:n/t.    ROM:     L Knee: 2 degrees of hyperextension to 124 degrees            Strength:   L Knee extension: 4+/5   7/12/18   L knee flexion: 4-/5   7/12/18   5-rep leg press max: L 75#, R 25#                    Functional Mobility: Patient ambulates on level ground with mild antalgic gait pattern when in stance phase on R LE. Patient ascends stairs with reciprocal gait pattern with use of handrail. Patient descends stairs with step-to gait pattern with L LE descending stair before R wit use of single handrail. Patient able to stand from standard chair without use of hands, but performs significant forward trunk lean to complete the movement. Balance: not tested. Body Structures Involved:  1. Joints  2. Muscles  3. Ligaments Body Functions Affected:  1. Neuromusculoskeletal Activities and Participation Affected:  1. Mobility  2.  Community, Social and [place]   Number of elements (examined above) that affect the Plan of Care: 4+: HIGH COMPLEXITY   CLINICAL PRESENTATION: Presentation: Evolving clinical presentation with changing clinical characteristics: MODERATE COMPLEXITY   CLINICAL DECISION MAKING:   Outcome Measure: Tool Used: Lower Extremity Functional Scale (LEFS)  Score:  Initial: 5/80 20/80 (Date: 4-16-18 ) Most recent: 44/80 (7-6-18)   Interpretation of Score: 20 questions each scored on a 5 point scale with 0 representing \"extreme difficulty or unable to perform\" and 4 representing \"no difficulty\". The lower the score, the greater the functional disability. 80/80 represents no disability. Minimal detectable change is 9 points. Score 80 79-63 62-48 47-32 31-16 15-1 0   Modifier CH CI CJ CK CL CM CN     Medical Necessity:   · Patient is expected to demonstrate progress in strength, range of motion and balance to improve gait. Reason for Services/Other Comments:  · Patient continues to require skilled intervention due to post-op R knee, decreased ROM, strength, gait. Use of outcome tool(s) and clinical judgement create a POC that gives a: Questionable prediction of patient's progress: MODERATE COMPLEXITY            TREATMENT:   (In addition to Assessment/Re-Assessment sessions the following treatments were rendered)      Pre-treatment Symptoms/Complaints: Pt states her knee is hurting. She complains of a kind of sticking feeling in her knee. Pain: Initial:   3/10 Post Session:  4/10. Did not wear knee brace to PT. Accompanied by hospital  and workers comp  throughout entire session. Therapeutic Exercise: ( 40 minutes) for LE strengthening. Moderate visual and verbal cues for body alignment with each exercise. Date:  7/12/18 Date:  7/16/18 Date:  7/17   Activity/Exercise Parameters Parameters Parameters   SLR  Sidelying SLR  Prone SLR 10 each Flexion, 3 x 10  3# 3 x 10   Prone knee flexion 10  For quad stretching 5 x 15 sec.    Standing march                   Heel raise 10  10     Slant board stretch 3 x 20 sec 2 x 30\" B    Step up / step down 2' x7  pain  1' x 10     Shuttle  No wt 30x  12#    30x  No wt 30x  12# 15 x   HS machine 10# x 15  15# x 10  15# 2 x 15   LAQ 4# x 20       Gait drills  Retrowalking, 40' x 6    Stepping fwd/bwd with L LE while in stance on R     Ambulated 500' x 5 with cues to land on heel when ambulating to facilitate heel strike at initial contact    Sit to stands  2 x 10 from weight bench w/o hands    Squats  minisquat with ball toss against rebounder, 3 x 10     Bridging  3 x 10 B    Short arc quads   3# 3 x 10   lunge   Mini 2 x 10             Therapeutic Modalities: not today                                                                                                HEP: no changes to HEP today    Treatment/Session Assessment:    · Response to Treatment: Pt has increased crepitus as repetitions increase. Uncertain if crepitus increases as her muscles fatigue but it is the same as she describes with walking. It gets worse as she walks for a bit. · Compliance with Program/Exercises:  Appears compliant   · Recommendations/Intent for next treatment session: \"Next visit will focus on gait, knee ROM, strength\".    Total Treatment Duration: 40 Minutes   PT Patient Time In/Time Out  Time In: 0800  Time Out: 0845    Renzo Ramírez, PT

## 2018-07-19 ENCOUNTER — MYC MEDICAL ADVICE (OUTPATIENT)
Dept: OBGYN | Facility: CLINIC | Age: 33
End: 2018-07-19

## 2018-07-19 ENCOUNTER — PRENATAL OFFICE VISIT (OUTPATIENT)
Dept: OBGYN | Facility: CLINIC | Age: 33
End: 2018-07-19
Payer: COMMERCIAL

## 2018-07-19 VITALS
BODY MASS INDEX: 47.09 KG/M2 | HEIGHT: 66 IN | RESPIRATION RATE: 18 BRPM | DIASTOLIC BLOOD PRESSURE: 68 MMHG | TEMPERATURE: 98.3 F | HEART RATE: 108 BPM | WEIGHT: 293 LBS | SYSTOLIC BLOOD PRESSURE: 137 MMHG

## 2018-07-19 DIAGNOSIS — Z34.81 ENCOUNTER FOR SUPERVISION OF OTHER NORMAL PREGNANCY IN FIRST TRIMESTER: ICD-10-CM

## 2018-07-19 DIAGNOSIS — Z12.4 SCREENING FOR MALIGNANT NEOPLASM OF CERVIX: ICD-10-CM

## 2018-07-19 DIAGNOSIS — Z34.81 PRENATAL CARE, SUBSEQUENT PREGNANCY IN FIRST TRIMESTER: Primary | ICD-10-CM

## 2018-07-19 DIAGNOSIS — Z98.891 S/P CESAREAN SECTION: ICD-10-CM

## 2018-07-19 LAB
ABO + RH BLD: NORMAL
ABO + RH BLD: NORMAL
BLD GP AB SCN SERPL QL: NORMAL
BLOOD BANK CMNT PATIENT-IMP: NORMAL
ERYTHROCYTE [DISTWIDTH] IN BLOOD BY AUTOMATED COUNT: 13 % (ref 10–15)
HCT VFR BLD AUTO: 41.1 % (ref 35–47)
HGB BLD-MCNC: 14 G/DL (ref 11.7–15.7)
MCH RBC QN AUTO: 31.4 PG (ref 26.5–33)
MCHC RBC AUTO-ENTMCNC: 34.1 G/DL (ref 31.5–36.5)
MCV RBC AUTO: 92 FL (ref 78–100)
PLATELET # BLD AUTO: 263 10E9/L (ref 150–450)
RBC # BLD AUTO: 4.46 10E12/L (ref 3.8–5.2)
SPECIMEN EXP DATE BLD: NORMAL
WBC # BLD AUTO: 14.2 10E9/L (ref 4–11)

## 2018-07-19 PROCEDURE — 87491 CHLMYD TRACH DNA AMP PROBE: CPT | Performed by: OBSTETRICS & GYNECOLOGY

## 2018-07-19 PROCEDURE — 86850 RBC ANTIBODY SCREEN: CPT | Performed by: OBSTETRICS & GYNECOLOGY

## 2018-07-19 PROCEDURE — 87086 URINE CULTURE/COLONY COUNT: CPT | Performed by: OBSTETRICS & GYNECOLOGY

## 2018-07-19 PROCEDURE — 86900 BLOOD TYPING SEROLOGIC ABO: CPT | Performed by: OBSTETRICS & GYNECOLOGY

## 2018-07-19 PROCEDURE — 76817 TRANSVAGINAL US OBSTETRIC: CPT | Performed by: OBSTETRICS & GYNECOLOGY

## 2018-07-19 PROCEDURE — 36415 COLL VENOUS BLD VENIPUNCTURE: CPT | Performed by: OBSTETRICS & GYNECOLOGY

## 2018-07-19 PROCEDURE — 86780 TREPONEMA PALLIDUM: CPT | Performed by: OBSTETRICS & GYNECOLOGY

## 2018-07-19 PROCEDURE — 85027 COMPLETE CBC AUTOMATED: CPT | Performed by: OBSTETRICS & GYNECOLOGY

## 2018-07-19 PROCEDURE — 87591 N.GONORRHOEAE DNA AMP PROB: CPT | Performed by: OBSTETRICS & GYNECOLOGY

## 2018-07-19 PROCEDURE — G0476 HPV COMBO ASSAY CA SCREEN: HCPCS | Performed by: OBSTETRICS & GYNECOLOGY

## 2018-07-19 PROCEDURE — 86901 BLOOD TYPING SEROLOGIC RH(D): CPT | Performed by: OBSTETRICS & GYNECOLOGY

## 2018-07-19 PROCEDURE — 99207 ZZC FIRST OB VISIT: CPT | Performed by: OBSTETRICS & GYNECOLOGY

## 2018-07-19 PROCEDURE — 87389 HIV-1 AG W/HIV-1&-2 AB AG IA: CPT | Performed by: OBSTETRICS & GYNECOLOGY

## 2018-07-19 PROCEDURE — G0145 SCR C/V CYTO,THINLAYER,RESCR: HCPCS | Performed by: OBSTETRICS & GYNECOLOGY

## 2018-07-19 PROCEDURE — 87340 HEPATITIS B SURFACE AG IA: CPT | Performed by: OBSTETRICS & GYNECOLOGY

## 2018-07-19 PROCEDURE — 86762 RUBELLA ANTIBODY: CPT | Performed by: OBSTETRICS & GYNECOLOGY

## 2018-07-19 NOTE — PROGRESS NOTES
Janine Salas  is a 32 year old  year old single  G 2 P 1001who presents to the clinic for an new ob visit.    Estimated Date of Delivery: Feb 27, 2019  is calculated from Patient's last menstrual period was Patient's last menstrual period was 05/23/2018.   She has not had bleeding since her LMP.   She has not had nausea. Weigh loss has not occurred.   This was a planned pregnancy.   SOPHIE is involved,  Michael   OTHER CONCERNS: previous CS  INFECTION HISTORY  HIV: no  Hepatitis B: no  Hepatitis C: no  Syphilis:  no  Tuberculosis: no   PPD- no  Herpes self: no  Herpes partner:  no  Chlamydia:  no  Gonorrhea:  no  HPV: no  BV:  no  Trichomonis:  no  Chicken Pox:  YES  ====================================================  PERSONAL/SOCIAL HISTORY  Lives lives with their family.  Employment: Full time.  Her job involves light activity .  Additional items: None  =====================================================   REVIEW OF SYSTEMS  CONSTITUTIONAL: NEGATIVE for fever, chills  INTEGUMENTARY/SKIN: NEGATIVE for worrisome rashes, moles or lesions  EYES: NEGATIVE for vision changes   ENT/MOUTH: NEGATIVE for ear, mouth and throat problems  RESP: NEGATIVE for significant cough or SOB  BREAST: NEGATIVE for masses, tenderness or discharge  CV: NEGATIVE for chest pain, palpitations   GI: NEGATIVE for nausea, abdominal pain, heartburn, or change in bowel habits  : NEGATIVE for frequency, dysuria, or hematuria  NEURO: NEGATIVE for weakness, dizziness or paresthesias or headache  ENDOCRINE: NEGATIVE for temperature intolerance, skin/hair changes  HEME: NEGATIVE for bleeding problems  PSYCHIATRIC: NEGATIVE for changes in mood or affect  C: NEGATIVE for fever, chills  E: NEGATIVE for vision changes   R: NEGATIVE for significant cough or SOB  M: NEGATIVE for significant arthralgias or myalgia  N: NEGATIVE for weakness, dizziness or paresthesias or headache  ====================================================  PHYSICAL EXAM:  "Vitals: /68  Pulse 108  Temp 98.3  F (36.8  C)  Resp 18  Ht 5' 6\" (1.676 m)  Wt 296 lb (134.3 kg)  LMP 2018  BMI 47.78 kg/m2  BMI= Body mass index is 47.78 kg/(m^2).      RECOMMENDED WEIGHT GAIN: 15-25 lbs.  GENERAL:  Pleasant pregnant female, alert, well groomed.  SKIN:  Warm and dry, without lesions or rashes  HEAD: Symmetrical features.  EYES:  PERRLA,   MOUTH:  Buccal mucosa pink, moist without lesions.    NECK:  Thyroid without enlargement and nodules.  Lymph nodes not palpable.   LUNGS:  Clear to auscultation.  BREAST:  Symmetrical.  No dominant, fixed or suspicious masses are noted.  No skin or nipple changes or axillary nodes.  Self exam is taught and encouraged.  Nipples .      HEART:  RRR without murmur.  ABDOMEN: Soft without masses , tenderness or organomegaly.  No CVA tenderness. Well healed scar from  section.   MUSCULOSKELETAL:  Full range of motion  EXTREMITIES:  No edema. No significant varicosities.   GENITALIA:  BUS WNL, no lesions noted   VAGINA:  Pink, normal rugae and discharge ,   CERVIX:  smooth, without discharge or CMT and nulliparous os,   firm/ closed 4 cm long.  UTERUS: Anteverted, nontender 9 weeks in size.  ADNEXA: Without masses or tenderness  PELVIS:   PELVIS:   Adequate and Inadequate, PelvisUntestedRECTAL:  Normal appearance.  Digital exam deferred.  WET PREP:Not done  gonorrhea  =========================================  ASSESSMENT:  (Z34.81) Prenatal care, subsequent pregnancy in first trimester  (primary encounter diagnosis)  Comment: Estimated Date of Delivery: 2019   Plan: ABO/Rh type and screen, Hepatitis B surface         antigen, CBC with platelets, HIV Antigen         Antibody Combo, Rubella Antibody IgG         Quantitative, Treponema Abs w Reflex to RPR and        Titer, Urine Culture Aerobic Bacterial, US OB         Transvaginal Only ([id number])            (Z98.891) S/P  section  Comment: desires repeat CS  Plan: repeat CS " after 39 weeks    (Z12.4) Screening for malignant neoplasm of cervix  Comment:   Plan: Pap imaged thin layer screen with HPV -         recommended age 30 - 65 years (select HPV order        below), HPV High Risk Types DNA Cervical            (Z34.81) Encounter for supervision of other normal pregnancy in first trimester  Comment:   Plan: ABO/Rh type and screen, Hepatitis B surface         antigen, CBC with platelets, HIV Antigen         Antibody Combo, Rubella Antibody IgG         Quantitative, Treponema Abs w Reflex to RPR and        Titer, Urine Culture Aerobic Bacterial, US OB         Transvaginal Only ([id number])              PREGNANCY AT RISK? no  ==========================================      PLAN:  Discussed physician coverage, tertiary support, diet, exercise, weight gain, schedule of visits, routine and indicated ultrasounds, childbirth education and antepartum testing for certain birth defects.  Encouraged patient to review contents of Prenatal Breastfeeding Education Toolkit. Offered opportunity to answer questions regarding the importance of skin to skin contact, early initiation of exclusive breastfeeding for the first six months and rooming in while in the hospital.  Discussed [facility] travel advisory for Zika virus.  Syphilis is a sexually transmitted disease that can cause birth defects in the babies of untreated mothers. Every pregnant patient is tested for syphilis early in each pregnancy as part of the routine lab work. The [place] Department of Health has seen an increase in the rate of syphilis in [place]. The [facility] now recommends testing for syphilis 3 times during a pregnancy, the new prenatal visit, 28 weeks and when admitted for delivery    Instructed on use of triage nurse line and contacting the on call Birthplace after hours for an urgent need such as fever, vagina bleeding, bladder or vaginal infection, rupture of membranes,  or term labor.    Discussed the indications, uses for and  false positives for quad screen, nuchal translucency and fetal survey ultrasound at 18-20 weeks gestation. Will inform us at the next visit if she wished to avail herself of these screens.  Instructed on best evidence for: weight gain for her BMI for pregnancy; healthy diet and foods to avoid; exercise and activity during pregnancy;avoiding exposure to toxoplasmosis; and maintenance of a generally healthy lifestyle.   Discussed the harms, benefits, side effects and alternative therapies for current prescribed and OTC medications.      Marco Marin

## 2018-07-19 NOTE — MR AVS SNAPSHOT
After Visit Summary   2018    Janine Salas    MRN: [id number]           Patient Information     Date Of Birth          1985        Visit Information        Provider Department      2018 9:00 AM Marco Marin MD; [facility]        Today's Diagnoses     Prenatal care, subsequent pregnancy in first trimester    -  1    S/P  section        Screening for malignant neoplasm of cervix        Encounter for supervision of other normal pregnancy in first trimester           Follow-ups after your visit        Future tests that were ordered for you today     Open Future Orders        Priority Expected Expires Ordered    US OB Transvaginal Only ([id number]) Routine  10/17/2018 2018            Who to contact     If you have questions or need follow up information about today's clinic visit or your schedule please contact [facility] directly at [phone].  Normal or non-critical lab and imaging results will be communicated to you by MyChart, letter or phone within 4 business days after the clinic has received the results. If you do not hear from us within 7 days, please contact the clinic through [employer] or phone. If you have a critical or abnormal lab result, we will notify you by phone as soon as possible.  Submit refill requests through [employer] or call your pharmacy and they will forward the refill request to us. Please allow 3 business days for your refill to be completed.          Additional Information About Your Visit        MyChart Information     [employer] gives you secure access to your electronic health record. If you see a primary care provider, you can also send messages to your care team and make appointments. If you have questions, please call your primary care clinic.  If you do not have a primary care provider, please call [phone] and they will assist you.        Care EveryWhere ID     This is your Care EveryWhere  "ID. This could be used by other organizations to access your [facility] medical records  [id number]        Your Vitals Were     Pulse Temperature Respirations Height Last Period BMI (Body Mass Index)    108 98.3  F (36.8  C) 18 5' 6\" (1.676 m) 05/23/2018 47.78 kg/m2       Blood Pressure from Last 3 Encounters:   07/19/18 137/68   05/16/18 139/87   05/07/18 134/68    Weight from Last 3 Encounters:   07/19/18 296 lb (134.3 kg)   05/07/18 292 lb (132.5 kg)   04/06/18 294 lb (133.4 kg)              We Performed the Following     ABO/Rh type and screen     CBC with platelets     Chlamydia trachomatis PCR     Hepatitis B surface antigen     HIV Antigen Antibody Combo     HPV High Risk Types DNA Cervical     Neisseria gonorrhoeae PCR     Pap imaged thin layer screen with HPV - recommended age 30 - 65 years (select HPV order below)     Rubella Antibody IgG Quantitative     Treponema Abs w Reflex to RPR and Titer     Urine Culture Aerobic Bacterial     US OB Transvaginal Only          Today's Medication Changes          These changes are accurate as of 7/19/18  9:55 AM.  If you have any questions, ask your nurse or doctor.               These medicines have changed or have updated prescriptions.        Dose/Directions    azelaic acid 20 % cream   Commonly known as:  AZELEX   This may have changed:    - when to take this  - reasons to take this   Used for:  Granuloma annulare, Acne vulgaris        Apply topically 2 times daily   Quantity:  50 g   Refills:  11         Stop taking these medicines if you haven't already. Please contact your care team if you have questions.     buPROPion 150 MG 24 hr tablet   Commonly known as:  WELLBUTRIN XL   Stopped by:  Marco Marin MD                    Primary Care Provider Office Phone # Fax #    Kavya Haider PA-C [phone] [address] [place] [address]        Equal Access to Services     [facility] IRENE AH: Darin Pierce, " alexis craft, qaybta kaellen jenniferwil, anali linusin hayaan jenniferbrittany asaelfranchesca laLongaasunil ah. So [facility] [phone].    ATENCIÓN: Si atiya morrell, tiene a pacheco disposición servicios gratuitos de asistencia lingüística. Christiana al [phone].    We comply with applicable federal civil rights laws and [place] laws. We do not discriminate on the basis of race, color, national origin, age, disability, sex, sexual orientation, or gender identity.            Thank you!     Thank you for choosing [facility]  for your care. Our goal is always to provide you with excellent care. Hearing back from our patients is one way we can continue to improve our services. Please take a few minutes to complete the written survey that you may receive in the mail after your visit with us. Thank you!             Your Updated Medication List - Protect others around you: Learn how to safely use, store and throw away your medicines at www.disposemymeds.org.          This list is accurate as of 7/19/18  9:55 AM.  Always use your most recent med list.                   Brand Name Dispense Instructions for use Diagnosis    azelaic acid 20 % cream    AZELEX    50 g    Apply topically 2 times daily    Granuloma annulare, Acne vulgaris       prenatal multivitamin plus iron 27-0.8 MG Tabs per tablet      Take 1 tablet by mouth daily

## 2018-07-20 LAB
BACTERIA SPEC CULT: NO GROWTH
C TRACH DNA SPEC QL NAA+PROBE: NEGATIVE
HBV SURFACE AG SERPL QL IA: NONREACTIVE
HIV 1+2 AB+HIV1 P24 AG SERPL QL IA: NONREACTIVE
Lab: NORMAL
N GONORRHOEA DNA SPEC QL NAA+PROBE: NEGATIVE
RUBV IGG SERPL IA-ACNC: 21 IU/ML
SPECIMEN SOURCE: NORMAL
T PALLIDUM AB SER QL: NONREACTIVE

## 2018-07-23 LAB
COPATH REPORT: NORMAL
PAP: NORMAL

## 2018-07-25 LAB
FINAL DIAGNOSIS: NORMAL
HPV HR 12 DNA CVX QL NAA+PROBE: NEGATIVE
HPV16 DNA SPEC QL NAA+PROBE: NEGATIVE
HPV18 DNA SPEC QL NAA+PROBE: NEGATIVE
SPECIMEN DESCRIPTION: NORMAL
SPECIMEN SOURCE CVX/VAG CYTO: NORMAL

## 2018-08-15 ENCOUNTER — PRENATAL OFFICE VISIT (OUTPATIENT)
Dept: OBGYN | Facility: CLINIC | Age: 33
End: 2018-08-15
Payer: COMMERCIAL

## 2018-08-15 VITALS
SYSTOLIC BLOOD PRESSURE: 143 MMHG | HEIGHT: 66 IN | DIASTOLIC BLOOD PRESSURE: 79 MMHG | TEMPERATURE: 98.3 F | WEIGHT: 293 LBS | RESPIRATION RATE: 18 BRPM | HEART RATE: 88 BPM | BODY MASS INDEX: 47.09 KG/M2

## 2018-08-15 DIAGNOSIS — Z34.80 PRENATAL CARE, SUBSEQUENT PREGNANCY, UNSPECIFIED TRIMESTER: ICD-10-CM

## 2018-08-15 DIAGNOSIS — M54.42 CHRONIC LEFT-SIDED LOW BACK PAIN WITH LEFT-SIDED SCIATICA: Primary | ICD-10-CM

## 2018-08-15 DIAGNOSIS — Z34.82 PRENATAL CARE, SUBSEQUENT PREGNANCY IN SECOND TRIMESTER: ICD-10-CM

## 2018-08-15 DIAGNOSIS — G89.29 CHRONIC LEFT-SIDED LOW BACK PAIN WITH LEFT-SIDED SCIATICA: Primary | ICD-10-CM

## 2018-08-15 PROCEDURE — 99207 ZZC PRENATAL VISIT: CPT | Performed by: OBSTETRICS & GYNECOLOGY

## 2018-08-15 NOTE — MR AVS SNAPSHOT
"              After Visit Summary   8/15/2018    Janine Salas    MRN: [id number]           Patient Information     Date Of Birth          1985        Visit Information        Provider Department      8/15/2018 10:30 AM Marco Marin MD [facility]        Today's Diagnoses     Chronic left-sided low back pain with left-sided sciatica    -  1    Prenatal care, subsequent pregnancy in second trimester        Prenatal care, subsequent pregnancy, unspecified trimester           Follow-ups after your visit        Additional Services     PHYSICAL THERAPY REFERRAL       *This therapy referral will be filtered to a centralized scheduling office at [facility] and the patient will receive a call to schedule an appointment at a [facility] location most convenient for them. *     [facility] provides Physical Therapy evaluation and treatment and many specialty services across the [facility] system.  If requesting a specialty program, please choose from the list below.    If you have not heard from the scheduling office within 2 business days, please call [phone] for all locations, with the exception of [place], please call [phone] and [facility], please call [phone]  Treatment: Evaluation & Treatment  Special Instructions/Modalities:   Special Programs: SI joint issue  pregnancy    Please be aware that coverage of these services is subject to the terms and limitations of your health insurance plan.  Call member services at your health plan with any benefit or coverage questions.      **Note to Provider:  If you are referring outside of [facility] for the therapy appointment, please list the name of the location in the \"special instructions\" above, print the referral and give to the patient to schedule the appointment.                  Who to contact     If you have questions or need follow up information about today's clinic visit or your " "schedule please contact [facility] directly at [phone].  Normal or non-critical lab and imaging results will be communicated to you by MyChart, letter or phone within 4 business days after the clinic has received the results. If you do not hear from us within 7 days, please contact the clinic through [employer] or phone. If you have a critical or abnormal lab result, we will notify you by phone as soon as possible.  Submit refill requests through [employer] or call your pharmacy and they will forward the refill request to us. Please allow 3 business days for your refill to be completed.          Additional Information About Your Visit        [employer] Information     [employer] gives you secure access to your electronic health record. If you see a primary care provider, you can also send messages to your care team and make appointments. If you have questions, please call your primary care clinic.  If you do not have a primary care provider, please call [phone] and they will assist you.        Care EveryWhere ID     This is your Care EveryWhere ID. This could be used by other organizations to access your [facility] medical records  [id number]        Your Vitals Were     Pulse Temperature Respirations Height Last Period BMI (Body Mass Index)    102 98.3  F (36.8  C) 18 5' 6\" (1.676 m) 05/23/2018 48.36 kg/m2       Blood Pressure from Last 3 Encounters:   08/15/18 (!) 143/101   07/19/18 137/68   05/16/18 139/87    Weight from Last 3 Encounters:   08/15/18 299 lb 9.6 oz (135.9 kg)   07/19/18 296 lb (134.3 kg)   05/07/18 292 lb (132.5 kg)              We Performed the Following     PHYSICAL THERAPY REFERRAL          Today's Medication Changes          These changes are accurate as of 8/15/18 11:02 AM.  If you have any questions, ask your nurse or doctor.               These medicines have changed or have updated prescriptions.        Dose/Directions    azelaic acid 20 % cream   Commonly known as:  AZELEX "   This may have changed:    - when to take this  - reasons to take this   Used for:  Granuloma annulare, Acne vulgaris        Apply topically 2 times daily   Quantity:  50 g   Refills:  11                Primary Care Provider Office Phone # Fax #    Kavya Haider PA-C [phone] [address] [place] [address]        Equal Access to Services     [facility]: Hadii aad ku hadasho Soomaali, waaxda luqadaha, qaybta kaalmada adeegyada, waxay idiin hayaan adebrittany khizaiahpeewee lamanolo . So [facility] [phone].    ATENCIÓN: Si habla esploree, tiene a pacheco disposición servicios gratuitos de asistencia lingüística. [facility] [phone].    We comply with applicable federal civil rights laws and [place] laws. We do not discriminate on the basis of race, color, national origin, age, disability, sex, sexual orientation, or gender identity.            Thank you!     Thank you for choosing [facility]  for your care. Our goal is always to provide you with excellent care. Hearing back from our patients is one way we can continue to improve our services. Please take a few minutes to complete the written survey that you may receive in the mail after your visit with us. Thank you!             Your Updated Medication List - Protect others around you: Learn how to safely use, store and throw away your medicines at www.disposemymeds.org.          This list is accurate as of 8/15/18 11:02 AM.  Always use your most recent med list.                   Brand Name Dispense Instructions for use Diagnosis    azelaic acid 20 % cream    AZELEX    50 g    Apply topically 2 times daily    Granuloma annulare, Acne vulgaris       BENADRYL PO           prenatal multivitamin plus iron 27-0.8 MG Tabs per tablet      Take 1 tablet by mouth daily        TYLENOL PO      Take 500 mg by mouth

## 2018-08-15 NOTE — PROGRESS NOTES
Concerns: Sciatic pain on the left and left shoulder discomfort  Interested in PT as chiropractic has not helped  Discussed anenatal screening.  She declines testing at this time.  Reportable signs and symptoms discussed.  Will schedule anatomy ultrasound.  RTC 4 weeks.  Dating reviewed, checklist updated, see prenatal flowsheet for details.    Marco Marin MD

## 2018-08-23 ENCOUNTER — THERAPY VISIT (OUTPATIENT)
Dept: PHYSICAL THERAPY | Facility: CLINIC | Age: 33
End: 2018-08-23
Payer: COMMERCIAL

## 2018-08-23 DIAGNOSIS — M54.50 BILATERAL LOW BACK PAIN WITHOUT SCIATICA: Primary | ICD-10-CM

## 2018-08-23 PROCEDURE — 97161 PT EVAL LOW COMPLEX 20 MIN: CPT | Mod: GP | Performed by: PHYSICAL THERAPIST

## 2018-08-23 PROCEDURE — 97110 THERAPEUTIC EXERCISES: CPT | Mod: GP | Performed by: PHYSICAL THERAPIST

## 2018-08-23 NOTE — PROGRESS NOTES
"[facility] Initial Evaluation  Subjective:  Patient is a 32 year old female presenting with rehab back hpi.   Janine Salas is a 32 year old female with a lumbar condition.  Condition occurred with:  Other reason.  Condition occurred: at home.  This is a recurrent condition  Pt reports recent episode of low back pain starting 18. Pt reports back hurts with lifting son off of ground the most. Back will also hurt with prolonged standing and sitting..    Patient reports pain:  Lower lumbar spine.  Radiates to: denies.  Pain is described as sharp and stabbing and is constant and reported as 4/10.  Associated symptoms:  Pregnancy. Pain is worse during the day.  Symptoms are exacerbated by bending, carrying, lifting, sitting and standing and relieved by rest and activity/movement.  Since onset symptoms are gradually worsening.  Special testing: none reported.  Previous treatment: n/a.  Improvement with previous treatment: n/a.  General health as reported by patient is fair.  Pertinent medical history includes:  Osteoarthritis, currently pregnant, high blood pressure, overweight, numbness/tingling and migraines/headaches.  Medical allergies: codeine and zofran.  Surgical history: appendectomy, gall bladder, .  Current medications:  Pain medication (\"prenatal, azalex\").  Current occupation is none.  Employment status: n/a.  Employment tasks: n/a.    Barriers include:  None as reported by the patient.    Red flags:  None as reported by the patient.                        Objective:  Standing Alignment:        Lumbar:  Anterior pelvic tilt and lordosis incr                           Lumbar/SI Evaluation  ROM:    AROM Lumbar:   Flexion:        100% no pain  Ext:                    25% painful   Side Bend:        Left:     Right:   Rotation:           Left:     Right:   Side Glide:        Left:     Right:                   Neural Tension/Mobility:  Lumbar:  Normal                            "                       Hip Evaluation    Hip Strength:    Flexion:   Left: 5-/5   Pain:  Right: 5-/5   Pain:                    Extension:  Left: 4+/5  Pain:Right: 4+/5    Pain:    Abduction:  Left: 4-/5     Pain:Right: 4-/5    Pain:        Knee Flexion:  Left: 4+/5   Pain:Right: 4+/5   Pain:  Knee Extension:  Left: 5-/5   Pain:Right: 5-/5    Pain:                       General     ROS    Assessment/Plan:    Patient is a 32 year old female with lumbar complaints.    Patient has the following significant findings with corresponding treatment plan.                Diagnosis 1:  LBP without sciatica  Pain -  manual therapy, splint/taping/bracing/orthotics, self management, education, directional preference exercise and home program  Decreased ROM/flexibility - manual therapy and therapeutic exercise  Decreased joint mobility - manual therapy and therapeutic exercise  Decreased strength - therapeutic exercise and therapeutic activities  Impaired balance - neuro re-education and therapeutic activities  Decreased proprioception - neuro re-education and therapeutic activities  Impaired posture - neuro re-education    Therapy Evaluation Codes:   1) History comprised of:   Personal factors that impact the plan of care:      None.    Comorbidity factors that impact the plan of care are:      Current pregnancy, Depression, High blood pressure, Migraines/headaches, Osteoarthritis, Overweight and Pain at night/rest.     Medications impacting care: Anti-inflammatory and Steroids.  2) Examination of Body Systems comprised of:   Body structures and functions that impact the plan of care:      Lumbar spine.   Activity limitations that impact the plan of care are:      Bending, Driving, Dressing, Lifting, Sitting, Squatting/kneeling, Stairs, Standing, Walking, Sleeping and Laying down.  3) Clinical presentation characteristics are:   Stable/Uncomplicated.  4) Decision-Making    Low complexity using standardized patient assessment  instrument and/or measureable assessment of functional outcome.  Cumulative Therapy Evaluation is: Low complexity.    Previous and current functional limitations:  (See Goal Flow Sheet for this information)    Short term and Long term goals: (See Goal Flow Sheet for this information)     Communication ability:  Patient appears to be able to clearly communicate and understand verbal and written communication and follow directions correctly.  Treatment Explanation - The following has been discussed with the patient:   RX ordered/plan of care  Anticipated outcomes  Possible risks and side effects  This patient would benefit from PT intervention to resume normal activities.   Rehab potential is good.    Frequency:  1 X week, once daily  Duration:  for 8 weeks  Discharge Plan:  Achieve all LTG.  Independent in home treatment program.  Reach maximal therapeutic benefit.    Please refer to the daily flowsheet for treatment today, total treatment time and time spent performing 1:1 timed codes.

## 2018-08-23 NOTE — MR AVS SNAPSHOT
After Visit Summary   8/23/2018    Janine Salas    MRN: [id number]           Patient Information     Date Of Birth          1985        Visit Information        Provider Department      8/23/2018 8:35 AM Ismael Valadez, PT [facility]        Today's Diagnoses     Bilateral low back pain without sciatica    -  1       Follow-ups after your visit        Your next 10 appointments already scheduled     Aug 29, 2018  5:10 PM CDT   MONIQUE Spine with Kim Britt PT   [facility] [facility] ([facility])    [address]  [facility] [address]   [phone]            Sep 05, 2018 11:55 AM CDT   MONIQUE Spine with Ismael Valadez PT   [facility] ([facility])    [address]  [facility] [address]   [phone]            Sep 12, 2018 10:40 AM CDT   MONIQUE Spine with Kim Britt PT   [facility] ([facility])    [address]  [facility] [address] [phone]            Sep 19, 2018  9:45 AM CDT   MyChart OB-GYN Established Prenatal with Marco Marin MD   [facility] ([facility]    [address] [facility] [address] [phone]            Sep 19, 2018 10:40 AM CDT   MONIQUE Spine with Kim Britt PT   [facility] ([facility])    [address]  [facility] [address] [phone]            Sep 26, 2018 11:20 AM CDT   MONIQUE Spine with Kim Britt PT   [facility] ([facility])    [address] [facility] [address] [phone]              Who to contact     If you have questions or need follow up information about today's clinic visit or your schedule please contact [facility] directly at [phone].  Normal or non-critical lab and imaging results will be communicated to you by MyChart, letter or phone within 4 business days after the clinic has received the results. If you do not hear from us  within 7 days, please contact the clinic through Team-Match or phone. If you have a critical or abnormal lab result, we will notify you by phone as soon as possible.  Submit refill requests through Team-Match or call your pharmacy and they will forward the refill request to us. Please allow 3 business days for your refill to be completed.          Additional Information About Your Visit        [place] Monitor[facility]MedCenterDisplay Information     Team-Match gives you secure access to your electronic health record. If you see a primary care provider, you can also send messages to your care team and make appointments. If you have questions, please call your primary care clinic.  If you do not have a primary care provider, please call [phone] and they will assist you.        Care EveryWhere ID     This is your Care EveryWhere ID. This could be used by other organizations to access your [place] medical records  [id number]        Your Vitals Were     Last Period                   05/23/2018            Blood Pressure from Last 3 Encounters:   08/15/18 143/79   07/19/18 137/68   05/16/18 139/87    Weight from Last 3 Encounters:   08/15/18 135.9 kg (299 lb 9.6 oz)   07/19/18 134.3 kg (296 lb)   05/07/18 132.5 kg (292 lb)              We Performed the Following     HC PT EVAL, LOW COMPLEXITY     MONIQUE INITIAL EVAL REPORT     THERAPEUTIC EXERCISES          Today's Medication Changes          These changes are accurate as of 8/23/18  1:48 PM.  If you have any questions, ask your nurse or doctor.               These medicines have changed or have updated prescriptions.        Dose/Directions    azelaic acid 20 % cream   Commonly known as:  AZELEX   This may have changed:    - when to take this  - reasons to take this   Used for:  Granuloma annulare, Acne vulgaris        Apply topically 2 times daily   Quantity:  50 g   Refills:  11                Primary Care Provider Office Phone # Fax #    Kavya Haider PA-C [phone] [facility] [address]        Equal Access to Services     GEO BONILLA : Hadii aad ku hadevinterence Pierce, wacarmeloseamus craft, nimeshnino perezmaseamus lee, anali vyasizaiahpeewee goldberg. So [facility] [phone].    ATENCIÓN: Si habla español, tiene a pacheco disposición servicios gratuitos de asistencia lingüística. Llame al [phone].    We comply with applicable federal civil rights laws and [place] laws. We do not discriminate on the basis of race, color, national origin, age, disability, sex, sexual orientation, or gender identity.            Thank you!     Thank you for choosing [facility]  for your care. Our goal is always to provide you with excellent care. Hearing back from our patients is one way we can continue to improve our services. Please take a few minutes to complete the written survey that you may receive in the mail after your visit with us. Thank you!             Your Updated Medication List - Protect others around you: Learn how to safely use, store and throw away your medicines at www.disposemymeds.org.          This list is accurate as of 8/23/18  1:48 PM.  Always use your most recent med list.                   Brand Name Dispense Instructions for use Diagnosis    azelaic acid 20 % cream    AZELEX    50 g    Apply topically 2 times daily    Granuloma annulare, Acne vulgaris       BENADRYL PO           prenatal multivitamin plus iron 27-0.8 MG Tabs per tablet      Take 1 tablet by mouth daily        TYLENOL PO      Take 500 mg by mouth

## 2018-09-05 ENCOUNTER — THERAPY VISIT (OUTPATIENT)
Dept: PHYSICAL THERAPY | Facility: CLINIC | Age: 33
End: 2018-09-05
Payer: COMMERCIAL

## 2018-09-05 DIAGNOSIS — M54.50 BILATERAL LOW BACK PAIN WITHOUT SCIATICA: ICD-10-CM

## 2018-09-05 PROCEDURE — 97110 THERAPEUTIC EXERCISES: CPT | Mod: GP | Performed by: PHYSICAL THERAPIST

## 2018-09-12 ENCOUNTER — THERAPY VISIT (OUTPATIENT)
Dept: PHYSICAL THERAPY | Facility: CLINIC | Age: 33
End: 2018-09-12
Payer: COMMERCIAL

## 2018-09-12 DIAGNOSIS — M54.50 ACUTE BILATERAL LOW BACK PAIN WITHOUT SCIATICA: ICD-10-CM

## 2018-09-12 PROCEDURE — 97110 THERAPEUTIC EXERCISES: CPT | Mod: GP | Performed by: PHYSICAL THERAPIST

## 2018-09-12 PROCEDURE — 97140 MANUAL THERAPY 1/> REGIONS: CPT | Mod: GP | Performed by: PHYSICAL THERAPIST

## 2018-09-19 ENCOUNTER — PRENATAL OFFICE VISIT (OUTPATIENT)
Dept: OBGYN | Facility: CLINIC | Age: 33
End: 2018-09-19
Payer: COMMERCIAL

## 2018-09-19 ENCOUNTER — THERAPY VISIT (OUTPATIENT)
Dept: PHYSICAL THERAPY | Facility: CLINIC | Age: 33
End: 2018-09-19
Payer: COMMERCIAL

## 2018-09-19 VITALS
DIASTOLIC BLOOD PRESSURE: 94 MMHG | BODY MASS INDEX: 47.09 KG/M2 | SYSTOLIC BLOOD PRESSURE: 150 MMHG | RESPIRATION RATE: 20 BRPM | HEART RATE: 111 BPM | HEIGHT: 66 IN | TEMPERATURE: 98.6 F | WEIGHT: 293 LBS

## 2018-09-19 DIAGNOSIS — M25.552 HIP PAIN, LEFT: ICD-10-CM

## 2018-09-19 DIAGNOSIS — Z34.82 PRENATAL CARE, SUBSEQUENT PREGNANCY IN SECOND TRIMESTER: ICD-10-CM

## 2018-09-19 DIAGNOSIS — M54.50 ACUTE BILATERAL LOW BACK PAIN WITHOUT SCIATICA: ICD-10-CM

## 2018-09-19 DIAGNOSIS — Z23 NEED FOR PROPHYLACTIC VACCINATION AND INOCULATION AGAINST INFLUENZA: Primary | ICD-10-CM

## 2018-09-19 PROCEDURE — 90686 IIV4 VACC NO PRSV 0.5 ML IM: CPT | Performed by: OBSTETRICS & GYNECOLOGY

## 2018-09-19 PROCEDURE — 97110 THERAPEUTIC EXERCISES: CPT | Mod: GP | Performed by: PHYSICAL THERAPIST

## 2018-09-19 PROCEDURE — 90471 IMMUNIZATION ADMIN: CPT | Performed by: OBSTETRICS & GYNECOLOGY

## 2018-09-19 PROCEDURE — 99207 ZZC PRENATAL VISIT: CPT | Performed by: OBSTETRICS & GYNECOLOGY

## 2018-09-19 PROCEDURE — 97140 MANUAL THERAPY 1/> REGIONS: CPT | Mod: GP | Performed by: PHYSICAL THERAPIST

## 2018-09-19 RX ORDER — PRENATAL VIT/IRON FUM/FOLIC AC 27MG-0.8MG
1 TABLET ORAL DAILY
Qty: 100 TABLET | Refills: 1 | Status: SHIPPED | OUTPATIENT
Start: 2018-09-19 | End: 2018-12-13

## 2018-09-19 NOTE — PROGRESS NOTES
Concerns today: pt in  Severe pain with left hip SI joint dislocation  She is scheduled to see PT today    No nausea/vomiting. No heartburn.  No vaginal bleeding, no contractions/severe cramping, no leakage of fluid.  No vaginal discharge. No dysuria  No headache, vision changes, lower extremity swelling, upper abdominal pain, chest pain, shortness of breath.   Discussed  screening.  She declines testing at this time.  Reportable signs and symptoms discussed.  BP elevated with increased pain    Marco Marin MD

## 2018-09-19 NOTE — PROGRESS NOTES

## 2018-09-19 NOTE — NURSING NOTE
"Initial BP (!) 150/94 (BP Location: Right arm, Patient Position: Chair, Cuff Size: Adult Large)  Pulse 111  Temp 98.6  F (37  C) (Tympanic)  Resp 20  Ht 5' 6\" (1.676 m)  Wt 311 lb (141.1 kg)  LMP 05/23/2018  Breastfeeding? No  BMI 50.2 kg/m2 Estimated body mass index is 50.2 kg/(m^2) as calculated from the following:    Height as of this encounter: 5' 6\" (1.676 m).    Weight as of this encounter: 311 lb (141.1 kg). .    Karen Funez, ADEN    "

## 2018-09-19 NOTE — MR AVS SNAPSHOT
After Visit Summary   9/19/2018    Janine Salas    MRN: [id number]           Patient Information     Date Of Birth          1985        Visit Information        Provider Department      9/19/2018 9:45 AM Marco Marin MD [facility]        Today's Diagnoses     Need for prophylactic vaccination and inoculation against influenza    -  1    Prenatal care, subsequent pregnancy in second trimester        Hip pain, left           Follow-ups after your visit        Follow-up notes from your care team     Return in about 4 weeks (around 10/17/2018).      Your next 10 appointments already scheduled     Sep 19, 2018 10:40 AM CDT   MONIQUE Spine with Kim Britt [facility] ([facility])    [address] [facility] [address] [phone]            Sep 26, 2018 11:20 AM CDT   MONIQUE Spine with Kim Britt PT   [place] [facility] ([facility])    [address]  [facility] [id number] [phone]              Future tests that were ordered for you today     Open Future Orders        Priority Expected Expires Ordered    US OB > 14 Weeks Complete Single Routine  9/19/2019 9/19/2018            Who to contact     If you have questions or need follow up information about today's clinic visit or your schedule please contact [facility] directly at [phone].  Normal or non-critical lab and imaging results will be communicated to you by MyChart, letter or phone within 4 business days after the clinic has received the results. If you do not hear from us within 7 days, please contact the clinic through MyChart or phone. If you have a critical or abnormal lab result, we will notify you by phone as soon as possible.  Submit refill requests through [employer] or call your pharmacy and they will forward the refill request to us. Please allow 3 business days for your refill to be completed.          Additional Information About Your  "Visit        MyChart Information     [employer] gives you secure access to your electronic health record. If you see a primary care provider, you can also send messages to your care team and make appointments. If you have questions, please call your primary care clinic.  If you do not have a primary care provider, please call [phone] and they will assist you.        Care EveryWhere ID     This is your Care EveryWhere ID. This could be used by other organizations to access your [place] medical records  [id number]        Your Vitals Were     Pulse Temperature Respirations Height Last Period Breastfeeding?    111 98.6  F (37  C) (Tympanic) 20 5' 6\" (1.676 m) 05/23/2018 No    BMI (Body Mass Index)                   50.2 kg/m2            Blood Pressure from Last 3 Encounters:   09/19/18 (!) 150/94   08/15/18 143/79   07/19/18 137/68    Weight from Last 3 Encounters:   09/19/18 311 lb (141.1 kg)   08/15/18 299 lb 9.6 oz (135.9 kg)   07/19/18 296 lb (134.3 kg)              We Performed the Following     FLU VACCINE, SPLIT VIRUS, IM (QUADRIVALENT) [15476]- >3 YRS     Vaccine Administration, Initial [36840]          Today's Medication Changes          These changes are accurate as of 9/19/18 10:36 AM.  If you have any questions, ask your nurse or doctor.               These medicines have changed or have updated prescriptions.        Dose/Directions    azelaic acid 20 % cream   Commonly known as:  AZELEX   This may have changed:    - when to take this  - reasons to take this   Used for:  Granuloma annulare, Acne vulgaris        Apply topically 2 times daily   Quantity:  50 g   Refills:  11            Where to get your medicines      These medications were sent to [facility] - [address]  [address] [address]     Phone:  [phone]     prenatal multivitamin plus iron 27-0.8 MG Tabs per tablet                Primary Care Provider Office Phone # Fax #    Kavya " Thelma Haider PA-C [phone]       [address] [facility] [address]        Equal Access to Services     GEO BONILLA : Darin melvi mann mary kate Pierce, wacarmeloda luqmartha, nimeshta kamehnazda rosa, anali louiesunil yusuf. So [facility] [phone].    ATENCIÓN: Si habla español, tiene a pacheco disposición servicios gratuitos de asistencia lingüística. Llame al [phone].    We comply with applicable federal civil rights laws and [place] laws. We do not discriminate on the basis of race, color, national origin, age, disability, sex, sexual orientation, or gender identity.            Thank you!     Thank you for choosing [facility]  for your care. Our goal is always to provide you with excellent care. Hearing back from our patients is one way we can continue to improve our services. Please take a few minutes to complete the written survey that you may receive in the mail after your visit with us. Thank you!             Your Updated Medication List - Protect others around you: Learn how to safely use, store and throw away your medicines at www.disposemymeds.org.          This list is accurate as of 9/19/18 10:36 AM.  Always use your most recent med list.                   Brand Name Dispense Instructions for use Diagnosis    azelaic acid 20 % cream    AZELEX    50 g    Apply topically 2 times daily    Granuloma annulare, Acne vulgaris       BENADRYL PO           prenatal multivitamin plus iron 27-0.8 MG Tabs per tablet     100 tablet    Take 1 tablet by mouth daily    Prenatal care, subsequent pregnancy in second trimester       TYLENOL PO      Take 500 mg by mouth

## 2018-09-26 ENCOUNTER — THERAPY VISIT (OUTPATIENT)
Dept: PHYSICAL THERAPY | Facility: CLINIC | Age: 33
End: 2018-09-26
Payer: COMMERCIAL

## 2018-09-26 DIAGNOSIS — M54.50 ACUTE BILATERAL LOW BACK PAIN WITHOUT SCIATICA: ICD-10-CM

## 2018-09-26 PROCEDURE — 97140 MANUAL THERAPY 1/> REGIONS: CPT | Mod: GP | Performed by: PHYSICAL THERAPIST

## 2018-09-26 PROCEDURE — 97110 THERAPEUTIC EXERCISES: CPT | Mod: GP | Performed by: PHYSICAL THERAPIST

## 2018-10-02 ENCOUNTER — MYC MEDICAL ADVICE (OUTPATIENT)
Dept: FAMILY MEDICINE | Facility: CLINIC | Age: 33
End: 2018-10-02

## 2018-10-03 ENCOUNTER — THERAPY VISIT (OUTPATIENT)
Dept: PHYSICAL THERAPY | Facility: CLINIC | Age: 33
End: 2018-10-03
Payer: COMMERCIAL

## 2018-10-03 DIAGNOSIS — M54.50 ACUTE BILATERAL LOW BACK PAIN WITHOUT SCIATICA: ICD-10-CM

## 2018-10-03 PROCEDURE — 97110 THERAPEUTIC EXERCISES: CPT | Mod: GP | Performed by: PHYSICAL THERAPIST

## 2018-10-03 PROCEDURE — 97140 MANUAL THERAPY 1/> REGIONS: CPT | Mod: GP | Performed by: PHYSICAL THERAPIST

## 2018-10-04 ENCOUNTER — HOSPITAL ENCOUNTER (OUTPATIENT)
Dept: ULTRASOUND IMAGING | Facility: CLINIC | Age: 33
Discharge: HOME | End: 2018-10-04
Attending: OBSTETRICS & GYNECOLOGY | Admitting: OBSTETRICS & GYNECOLOGY
Payer: COMMERCIAL

## 2018-10-04 DIAGNOSIS — Z34.82 PRENATAL CARE, SUBSEQUENT PREGNANCY IN SECOND TRIMESTER: ICD-10-CM

## 2018-10-04 DIAGNOSIS — Z23 NEED FOR PROPHYLACTIC VACCINATION AND INOCULATION AGAINST INFLUENZA: ICD-10-CM

## 2018-10-04 PROCEDURE — 76805 OB US >/= 14 WKS SNGL FETUS: CPT

## 2018-10-09 NOTE — PROGRESS NOTES
Janine  Your ULTRASOUND was incomplete and a follow up will need to be scheduled.  You can discuss the details with Dr Porter at your next visit  Marco Marin

## 2018-10-10 ENCOUNTER — OFFICE VISIT (OUTPATIENT)
Dept: FAMILY MEDICINE | Facility: CLINIC | Age: 33
End: 2018-10-10
Payer: COMMERCIAL

## 2018-10-10 ENCOUNTER — TELEPHONE (OUTPATIENT)
Dept: FAMILY MEDICINE | Facility: CLINIC | Age: 33
End: 2018-10-10

## 2018-10-10 ENCOUNTER — THERAPY VISIT (OUTPATIENT)
Dept: PHYSICAL THERAPY | Facility: CLINIC | Age: 33
End: 2018-10-10
Payer: COMMERCIAL

## 2018-10-10 VITALS
HEART RATE: 96 BPM | SYSTOLIC BLOOD PRESSURE: 133 MMHG | DIASTOLIC BLOOD PRESSURE: 80 MMHG | BODY MASS INDEX: 47.09 KG/M2 | WEIGHT: 293 LBS | TEMPERATURE: 98.1 F | HEIGHT: 66 IN

## 2018-10-10 DIAGNOSIS — R09.82 POST-NASAL DRAINAGE: Primary | ICD-10-CM

## 2018-10-10 DIAGNOSIS — M54.50 ACUTE BILATERAL LOW BACK PAIN WITHOUT SCIATICA: ICD-10-CM

## 2018-10-10 PROCEDURE — 99213 OFFICE O/P EST LOW 20 MIN: CPT | Performed by: NURSE PRACTITIONER

## 2018-10-10 PROCEDURE — 97110 THERAPEUTIC EXERCISES: CPT | Mod: GP | Performed by: PHYSICAL THERAPIST

## 2018-10-10 PROCEDURE — 97140 MANUAL THERAPY 1/> REGIONS: CPT | Mod: GP | Performed by: PHYSICAL THERAPIST

## 2018-10-10 NOTE — TELEPHONE ENCOUNTER
Prior Authorization Required on: Budesonide 32mcg (Non-formulary)  [facility]  Insurance Phone: [phone]  Patient ID: [id number]  Please Contact the Pharmacy with Prior Auth Status (approval/denial).   Thank You!    See Judah  Pharmacy Technician  [facility]  [phone]

## 2018-10-10 NOTE — PROGRESS NOTES
SUBJECTIVE:   Janine Salas is a 32 year old female who presents to clinic today for the following health issues:    This patient is accompanied in the office by her spouse and son.    ENT Symptoms             Symptoms: cc Present Absent Comment   Fever/Chills   x    Fatigue  x     Muscle Aches   x    Eye Irritation   x    Sneezing   x    Nasal Emeka/Drg  x  Clear drainage, runny nose, post nasal drainage.   Sinus Pressure/Pain   x    Loss of smell   x    Dental pain   x    Sore Throat   x    Swollen Glands   x    Ear Pain/Fullness   x    Cough x   Productive, clear. Worse lying down.    Wheeze   x    Chest Pain   x    Shortness of breath   x    Rash   x    Other   x      Symptom duration:  3 weeks   Symptom severity:  mild   Treatments tried:  None   Contacts:  Family with similar sx         Problem list and histories reviewed & adjusted, as indicated.  Additional history: as documented    Patient Active Problem List   Diagnosis     CARDIOVASCULAR SCREENING; LDL GOAL LESS THAN 160     Back pain associated with peripheral numbness     Morbid obesity due to excess calories (H)     Anxiety attack     Essential hypertension     S/P  section     Acute bilateral low back pain with right-sided sciatica     Moderate episode of recurrent major depressive disorder (H)     Prenatal care, subsequent pregnancy     Bilateral low back pain without sciatica     Hip pain, left     Past Surgical History:   Procedure Laterality Date     APPENDECTOMY        SECTION N/A 2016    Procedure:  SECTION;  Surgeon: Marco Marin MD;  Location: [facility] OR     COLONOSCOPY       LAPAROSCOPIC CHOLECYSTECTOMY  2013    Procedure: LAPAROSCOPIC CHOLECYSTECTOMY;  Laparoscopic Cholecystectomy;  Surgeon: Lasha Garcias MD;  Location: [facility] OR     MOUTH SURGERY      wisdom teeth     UPPER GI ENDOSCOPY         Social History   Substance Use Topics     Smoking status: Former Smoker     Packs/day: 0.50  "    Types: Cigarettes     Start date: 5/7/2015     Smokeless tobacco: Former User     Alcohol use No      Comment: rare- quit with pregnancy     Family History   Problem Relation Age of Onset     Hypertension Mother      Depression Mother      Osteoporosis Mother      Thyroid Disease Mother      Hypertension Father      Alcohol/Drug Father      recovered alcohol- has fetal alcohol syndrome     Cancer Father      melanoma     Hypertension Maternal Grandmother      Alzheimer Disease Maternal Grandmother      Cardiovascular Maternal Grandmother      triple bypass     Cancer Maternal Grandfather      lung     Alcohol/Drug Paternal Grandmother      alcohol     Cancer - colorectal Paternal Grandmother      colon     Alcohol/Drug Paternal Grandfather      alcohol     Cancer Paternal Grandfather      leukemia - adult onset     Cardiovascular Paternal Grandfather      stent     Obesity Sister      Breast Cancer Maternal Aunt      Cancer Sister      cervical cancer, hysterectomy     Substance Abuse Sister      recovered drugs     Bipolar Disorder Sister      Depression Sister      Hypothyroidism Sister      Autism Spectrum Disorder Sister      ASpergers     Bipolar Disorder Other            Reviewed and updated as needed this visit by clinical staff       Reviewed and updated as needed this visit by Provider         ROS:  Constitutional, HEENT, cardiovascular, pulmonary, gi and gu systems are negative, except as otherwise noted.    OBJECTIVE:     /80  Pulse 96  Temp 98.1  F (36.7  C) (Tympanic)  Ht 5' 6\" (1.676 m)  Wt 313 lb 6 oz (142.1 kg)  LMP 05/23/2018  BMI 50.58 kg/m2  Body mass index is 50.58 kg/(m^2).  GENERAL: healthy, alert and no distress  EYES: Eyes grossly normal to inspection, PERRL and conjunctivae and sclerae normal  HENT: ear canals and TM's normal, nose and mouth without ulcers or lesions  NECK: no adenopathy, no asymmetry, masses, or scars and thyroid normal to palpation  RESP: lungs clear to " auscultation - no rales, rhonchi or wheezes  CV: regular rates and rhythm, normal S1 S2, no S3 or S4 and no murmur, click or rub  SKIN: no suspicious lesions or rashes  NEURO: Normal strength and tone, mentation intact and speech normal    Diagnostic Test Results:  none     ASSESSMENT/PLAN:       ICD-10-CM    1. Post-nasal drainage R09.82 budesonide (RHINOCORT AQUA) 32 MCG/ACT spray       FUTURE APPOINTMENTS:       - Follow up in 1-2 weeks for persistent symptoms, sooner for new or worsening symptoms.     Patient Instructions     Chronic Cough with Uncertain Cause (Adult)    Everyone has had a cough as part of the common cold, flu, or bronchitis. This kind of cough occurs along with an achy feeling, low-grade fever, nasal and sinus congestion, and a scratchy or sore throat. This usually gets better in 2 to 3 weeks. A cough that lasts longer than 3 weeks may be due to other causes.  If your cough does not improve over the next 2 weeks, further testing may be needed. Follow up with your healthcare provider as advised. Cough suppressants may be recommended. Based on your exam today, the exact cause of your cough is not certain. Below are some common causes for persistent cough.  Smoker's cough  Smoker s cough doesn t go away. If you continue to smoke, it only gets worse. The cough is from irritation in the air passages. Talk to your healthcare provider about quitting. Medicines or nicotine-replacement products, like gum or the patch, may make quitting easier.  Postnasal drip  A cough that is worse at night may be due to postnasal drip. Excess mucus in the nose drains from the back of your nose to your throat. This triggers the cough reflex. Postnasal drip may be due to a sinus infection or allergy. Common allergens include dust, tobacco smoke (both inhaled and secondhand smoke), environmental pollutants, pollen, mold, pets, cleaning agents, room deodorizers, and chemical fumes. Over-the-counter antihistamines or  decongestants may be helpful for allergies. A sinus infection may requires antibiotic treatment. See your healthcare provider if symptoms continue.  Medicines  Certain prescribed medicines can cause a chronic cough in some people:    ACE inhibitors for high blood pressure. These include benazepril, captopril, enalapril, fosinopril, lisinopril, quinapril, ramipril, and others.    Beta-blockers for high blood pressure and other conditions. These include propranolol, atenolol, metoprolol, nadolol, and others.  Let your healthcare provider know if you are taking any of these.  Asthma  Cough may be the only sign of mild asthma. You may have tests to find out if asthma is causing your cough. You may also take asthma medicine on a trial basis.  Acid reflux (heartburn, GERD)  The esophagus is the tube that carries food from the mouth to the stomach. A valve at its lower end prevents stomach acids from flowing upward. If this valve does not work properly, acid from the stomach enters the esophagus. This may cause a burning pain in the upper abdomen or lower chest, belching, or cough. Symptoms are often worse when lying flat. Avoid eating or drinking before bedtime. Try using extra pillows to raise your upper body, or place 4-inch blocks under the head of your bed. You may try an over-the-counter antacid or an acid-blocking medicine such as famotidine, cimetidine, ranitidine, esomeprazole, lansoprazole, or omeprazole. Stronger medicines for this condition can be prescribed by your healthcare provider.  Follow-up care  Follow up with your healthcare provider, or as advised, if your cough does not improve. Further testing may be needed.  Note: If an X-ray was taken, a specialist will review it. You will be notified of any new findings that may affect your care.  When to seek medical advice  Call your healthcare provider right away if any of these occur:    Mild wheezing or difficulty breathing    Fever of 100.4 F (38 C) or  higher, or as directed by your healthcare provider    Unexpected weight loss    Coughing up large amounts of colored sputum    Night sweats (sheets and pajamas get soaking wet)  Call 911  Call 911 if any of these occur:    Coughing up blood    Moderate to severe trouble breathing or wheezing  Date Last Reviewed: 9/13/2015 2000-2017 [employer]. [address] [place] [address]. All rights reserved. This information is not intended as a substitute for professional medical care. Always follow your healthcare professional's instructions.            [facility]

## 2018-10-10 NOTE — PATIENT INSTRUCTIONS
Chronic Cough with Uncertain Cause (Adult)    Everyone has had a cough as part of the common cold, flu, or bronchitis. This kind of cough occurs along with an achy feeling, low-grade fever, nasal and sinus congestion, and a scratchy or sore throat. This usually gets better in 2 to 3 weeks. A cough that lasts longer than 3 weeks may be due to other causes.  If your cough does not improve over the next 2 weeks, further testing may be needed. Follow up with your healthcare provider as advised. Cough suppressants may be recommended. Based on your exam today, the exact cause of your cough is not certain. Below are some common causes for persistent cough.  Smoker's cough  Smoker s cough doesn t go away. If you continue to smoke, it only gets worse. The cough is from irritation in the air passages. Talk to your healthcare provider about quitting. Medicines or nicotine-replacement products, like gum or the patch, may make quitting easier.  Postnasal drip  A cough that is worse at night may be due to postnasal drip. Excess mucus in the nose drains from the back of your nose to your throat. This triggers the cough reflex. Postnasal drip may be due to a sinus infection or allergy. Common allergens include dust, tobacco smoke (both inhaled and secondhand smoke), environmental pollutants, pollen, mold, pets, cleaning agents, room deodorizers, and chemical fumes. Over-the-counter antihistamines or decongestants may be helpful for allergies. A sinus infection may requires antibiotic treatment. See your healthcare provider if symptoms continue.  Medicines  Certain prescribed medicines can cause a chronic cough in some people:    ACE inhibitors for high blood pressure. These include benazepril, captopril, enalapril, fosinopril, lisinopril, quinapril, ramipril, and others.    Beta-blockers for high blood pressure and other conditions. These include propranolol, atenolol, metoprolol, nadolol, and others.  Let your healthcare  provider know if you are taking any of these.  Asthma  Cough may be the only sign of mild asthma. You may have tests to find out if asthma is causing your cough. You may also take asthma medicine on a trial basis.  Acid reflux (heartburn, GERD)  The esophagus is the tube that carries food from the mouth to the stomach. A valve at its lower end prevents stomach acids from flowing upward. If this valve does not work properly, acid from the stomach enters the esophagus. This may cause a burning pain in the upper abdomen or lower chest, belching, or cough. Symptoms are often worse when lying flat. Avoid eating or drinking before bedtime. Try using extra pillows to raise your upper body, or place 4-inch blocks under the head of your bed. You may try an over-the-counter antacid or an acid-blocking medicine such as famotidine, cimetidine, ranitidine, esomeprazole, lansoprazole, or omeprazole. Stronger medicines for this condition can be prescribed by your healthcare provider.  Follow-up care  Follow up with your healthcare provider, or as advised, if your cough does not improve. Further testing may be needed.  Note: If an X-ray was taken, a specialist will review it. You will be notified of any new findings that may affect your care.  When to seek medical advice  Call your healthcare provider right away if any of these occur:    Mild wheezing or difficulty breathing    Fever of 100.4 F (38 C) or higher, or as directed by your healthcare provider    Unexpected weight loss    Coughing up large amounts of colored sputum    Night sweats (sheets and pajamas get soaking wet)  Call 911  Call 911 if any of these occur:    Coughing up blood    Moderate to severe trouble breathing or wheezing  Date Last Reviewed: 9/13/2015 2000-2017 [employer]. [address] [place] [address], [place] [address]. All rights reserved. This information is not intended as a substitute for professional medical care. Always follow your healthcare  professional's instructions.

## 2018-10-10 NOTE — MR AVS SNAPSHOT
After Visit Summary   10/10/2018    Janine Salas    MRN: [id number]           Patient Information     Date Of Birth          1985        Visit Information        Provider Department      10/10/2018 1:00 PM Janine Cárdenas APRN [facility]        Today's Diagnoses     Post-nasal drainage    -  1      Care Instructions      Chronic Cough with Uncertain Cause (Adult)    Everyone has had a cough as part of the common cold, flu, or bronchitis. This kind of cough occurs along with an achy feeling, low-grade fever, nasal and sinus congestion, and a scratchy or sore throat. This usually gets better in 2 to 3 weeks. A cough that lasts longer than 3 weeks may be due to other causes.  If your cough does not improve over the next 2 weeks, further testing may be needed. Follow up with your healthcare provider as advised. Cough suppressants may be recommended. Based on your exam today, the exact cause of your cough is not certain. Below are some common causes for persistent cough.  Smoker's cough  Smoker s cough doesn t go away. If you continue to smoke, it only gets worse. The cough is from irritation in the air passages. Talk to your healthcare provider about quitting. Medicines or nicotine-replacement products, like gum or the patch, may make quitting easier.  Postnasal drip  A cough that is worse at night may be due to postnasal drip. Excess mucus in the nose drains from the back of your nose to your throat. This triggers the cough reflex. Postnasal drip may be due to a sinus infection or allergy. Common allergens include dust, tobacco smoke (both inhaled and secondhand smoke), environmental pollutants, pollen, mold, pets, cleaning agents, room deodorizers, and chemical fumes. Over-the-counter antihistamines or decongestants may be helpful for allergies. A sinus infection may requires antibiotic treatment. See your healthcare provider if symptoms continue.  Medicines  Certain  prescribed medicines can cause a chronic cough in some people:    ACE inhibitors for high blood pressure. These include benazepril, captopril, enalapril, fosinopril, lisinopril, quinapril, ramipril, and others.    Beta-blockers for high blood pressure and other conditions. These include propranolol, atenolol, metoprolol, nadolol, and others.  Let your healthcare provider know if you are taking any of these.  Asthma  Cough may be the only sign of mild asthma. You may have tests to find out if asthma is causing your cough. You may also take asthma medicine on a trial basis.  Acid reflux (heartburn, GERD)  The esophagus is the tube that carries food from the mouth to the stomach. A valve at its lower end prevents stomach acids from flowing upward. If this valve does not work properly, acid from the stomach enters the esophagus. This may cause a burning pain in the upper abdomen or lower chest, belching, or cough. Symptoms are often worse when lying flat. Avoid eating or drinking before bedtime. Try using extra pillows to raise your upper body, or place 4-inch blocks under the head of your bed. You may try an over-the-counter antacid or an acid-blocking medicine such as famotidine, cimetidine, ranitidine, esomeprazole, lansoprazole, or omeprazole. Stronger medicines for this condition can be prescribed by your healthcare provider.  Follow-up care  Follow up with your healthcare provider, or as advised, if your cough does not improve. Further testing may be needed.  Note: If an X-ray was taken, a specialist will review it. You will be notified of any new findings that may affect your care.  When to seek medical advice  Call your healthcare provider right away if any of these occur:    Mild wheezing or difficulty breathing    Fever of 100.4 F (38 C) or higher, or as directed by your healthcare provider    Unexpected weight loss    Coughing up large amounts of colored sputum    Night sweats (sheets and pajamas get soaking  wet)  Call 911  Call 911 if any of these occur:    Coughing up blood    Moderate to severe trouble breathing or wheezing  Date Last Reviewed: 9/13/2015 2000-2017 [employer]. [address] [place] [address]. All rights reserved. This information is not intended as a substitute for professional medical care. Always follow your healthcare professional's instructions.                Follow-ups after your visit        Your next 10 appointments already scheduled     Oct 10, 2018  2:30 PM CDT   MONIQUE Spine with Kim Britt PT   [place] [facility] ([facility])    [address] [address] [phone]            Oct 17, 2018 11:00 AM CDT   ESTABLISHED PRENATAL with Anita Porter MD   [facility] ([facility]    [address] [id number] [phone]              Who to contact     Normal or non-critical lab and imaging results will be communicated to you by weipasst, letter or phone within 4 business days after the clinic has received the results. If you do not hear from us within 7 days, please contact the clinic through weipasst or phone. If you have a critical or abnormal lab result, we will notify you by phone as soon as possible.  Submit refill requests through [employer] or call your pharmacy and they will forward the refill request to us. Please allow 3 business days for your refill to be completed.          If you need to speak with a  for additional information , please call: [phone]           Additional Information About Your Visit        Peers App Information     [employer] gives you secure access to your electronic health record. If you see a primary care provider, you can also send messages to your care team and make appointments. If you have questions, please call your primary care clinic.  If you do not have a primary care provider, please call [phone] and they will assist  "you.        Care EveryWhere ID     This is your Care EveryWhere ID. This could be used by other organizations to access your [place] medical records  [id number]        Your Vitals Were     Pulse Temperature Height Last Period BMI (Body Mass Index)       96 98.1  F (36.7  C) (Tympanic) 5' 6\" (1.676 m) 05/23/2018 50.58 kg/m2        Blood Pressure from Last 3 Encounters:   10/10/18 133/80   09/19/18 (!) 150/94   08/15/18 143/79    Weight from Last 3 Encounters:   10/10/18 313 lb 6 oz (142.1 kg)   09/19/18 311 lb (141.1 kg)   08/15/18 299 lb 9.6 oz (135.9 kg)              Today, you had the following     No orders found for display         Today's Medication Changes          These changes are accurate as of 10/10/18  1:37 PM.  If you have any questions, ask your nurse or doctor.               Start taking these medicines.        Dose/Directions    budesonide 32 MCG/ACT spray   Commonly known as:  RHINOCORT AQUA   Used for:  Post-nasal drainage   Started by:  Janine Cárdenas APRN CNP        Dose:  1 spray   Spray 1 spray into both nostrils 2 times daily   Quantity:  1 Bottle   Refills:  3         These medicines have changed or have updated prescriptions.        Dose/Directions    azelaic acid 20 % cream   Commonly known as:  AZELEX   This may have changed:    - when to take this  - reasons to take this   Used for:  Granuloma annulare, Acne vulgaris        Apply topically 2 times daily   Quantity:  50 g   Refills:  11            Where to get your medicines      These medications were sent to [facility] [place] - [address]  [address] [address]     Phone:  [phone]     budesonide 32 MCG/ACT spray                Primary Care Provider Office Phone # Fax #    Kavya Haider PA-C [phone]       [address] [place] [address]        Equal Access to Services     GE[facility] BONILLA AH: Hadii melvi hartmann Soomaali, waaxda luqadaha, qaybta " anali thompsonbrittany dick ah. So [facility] [phone].    ATENCIÓN: Si atiya morrell, tiene a pacheco disposición servicios gratuitos de asistencia lingüística. Christiana al [phone].    We comply with applicable federal civil rights laws and [place] laws. We do not discriminate on the basis of race, color, national origin, age, disability, sex, sexual orientation, or gender identity.            Thank you!     Thank you for choosing [facility]  for your care. Our goal is always to provide you with excellent care. Hearing back from our patients is one way we can continue to improve our services. Please take a few minutes to complete the written survey that you may receive in the mail after your visit with us. Thank you!             Your Updated Medication List - Protect others around you: Learn how to safely use, store and throw away your medicines at www.disposemymeds.org.          This list is accurate as of 10/10/18  1:37 PM.  Always use your most recent med list.                   Brand Name Dispense Instructions for use Diagnosis    azelaic acid 20 % cream    AZELEX    50 g    Apply topically 2 times daily    Granuloma annulare, Acne vulgaris       BENADRYL PO           budesonide 32 MCG/ACT spray    RHINOCORT AQUA    1 Bottle    Spray 1 spray into both nostrils 2 times daily    Post-nasal drainage       prenatal multivitamin plus iron 27-0.8 MG Tabs per tablet     100 tablet    Take 1 tablet by mouth daily    Prenatal care, subsequent pregnancy in second trimester       TYLENOL PO      Take 500 mg by mouth

## 2018-10-11 NOTE — TELEPHONE ENCOUNTER
Can you please let her know this available over the counter to buy. I want her to use this one because she is pregnant and it doesn't have alcohol in it. Thanks. WARREN Li CNP

## 2018-10-11 NOTE — TELEPHONE ENCOUNTER
Central Prior Authorization Team   Phone: [phone]      PA Initiation    Medication: Budesonide 32 mcg-Initiated  Insurance Company: [employer] - Phone [phone] Fax [phone]  Pharmacy Filling the Rx: [facility] - [address]  Filling Pharmacy Phone: [phone]  Filling Pharmacy Fax:    Start Date: 10/11/2018

## 2018-10-16 NOTE — TELEPHONE ENCOUNTER
Per CMM the PA has been denied.  Called and spoke with Karen at [employer] and asked to have another denial fax sent to our number.

## 2018-10-17 ENCOUNTER — PRENATAL OFFICE VISIT (OUTPATIENT)
Dept: OBGYN | Facility: CLINIC | Age: 33
End: 2018-10-17
Payer: COMMERCIAL

## 2018-10-17 ENCOUNTER — HOSPITAL ENCOUNTER (OUTPATIENT)
Dept: ULTRASOUND IMAGING | Facility: CLINIC | Age: 33
Discharge: HOME | End: 2018-10-17
Attending: OBSTETRICS & GYNECOLOGY | Admitting: OBSTETRICS & GYNECOLOGY
Payer: COMMERCIAL

## 2018-10-17 VITALS
WEIGHT: 293 LBS | SYSTOLIC BLOOD PRESSURE: 149 MMHG | DIASTOLIC BLOOD PRESSURE: 77 MMHG | HEIGHT: 66 IN | HEART RATE: 97 BPM | RESPIRATION RATE: 18 BRPM | TEMPERATURE: 98 F | BODY MASS INDEX: 47.09 KG/M2

## 2018-10-17 DIAGNOSIS — Z34.81 ENCOUNTER FOR SUPERVISION OF OTHER NORMAL PREGNANCY IN FIRST TRIMESTER: ICD-10-CM

## 2018-10-17 DIAGNOSIS — Z34.81 PRENATAL CARE, SUBSEQUENT PREGNANCY IN FIRST TRIMESTER: ICD-10-CM

## 2018-10-17 DIAGNOSIS — Z3A.21 21 WEEKS GESTATION OF PREGNANCY: Primary | ICD-10-CM

## 2018-10-17 PROCEDURE — 76816 OB US FOLLOW-UP PER FETUS: CPT

## 2018-10-17 PROCEDURE — 99207 ZZC PRENATAL VISIT: CPT | Performed by: OBSTETRICS & GYNECOLOGY

## 2018-10-17 NOTE — MR AVS SNAPSHOT
After Visit Summary   10/17/2018    Janine Salas    MRN: [id number]           Patient Information     Date Of Birth          1985        Visit Information        Provider Department      10/17/2018 11:00 AM Anita Porter MD [facility]        Today's Diagnoses     21 weeks gestation of pregnancy    -  1       Follow-ups after your visit        Your next 10 appointments already scheduled     Oct 17, 2018 12:30 PM CDT   US OB TRANSVAGINAL ONLY with WYUS1   [facility] ([facility])    [address] [facility] [address]   [phone]           How do I prepare for my exam? (Food and drink instructions) Drink four 8-ounce glasses of fluid an hour before your exam. If you need to empty your bladder before your exam, try to release only a little urine. Then, drink another glass of fluid.  How do I prepare for my exam? (Other instructions) You may have up to two family members in the exam room. If you bring a small child, an adult must be there to care for him or her. No video or camera photography during the procedure.  What should I wear: Wear comfortable clothes.  How long does the exam take: Most ultrasounds take 30 to 60 minutes.  What should I bring: Bring a list of your medicines, including vitamins, minerals and over-the-counter drugs. It is safest to leave personal items at home.  Do I need a :  No  is needed.  What do I need to tell my doctor: Tell your doctor about any allergies you may have.  What should I do after the exam: No restrictions, You may resume normal activities.  What is this test: An ultrasound uses sound waves to make pictures of the body. Sound waves do not cause pain. The only discomfort may be the pressure of the wand against your skin or full bladder.  Who should I call with questions: If you have any questions, please call the Imaging Department where you will have your exam. Directions,  parking instructions, and other information is available on our website, [email or web address].            Oct 24, 2018  3:10 PM CDT   MONIQUE Spine with Kim Britt PT   [place] [facility] ([facility])    [address] [place] [address]   [phone]            Nov 14, 2018 11:00 AM CST   LAB with [facility] ([facility])    [address] [address] [phone]           Please do not eat 10-12 hours before your appointment if you are coming in fasting for labs on lipids, cholesterol, or glucose (sugar). This does not apply to pregnant women. Water, hot tea and black coffee (with nothing added) are okay. Do not drink other fluids, diet soda or chew gum.            Nov 14, 2018 11:15 AM CST   ESTABLISHED PRENATAL with Marco Marin MD   [facility] ([facility])    [address] [address] [phone]              Future tests that were ordered for you today     Open Future Orders        Priority Expected Expires Ordered    CBC with platelets Routine  12/17/2018 10/17/2018    Treponema Abs w Reflex to RPR and Titer Routine  12/17/2018 10/17/2018    Glucose tolerance gest screen 1 hour Routine  12/17/2018 10/17/2018            Who to contact     If you have questions or need follow up information about today's clinic visit or your schedule please contact [facility] directly at [phone].  Normal or non-critical lab and imaging results will be communicated to you by MyChart, letter or phone within 4 business days after the clinic has received the results. If you do not hear from us within 7 days, please contact the clinic through MyChart or phone. If you have a critical or abnormal lab result, we will notify you by phone as soon as possible.  Submit refill requests through [employer] or call your pharmacy and they will forward the refill request to us. Please allow 3  "business days for your refill to be completed.          Additional Information About Your Visit        MyChart Information     [employer] gives you secure access to your electronic health record. If you see a primary care provider, you can also send messages to your care team and make appointments. If you have questions, please call your primary care clinic.  If you do not have a primary care provider, please call [phone] and they will assist you.        Care EveryWhere ID     This is your Care EveryWhere ID. This could be used by other organizations to access your [place] medical records  [id number]        Your Vitals Were     Pulse Temperature Respirations Height Last Period BMI (Body Mass Index)    97 98  F (36.7  C) (Tympanic) 18 5' 6\" (1.676 m) 05/23/2018 51.49 kg/m2       Blood Pressure from Last 3 Encounters:   10/17/18 149/77   10/10/18 133/80   09/19/18 (!) 150/94    Weight from Last 3 Encounters:   10/17/18 319 lb (144.7 kg)   10/10/18 313 lb 6 oz (142.1 kg)   09/19/18 311 lb (141.1 kg)                 Today's Medication Changes          These changes are accurate as of 10/17/18 11:35 AM.  If you have any questions, ask your nurse or doctor.               These medicines have changed or have updated prescriptions.        Dose/Directions    azelaic acid 20 % cream   Commonly known as:  AZELEX   This may have changed:    - when to take this  - reasons to take this   Used for:  Granuloma annulare, Acne vulgaris        Apply topically 2 times daily   Quantity:  50 g   Refills:  11                Primary Care Provider Office Phone # Fax #    Kavya Haider PA-C [phone] [address]  [facility] [address]        Equal Access to Services     [facility] AH: Darin Pierce, alexis craft, khari toribioalmaseamus lee, anali goldberg. So [facility] [phone].    ATENCIÓN: Si habla español, tiene a pacheco disposición servicios gratuitos de " asistencia lingüística. Christiana al [phone].    We comply with applicable federal civil rights laws and [place] laws. We do not discriminate on the basis of race, color, national origin, age, disability, sex, sexual orientation, or gender identity.            Thank you!     Thank you for choosing [facility]  for your care. Our goal is always to provide you with excellent care. Hearing back from our patients is one way we can continue to improve our services. Please take a few minutes to complete the written survey that you may receive in the mail after your visit with us. Thank you!             Your Updated Medication List - Protect others around you: Learn how to safely use, store and throw away your medicines at www.disposemymeds.org.          This list is accurate as of 10/17/18 11:35 AM.  Always use your most recent med list.                   Brand Name Dispense Instructions for use Diagnosis    azelaic acid 20 % cream    AZELEX    50 g    Apply topically 2 times daily    Granuloma annulare, Acne vulgaris       BENADRYL PO           budesonide 32 MCG/ACT spray    RHINOCORT AQUA    1 Bottle    Spray 1 spray into both nostrils 2 times daily    Post-nasal drainage       prenatal multivitamin plus iron 27-0.8 MG Tabs per tablet     100 tablet    Take 1 tablet by mouth daily    Prenatal care, subsequent pregnancy in second trimester       TYLENOL PO      Take 500 mg by mouth

## 2018-10-17 NOTE — PROGRESS NOTES
"CC: Here for routine prenatal visit @ 21w6d   HPI:  Feeling well; normal FM; denies contractions    PE: /77 (BP Location: Right arm, Patient Position: Chair, Cuff Size: Adult Large)  Pulse 97  Temp 98  F (36.7  C) (Tympanic)  Resp 18  Ht 5' 6\" (1.676 m)  Wt 319 lb (144.7 kg)  LMP 05/23/2018  BMI 51.49 kg/m2   See OB flowsheet      A:  1. 21 weeks gestation of pregnancy    - CBC with platelets; Future  - Treponema Abs w Reflex to RPR and Titer; Future  - Glucose tolerance gest screen 1 hour; Future      Routine prenatal care  RTC 4 weeks.      Anita Porter M.D.     "

## 2018-10-17 NOTE — TELEPHONE ENCOUNTER
Called and spoke with Melissa and informed still have not received denial fax, she faxed over another copy.

## 2018-10-18 NOTE — TELEPHONE ENCOUNTER
Called and spoke with Kim and informed still have not received denial fax, she faxed over another copy.

## 2018-10-19 NOTE — TELEPHONE ENCOUNTER
PRIOR AUTHORIZATION DENIED    Medication: Budesonide 32 mcg-DENIED    Denial Date: 10/18/2018    Denial Rational: Requested drug is not included in the member's benefit.

## 2018-11-14 ENCOUNTER — PRENATAL OFFICE VISIT (OUTPATIENT)
Dept: OBGYN | Facility: CLINIC | Age: 33
End: 2018-11-14
Payer: COMMERCIAL

## 2018-11-14 VITALS
SYSTOLIC BLOOD PRESSURE: 136 MMHG | BODY MASS INDEX: 47.09 KG/M2 | RESPIRATION RATE: 20 BRPM | HEART RATE: 100 BPM | WEIGHT: 293 LBS | TEMPERATURE: 97.5 F | HEIGHT: 66 IN | DIASTOLIC BLOOD PRESSURE: 82 MMHG

## 2018-11-14 DIAGNOSIS — Z3A.21 21 WEEKS GESTATION OF PREGNANCY: ICD-10-CM

## 2018-11-14 DIAGNOSIS — Z34.80 PRENATAL CARE, SUBSEQUENT PREGNANCY, UNSPECIFIED TRIMESTER: Primary | ICD-10-CM

## 2018-11-14 LAB
ERYTHROCYTE [DISTWIDTH] IN BLOOD BY AUTOMATED COUNT: 13.1 % (ref 10–15)
GLUCOSE 1H P 50 G GLC PO SERPL-MCNC: 74 MG/DL (ref 60–129)
HCT VFR BLD AUTO: 42 % (ref 35–47)
HGB BLD-MCNC: 14 G/DL (ref 11.7–15.7)
MCH RBC QN AUTO: 30.8 PG (ref 26.5–33)
MCHC RBC AUTO-ENTMCNC: 33.3 G/DL (ref 31.5–36.5)
MCV RBC AUTO: 93 FL (ref 78–100)
PLATELET # BLD AUTO: 247 10E9/L (ref 150–450)
RBC # BLD AUTO: 4.54 10E12/L (ref 3.8–5.2)
WBC # BLD AUTO: 16.3 10E9/L (ref 4–11)

## 2018-11-14 PROCEDURE — 85027 COMPLETE CBC AUTOMATED: CPT | Performed by: OBSTETRICS & GYNECOLOGY

## 2018-11-14 PROCEDURE — 36415 COLL VENOUS BLD VENIPUNCTURE: CPT | Performed by: OBSTETRICS & GYNECOLOGY

## 2018-11-14 PROCEDURE — 99207 ZZC PRENATAL VISIT: CPT | Performed by: OBSTETRICS & GYNECOLOGY

## 2018-11-14 PROCEDURE — 82950 GLUCOSE TEST: CPT | Performed by: OBSTETRICS & GYNECOLOGY

## 2018-11-14 PROCEDURE — 86780 TREPONEMA PALLIDUM: CPT | Performed by: OBSTETRICS & GYNECOLOGY

## 2018-11-14 NOTE — MR AVS SNAPSHOT
After Visit Summary   11/14/2018    Janine Salas    MRN: [id number]           Patient Information     Date Of Birth          1985        Visit Information        Provider Department      11/14/2018 11:15 AM Marco Marin MD [facility]        Today's Diagnoses     Prenatal care, subsequent pregnancy, unspecified trimester    -  1       Follow-ups after your visit        Follow-up notes from your care team     Return in about 2 weeks (around 11/28/2018).      Your next 10 appointments already scheduled     Nov 21, 2018  2:00 PM CST   US OB >14 WEEKS FOLLOW UP with [facility]   [facility] ([facility])    [address] [address]   [phone]           How do I prepare for my exam? (Food and drink instructions) Drink four 8-ounce glasses of fluid an hour before your exam. If you need to empty your bladder before your exam, try to release only a little urine. Then, drink another glass of fluid.  How do I prepare for my exam? (Other instructions) You may have up to two family members in the exam room. If you bring a small child, an adult must be there to care for him or her. No video or camera photography during the procedure.  What should I wear: Wear comfortable clothes.  How long does the exam take: Most ultrasounds take 30 to 60 minutes.  What should I bring: Bring a list of your medicines, including vitamins, minerals and over-the-counter drugs. It is safest to leave personal items at home.  Do I need a :  No  is needed.  What do I need to tell my doctor: Tell your doctor about any allergies you may have.  What should I do after the exam: No restrictions, You may resume normal activities.  What is this test: An ultrasound uses sound waves to make pictures of the body. Sound waves do not cause pain. The only discomfort may be the pressure of the wand against your skin or full bladder.  Who should I call with  questions: If you have any questions, please call the Imaging Department where you will have your exam. Directions, parking instructions, and other information is available on our website, [email or web address].            Dec 13, 2018 11:30 AM CST   ESTABLISHED PRENATAL with Marco Marin MD   [facility]    [address]  [facility] [address]   [phone]              Future tests that were ordered for you today     Open Future Orders        Priority Expected Expires Ordered    US OB >14 Weeks Follow Up Routine  11/14/2019 11/14/2018            Who to contact     If you have questions or need follow up information about today's clinic visit or your schedule please contact [facility] directly at [phone].  Normal or non-critical lab and imaging results will be communicated to you by MyChart, letter or phone within 4 business days after the clinic has received the results. If you do not hear from us within 7 days, please contact the clinic through MyChart or phone. If you have a critical or abnormal lab result, we will notify you by phone as soon as possible.  Submit refill requests through [employer] or call your pharmacy and they will forward the refill request to us. Please allow 3 business days for your refill to be completed.          Additional Information About Your Visit        [employer] Information     [employer] gives you secure access to your electronic health record. If you see a primary care provider, you can also send messages to your care team and make appointments. If you have questions, please call your primary care clinic.  If you do not have a primary care provider, please call [phone] and they will assist you.        Care EveryWhere ID     This is your Care EveryWhere ID. This could be used by other organizations to access your [facility] medical records  [id number]        Your Vitals Were     Pulse Temperature Respirations  "Height Last Period BMI (Body Mass Index)    100 97.5  F (36.4  C) 20 1.676 m (5' 6\") 05/23/2018 51.81 kg/m2       Blood Pressure from Last 3 Encounters:   11/14/18 136/82   10/17/18 149/77   10/10/18 133/80    Weight from Last 3 Encounters:   11/14/18 145.6 kg (321 lb)   10/17/18 144.7 kg (319 lb)   10/10/18 142.1 kg (313 lb 6 oz)                 Today's Medication Changes          These changes are accurate as of 11/14/18 11:59 PM.  If you have any questions, ask your nurse or doctor.               Stop taking these medicines if you haven't already. Please contact your care team if you have questions.     budesonide 32 MCG/ACT spray   Commonly known as:  RHINOCORT AQUA   Stopped by:  Marco Marin MD                    Primary Care Provider Office Phone # Fax #    Kavya Thelma Haider PA-C [phone] [address] [facility] [address]        Equal Access to Services     [facility]: Hadper Pierce, waamy craft, qaanali antonio. So [facility] [phone].    ATENCIÓN: Si habla español, tiene a pacheco disposición servicios gratuitos de asistencia lingüística. [facility] [phone].    We comply with applicable federal civil rights laws and [place] laws. We do not discriminate on the basis of race, color, national origin, age, disability, sex, sexual orientation, or gender identity.            Thank you!     Thank you for choosing [facility]  for your care. Our goal is always to provide you with excellent care. Hearing back from our patients is one way we can continue to improve our services. Please take a few minutes to complete the written survey that you may receive in the mail after your visit with us. Thank you!             Your Updated Medication List - Protect others around you: Learn how to safely use, store and throw away your medicines at www.disposemymeds.org.          This list is accurate " as of 11/14/18 11:59 PM.  Always use your most recent med list.                   Brand Name Dispense Instructions for use Diagnosis    azelaic acid 20 % cream    AZELEX    50 g    Apply topically 2 times daily    Granuloma annulare, Acne vulgaris       BENADRYL PO           prenatal multivitamin plus iron 27-0.8 MG Tabs per tablet     100 tablet    Take 1 tablet by mouth daily    Prenatal care, subsequent pregnancy in second trimester       TYLENOL PO      Take 500 mg by mouth

## 2018-11-14 NOTE — PROGRESS NOTES
Concerns:   Doing well.  No concerns today.  No vaginal bleeding, no contractions, no leakage of fluid  No nausea/vomiting. No heartburn  No vaginal discharge. No dysuria.   No headache, vision changes, lower extremity swelling, upper abdominal pain, chest pain, shortness of breath  Tdap planned next visit  Discussed PTL, PROM, and when to call or come in.  Normal anatomy ultrasound.  GCT today  RTC 4 weeks.  GTT and labs today       Marco Marin MD

## 2018-11-15 LAB — T PALLIDUM AB SER QL: NONREACTIVE

## 2018-11-21 ENCOUNTER — HOSPITAL ENCOUNTER (OUTPATIENT)
Dept: ULTRASOUND IMAGING | Facility: CLINIC | Age: 33
Discharge: HOME | End: 2018-11-21
Attending: OBSTETRICS & GYNECOLOGY | Admitting: OBSTETRICS & GYNECOLOGY
Payer: COMMERCIAL

## 2018-11-21 DIAGNOSIS — Z34.80 PRENATAL CARE, SUBSEQUENT PREGNANCY, UNSPECIFIED TRIMESTER: ICD-10-CM

## 2018-11-21 PROCEDURE — 76816 OB US FOLLOW-UP PER FETUS: CPT

## 2018-12-11 ENCOUNTER — E-VISIT (OUTPATIENT)
Dept: FAMILY MEDICINE | Facility: CLINIC | Age: 33
End: 2018-12-11

## 2018-12-11 ENCOUNTER — TELEPHONE (OUTPATIENT)
Dept: OBGYN | Facility: CLINIC | Age: 33
End: 2018-12-11

## 2018-12-11 DIAGNOSIS — J06.9 VIRAL UPPER RESPIRATORY TRACT INFECTION: Primary | ICD-10-CM

## 2018-12-11 PROCEDURE — 99444 ZZC PHYSICIAN ONLINE EVALUATION & MANAGEMENT SERVICE: CPT | Performed by: PHYSICIAN ASSISTANT

## 2018-12-11 NOTE — TELEPHONE ENCOUNTER
"Reason for call:  Patient reporting a symptom    Symptom or request: Pt calling - thinks she has URI -  had one last week (sick since Sept).  Pt states \"I'm coughing up green stuff since this morning\"  \"I just need an antibiotic it's not rocket science\"  Has put in EVISIT in to FP this morning but nobody has responded.  7 months pregnant.    Pharmacy: [facility].    Duration (how long have symptoms been present):  Cough for a few days - coughing up \"green chunks\" since this morning, had low grade fevers over the weekend but doesn't seem to be having one bea.    Have you been treated for this before? No    Additional comments:     Phone Number patient can be reached at:  Home number on file [phone] (home)    Best Time:  any    Can we leave a detailed message on this number:  YES    Call taken on 12/11/2018 at 1:08 PM by Mery Pulliam    "

## 2018-12-11 NOTE — TELEPHONE ENCOUNTER
Katie Haider (KENZIE) has responded to E-Visit. No further action required     Lukas FRASER RN   Specialty Clinics

## 2018-12-11 NOTE — TELEPHONE ENCOUNTER
Checked in with Radha CAMPOS at [facility].  She will have Kavya Haider PA-C ( PCP ) address.  Otherwise recommend further evaluation in Urgent Care.    Mary Painting   Ob/Gyn Clinic  RN

## 2018-12-13 ENCOUNTER — PRENATAL OFFICE VISIT (OUTPATIENT)
Dept: OBGYN | Facility: CLINIC | Age: 33
End: 2018-12-13
Payer: COMMERCIAL

## 2018-12-13 VITALS
SYSTOLIC BLOOD PRESSURE: 148 MMHG | WEIGHT: 293 LBS | DIASTOLIC BLOOD PRESSURE: 85 MMHG | TEMPERATURE: 98.2 F | HEART RATE: 98 BPM | BODY MASS INDEX: 47.09 KG/M2 | HEIGHT: 66 IN

## 2018-12-13 DIAGNOSIS — Z34.80 PRENATAL CARE, SUBSEQUENT PREGNANCY, UNSPECIFIED TRIMESTER: Primary | ICD-10-CM

## 2018-12-13 DIAGNOSIS — Z34.82 PRENATAL CARE, SUBSEQUENT PREGNANCY IN SECOND TRIMESTER: ICD-10-CM

## 2018-12-13 PROCEDURE — 90471 IMMUNIZATION ADMIN: CPT | Performed by: OBSTETRICS & GYNECOLOGY

## 2018-12-13 PROCEDURE — 90715 TDAP VACCINE 7 YRS/> IM: CPT | Performed by: OBSTETRICS & GYNECOLOGY

## 2018-12-13 PROCEDURE — 99207 ZZC PRENATAL VISIT: CPT | Performed by: OBSTETRICS & GYNECOLOGY

## 2018-12-13 RX ORDER — OXYMETAZOLINE HYDROCHLORIDE 0.05 G/100ML
2 SPRAY NASAL 2 TIMES DAILY
Status: ON HOLD | COMMUNITY
End: 2019-02-20

## 2018-12-13 RX ORDER — PRENATAL VIT/IRON FUM/FOLIC AC 27MG-0.8MG
1 TABLET ORAL DAILY
Qty: 100 TABLET | Refills: 1 | Status: SHIPPED | OUTPATIENT
Start: 2018-12-13 | End: 2019-08-21

## 2018-12-13 ASSESSMENT — MIFFLIN-ST. JEOR: SCORE: 2206.36

## 2018-12-13 NOTE — NURSING NOTE
Screening Questionnaire for Adult Immunization    Are you sick today?   Yes   Do you have allergies to medications, food, a vaccine component or latex?   No   Have you ever had a serious reaction after receiving a vaccination?   No   Do you have a long-term health problem with heart disease, lung disease, asthma, kidney disease, metabolic disease (e.g. diabetes), anemia, or other blood disorder?   No   Do you have cancer, leukemia, HIV/AIDS, or any other immune system problem?   No   In the past 3 months, have you taken medications that affect  your immune system, such as prednisone, other steroids, or anticancer drugs; drugs for the treatment of rheumatoid arthritis, Crohn s disease, or psoriasis; or have you had radiation treatments?   No   Have you had a seizure, or a brain or other nervous system problem?   No   During the past year, have you received a transfusion of blood or blood     products, or been given immune (gamma) globulin or antiviral drug?   No   For women: Are you pregnant or is there a chance you could become        pregnant during the next month?   Yes   Have you received any vaccinations in the past 4 weeks?   No     Immunization questionnaire answers were all negative.        Per orders of Dr. Marin, injection of TDAP given by Shyanne Morales. Patient instructed to remain in clinic for 15 minutes afterwards, and to report any adverse reaction to me immediately.       Screening performed by Shyanne Morales on 12/13/2018 at 11:51 AM.

## 2018-12-13 NOTE — PROGRESS NOTES
Concerns:   Doing well.  No concerns today.  No vaginal bleeding, LOF.  No contractions.  Discussed kick counts and fetal movement.  Discussed kick counts and fetal movement.  Discussed PTL, PROM, and when to call or come in.  RTC 2 weeks.    Marco Marin MD

## 2018-12-26 ENCOUNTER — PRENATAL OFFICE VISIT (OUTPATIENT)
Dept: OBGYN | Facility: CLINIC | Age: 33
End: 2018-12-26
Payer: COMMERCIAL

## 2018-12-26 VITALS
DIASTOLIC BLOOD PRESSURE: 96 MMHG | HEIGHT: 66 IN | SYSTOLIC BLOOD PRESSURE: 144 MMHG | TEMPERATURE: 98.4 F | WEIGHT: 293 LBS | BODY MASS INDEX: 47.09 KG/M2 | RESPIRATION RATE: 16 BRPM | HEART RATE: 105 BPM

## 2018-12-26 DIAGNOSIS — I10 ESSENTIAL HYPERTENSION: ICD-10-CM

## 2018-12-26 DIAGNOSIS — O99.343 ANXIETY IN PREGNANCY IN THIRD TRIMESTER, ANTEPARTUM: ICD-10-CM

## 2018-12-26 DIAGNOSIS — F41.9 ANXIETY IN PREGNANCY IN THIRD TRIMESTER, ANTEPARTUM: ICD-10-CM

## 2018-12-26 DIAGNOSIS — Z34.83 PRENATAL CARE, SUBSEQUENT PREGNANCY IN THIRD TRIMESTER: Primary | ICD-10-CM

## 2018-12-26 LAB
ALT SERPL W P-5'-P-CCNC: 37 U/L (ref 0–50)
AST SERPL W P-5'-P-CCNC: 30 U/L (ref 0–45)
CREAT SERPL-MCNC: 0.53 MG/DL (ref 0.52–1.04)
CREAT UR-MCNC: 44 MG/DL
ERYTHROCYTE [DISTWIDTH] IN BLOOD BY AUTOMATED COUNT: 13.1 % (ref 10–15)
GFR SERPL CREATININE-BSD FRML MDRD: >90 ML/MIN/{1.73_M2}
HCT VFR BLD AUTO: 40.3 % (ref 35–47)
HGB BLD-MCNC: 13.4 G/DL (ref 11.7–15.7)
MCH RBC QN AUTO: 30.4 PG (ref 26.5–33)
MCHC RBC AUTO-ENTMCNC: 33.3 G/DL (ref 31.5–36.5)
MCV RBC AUTO: 91 FL (ref 78–100)
PLATELET # BLD AUTO: 240 10E9/L (ref 150–450)
PROT UR-MCNC: <0.05 G/L
PROT/CREAT 24H UR: NORMAL G/G CR (ref 0–0.2)
RBC # BLD AUTO: 4.41 10E12/L (ref 3.8–5.2)
URATE SERPL-MCNC: 3.6 MG/DL (ref 2.6–6)
WBC # BLD AUTO: 12 10E9/L (ref 4–11)

## 2018-12-26 PROCEDURE — 82565 ASSAY OF CREATININE: CPT | Performed by: OBSTETRICS & GYNECOLOGY

## 2018-12-26 PROCEDURE — 84460 ALANINE AMINO (ALT) (SGPT): CPT | Performed by: OBSTETRICS & GYNECOLOGY

## 2018-12-26 PROCEDURE — 99207 ZZC PRENATAL VISIT: CPT | Performed by: OBSTETRICS & GYNECOLOGY

## 2018-12-26 PROCEDURE — 36415 COLL VENOUS BLD VENIPUNCTURE: CPT | Performed by: OBSTETRICS & GYNECOLOGY

## 2018-12-26 PROCEDURE — 85027 COMPLETE CBC AUTOMATED: CPT | Performed by: OBSTETRICS & GYNECOLOGY

## 2018-12-26 PROCEDURE — 84156 ASSAY OF PROTEIN URINE: CPT | Performed by: OBSTETRICS & GYNECOLOGY

## 2018-12-26 PROCEDURE — 84550 ASSAY OF BLOOD/URIC ACID: CPT | Performed by: OBSTETRICS & GYNECOLOGY

## 2018-12-26 PROCEDURE — 84450 TRANSFERASE (AST) (SGOT): CPT | Performed by: OBSTETRICS & GYNECOLOGY

## 2018-12-26 RX ORDER — BREAST PUMP
1 EACH MISCELLANEOUS
Qty: 1 EACH | Refills: 0 | Status: SHIPPED | OUTPATIENT
Start: 2018-12-26 | End: 2020-07-16

## 2018-12-26 RX ORDER — BREAST PUMP
1 EACH MISCELLANEOUS
Qty: 1 EACH | Refills: 0 | Status: SHIPPED | OUTPATIENT
Start: 2018-12-26 | End: 2018-12-26

## 2018-12-26 RX ORDER — HYDROXYZINE PAMOATE 50 MG/1
50 CAPSULE ORAL
Qty: 30 CAPSULE | Refills: 1 | Status: SHIPPED | OUTPATIENT
Start: 2018-12-26 | End: 2019-02-06

## 2018-12-26 ASSESSMENT — MIFFLIN-ST. JEOR: SCORE: 2255.35

## 2018-12-26 NOTE — PATIENT INSTRUCTIONS
1) Go down to lab and have your blood drawn and leave a urine sample.   2) Call Radiology to schedule a growth US in the next week and then weekly BPPs (Biophysical profiles) Radiology Phone #: [phone].  3) Return to clinic in one week.   4)  your vistaril at your pharmacy to help you sleep.   Good luck and hang in there!!  - Dr. Silveira

## 2018-12-26 NOTE — NURSING NOTE
"Initial BP (!) 144/96 (BP Location: Right arm, Patient Position: Chair, Cuff Size: Adult Large)   Pulse 105   Temp 98.4  F (36.9  C) (Tympanic)   Resp 16   Ht 1.676 m (5' 6\")   Wt (!) 153.9 kg (339 lb 3.2 oz)   LMP 05/23/2018   BMI 54.75 kg/m   Estimated body mass index is 54.75 kg/m  as calculated from the following:    Height as of this encounter: 1.676 m (5' 6\").    Weight as of this encounter: 153.9 kg (339 lb 3.2 oz). .      "

## 2018-12-26 NOTE — PROGRESS NOTES
"[facility]   OB/GYN Clinic    CC: Return OB     Subjective:    Janine is a 33 year old  at 31w6d by 9w0d US who presents for return OB visit.  Janine has had a very bad week. Her family had an intervention with her on [place] [place] because they think her son has autism because he is missing developmental milestones. She is deeply upset by this and feels \"betrayed\" by her family. She reports that she thinks her son is delayed because she \"dissociated\" from him due to her own childhood trauma as well as what she feels was a traumatic birth experience during her c/s. She feels like she wasn't even given an opportunity to be induced (cervidil was placed in the wrong spot) and she was forced to have a c/s. She feels like she was told she would have \"no chance\" at a vaginal delivery and TOLAC wasn't an option for her. Hasn't slept in days worrying about these issues and an upcoming delivery. She \"feels like she is on the verge of a nervous breakdown.\" This is NOT a desired pregnancy for her.     She feels she has no support in this pregnancy and can't trust in in-laws to \"help\" her. Is very worried and anxious about choosing a date of delivery because she doesn't want to be  from her son during the surgery.     Mathews like BPPs were \"a waste of my time and took forever.\" No symptoms of elevated BPs, no HA/scotoma, has noticed slowly worsening edema.     No ctx, vaginal bleeding or leaking. +FM.     Objective:  BP (!) 144/96 (BP Location: Right arm, Patient Position: Chair, Cuff Size: Adult Large)   Pulse 105   Temp 98.4  F (36.9  C) (Tympanic)   Resp 16   Ht 1.676 m (5' 6\")   Wt (!) 153.9 kg (339 lb 3.2 oz)   LMP 2018   BMI 54.75 kg/m      Estimated body mass index is 54.75 kg/m  as calculated from the following:    Height as of this encounter: 1.676 m (5' 6\").    Weight as of this encounter: 153.9 kg (339 lb 3.2 oz).    Physical Exam:  Gen: crying and distressed during most " "of the interview   Respiratory: breathing comfortably on room air   Cardiac: Warm and well-perfused  GI: Abd soft and non-tender, gravid  MSK: Grossly normal movement of all four extremities  Psych: very anxious   Lower extremity: trace b/l edema     See OB flowsheet    Assessment/Plan:   33 year old  at 31w6d by 9w0d US who presents for follow-up OB visit.   1) New OB lab: O+, thang neg, RI. 3rd tri labs nl.   2) Chronic Hypertension: hx of gestational HTN, BPs 130-140s/80-90s. No indication for BP meds at this time. Will draw baseline HELLP labs today. Recommended serial growth US q4wks and weekly BPPs from now until delivery. Discussed delivery between 38-40wks and possibly at 37wks should she develop pre-e.   3) Delivery plan: Reviewed delivery options. Pt had initially presented for IOL in her first pregnancy, but failed to make progress on cervidil for requested a primary c/s, which was a scheduled primary c/s. Reviewed options of TOLAC vs repeat scheduled c/s. Pt is not interested in referral to another location for review of TOLAC options, so feels \"stuck\" with a repeat c/s. She thinks her c/s can only be on a Friday, which I discussed with her was incorrect information. Was very overwhelmed with the discussion of picking a possible date for c/s, so will continue to discuss when she is in improved state. Will discuss contraception plan at next visit as this was an undesired pregnancy. Appears to have declined a tubal ligation previously.   4) Immunizations: s/p flu shot and Tdap   5) Anxiety: pt was very visibly anxious during our visit and reports not having slept for days. I recommended using vistaril tonight to help get her some sleep and ease some anxiety. I offered starting an serotonin specific reuptake inhibitor to help, but declines this at this time. Continues to see therapist weekly. SI/HI precautions reviewed.     I did recommend that she return to clinic in one week to check in about her " mood, given her very distressed state today in clinic.     Angela Silveira MD  OB/GYN  12/26/2018

## 2018-12-28 ENCOUNTER — MYC MEDICAL ADVICE (OUTPATIENT)
Dept: OBGYN | Facility: CLINIC | Age: 33
End: 2018-12-28

## 2018-12-28 NOTE — TELEPHONE ENCOUNTER
Pt was seen in clinic by Dr. Silveira on 12-26-18.  See plan for anxiety problem.  Question if should have something for anxiety.  Pt uses the pharmacy downstairs.  Pt does not have future appt this next week for recheck on mood.    5) Anxiety: pt was very visibly anxious during our visit and reports not having slept for days. I recommended using vistaril tonight to help get her some sleep and ease some anxiety. I offered starting an serotonin specific reuptake inhibitor to help, but declines this at this time. Continues to see therapist weekly. SI/HI precautions reviewed.     Janeth Sethi  [facility] RN

## 2018-12-31 DIAGNOSIS — F41.1 GAD (GENERALIZED ANXIETY DISORDER): Primary | ICD-10-CM

## 2018-12-31 RX ORDER — BUPROPION HYDROCHLORIDE 150 MG/1
150 TABLET, EXTENDED RELEASE ORAL 2 TIMES DAILY
Qty: 180 TABLET | Refills: 3 | Status: SHIPPED | OUTPATIENT
Start: 2018-12-31 | End: 2019-01-09

## 2019-01-02 ENCOUNTER — HOSPITAL ENCOUNTER (OUTPATIENT)
Dept: ULTRASOUND IMAGING | Facility: CLINIC | Age: 34
Discharge: HOME | End: 2019-01-02
Attending: OBSTETRICS & GYNECOLOGY | Admitting: OBSTETRICS & GYNECOLOGY
Payer: COMMERCIAL

## 2019-01-02 DIAGNOSIS — Z34.83 PRENATAL CARE, SUBSEQUENT PREGNANCY IN THIRD TRIMESTER: ICD-10-CM

## 2019-01-02 DIAGNOSIS — I10 ESSENTIAL HYPERTENSION: ICD-10-CM

## 2019-01-02 PROCEDURE — 76819 FETAL BIOPHYS PROFIL W/O NST: CPT

## 2019-01-04 NOTE — TELEPHONE ENCOUNTER
Wellbutrin was issued to pt on 12-31-18 by Dr. Porter.    Janeth Sethi  [facility] RN

## 2019-01-09 ENCOUNTER — TELEPHONE (OUTPATIENT)
Dept: OBGYN | Facility: CLINIC | Age: 34
End: 2019-01-09

## 2019-01-09 ENCOUNTER — PRENATAL OFFICE VISIT (OUTPATIENT)
Dept: OBGYN | Facility: CLINIC | Age: 34
End: 2019-01-09
Payer: COMMERCIAL

## 2019-01-09 VITALS
WEIGHT: 293 LBS | TEMPERATURE: 98.3 F | RESPIRATION RATE: 20 BRPM | HEIGHT: 66 IN | BODY MASS INDEX: 47.09 KG/M2 | DIASTOLIC BLOOD PRESSURE: 95 MMHG | SYSTOLIC BLOOD PRESSURE: 143 MMHG | HEART RATE: 133 BPM

## 2019-01-09 DIAGNOSIS — Z98.891 S/P CESAREAN SECTION: ICD-10-CM

## 2019-01-09 DIAGNOSIS — Z34.83 PRENATAL CARE, SUBSEQUENT PREGNANCY IN THIRD TRIMESTER: Primary | ICD-10-CM

## 2019-01-09 PROCEDURE — 99207 ZZC PRENATAL VISIT: CPT | Performed by: OBSTETRICS & GYNECOLOGY

## 2019-01-09 RX ORDER — BUPROPION HYDROCHLORIDE 150 MG/1
150 TABLET ORAL EVERY MORNING
COMMUNITY
End: 2019-02-06

## 2019-01-09 ASSESSMENT — MIFFLIN-ST. JEOR: SCORE: 2263.52

## 2019-01-09 NOTE — PROGRESS NOTES
"CC: Here for routine prenatal visit @ 33w6d   HPI:  Getting BPP every other week; growth scan last week 2099 g (61%); would like Dr. Marin to do RCS on 19      PE: BP (!) 143/95 (BP Location: Right arm, Patient Position: Chair, Cuff Size: Adult Large)   Pulse 133   Temp 98.3  F (36.8  C) (Tympanic)   Resp 20   Ht 1.676 m (5' 6\")   Wt (!) 154.7 kg (341 lb)   LMP 2018   Breastfeeding? No   BMI 55.04 kg/m     See OB flowsheet      A:  1. Prenatal care, subsequent pregnancy in third trimester      2. S/P  section  3> Chronic hypertension      Recommend weekly BPP  Schedule RCS on 19  Routine prenatal care  RTC 2 weeks.      Anita Porter M.D.     "

## 2019-01-09 NOTE — TELEPHONE ENCOUNTER
"Janine Salas  [id number]  1985    You are now scheduled for surgery at [facility].  Below are the details for your surgery.  Please read the \"Preparing for Your Surgery\" instructions and let us know if you have any questions.    Type of surgery:  Delivery  Surgeon:  Marco Marin MD  Location of surgery: [facility] OR    Date of surgery: 19    Time: 7:30am   Arrival Time: 6:00am [facility]    Time can change, to be confirmed a couple of days prior by pre-op surgery nurse.    Pre-Op Appt Date: last OB visit  Post-Op Appt Date: To be determined by provider     Packet sent out: Yes  Pre-cert/Authorization completed:  TBD by Financial Securing Office.   MA Sterilization/Hysterectomy Acknowledgment Consent signed: Not Applicable    [facility]  [phone]    Fax: [phone]  Same Day Surgery [phone]  Fax: [phone]  [facility] Center [phone]      "

## 2019-01-09 NOTE — PROGRESS NOTES
"Initial BP (!) 143/95 (BP Location: Right arm, Patient Position: Chair, Cuff Size: Adult Large)   Pulse 133   Temp 98.3  F (36.8  C) (Tympanic)   Resp 20   Ht 1.676 m (5' 6\")   Wt (!) 154.7 kg (341 lb)   LMP 05/23/2018   Breastfeeding? No   BMI 55.04 kg/m   Estimated body mass index is 55.04 kg/m  as calculated from the following:    Height as of this encounter: 1.676 m (5' 6\").    Weight as of this encounter: 154.7 kg (341 lb). .    Karen Funez, ADEN    "

## 2019-01-10 ENCOUNTER — HOSPITAL ENCOUNTER (OUTPATIENT)
Dept: ULTRASOUND IMAGING | Facility: CLINIC | Age: 34
Discharge: HOME | End: 2019-01-10
Attending: PHYSICIAN ASSISTANT | Admitting: PHYSICIAN ASSISTANT
Payer: COMMERCIAL

## 2019-01-10 DIAGNOSIS — Z34.83 PRENATAL CARE, SUBSEQUENT PREGNANCY IN THIRD TRIMESTER: ICD-10-CM

## 2019-01-10 DIAGNOSIS — I10 ESSENTIAL HYPERTENSION: ICD-10-CM

## 2019-01-10 PROCEDURE — 76819 FETAL BIOPHYS PROFIL W/O NST: CPT

## 2019-01-17 ENCOUNTER — TELEPHONE (OUTPATIENT)
Dept: FAMILY MEDICINE | Facility: CLINIC | Age: 34
End: 2019-01-17

## 2019-01-17 ENCOUNTER — HOSPITAL ENCOUNTER (EMERGENCY)
Facility: CLINIC | Age: 34
Discharge: HOME | End: 2019-01-17
Attending: PHYSICIAN ASSISTANT | Admitting: PHYSICIAN ASSISTANT
Payer: COMMERCIAL

## 2019-01-17 VITALS
DIASTOLIC BLOOD PRESSURE: 92 MMHG | RESPIRATION RATE: 18 BRPM | HEIGHT: 66 IN | SYSTOLIC BLOOD PRESSURE: 153 MMHG | HEART RATE: 103 BPM | BODY MASS INDEX: 55.04 KG/M2 | TEMPERATURE: 97.8 F | OXYGEN SATURATION: 98 %

## 2019-01-17 DIAGNOSIS — S99.921A INJURY OF TOE ON RIGHT FOOT, INITIAL ENCOUNTER: ICD-10-CM

## 2019-01-17 PROCEDURE — 99213 OFFICE O/P EST LOW 20 MIN: CPT | Performed by: PHYSICIAN ASSISTANT

## 2019-01-17 PROCEDURE — G0463 HOSPITAL OUTPT CLINIC VISIT: HCPCS

## 2019-01-17 NOTE — ED AVS SNAPSHOT
[facility]  [address] [address]  Phone:  [phone]  Fax:  [phone]                                    Janine Salas   MRN: [id number]    Department:  [facility]   Date of Visit:  1/17/2019           After Visit Summary Signature Page    I have received my discharge instructions, and my questions have been answered. I have discussed any challenges I see with this plan with the nurse or doctor.    ..........................................................................................................................................  Patient/Patient Representative Signature      ..........................................................................................................................................  Patient Representative Print Name and Relationship to Patient    ..................................................               ................................................  Date                                   Time    ..........................................................................................................................................  Reviewed by Signature/Title    ...................................................              ..............................................  Date                                               Time          22EPIC Rev 08/18

## 2019-01-18 ENCOUNTER — HOSPITAL ENCOUNTER (OUTPATIENT)
Dept: ULTRASOUND IMAGING | Facility: CLINIC | Age: 34
Discharge: HOME | End: 2019-01-18
Attending: PHYSICIAN ASSISTANT | Admitting: PHYSICIAN ASSISTANT
Payer: COMMERCIAL

## 2019-01-18 DIAGNOSIS — I10 ESSENTIAL HYPERTENSION: ICD-10-CM

## 2019-01-18 DIAGNOSIS — Z34.83 PRENATAL CARE, SUBSEQUENT PREGNANCY IN THIRD TRIMESTER: ICD-10-CM

## 2019-01-18 PROCEDURE — 76819 FETAL BIOPHYS PROFIL W/O NST: CPT

## 2019-01-18 NOTE — ED PROVIDER NOTES
History     Chief Complaint   Patient presents with     Toe Injury     HPI  Janine Salas is a 33 year old 35-week pregnant female who presents to the urgent care with concern over right third toe pain after injury earlier today.  Patient states that she dropped a can on the foot while barefoot and had sudden onset of pain.  Later her toddler child jumped on the toe.  She has noted a crack to the nail.  She additionally complains of some ecchymosis.  She has not had any distal numbness or paresthesias.  She denies any pain in the proximal aspect of the foot.  She is attempted to treat with Tylenol.     Allergies:  Allergies   Allergen Reactions     Codeine Itching     Zofran [Ondansetron] Other (See Comments)     Other reaction(s): Headache  Headaches        Problem List:    Patient Active Problem List    Diagnosis Date Noted     Hip pain, left 2018     Priority: Medium     Bilateral low back pain without sciatica 2018     Priority: Medium     Prenatal care, subsequent pregnancy 2018     Priority: Medium     Moderate episode of recurrent major depressive disorder (H) 03/15/2018     Priority: Medium     Acute bilateral low back pain with right-sided sciatica 2017     Priority: Medium     S/P  section 2016     Priority: Medium     Essential hypertension 2016     Priority: Medium     Anxiety attack 02/10/2016     Priority: Medium     Morbid obesity due to excess calories (H) 2016     Priority: Medium     Back pain associated with peripheral numbness 2016     Priority: Medium     CARDIOVASCULAR SCREENING; LDL GOAL LESS THAN 160 2012     Priority: Medium        Past Medical History:    Past Medical History:   Diagnosis Date     Acute pancreatitis 1/15     Anxiety      Chickenpox      PONV (postoperative nausea and vomiting)      Pregnancy induced hypertension      PTSD (post-traumatic stress disorder)      Severe postpartum depression        Past  Surgical History:    Past Surgical History:   Procedure Laterality Date     APPENDECTOMY  2001      SECTION N/A 2016    Procedure:  SECTION;  Surgeon: Marco Marin MD;  Location: [facility] OR     COLONOSCOPY       LAPAROSCOPIC CHOLECYSTECTOMY  2013    Procedure: LAPAROSCOPIC CHOLECYSTECTOMY;  Laparoscopic Cholecystectomy;  Surgeon: Lasha Garcias MD;  Location: [facility] OR     MOUTH SURGERY      wisdom teeth     UPPER GI ENDOSCOPY         Family History:    Family History   Problem Relation Age of Onset     Hypertension Mother      Depression Mother      Osteoporosis Mother      Thyroid Disease Mother      Hypertension Father      Alcohol/Drug Father         recovered alcohol- has fetal alcohol syndrome     Cancer Father         melanoma     Hypertension Maternal Grandmother      Alzheimer Disease Maternal Grandmother      Cardiovascular Maternal Grandmother         triple bypass     Cancer Maternal Grandfather         lung     Alcohol/Drug Paternal Grandmother         alcohol     Cancer - colorectal Paternal Grandmother         colon     Alcohol/Drug Paternal Grandfather         alcohol     Cancer Paternal Grandfather         leukemia - adult onset     Cardiovascular Paternal Grandfather         stent     Obesity Sister      Breast Cancer Maternal Aunt      Cancer Sister         cervical cancer, hysterectomy     Substance Abuse Sister         recovered drugs     Bipolar Disorder Sister      Depression Sister      Hypothyroidism Sister      Autism Spectrum Disorder Sister         ASpergers     Bipolar Disorder Other        Social History:  Marital Status:  Single [1]  Social History     Tobacco Use     Smoking status: Former Smoker     Packs/day: 0.50     Types: Cigarettes     Start date: 2015     Smokeless tobacco: Former User   Substance Use Topics     Alcohol use: No     Alcohol/week: 0.0 oz     Comment: rare- quit with pregnancy     Drug use: No        Medications:   "    Acetaminophen (TYLENOL PO)   azelaic acid (AZELEX) 20 % cream   buPROPion (WELLBUTRIN XL) 150 MG 24 hr tablet   DiphenhydrAMINE HCl (BENADRYL PO)   guaiFENesin (ROBITUSSIN) 100 MG/5ML SYRP   Misc. Devices (BREAST PUMP) MISC   oxymetazoline (AFRIN) 0.05 % nasal spray   Prenatal Vit-Fe Fumarate-FA (PRENATAL MULTIVITAMIN W/IRON) 27-0.8 MG tablet     Review of Systems  INTEGUMENTARY/SKIN: POSITIVE for ecchymosis right third toe NEGATIVE for lacerations, worrisome rashes   MUSCULOSKELETAL: POSITIVE  for right third toe pain and NEGATIVE for other concerning arthralgias or myalgias   NEURO: NEGATIVE for numbness, paresthesias   Physical Exam   BP: (!) 153/92  Pulse: 103  Temp: 97.8  F (36.6  C)  Resp: 18  Height: 167.6 cm (5' 6\")  SpO2: 98 %  Physical Exam   Constitutional: She is oriented to person, place, and time. She appears well-developed and well-nourished.   Musculoskeletal:        Right ankle: Normal.        Right foot: There is decreased range of motion (right third toe due to pain), tenderness and bony tenderness. There is no swelling, normal capillary refill, no crepitus, no deformity and no laceration.        Feet:    Neurological: She is alert and oriented to person, place, and time. No sensory deficit.   Skin: Skin is warm, dry and intact. Capillary refill takes less than 2 seconds. Ecchymosis noted. No abrasion, no bruising and no rash noted. No erythema.       ED Course        Procedures        Critical Care time:  none            No results found for this or any previous visit (from the past 24 hour(s)).    Medications - No data to display    Assessments & Plan (with Medical Decision Making)     I have reviewed the nursing notes.    I have reviewed the findings, diagnosis, plan and need for follow up with the patient.          Medication List      There are no discharge medications for this visit.       Final diagnoses:   Injury of toe on right foot, initial encounter     33-year-old female presents " to urgent care with concern over right third toe pain after a can dropped on it earlier today.  She had mild tachycardia upon arrival, remainder vital signs within normal limits.  Physical exam findings as described above were significant for pain, tenderness palpation, ecchymosis of her right third toe.  I did discuss her/benefits of obtaining x-ray to rule out fracture given mechanism of injury however as toe fractures are generally treated symptomatically with alicia taping, and support with shoe patient declined receiving radiation due to her pregnancy status.  Differential would include contusion, fracture, strain.  She did not have any pain in the foot to suggest metatarsal injury at this time.  She was discharged home stable with postop shoe.  Follow-up with primary care provider if no improvement within the next 7-10 days.  Worrisome reasons to return to the ER/UC sooner discussed.    Disclaimer: This note consists of symbols derived from keyboarding, dictation, and/or voice recognition software. As a result, there may be errors in the script that have gone undetected.  Please consider this when interpreting information found in the chart.    1/17/2019   [facility] EMERGENCY DEPARTMENT     Phoebe Pteer PA-C  01/17/19 [id number]

## 2019-01-21 NOTE — TELEPHONE ENCOUNTER
Sorry am just seeing this now  There isn't a lot we do for the toes (other than the big toe). She can try alicia taping it. Sometimes getting a post op shoe or wearing shoes with very firm soles to prevent bending at the toes can help. I would have her ice and elevate when able.   Kavya

## 2019-01-21 NOTE — TELEPHONE ENCOUNTER
Message left on patient's answering machine to call the [facility] RN back.  Gonzalo Ellison RN

## 2019-01-23 ENCOUNTER — PRENATAL OFFICE VISIT (OUTPATIENT)
Dept: OBGYN | Facility: CLINIC | Age: 34
End: 2019-01-23
Payer: COMMERCIAL

## 2019-01-23 VITALS
DIASTOLIC BLOOD PRESSURE: 94 MMHG | HEART RATE: 123 BPM | TEMPERATURE: 97.6 F | SYSTOLIC BLOOD PRESSURE: 162 MMHG | WEIGHT: 293 LBS | BODY MASS INDEX: 47.09 KG/M2 | RESPIRATION RATE: 18 BRPM | HEIGHT: 66 IN

## 2019-01-23 DIAGNOSIS — I10 ESSENTIAL HYPERTENSION: ICD-10-CM

## 2019-01-23 DIAGNOSIS — Z34.83 PRENATAL CARE, SUBSEQUENT PREGNANCY IN THIRD TRIMESTER: Primary | ICD-10-CM

## 2019-01-23 LAB
ALT SERPL W P-5'-P-CCNC: 30 U/L (ref 0–50)
AST SERPL W P-5'-P-CCNC: 24 U/L (ref 0–45)
CREAT SERPL-MCNC: 0.61 MG/DL (ref 0.52–1.04)
CREAT UR-MCNC: 43 MG/DL
ERYTHROCYTE [DISTWIDTH] IN BLOOD BY AUTOMATED COUNT: 13.1 % (ref 10–15)
GFR SERPL CREATININE-BSD FRML MDRD: >90 ML/MIN/{1.73_M2}
HCT VFR BLD AUTO: 41.3 % (ref 35–47)
HGB BLD-MCNC: 13.9 G/DL (ref 11.7–15.7)
MCH RBC QN AUTO: 30.3 PG (ref 26.5–33)
MCHC RBC AUTO-ENTMCNC: 33.7 G/DL (ref 31.5–36.5)
MCV RBC AUTO: 90 FL (ref 78–100)
PLATELET # BLD AUTO: 253 10E9/L (ref 150–450)
PROT UR-MCNC: <0.05 G/L
PROT/CREAT 24H UR: NORMAL G/G CR (ref 0–0.2)
RBC # BLD AUTO: 4.58 10E12/L (ref 3.8–5.2)
URATE SERPL-MCNC: 4.4 MG/DL (ref 2.6–6)
WBC # BLD AUTO: 15.7 10E9/L (ref 4–11)

## 2019-01-23 PROCEDURE — 85027 COMPLETE CBC AUTOMATED: CPT | Performed by: OBSTETRICS & GYNECOLOGY

## 2019-01-23 PROCEDURE — 99207 ZZC PRENATAL VISIT: CPT | Performed by: OBSTETRICS & GYNECOLOGY

## 2019-01-23 PROCEDURE — 36415 COLL VENOUS BLD VENIPUNCTURE: CPT | Performed by: OBSTETRICS & GYNECOLOGY

## 2019-01-23 PROCEDURE — 84550 ASSAY OF BLOOD/URIC ACID: CPT | Performed by: OBSTETRICS & GYNECOLOGY

## 2019-01-23 PROCEDURE — 87653 STREP B DNA AMP PROBE: CPT | Performed by: OBSTETRICS & GYNECOLOGY

## 2019-01-23 PROCEDURE — 84460 ALANINE AMINO (ALT) (SGPT): CPT | Performed by: OBSTETRICS & GYNECOLOGY

## 2019-01-23 PROCEDURE — 84156 ASSAY OF PROTEIN URINE: CPT | Performed by: OBSTETRICS & GYNECOLOGY

## 2019-01-23 PROCEDURE — 82565 ASSAY OF CREATININE: CPT | Performed by: OBSTETRICS & GYNECOLOGY

## 2019-01-23 PROCEDURE — 84450 TRANSFERASE (AST) (SGOT): CPT | Performed by: OBSTETRICS & GYNECOLOGY

## 2019-01-23 RX ORDER — HYDROXYZINE HYDROCHLORIDE 50 MG/1
50 TABLET, FILM COATED ORAL 3 TIMES DAILY PRN
COMMUNITY
End: 2019-03-01

## 2019-01-23 ASSESSMENT — MIFFLIN-ST. JEOR: SCORE: 2299.8

## 2019-01-23 NOTE — PROGRESS NOTES
"CC: Here for routine prenatal visit @ 35w6d   HPI:  Repeat /82; no scotomata or headache; peripheral edema about same    PE: BP (!) 162/94 (BP Location: Right arm, Patient Position: Chair, Cuff Size: Adult Large)   Pulse 123   Temp 97.6  F (36.4  C) (Tympanic)   Resp 18   Ht 1.676 m (5' 6\")   Wt (!) 158.3 kg (349 lb)   LMP 05/23/2018   BMI 56.33 kg/m     See OB flowsheet      A:  1. Prenatal care, subsequent pregnancy in third trimester    - Strep, Group B by PCR    2. Essential hypertension    - CBC with platelets  - ALT  - AST  - Uric acid  - Creatinine  - Protein  random urine with Creat Ratio      Routine prenatal care  RTC 1 weeks.      Anita Porter M.D.     "

## 2019-01-24 ENCOUNTER — HOSPITAL ENCOUNTER (OUTPATIENT)
Dept: ULTRASOUND IMAGING | Facility: CLINIC | Age: 34
Discharge: HOME | End: 2019-01-24
Attending: OBSTETRICS & GYNECOLOGY | Admitting: OBSTETRICS & GYNECOLOGY
Payer: COMMERCIAL

## 2019-01-24 DIAGNOSIS — I10 ESSENTIAL HYPERTENSION: ICD-10-CM

## 2019-01-24 DIAGNOSIS — Z34.83 PRENATAL CARE, SUBSEQUENT PREGNANCY IN THIRD TRIMESTER: ICD-10-CM

## 2019-01-24 LAB
GP B STREP DNA SPEC QL NAA+PROBE: NEGATIVE
SPECIMEN SOURCE: NORMAL

## 2019-01-24 PROCEDURE — 76819 FETAL BIOPHYS PROFIL W/O NST: CPT

## 2019-01-30 ENCOUNTER — PRENATAL OFFICE VISIT (OUTPATIENT)
Dept: OBGYN | Facility: CLINIC | Age: 34
End: 2019-01-30
Payer: COMMERCIAL

## 2019-01-30 ENCOUNTER — HOSPITAL ENCOUNTER (OUTPATIENT)
Dept: ULTRASOUND IMAGING | Facility: CLINIC | Age: 34
Discharge: HOME | End: 2019-01-30
Attending: OBSTETRICS & GYNECOLOGY | Admitting: OBSTETRICS & GYNECOLOGY
Payer: COMMERCIAL

## 2019-01-30 ENCOUNTER — ANESTHESIA EVENT (OUTPATIENT)
Dept: SURGERY | Facility: CLINIC | Age: 34
End: 2019-01-30
Payer: MEDICAID

## 2019-01-30 VITALS
WEIGHT: 293 LBS | RESPIRATION RATE: 18 BRPM | TEMPERATURE: 98.9 F | DIASTOLIC BLOOD PRESSURE: 94 MMHG | HEIGHT: 66 IN | HEART RATE: 122 BPM | SYSTOLIC BLOOD PRESSURE: 140 MMHG | BODY MASS INDEX: 47.09 KG/M2

## 2019-01-30 DIAGNOSIS — Z34.83 ENCOUNTER FOR SUPERVISION OF OTHER NORMAL PREGNANCY IN THIRD TRIMESTER: Primary | ICD-10-CM

## 2019-01-30 DIAGNOSIS — I10 ESSENTIAL HYPERTENSION: ICD-10-CM

## 2019-01-30 DIAGNOSIS — Z34.83 PRENATAL CARE, SUBSEQUENT PREGNANCY IN THIRD TRIMESTER: ICD-10-CM

## 2019-01-30 DIAGNOSIS — F41.9 ANXIETY: ICD-10-CM

## 2019-01-30 PROCEDURE — 99207 ZZC PRENATAL VISIT: CPT | Performed by: OBSTETRICS & GYNECOLOGY

## 2019-01-30 PROCEDURE — 59426 ANTEPARTUM CARE ONLY: CPT | Performed by: OBSTETRICS & GYNECOLOGY

## 2019-01-30 PROCEDURE — 76819 FETAL BIOPHYS PROFIL W/O NST: CPT

## 2019-01-30 RX ORDER — ESCITALOPRAM OXALATE 10 MG/1
10 TABLET ORAL DAILY
Qty: 90 TABLET | Refills: 3 | Status: SHIPPED | OUTPATIENT
Start: 2019-01-30 | End: 2020-02-03

## 2019-01-30 ASSESSMENT — MIFFLIN-ST. JEOR: SCORE: 2327.02

## 2019-01-30 NOTE — PROGRESS NOTES
Concerns: she recalled the problems with the spinal and asked for a general anesthesia.  I contacted Earl Hay CRNA we discussed the situation.  He is leaving a message with the rest of the staff thought that it would be doable.  She has been discussing with her therapist current medication-Wellbutrin at 150 mg daily.  Her issue seems to be more anxiety which would be better treated with Lexapro.  I discussed the switch over with the pharmacy who recommended stopping the Wellbutrin and in starting the Lexapro at 10 mg daily and slowly increasing her dose.  Doing well.  No concerns today.  No vaginal bleeding, LOF, contractions.  No HA, RUQ pain, N/V, visual changes.  Discussed signs of labor and when to call or come in.  GBS done today.  Labor precautions discussed.  RTC 1 week.  Prenatal flowsheet information is reviewed.    Marco Marin MD

## 2019-02-06 ENCOUNTER — PRENATAL OFFICE VISIT (OUTPATIENT)
Dept: OBGYN | Facility: CLINIC | Age: 34
End: 2019-02-06

## 2019-02-06 VITALS
WEIGHT: 293 LBS | TEMPERATURE: 98.6 F | DIASTOLIC BLOOD PRESSURE: 95 MMHG | RESPIRATION RATE: 18 BRPM | HEART RATE: 110 BPM | SYSTOLIC BLOOD PRESSURE: 145 MMHG | BODY MASS INDEX: 47.09 KG/M2 | HEIGHT: 66 IN

## 2019-02-06 DIAGNOSIS — Z34.83 ENCOUNTER FOR SUPERVISION OF OTHER NORMAL PREGNANCY IN THIRD TRIMESTER: Primary | ICD-10-CM

## 2019-02-06 PROCEDURE — 99212 OFFICE O/P EST SF 10 MIN: CPT | Performed by: OBSTETRICS & GYNECOLOGY

## 2019-02-06 ASSESSMENT — MIFFLIN-ST. JEOR: SCORE: 2349.7

## 2019-02-06 NOTE — NURSING NOTE
"Chief Complaint   Patient presents with     Prenatal Care     37w 6d       Initial BP (!) 145/95 (BP Location: Right arm, Patient Position: Chair, Cuff Size: Adult Large)   Pulse 110   Temp 98.6  F (37  C) (Tympanic)   Resp 18   Ht 1.676 m (5' 6\")   Wt (!) 163.3 kg (360 lb)   LMP 05/23/2018   BMI 58.11 kg/m   Estimated body mass index is 58.11 kg/m  as calculated from the following:    Height as of this encounter: 1.676 m (5' 6\").    Weight as of this encounter: 163.3 kg (360 lb).  BP completed using cuff size: large  Medications and allergies reviewed.      Karen COTA MA    "

## 2019-02-06 NOTE — PROGRESS NOTES
Concerns: having random contractions  No Headaches  Boy! Peng    No vaginal bleeding, LOF, contractions.  No HA, RUQ pain, N/V, visual changes.  Biophysical profile will be tomorrow  She is taking Lexapro 10 mg daily  Labor precautions discussed.  Labor precautions  RTC 1 week.    Marco Marin MD

## 2019-02-08 ENCOUNTER — HOSPITAL ENCOUNTER (OUTPATIENT)
Dept: ULTRASOUND IMAGING | Facility: CLINIC | Age: 34
Discharge: HOME | End: 2019-02-08
Attending: OBSTETRICS & GYNECOLOGY | Admitting: OBSTETRICS & GYNECOLOGY
Payer: MEDICAID

## 2019-02-08 PROCEDURE — 76819 FETAL BIOPHYS PROFIL W/O NST: CPT

## 2019-02-11 ENCOUNTER — HOSPITAL ENCOUNTER (INPATIENT)
Facility: CLINIC | Age: 34
LOS: 5 days | Discharge: HOME | End: 2019-02-16
Attending: OBSTETRICS & GYNECOLOGY | Admitting: OBSTETRICS & GYNECOLOGY
Payer: MEDICAID

## 2019-02-11 DIAGNOSIS — Z34.90 TERM PREGNANCY, REPEAT: Primary | ICD-10-CM

## 2019-02-11 DIAGNOSIS — Z98.891 S/P CESAREAN SECTION: Primary | ICD-10-CM

## 2019-02-11 PROBLEM — Z34.80 PRENATAL CARE, SUBSEQUENT PREGNANCY: Status: ACTIVE | Noted: 2018-06-20

## 2019-02-11 LAB
ABO + RH BLD: NORMAL
ABO + RH BLD: NORMAL
ALT SERPL W P-5'-P-CCNC: 26 U/L (ref 0–50)
AST SERPL W P-5'-P-CCNC: 28 U/L (ref 0–45)
BLD GP AB SCN SERPL QL: NORMAL
BLOOD BANK CMNT PATIENT-IMP: NORMAL
CREAT SERPL-MCNC: 0.55 MG/DL (ref 0.52–1.04)
CREAT UR-MCNC: 147 MG/DL
ERYTHROCYTE [DISTWIDTH] IN BLOOD BY AUTOMATED COUNT: 13.1 % (ref 10–15)
GFR SERPL CREATININE-BSD FRML MDRD: >90 ML/MIN/{1.73_M2}
HCT VFR BLD AUTO: 40.7 % (ref 35–47)
HGB BLD-MCNC: 13.6 G/DL (ref 11.7–15.7)
MCH RBC QN AUTO: 30.2 PG (ref 26.5–33)
MCHC RBC AUTO-ENTMCNC: 33.4 G/DL (ref 31.5–36.5)
MCV RBC AUTO: 90 FL (ref 78–100)
PLATELET # BLD AUTO: 238 10E9/L (ref 150–450)
PROT UR-MCNC: 0.17 G/L
PROT/CREAT 24H UR: 0.11 G/G CR (ref 0–0.2)
RBC # BLD AUTO: 4.51 10E12/L (ref 3.8–5.2)
SPECIMEN EXP DATE BLD: NORMAL
URATE SERPL-MCNC: 5 MG/DL (ref 2.6–6)
WBC # BLD AUTO: 14.2 10E9/L (ref 4–11)

## 2019-02-11 PROCEDURE — 84156 ASSAY OF PROTEIN URINE: CPT | Performed by: OBSTETRICS & GYNECOLOGY

## 2019-02-11 PROCEDURE — 25000128 H RX IP 250 OP 636: Performed by: NURSE ANESTHETIST, CERTIFIED REGISTERED

## 2019-02-11 PROCEDURE — 59514 CESAREAN DELIVERY ONLY: CPT | Mod: AS | Performed by: PHYSICIAN ASSISTANT

## 2019-02-11 PROCEDURE — 25000125 ZZHC RX 250: Performed by: OBSTETRICS & GYNECOLOGY

## 2019-02-11 PROCEDURE — 25000128 H RX IP 250 OP 636: Performed by: OBSTETRICS & GYNECOLOGY

## 2019-02-11 PROCEDURE — 25000125 ZZHC RX 250: Performed by: NURSE ANESTHETIST, CERTIFIED REGISTERED

## 2019-02-11 PROCEDURE — 25800030 ZZH RX IP 258 OP 636: Performed by: NURSE ANESTHETIST, CERTIFIED REGISTERED

## 2019-02-11 PROCEDURE — 71000013 ZZH RECOVERY PHASE 1 LEVEL 1 EA ADDTL HR: Performed by: OBSTETRICS & GYNECOLOGY

## 2019-02-11 PROCEDURE — 82565 ASSAY OF CREATININE: CPT | Performed by: OBSTETRICS & GYNECOLOGY

## 2019-02-11 PROCEDURE — 37000009 ZZH ANESTHESIA TECHNICAL FEE, EACH ADDTL 15 MIN: Performed by: OBSTETRICS & GYNECOLOGY

## 2019-02-11 PROCEDURE — 27210794 ZZH OR GENERAL SUPPLY STERILE: Performed by: OBSTETRICS & GYNECOLOGY

## 2019-02-11 PROCEDURE — 12000000 ZZH R&B MED SURG/OB

## 2019-02-11 PROCEDURE — 27110028 ZZH OR GENERAL SUPPLY NON-STERILE: Performed by: OBSTETRICS & GYNECOLOGY

## 2019-02-11 PROCEDURE — 71000012 ZZH RECOVERY PHASE 1 LEVEL 1 FIRST HR: Performed by: OBSTETRICS & GYNECOLOGY

## 2019-02-11 PROCEDURE — 84450 TRANSFERASE (AST) (SGOT): CPT | Performed by: OBSTETRICS & GYNECOLOGY

## 2019-02-11 PROCEDURE — 86900 BLOOD TYPING SEROLOGIC ABO: CPT | Performed by: OBSTETRICS & GYNECOLOGY

## 2019-02-11 PROCEDURE — 85027 COMPLETE CBC AUTOMATED: CPT | Performed by: OBSTETRICS & GYNECOLOGY

## 2019-02-11 PROCEDURE — 25800030 ZZH RX IP 258 OP 636: Performed by: OBSTETRICS & GYNECOLOGY

## 2019-02-11 PROCEDURE — 86901 BLOOD TYPING SEROLOGIC RH(D): CPT | Performed by: OBSTETRICS & GYNECOLOGY

## 2019-02-11 PROCEDURE — 59515 CESAREAN DELIVERY: CPT | Performed by: OBSTETRICS & GYNECOLOGY

## 2019-02-11 PROCEDURE — 37000008 ZZH ANESTHESIA TECHNICAL FEE, 1ST 30 MIN: Performed by: OBSTETRICS & GYNECOLOGY

## 2019-02-11 PROCEDURE — 84460 ALANINE AMINO (ALT) (SGPT): CPT | Performed by: OBSTETRICS & GYNECOLOGY

## 2019-02-11 PROCEDURE — 25000132 ZZH RX MED GY IP 250 OP 250 PS 637: Performed by: OBSTETRICS & GYNECOLOGY

## 2019-02-11 PROCEDURE — 36415 COLL VENOUS BLD VENIPUNCTURE: CPT | Performed by: OBSTETRICS & GYNECOLOGY

## 2019-02-11 PROCEDURE — 86850 RBC ANTIBODY SCREEN: CPT | Performed by: OBSTETRICS & GYNECOLOGY

## 2019-02-11 PROCEDURE — 84550 ASSAY OF BLOOD/URIC ACID: CPT | Performed by: OBSTETRICS & GYNECOLOGY

## 2019-02-11 PROCEDURE — 36000058 ZZH SURGERY LEVEL 3 EA 15 ADDTL MIN: Performed by: OBSTETRICS & GYNECOLOGY

## 2019-02-11 PROCEDURE — 36000056 ZZH SURGERY LEVEL 3 1ST 30 MIN: Performed by: OBSTETRICS & GYNECOLOGY

## 2019-02-11 RX ORDER — METOCLOPRAMIDE HYDROCHLORIDE 5 MG/ML
10 INJECTION INTRAMUSCULAR; INTRAVENOUS EVERY 6 HOURS PRN
Status: DISCONTINUED | OUTPATIENT
Start: 2019-02-11 | End: 2019-02-14

## 2019-02-11 RX ORDER — LIDOCAINE 40 MG/G
CREAM TOPICAL
Status: DISCONTINUED | OUTPATIENT
Start: 2019-02-11 | End: 2019-02-15

## 2019-02-11 RX ORDER — METOCLOPRAMIDE 10 MG/1
10 TABLET ORAL EVERY 6 HOURS PRN
Status: DISCONTINUED | OUTPATIENT
Start: 2019-02-11 | End: 2019-02-14

## 2019-02-11 RX ORDER — ONDANSETRON 2 MG/ML
INJECTION INTRAMUSCULAR; INTRAVENOUS PRN
Status: DISCONTINUED | OUTPATIENT
Start: 2019-02-11 | End: 2019-02-11

## 2019-02-11 RX ORDER — DEXAMETHASONE SODIUM PHOSPHATE 4 MG/ML
INJECTION, SOLUTION INTRA-ARTICULAR; INTRALESIONAL; INTRAMUSCULAR; INTRAVENOUS; SOFT TISSUE PRN
Status: DISCONTINUED | OUTPATIENT
Start: 2019-02-11 | End: 2019-02-11

## 2019-02-11 RX ORDER — OXYTOCIN/0.9 % SODIUM CHLORIDE 30/500 ML
PLASTIC BAG, INJECTION (ML) INTRAVENOUS CONTINUOUS PRN
Status: DISCONTINUED | OUTPATIENT
Start: 2019-02-11 | End: 2019-02-11

## 2019-02-11 RX ORDER — HYDROXYZINE HYDROCHLORIDE 50 MG/1
50 TABLET, FILM COATED ORAL 3 TIMES DAILY PRN
Status: DISCONTINUED | OUTPATIENT
Start: 2019-02-11 | End: 2019-02-15

## 2019-02-11 RX ORDER — MORPHINE SULFATE 4 MG/ML
INJECTION, SOLUTION INTRAMUSCULAR; INTRAVENOUS PRN
Status: DISCONTINUED | OUTPATIENT
Start: 2019-02-11 | End: 2019-02-11

## 2019-02-11 RX ORDER — FENTANYL CITRATE 50 UG/ML
INJECTION, SOLUTION INTRAMUSCULAR; INTRAVENOUS PRN
Status: DISCONTINUED | OUTPATIENT
Start: 2019-02-11 | End: 2019-02-11

## 2019-02-11 RX ORDER — NALOXONE HYDROCHLORIDE 0.4 MG/ML
.1-.4 INJECTION, SOLUTION INTRAMUSCULAR; INTRAVENOUS; SUBCUTANEOUS
Status: DISCONTINUED | OUTPATIENT
Start: 2019-02-11 | End: 2019-02-14

## 2019-02-11 RX ORDER — ACETAMINOPHEN 325 MG/1
975 TABLET ORAL EVERY 8 HOURS
Status: DISPENSED | OUTPATIENT
Start: 2019-02-11 | End: 2019-02-14

## 2019-02-11 RX ORDER — DIPHENHYDRAMINE HCL 25 MG
25 CAPSULE ORAL EVERY 6 HOURS PRN
Status: DISCONTINUED | OUTPATIENT
Start: 2019-02-11 | End: 2019-02-14

## 2019-02-11 RX ORDER — SODIUM CHLORIDE, SODIUM LACTATE, POTASSIUM CHLORIDE, CALCIUM CHLORIDE 600; 310; 30; 20 MG/100ML; MG/100ML; MG/100ML; MG/100ML
INJECTION, SOLUTION INTRAVENOUS CONTINUOUS
Status: DISCONTINUED | OUTPATIENT
Start: 2019-02-11 | End: 2019-02-13

## 2019-02-11 RX ORDER — OXYTOCIN 10 [USP'U]/ML
10 INJECTION, SOLUTION INTRAMUSCULAR; INTRAVENOUS
Status: DISCONTINUED | OUTPATIENT
Start: 2019-02-11 | End: 2019-02-15

## 2019-02-11 RX ORDER — HYDROXYZINE HYDROCHLORIDE 25 MG/1
25 TABLET, FILM COATED ORAL EVERY 6 HOURS PRN
Status: DISCONTINUED | OUTPATIENT
Start: 2019-02-11 | End: 2019-02-14

## 2019-02-11 RX ORDER — ACETAMINOPHEN 325 MG/1
650 TABLET ORAL EVERY 4 HOURS PRN
Status: DISCONTINUED | OUTPATIENT
Start: 2019-02-14 | End: 2019-02-15

## 2019-02-11 RX ORDER — ACETAMINOPHEN 325 MG/1
975 TABLET ORAL ONCE
Status: COMPLETED | OUTPATIENT
Start: 2019-02-11 | End: 2019-02-14

## 2019-02-11 RX ORDER — ONDANSETRON 2 MG/ML
4 INJECTION INTRAMUSCULAR; INTRAVENOUS EVERY 6 HOURS PRN
Status: DISCONTINUED | OUTPATIENT
Start: 2019-02-11 | End: 2019-02-15

## 2019-02-11 RX ORDER — DIMENHYDRINATE 50 MG/ML
25 INJECTION, SOLUTION INTRAMUSCULAR; INTRAVENOUS
Status: DISCONTINUED | OUTPATIENT
Start: 2019-02-11 | End: 2019-02-14

## 2019-02-11 RX ORDER — CEFAZOLIN SODIUM 1 G/50ML
1 INJECTION, SOLUTION INTRAVENOUS SEE ADMIN INSTRUCTIONS
Status: DISCONTINUED | OUTPATIENT
Start: 2019-02-11 | End: 2019-02-13

## 2019-02-11 RX ORDER — CITRIC ACID/SODIUM CITRATE 334-500MG
30 SOLUTION, ORAL ORAL
Status: COMPLETED | OUTPATIENT
Start: 2019-02-11 | End: 2019-02-11

## 2019-02-11 RX ORDER — IBUPROFEN 800 MG/1
800 TABLET, FILM COATED ORAL EVERY 6 HOURS PRN
Status: DISCONTINUED | OUTPATIENT
Start: 2019-02-11 | End: 2019-02-15

## 2019-02-11 RX ORDER — MAGNESIUM HYDROXIDE 1200 MG/15ML
LIQUID ORAL PRN
Status: DISCONTINUED | OUTPATIENT
Start: 2019-02-11 | End: 2019-02-16 | Stop reason: HOSPADM

## 2019-02-11 RX ORDER — BUPIVACAINE HYDROCHLORIDE 7.5 MG/ML
INJECTION, SOLUTION INTRASPINAL PRN
Status: DISCONTINUED | OUTPATIENT
Start: 2019-02-11 | End: 2019-02-11

## 2019-02-11 RX ORDER — OXYCODONE HYDROCHLORIDE 5 MG/1
5-10 TABLET ORAL
Status: DISCONTINUED | OUTPATIENT
Start: 2019-02-11 | End: 2019-02-15

## 2019-02-11 RX ORDER — ESCITALOPRAM OXALATE 10 MG/1
10 TABLET ORAL DAILY
Status: DISCONTINUED | OUTPATIENT
Start: 2019-02-12 | End: 2019-02-15

## 2019-02-11 RX ORDER — LIDOCAINE 40 MG/G
CREAM TOPICAL
Status: DISCONTINUED | OUTPATIENT
Start: 2019-02-11 | End: 2019-02-14

## 2019-02-11 RX ORDER — AMOXICILLIN 250 MG
2 CAPSULE ORAL 2 TIMES DAILY PRN
Status: DISCONTINUED | OUTPATIENT
Start: 2019-02-11 | End: 2019-02-15

## 2019-02-11 RX ORDER — NALOXONE HYDROCHLORIDE 0.4 MG/ML
.1-.4 INJECTION, SOLUTION INTRAMUSCULAR; INTRAVENOUS; SUBCUTANEOUS
Status: DISCONTINUED | OUTPATIENT
Start: 2019-02-11 | End: 2019-02-15

## 2019-02-11 RX ORDER — DEXTROSE, SODIUM CHLORIDE, SODIUM LACTATE, POTASSIUM CHLORIDE, AND CALCIUM CHLORIDE 5; .6; .31; .03; .02 G/100ML; G/100ML; G/100ML; G/100ML; G/100ML
INJECTION, SOLUTION INTRAVENOUS CONTINUOUS
Status: DISCONTINUED | OUTPATIENT
Start: 2019-02-11 | End: 2019-02-13

## 2019-02-11 RX ORDER — OXYTOCIN/0.9 % SODIUM CHLORIDE 30/500 ML
340 PLASTIC BAG, INJECTION (ML) INTRAVENOUS CONTINUOUS PRN
Status: DISCONTINUED | OUTPATIENT
Start: 2019-02-11 | End: 2019-02-15

## 2019-02-11 RX ORDER — OXYTOCIN/0.9 % SODIUM CHLORIDE 30/500 ML
100 PLASTIC BAG, INJECTION (ML) INTRAVENOUS CONTINUOUS
Status: DISCONTINUED | OUTPATIENT
Start: 2019-02-11 | End: 2019-02-13

## 2019-02-11 RX ORDER — BISACODYL 10 MG
10 SUPPOSITORY, RECTAL RECTAL DAILY PRN
Status: DISCONTINUED | OUTPATIENT
Start: 2019-02-13 | End: 2019-02-15

## 2019-02-11 RX ORDER — ALBUTEROL SULFATE 0.83 MG/ML
2.5 SOLUTION RESPIRATORY (INHALATION) EVERY 4 HOURS PRN
Status: DISCONTINUED | OUTPATIENT
Start: 2019-02-11 | End: 2019-02-14

## 2019-02-11 RX ORDER — EPINEPHRINE 1 MG/ML
INJECTION, SOLUTION, CONCENTRATE INTRAVENOUS PRN
Status: DISCONTINUED | OUTPATIENT
Start: 2019-02-11 | End: 2019-02-11

## 2019-02-11 RX ORDER — NALOXONE HYDROCHLORIDE 0.4 MG/ML
.1-.4 INJECTION, SOLUTION INTRAMUSCULAR; INTRAVENOUS; SUBCUTANEOUS
Status: ACTIVE | OUTPATIENT
Start: 2019-02-11 | End: 2019-02-12

## 2019-02-11 RX ORDER — DIPHENHYDRAMINE HYDROCHLORIDE 50 MG/ML
25 INJECTION INTRAMUSCULAR; INTRAVENOUS EVERY 6 HOURS PRN
Status: DISCONTINUED | OUTPATIENT
Start: 2019-02-11 | End: 2019-02-14

## 2019-02-11 RX ORDER — FENTANYL CITRATE 50 UG/ML
25-50 INJECTION, SOLUTION INTRAMUSCULAR; INTRAVENOUS
Status: DISCONTINUED | OUTPATIENT
Start: 2019-02-11 | End: 2019-02-13

## 2019-02-11 RX ORDER — LANOLIN 100 %
OINTMENT (GRAM) TOPICAL
Status: DISCONTINUED | OUTPATIENT
Start: 2019-02-11 | End: 2019-02-15

## 2019-02-11 RX ORDER — AMOXICILLIN 250 MG
1 CAPSULE ORAL 2 TIMES DAILY PRN
Status: DISCONTINUED | OUTPATIENT
Start: 2019-02-11 | End: 2019-02-15

## 2019-02-11 RX ORDER — HYDROCORTISONE 2.5 %
CREAM (GRAM) TOPICAL 3 TIMES DAILY PRN
Status: DISCONTINUED | OUTPATIENT
Start: 2019-02-11 | End: 2019-02-15

## 2019-02-11 RX ORDER — KETOROLAC TROMETHAMINE 30 MG/ML
30 INJECTION, SOLUTION INTRAMUSCULAR; INTRAVENOUS
Status: COMPLETED | OUTPATIENT
Start: 2019-02-11 | End: 2019-02-11

## 2019-02-11 RX ORDER — HYDROXYZINE HYDROCHLORIDE 50 MG/1
50 TABLET, FILM COATED ORAL EVERY 6 HOURS PRN
Status: DISCONTINUED | OUTPATIENT
Start: 2019-02-11 | End: 2019-02-14

## 2019-02-11 RX ORDER — SCOLOPAMINE TRANSDERMAL SYSTEM 1 MG/1
1 PATCH, EXTENDED RELEASE TRANSDERMAL ONCE
Status: COMPLETED | OUTPATIENT
Start: 2019-02-11 | End: 2019-02-11

## 2019-02-11 RX ORDER — OXYMETAZOLINE HYDROCHLORIDE 0.05 G/100ML
2 SPRAY NASAL 2 TIMES DAILY
Status: DISCONTINUED | OUTPATIENT
Start: 2019-02-11 | End: 2019-02-13

## 2019-02-11 RX ORDER — LIDOCAINE HYDROCHLORIDE 10 MG/ML
INJECTION, SOLUTION INFILTRATION; PERINEURAL PRN
Status: DISCONTINUED | OUTPATIENT
Start: 2019-02-11 | End: 2019-02-11

## 2019-02-11 RX ORDER — SIMETHICONE 80 MG
80 TABLET,CHEWABLE ORAL 4 TIMES DAILY PRN
Status: DISCONTINUED | OUTPATIENT
Start: 2019-02-11 | End: 2019-02-15

## 2019-02-11 RX ORDER — KETOROLAC TROMETHAMINE 30 MG/ML
30 INJECTION, SOLUTION INTRAMUSCULAR; INTRAVENOUS EVERY 6 HOURS
Status: DISPENSED | OUTPATIENT
Start: 2019-02-11 | End: 2019-02-12

## 2019-02-11 RX ORDER — SCOLOPAMINE TRANSDERMAL SYSTEM 1 MG/1
1 PATCH, EXTENDED RELEASE TRANSDERMAL ONCE
Status: DISCONTINUED | OUTPATIENT
Start: 2019-02-11 | End: 2019-02-13

## 2019-02-11 RX ORDER — EPHEDRINE SULFATE 50 MG/ML
INJECTION, SOLUTION INTRAMUSCULAR; INTRAVENOUS; SUBCUTANEOUS PRN
Status: DISCONTINUED | OUTPATIENT
Start: 2019-02-11 | End: 2019-02-11

## 2019-02-11 RX ORDER — HYDROMORPHONE HYDROCHLORIDE 1 MG/ML
.3-.5 INJECTION, SOLUTION INTRAMUSCULAR; INTRAVENOUS; SUBCUTANEOUS EVERY 5 MIN PRN
Status: DISCONTINUED | OUTPATIENT
Start: 2019-02-11 | End: 2019-02-13

## 2019-02-11 RX ADMIN — PHENYLEPHRINE HYDROCHLORIDE 100 MCG: 10 INJECTION, SOLUTION INTRAMUSCULAR; INTRAVENOUS; SUBCUTANEOUS at 20:31

## 2019-02-11 RX ADMIN — ACETAMINOPHEN 975 MG: 325 TABLET, FILM COATED ORAL at 23:17

## 2019-02-11 RX ADMIN — SODIUM CHLORIDE, POTASSIUM CHLORIDE, SODIUM LACTATE AND CALCIUM CHLORIDE: 600; 310; 30; 20 INJECTION, SOLUTION INTRAVENOUS at 18:35

## 2019-02-11 RX ADMIN — Medication 5 MG: at 20:00

## 2019-02-11 RX ADMIN — SODIUM CHLORIDE, POTASSIUM CHLORIDE, SODIUM LACTATE AND CALCIUM CHLORIDE: 600; 310; 30; 20 INJECTION, SOLUTION INTRAVENOUS at 19:45

## 2019-02-11 RX ADMIN — DEXAMETHASONE SODIUM PHOSPHATE 8 MG: 4 INJECTION, SOLUTION INTRA-ARTICULAR; INTRALESIONAL; INTRAMUSCULAR; INTRAVENOUS; SOFT TISSUE at 20:26

## 2019-02-11 RX ADMIN — SCOPALAMINE 1 PATCH: 1 PATCH, EXTENDED RELEASE TRANSDERMAL at 19:23

## 2019-02-11 RX ADMIN — FENTANYL CITRATE 25 MCG: 50 INJECTION, SOLUTION INTRAMUSCULAR; INTRAVENOUS at 19:54

## 2019-02-11 RX ADMIN — KETOROLAC TROMETHAMINE 30 MG: 30 INJECTION, SOLUTION INTRAMUSCULAR at 21:35

## 2019-02-11 RX ADMIN — EPINEPHRINE 0.2 MG: 1 INJECTION, SOLUTION INTRAMUSCULAR; SUBCUTANEOUS at 19:51

## 2019-02-11 RX ADMIN — Medication 5 MG: at 20:07

## 2019-02-11 RX ADMIN — ONDANSETRON 4 MG: 2 INJECTION INTRAMUSCULAR; INTRAVENOUS at 20:27

## 2019-02-11 RX ADMIN — CEFAZOLIN SODIUM 3 G: 1 INJECTION, POWDER, FOR SOLUTION INTRAMUSCULAR; INTRAVENOUS at 19:01

## 2019-02-11 RX ADMIN — BUPIVACAINE HYDROCHLORIDE IN DEXTROSE 1.4 ML: 7.5 INJECTION, SOLUTION SUBARACHNOID at 19:51

## 2019-02-11 RX ADMIN — LIDOCAINE HYDROCHLORIDE 30 MG: 10 INJECTION, SOLUTION INFILTRATION; PERINEURAL at 19:51

## 2019-02-11 RX ADMIN — Medication 5 MG: at 19:58

## 2019-02-11 RX ADMIN — OXYTOCIN-SODIUM CHLORIDE 0.9% IV SOLN 30 UNIT/500ML 340 ML/HR: 30-0.9/5 SOLUTION at 20:16

## 2019-02-11 RX ADMIN — SODIUM CHLORIDE, POTASSIUM CHLORIDE, SODIUM LACTATE AND CALCIUM CHLORIDE 1000 ML: 600; 310; 30; 20 INJECTION, SOLUTION INTRAVENOUS at 18:35

## 2019-02-11 RX ADMIN — PHENYLEPHRINE HYDROCHLORIDE 100 MCG: 10 INJECTION, SOLUTION INTRAMUSCULAR; INTRAVENOUS; SUBCUTANEOUS at 20:45

## 2019-02-11 RX ADMIN — SODIUM CHLORIDE, POTASSIUM CHLORIDE, SODIUM LACTATE AND CALCIUM CHLORIDE: 600; 310; 30; 20 INJECTION, SOLUTION INTRAVENOUS at 21:24

## 2019-02-11 RX ADMIN — OXYTOCIN-SODIUM CHLORIDE 0.9% IV SOLN 30 UNIT/500ML 100 ML/HR: 30-0.9/5 SOLUTION at 23:45

## 2019-02-11 RX ADMIN — MORPHINE SULFATE 0.2 MG: 4 INJECTION, SOLUTION INTRAMUSCULAR; INTRAVENOUS at 19:54

## 2019-02-11 RX ADMIN — Medication 10 MG: at 20:03

## 2019-02-11 RX ADMIN — SODIUM CITRATE AND CITRIC ACID MONOHYDRATE 30 ML: 500; 334 SOLUTION ORAL at 19:06

## 2019-02-11 NOTE — PROGRESS NOTES
Patient arrived today at 1345 with c/o headache for 3 days and elevated blood pressure  She also reports feeling more edematous lately

## 2019-02-11 NOTE — PROGRESS NOTES
Patient having extreme hip pain and cannot get comfortable in bed.  Offered heat for back but patient feels she needs to get out of bed.  Fetus with reactive NST. EFM removed.  MD aware.  Labs have been drawn and MD will return to see patient when results are available.

## 2019-02-12 LAB — HGB BLD-MCNC: 12.7 G/DL (ref 11.7–15.7)

## 2019-02-12 PROCEDURE — 25000132 ZZH RX MED GY IP 250 OP 250 PS 637: Performed by: NURSE ANESTHETIST, CERTIFIED REGISTERED

## 2019-02-12 PROCEDURE — 85018 HEMOGLOBIN: CPT | Performed by: OBSTETRICS & GYNECOLOGY

## 2019-02-12 PROCEDURE — 12000000 ZZH R&B MED SURG/OB

## 2019-02-12 PROCEDURE — 25000132 ZZH RX MED GY IP 250 OP 250 PS 637: Performed by: OBSTETRICS & GYNECOLOGY

## 2019-02-12 PROCEDURE — 40000275 ZZH STATISTIC RCP TIME EA 10 MIN

## 2019-02-12 PROCEDURE — 36415 COLL VENOUS BLD VENIPUNCTURE: CPT | Performed by: OBSTETRICS & GYNECOLOGY

## 2019-02-12 PROCEDURE — 25000128 H RX IP 250 OP 636: Performed by: OBSTETRICS & GYNECOLOGY

## 2019-02-12 RX ORDER — MAGNESIUM SULFATE HEPTAHYDRATE 40 MG/ML
2 INJECTION, SOLUTION INTRAVENOUS
Status: DISCONTINUED | OUTPATIENT
Start: 2019-02-12 | End: 2019-02-14

## 2019-02-12 RX ORDER — LABETALOL HYDROCHLORIDE 5 MG/ML
80 INJECTION, SOLUTION INTRAVENOUS EVERY 10 MIN PRN
Status: DISCONTINUED | OUTPATIENT
Start: 2019-02-12 | End: 2019-02-14

## 2019-02-12 RX ORDER — MAGNESIUM SULFATE HEPTAHYDRATE 40 MG/ML
4 INJECTION, SOLUTION INTRAVENOUS
Status: DISCONTINUED | OUTPATIENT
Start: 2019-02-12 | End: 2019-02-14

## 2019-02-12 RX ORDER — MAGNESIUM SULFATE HEPTAHYDRATE 500 MG/ML
4 INJECTION, SOLUTION INTRAMUSCULAR; INTRAVENOUS
Status: DISCONTINUED | OUTPATIENT
Start: 2019-02-12 | End: 2019-02-14

## 2019-02-12 RX ORDER — LORAZEPAM 2 MG/ML
2 INJECTION INTRAMUSCULAR
Status: DISCONTINUED | OUTPATIENT
Start: 2019-02-12 | End: 2019-02-14

## 2019-02-12 RX ORDER — NIFEDIPINE 10 MG/1
10 CAPSULE ORAL
Status: DISCONTINUED | OUTPATIENT
Start: 2019-02-12 | End: 2019-02-14

## 2019-02-12 RX ORDER — LABETALOL HYDROCHLORIDE 5 MG/ML
20 INJECTION, SOLUTION INTRAVENOUS EVERY 10 MIN PRN
Status: DISCONTINUED | OUTPATIENT
Start: 2019-02-12 | End: 2019-02-14

## 2019-02-12 RX ORDER — LABETALOL HYDROCHLORIDE 5 MG/ML
40 INJECTION, SOLUTION INTRAVENOUS EVERY 10 MIN PRN
Status: DISCONTINUED | OUTPATIENT
Start: 2019-02-12 | End: 2019-02-14

## 2019-02-12 RX ORDER — PRENATAL VIT/IRON FUM/FOLIC AC 27MG-0.8MG
1 TABLET ORAL DAILY
Status: DISCONTINUED | OUTPATIENT
Start: 2019-02-12 | End: 2019-02-15

## 2019-02-12 RX ADMIN — OXYCODONE HYDROCHLORIDE 10 MG: 5 TABLET ORAL at 07:40

## 2019-02-12 RX ADMIN — OXYCODONE HYDROCHLORIDE 5 MG: 5 TABLET ORAL at 00:30

## 2019-02-12 RX ADMIN — KETOROLAC TROMETHAMINE 30 MG: 30 INJECTION, SOLUTION INTRAMUSCULAR at 15:41

## 2019-02-12 RX ADMIN — PRENATAL VIT W/ FE FUMARATE-FA TAB 27-0.8 MG 1 TABLET: 27-0.8 TAB at 15:41

## 2019-02-12 RX ADMIN — OXYCODONE HYDROCHLORIDE 10 MG: 5 TABLET ORAL at 04:42

## 2019-02-12 RX ADMIN — ACETAMINOPHEN 975 MG: 325 TABLET, FILM COATED ORAL at 15:40

## 2019-02-12 RX ADMIN — OXYCODONE HYDROCHLORIDE 5 MG: 5 TABLET ORAL at 02:02

## 2019-02-12 RX ADMIN — OXYCODONE HYDROCHLORIDE 5 MG: 5 TABLET ORAL at 21:24

## 2019-02-12 RX ADMIN — SODIUM CHLORIDE, SODIUM LACTATE, POTASSIUM CHLORIDE, CALCIUM CHLORIDE AND DEXTROSE MONOHYDRATE: 5; 600; 310; 30; 20 INJECTION, SOLUTION INTRAVENOUS at 01:08

## 2019-02-12 RX ADMIN — KETOROLAC TROMETHAMINE 30 MG: 30 INJECTION, SOLUTION INTRAMUSCULAR at 09:48

## 2019-02-12 RX ADMIN — OXYCODONE HYDROCHLORIDE 5 MG: 5 TABLET ORAL at 20:12

## 2019-02-12 RX ADMIN — HYDROXYZINE HYDROCHLORIDE 50 MG: 50 TABLET ORAL at 04:32

## 2019-02-12 RX ADMIN — KETOROLAC TROMETHAMINE 30 MG: 30 INJECTION, SOLUTION INTRAMUSCULAR at 03:24

## 2019-02-12 RX ADMIN — ESCITALOPRAM OXALATE 10 MG: 10 TABLET, FILM COATED ORAL at 07:41

## 2019-02-12 RX ADMIN — OXYCODONE HYDROCHLORIDE 10 MG: 5 TABLET ORAL at 15:46

## 2019-02-12 RX ADMIN — IBUPROFEN 800 MG: 800 TABLET ORAL at 18:54

## 2019-02-12 RX ADMIN — OXYCODONE HYDROCHLORIDE 10 MG: 5 TABLET ORAL at 10:55

## 2019-02-12 RX ADMIN — ACETAMINOPHEN 975 MG: 325 TABLET, FILM COATED ORAL at 07:41

## 2019-02-12 RX ADMIN — LABETALOL HYDROCHLORIDE 20 MG: 5 INJECTION INTRAVENOUS at 02:54

## 2019-02-12 NOTE — H&P
[facility] Labor and Delivery History and Physical    Janine Salas MRN# [id number]   Age: 33 year old YOB: 1985     Date of Admission:  2019    Primary care provider: Kavya Haidre           Chief Complaint:   Janine Salas is a 33 year old female  who is 38w4d pregnant and being admitted for repeat .          Pregnancy history:   She has a hx of PIH and has been followed in clinic with serial BPP's  She was admitted to triage for elevated BP on Home visit to 160/101.  She denies headache or visual changes.  Her pre eclampsia labs were normal  She has been having increased contractions to q 2-4 min.  No LOF  FHR cat I   OBSTETRIC HISTORY:    Obstetric History       T1      L1     SAB0   TAB0   Ectopic0   Multiple0   Live Births1       # Outcome Date GA Lbr Donavon/2nd Weight Sex Delivery Anes PTL Lv   2 Current            1 Term 16 39w0d  2.948 kg (6 lb 8 oz) M CS-LTranv Spinal N CAMRON      Name: Broderick      Complications: Failure to Progress in First Stage      Apgar1:  8                Apgar5: 9          EDC: Estimated Date of Delivery: 19    Prenatal Labs:   Lab Results   Component Value Date    ABO O 2018    RH Pos 2018    AS Neg 2018    HEPBANG Nonreactive 2018    CHPCRT Negative 2018    GCPCRT Negative 2018    TREPAB Negative 2015    HGB 13.6 2019       GBS Status:   Lab Results   Component Value Date    GBS Negative 2019       Active Problem List  Patient Active Problem List   Diagnosis     CARDIOVASCULAR SCREENING; LDL GOAL LESS THAN 160     Back pain associated with peripheral numbness     Morbid obesity due to excess calories (H)     Anxiety     Essential hypertension     S/P  section     Acute bilateral low back pain with right-sided sciatica     Moderate episode of recurrent major depressive disorder (H)     Prenatal care, subsequent pregnancy      Bilateral low back pain without sciatica     Hip pain, left       Medication Prior to Admission  Medications Prior to Admission   Medication Sig Dispense Refill Last Dose     Acetaminophen (TYLENOL PO) Take 500 mg by mouth once as needed    2/10/2019 at Unknown time     azelaic acid (AZELEX) 20 % cream Apply topically 2 times daily 50 g 11 Past Week at Unknown time     escitalopram (LEXAPRO) 10 MG tablet Take 1 tablet (10 mg) by mouth daily After 1 week , if tolerated, increase to 2 tabs daily (20 mg) 90 tablet 3 2/11/2019 at am     hydrOXYzine (ATARAX) 50 MG tablet Take 50 mg by mouth 3 times daily as needed for itching   2/11/2019 at Unknown time     oxymetazoline (AFRIN) 0.05 % nasal spray Spray 2 sprays into both nostrils 2 times daily   Past Month at Unknown time     Prenatal Vit-Fe Fumarate-FA (PRENATAL MULTIVITAMIN W/IRON) 27-0.8 MG tablet Take 1 tablet by mouth daily 100 tablet 1 2/10/2019 at 1200     Misc. Devices (BREAST PUMP) MISC 1 each every hour (Patient not taking: Reported on 1/23/2019) 1 each 0 Not Taking   .        Maternal Past Medical History:     Past Medical History:   Diagnosis Date     Acute pancreatitis 1/15     Anxiety      Chickenpox      PONV (postoperative nausea and vomiting)      Pregnancy induced hypertension      PTSD (post-traumatic stress disorder)      Severe postpartum depression 2016    also anxiety                       Family History:   I have reviewed this patient's family history            Social History:   I have reviewed this patient's social history         Review of Systems:   The Review of Systems is negative other than noted in the HPI          Physical Exam:   Vitals were reviewed  All vitals stable  Temp: 98.5  F (36.9  C) Temp src: Oral BP: 148/85     Resp: 16        Constitutional:   awake, alert, cooperative, no apparent distress, and appears stated age     Lungs:   No increased work of breathing, good air exchange, clear to auscultation bilaterally, no crackles  or wheezing     Cardiovascular:   Normal apical impulse, regular rate and rhythm, normal S1 and S2, no S3 or S4, and no murmur noted     Abdomen:   Morbidly Obese, scars noted Pfannenstiel, normal bowel sounds, soft and non-distended Gravid     Genitounirinary:   External Genitalia:  General appearance; normal     Musculoskeletal:   There is no redness, warmth, or swelling of the joints.  Full range of motion noted.  Motor strength is 5 out of 5 all extremities bilaterally.  Tone is normal.     Neurologic:   Awake, alert, oriented to name, place and time.  Cranial nerves II-XII are grossly intact.  Motor is 5 out of 5 bilaterally.  Cerebellar finger to nose, heel to shin intact.  Sensory is intact.  Babinski down going, Romberg negative, and gait is normal.     Neuropsychiatric:   General: normal, calm and normal eye contact  Level of consciousness: alert / normal  Affect: normal  Orientation: oriented to self, place, time and situation  Memory and insight: normal, memory for past and recent events intact and thought process normal     Skin:   no bruising or bleeding, normal skin color, texture, turgor, no redness, warmth, or swelling and no rashes      Cervix:   Membranes: intact   Dilation: 1   Effacement: 70%   Station:-4   Consistency: average   Position: Mid  Presentation:Cephalic  Fetal Heart Rate Tracing: reactive and reassuring  Tocometer: frequency q 2-4 minutes                       Assessment:   Janine Salas is a 38w4d pregnant female admitted with .          Plan:   Admit - see IP orders  Prepare for  section    Marco Marin MD

## 2019-02-12 NOTE — ANESTHESIA POSTPROCEDURE EVALUATION
Patient: Janine Salas    Procedure(s):   SECTION    Diagnosis:incision in abdomen for delivery of baby  Diagnosis Additional Information: No value filed.    Anesthesia Type:  Spinal, General, ETT    Note:  Anesthesia Post Evaluation    Patient location during evaluation: Bedside  Patient participation: Able to fully participate in evaluation  Level of consciousness: awake and alert  Pain management: adequate  Airway patency: patent  Cardiovascular status: stable  Respiratory status: room air  Hydration status: stable  PONV: none     Anesthetic complications: None          Last vitals:  Vitals:    19 1415 19 1450 19 2106   BP: 144/74 148/85 121/50   Pulse:   91   Resp:   16   Temp:   36.4  C (97.5  F)   SpO2:   92%         Electronically Signed By: WARREN Washington CRNA  2019  9:07 PM

## 2019-02-12 NOTE — PROGRESS NOTES
Cervix check due to increase in frequency and strength of contractions.  Cervix is closed and firm.  Dr. Alvarado notified and will discuss proceeding with C/S.

## 2019-02-12 NOTE — OP NOTE
[facility] Obstetrics  Operative Note    Pre-operative diagnosis: Previous  section  Term pregnancy  PIH   Post-operative diagnosis: Same   Procedure: Repeat low transverse  section   Surgeon: Marco Marin MD   Assistant(s): Braden Gaston PA-C  A surgical assistant was required for this surgery for his experience with retraction, achievement of hemostasis, and wound closure     Anesthesia: Spinal anesthesia   Quantitative blood loss: 185 ml   Total IV fluids: (See anesthesia record)  1700 ml   Blood transfusion: No transfusion was given during surgery   Total urine output: (See anesthesia record)  150 ml   Drains: Mcfarland catheter   Specimens: none   Findings: Live   Male  Cephalic presentation:  left occiput transverse  APGARS: 1 minute: 9   5 minute: 9  Weight: 7 # 12 ozs  Placenta: nl  Tubes: nl  Uterus: nl  Ovaries: nl   Complications: None   Condition: Infant stable, transferred to general care nursery  Mother stable, transfered to post-anesthesia recovery   Comments: Janine Salas   1985  [id number]      Janine Salas  presented for the above procedure.  She has term pregnancy PIH and contractions q 2-4, is s/p  section- desiring a repeaat  I met with Janine  and her S.O.  and discussed the planned procedure as well as the expected post operative course.  Risks of complications were noted and postoperative signs to watch for outlined.  Questions were answered and consent signed.  She was taken to the OR [facility] where she was placed in the supine position. She underwent Spinall anesthesia .  She was then placed in the supine  position .An examination under anesthesia was performed that showed: Reassuring FHR   She was prepped and draped.  A timeout was held confirming her identity and proposed procedures. All were in agreement.   After adequate anesthesia was documented a Pfannenstiel incision was made to the previous scar and carried down to  the subcutaneous tissue to the fascia.  The fascia was transversely incised.  The edges were freed from the underlying rectus muscles in cephalad caudad direction.  A midline incision was made after the rectus muscles were divided bluntly and the peritoneum grasped tented and entered without difficulty.  Bladder was scarred anteriorly.  Bladder flap was transversely incised and bluntly dissected.  Lower uterine segment was quite thin.  A low segment transverse incision was made in the incision extended by finger fracture technique.  The head was elevated and the wound with some difficulty as she was morbidly obese.  A Kiwi was attached to the vertex and popped off once in attempt to reattach the Kiwi in the blood filled uterus, this popped off twice.  It was then eventually delivered without the Kiwi delivery at  a viable and vigorous male.  Cord clamping was delayed cord was doubly clamped and cut  handed the nurse practitioner attendance who noted bruising in the area of the right occipital area.  Weight was 7 pounds 12 ounces Apgars are 9 and 9.   remained in the room and returned to the postop area with mother.    Cord blood was obtained.  The anterior placenta was delivered manually.  The uterus was exteriorized endometrium soft curetted with a lap sponge removing clots and debris.  Uterus was closed primarily with a running locked 0 Vicryl suture and imbricated with 0 Vicryl suture.  Hemostasis was assured at this time.    Uterus returned to its anatomic position.  Hemostasis again assured.  Peritoneum was closed with running 2-0 Vicryl.  The fascia closed from the apices the midline and tied individually with #1 Vicryl.  Subcu was washed with warm normal saline and hemostasis assured with electrocautery.  Subcu was closed with 0-0 chromic gut and the skin reapproximated utilizing 30V lock suture and Dermabond applied.  Quantitative blood loss was 185 cc.  Urine output 150 crystalloid 1700.   Sponge needle counts were correct x3.  Patient was then taken to the PACU in good condition.    Marco Marin

## 2019-02-12 NOTE — PLAN OF CARE
VSS - BP decreased to 130-150/70-80 after 20mg labetalol.  Pt denies vision change, headache, epigastric pain, SOB; lungs clear throughout.  Edema noted in ankles and feet.   Mcfarland in place draining clear, yellow urine.   Fundus firm, midline, U/U; scant bleeding since pad change.  No clots  Incision clean and dry; ice applied for comfort.   Pt reported increasing incision pain overnight.  Scheduled toradol and tylenol as well as PRN oxycodone and vistaril administered.  Pt reported decrease in pain with pain medication.  Pt was able to rest between cares.   Pt is breastfeeding infant - some nipple tenderness; cream from home applied.   Pt is bonding appropriately with infant.  Questions encouraged and answered.  Encouraged to call with needs.   Pt would like to discharge 2/13/19

## 2019-02-12 NOTE — PROGRESS NOTES
Pt prepped for csection - scrub complete, bicitra taken, antibiotic infused.    BP elevated; other VSS.   Contractions every 2-3 minutes lasting 50-70 sec  Cat 1 FHR with   Pt to OR on bed at 1940.  1950: Dop tone 140

## 2019-02-12 NOTE — L&D DELIVERY NOTE
"Janine Salas is a 33 year old  @ 38+4 Weeks EGA  She presented with contractions and elevated BP  on 19  Pre-eclampsia ruled out but gladys q 2 -4 min  Pregnancy was complicated by PIH- normal BPP weekly  Previous CS and desired repeat  GBS neg  Membrane status: intact  Repeat CS   Spinal anesthesia  FHR Cat I  Delivered a viable male  @ 20:15 via repeat CS  Very thin lower uterine segment  Weight 7#12 oz   APGAR's 9/9  Placenta delivered @ 20:16 , was complete with a 3vessel cord  See op report for complete details   ml  \"Peng\"    Marco Marin      "

## 2019-02-12 NOTE — PROGRESS NOTES
Mother and baby transferred to postpartum room at 2100 via bed and infant held by mom for post  section phase 1 recovery period. Patient oriented to room. Mother and baby bonding well and in stable condition upon transfer.

## 2019-02-12 NOTE — ANESTHESIA CARE TRANSFER NOTE
Patient: Janine Salas    Procedure(s):   SECTION    Diagnosis: incision in abdomen for delivery of baby  Diagnosis Additional Information: No value filed.    Anesthesia Type:   Spinal, General, ETT     Note:  Airway :Room Air  Patient transferred to:PACU  Handoff Report: Identifed the Patient, Identified the Reponsible Provider, Reviewed the pertinent medical history, Discussed the surgical course, Reviewed Intra-OP anesthesia mangement and issues during anesthesia, Set expectations for post-procedure period and Allowed opportunity for questions and acknowledgement of understanding      Vitals: (Last set prior to Anesthesia Care Transfer)    CRNA VITALS  2019 - 2019             Pulse:  83    SpO2:  93 %                Electronically Signed By: WARREN Washington CRNA  2019  9:06 PM

## 2019-02-12 NOTE — PROGRESS NOTES
[facility] Obstetrics Post-Op / Progress Note         Assessment and Plan:    Assessment:   Post-operative day #1  Low transverse repeat  section  L&D complications: Intrauterine pregnancy at 38+4 weeks gestation  Pregnancy induced hypertension      Doing well.  Clean wound without signs of infection.  Normal healing wound.  No immediate surgical complications identified.  No excessive bleeding  Pain well-controlled.      Plan:   Ambulation encouraged  Breast feeding strategies discussed  Monitor wound for signs of infection  Pain control measures as needed  Reportable signs and symptoms dicussed with the patient           Interval History:   Doing well.  Pain is well-controlled.  No fevers.  No history of wound drainage, warmth or significant erythema.  Good appetite.  Denies chest pain, shortness of breath, nausea or vomiting.  Ambulatory.  Breastfeeding well.          Significant Problems:    None          Review of Systems:    The patient denies any chest pain, shortness of breath, excessive pain, fever, chills, purulent drainage from the wound, nausea or vomiting.          Medications:   All medications related to the patient's surgery have been reviewed          Physical Exam:   Vitals were reviewed  All vitals stable  Temp: 98.1  F (36.7  C) Temp src: Oral BP: (!) 153/96 Pulse: 83   Resp: 18 SpO2: 99 % O2 Device: None (Room air) Oxygen Delivery: 2 LPM  Wound clean and dry with minimal or no drainage.  Surrounding skin with minimal erythema.  Constitutional:   awake, alert, cooperative, no apparent distress, and appears stated age     Lungs:   No increased work of breathing, good air exchange, clear to auscultation bilaterally, no crackles or wheezing     Cardiovascular:   Normal apical impulse, regular rate and rhythm, normal S1 and S2, no S3 or S4, and no murmur noted     Abdomen:   Incision intact and dry, normal bowel sounds, soft and non-distended             Data:     All laboratory data  related to this surgery reviewed  Hemoglobin   Date Value Ref Range Status   02/12/2019 12.7 11.7 - 15.7 g/dL Final   02/11/2019 13.6 11.7 - 15.7 g/dL Final   01/23/2019 13.9 11.7 - 15.7 g/dL Final   12/26/2018 13.4 11.7 - 15.7 g/dL Final   11/14/2018 14.0 11.7 - 15.7 g/dL Final         Marco Marin MD

## 2019-02-12 NOTE — ANESTHESIA PREPROCEDURE EVALUATION
Anesthesia Pre-Procedure Evaluation    Patient: Janine Salas   MRN: [id number] : 1985          Preoperative Diagnosis: incision in abdomen for delivery of baby    Procedure(s):   SECTION    Past Medical History:   Diagnosis Date     Acute pancreatitis 1/15     Anxiety      Chickenpox      PONV (postoperative nausea and vomiting)      Pregnancy induced hypertension      PTSD (post-traumatic stress disorder)      Severe postpartum depression 2016    also anxiety     Past Surgical History:   Procedure Laterality Date     APPENDECTOMY        SECTION N/A 2016    Procedure:  SECTION;  Surgeon: Marco Marin MD;  Location: [facility] OR     COLONOSCOPY       LAPAROSCOPIC CHOLECYSTECTOMY  2013    Procedure: LAPAROSCOPIC CHOLECYSTECTOMY;  Laparoscopic Cholecystectomy;  Surgeon: Lasha Garcias MD;  Location: [facility] OR     MOUTH SURGERY      wisdom teeth     UPPER GI ENDOSCOPY         Anesthesia Evaluation     . Pt has had prior anesthetic. Type: General    History of anesthetic complications   - PONV        ROS/MED HX    ENT/Pulmonary:  - neg pulmonary ROS     Neurologic:     (+)other neuro bilateral low back pain with sciatica    Cardiovascular:     (+) Dyslipidemia, hypertension----. : . . . :. .      : pregnancy induced hypertension.   METS/Exercise Tolerance:  >4 METS   Hematologic:  - neg hematologic  ROS       Musculoskeletal:  - neg musculoskeletal ROS       GI/Hepatic:  - neg GI/hepatic ROS       Renal/Genitourinary:  - ROS Renal section negative       Endo:     (+) Obesity, .      Psychiatric:     (+) psychiatric history anxiety and depression      Infectious Disease:         Malignancy:      - no malignancy   Other:                          Physical Exam  Normal systems: cardiovascular, pulmonary and dental    Airway   Mallampati: II  TM distance: >3 FB  Neck ROM: full    Dental     Cardiovascular       Pulmonary             Lab Results   Component Value  "Date    WBC 14.2 (H) 02/11/2019    HGB 13.6 02/11/2019    HCT 40.7 02/11/2019     02/11/2019    CRP <2.0 04/09/2015     04/06/2018    POTASSIUM 4.2 04/06/2018    CHLORIDE 107 04/06/2018    CO2 27 04/06/2018    BUN 18 04/06/2018    CR 0.55 02/11/2019    GLC 81 04/06/2018    ERIKA 8.7 04/06/2018    ALBUMIN 4.1 04/06/2018    PROTTOTAL 7.1 04/06/2018    ALT 26 02/11/2019    AST 28 02/11/2019    ALKPHOS 87 04/06/2018    BILITOTAL 0.4 04/06/2018    LIPASE 109 04/06/2018    AMYLASE 61 04/09/2015    TSH 3.12 03/31/2015    HCG Negative 02/09/2015    HCGS Negative 06/13/2014       Preop Vitals  BP Readings from Last 3 Encounters:   02/11/19 148/85   02/06/19 (!) 145/95   01/30/19 (!) 140/94    Pulse Readings from Last 3 Encounters:   02/06/19 110   01/30/19 122   01/23/19 123      Resp Readings from Last 3 Encounters:   02/11/19 16   02/06/19 18   01/30/19 18    SpO2 Readings from Last 3 Encounters:   01/17/19 98%   05/16/18 98%   11/27/17 97%      Temp Readings from Last 1 Encounters:   02/11/19 36.9  C (98.5  F) (Oral)    Ht Readings from Last 1 Encounters:   02/06/19 1.676 m (5' 6\")      Wt Readings from Last 1 Encounters:   02/06/19 (!) 163.3 kg (360 lb)    Estimated body mass index is 58.11 kg/m  as calculated from the following:    Height as of 2/6/19: 1.676 m (5' 6\").    Weight as of 2/6/19: 163.3 kg (360 lb).       Anesthesia Plan      History & Physical Review  History and physical reviewed and following examination; no interval change.    ASA Status:  3 .    NPO Status:  > 4 hours    Plan for Spinal, General and ETT   PONV prophylaxis:  Ondansetron (or other 5HT-3) and Dexamethasone or Solumedrol       Postoperative Care  Postoperative pain management:  IV analgesics and Oral pain medications.      Consents  Anesthetic plan, risks, benefits and alternatives discussed with:  Patient..                 WARREN Mccartney CRNA  "

## 2019-02-12 NOTE — PROGRESS NOTES
Phase I PACU complete at 2245.    BP elevated at times but decreased upon recheck - updated MD and labetalol ordered.    Pt reports starting to feel sore.    Fundus firm, midline, U/U; small amount of bleeding - one small clot on early fundal check, none following.    Incision clean and dry, open to air.  Mcfarland remains in place - patent draining clear yellow urine.    Plan to recheck VS at 0030 at which time pt would like oxycodone.   Pt breastfeeding infant; bonding appropriately with infant, attentive to cues.   Questions encouraged and answered.  Encouraged to call with needs.  Call light in reach

## 2019-02-12 NOTE — PROGRESS NOTES
Repeat  vacuum assisted, delivery of viable male at . apgars of baby . Pitocin IV given after delivery of baby. Baby boy placed cheek to cheek with mom after delivery. Significant other remains at bedside with mom and baby.

## 2019-02-12 NOTE — ANESTHESIA PROCEDURE NOTES
Peripheral nerve/Neuraxial procedure note : intrathecal  Pre-Procedure  Performed by  Fawn Arvizu APRN CRNA   Location: OR      Pre-Anesthestic Checklist: patient identified, IV checked, site marked, risks and benefits discussed, informed consent, monitors and equipment checked, pre-op evaluation and at physician/surgeon's request    Timeout  Correct Patient: Yes   Correct Procedure: Yes   Correct Site: Yes   Correct Laterality: N/A   Correct Position: Yes   Site Marked: N/A   .   Procedure Documentation  ASA 2 and Emergent  Diagnosis:Repeat C/S.    Procedure:    Intrathecal.  Insertion Site:L3-4  (midline approach)      Patient Prep;mask, sterile gloves, povidone-iodine 7.5% surgical scrub, patient draped.  .  Needle: Rohith tip Spinal Needle (gauge): 22  Spinal/LP Needle Length (inches): 3.5 # of attempts: 1 and # of redirects:  No introducer used .       Assessment/Narrative  Paresthesias: No.  .  .  clear CSF fluid removed . Time Injected: 19:54  Sensory Level: T4 Comments:  VAS pain score prior to injection:    VAS pain score after injection:    FHR stable, pt. Tolerated well.

## 2019-02-12 NOTE — PLAN OF CARE
Patient voiding fine after loaiza removal. Took shower, will pull IV after last dose of Toradol. Incision is clean, dry and intact, has had ice to site. Bleeding minimal. Independent in room, using call light appropriately.

## 2019-02-13 PROCEDURE — 12000000 ZZH R&B MED SURG/OB

## 2019-02-13 PROCEDURE — 25000132 ZZH RX MED GY IP 250 OP 250 PS 637: Performed by: OBSTETRICS & GYNECOLOGY

## 2019-02-13 RX ADMIN — ESCITALOPRAM OXALATE 10 MG: 10 TABLET, FILM COATED ORAL at 09:07

## 2019-02-13 RX ADMIN — OXYCODONE HYDROCHLORIDE 5 MG: 5 TABLET ORAL at 00:32

## 2019-02-13 RX ADMIN — ACETAMINOPHEN 975 MG: 325 TABLET, FILM COATED ORAL at 00:25

## 2019-02-13 RX ADMIN — ACETAMINOPHEN 975 MG: 325 TABLET, FILM COATED ORAL at 17:15

## 2019-02-13 RX ADMIN — OXYCODONE HYDROCHLORIDE 10 MG: 5 TABLET ORAL at 20:52

## 2019-02-13 RX ADMIN — ACETAMINOPHEN 975 MG: 325 TABLET, FILM COATED ORAL at 09:03

## 2019-02-13 RX ADMIN — SENNOSIDES AND DOCUSATE SODIUM 1 TABLET: 8.6; 5 TABLET ORAL at 07:14

## 2019-02-13 RX ADMIN — OXYCODONE HYDROCHLORIDE 10 MG: 5 TABLET ORAL at 15:42

## 2019-02-13 RX ADMIN — IBUPROFEN 800 MG: 800 TABLET ORAL at 03:22

## 2019-02-13 RX ADMIN — IBUPROFEN 800 MG: 800 TABLET ORAL at 22:49

## 2019-02-13 RX ADMIN — OXYCODONE HYDROCHLORIDE 10 MG: 5 TABLET ORAL at 11:14

## 2019-02-13 RX ADMIN — OXYCODONE HYDROCHLORIDE 10 MG: 5 TABLET ORAL at 07:13

## 2019-02-13 RX ADMIN — OXYCODONE HYDROCHLORIDE 5 MG: 5 TABLET ORAL at 00:25

## 2019-02-13 RX ADMIN — IBUPROFEN 800 MG: 800 TABLET ORAL at 09:43

## 2019-02-13 RX ADMIN — PRENATAL VIT W/ FE FUMARATE-FA TAB 27-0.8 MG 1 TABLET: 27-0.8 TAB at 09:07

## 2019-02-13 RX ADMIN — OXYCODONE HYDROCHLORIDE 10 MG: 5 TABLET ORAL at 03:22

## 2019-02-13 RX ADMIN — IBUPROFEN 800 MG: 800 TABLET ORAL at 15:42

## 2019-02-13 NOTE — PLAN OF CARE
Data: Vital signs within normal limits. Postpartum checks within normal limits - see flow record. Patient eating and drinking normally. Patient able to empty bladder independently. Patient ambulating independently. No apparent signs of infection. Incision healing well. Patient Is performing self cares and Is able to care for infant. Positive attachment behaviors are observed with infant. Support persons are not  present.  Action:  Pain plan was discussed. Patient would like pain meds to be brought in when they are due. Patient was medicated during the shift for pain. See MAR. Patient education done about breastfeeding, formula feeding, postpartum cares, pain management/plan,  safety and rest. See flow record.  Response:  Patient reassessed within 1 hour after each medication for pain. Patient stated that pain had improved. Patient stated that she was comfortable.   Plan: Anticipate discharge on 19.

## 2019-02-13 NOTE — PROGRESS NOTES
[facility] Obstetrics Post-Op / Progress Note         Assessment and Plan:    Assessment:   Post-operative day #2  Low transverse repeat  section  L&D complications: Intrauterine pregnancy at 38+4 weeks gestation      Doing well.  Clean wound without signs of infection.  Normal healing wound.  No immediate surgical complications identified.  No excessive bleeding  Pain well-controlled.   has had low Blood sugars and has an IV      Plan:   Ambulation encouraged  Breast feeding strategies discussed  Anticipate discharge tomorrow           Interval History:   Doing well.  Pain is well-controlled.  No fevers.  No history of wound drainage, warmth or significant erythema.  Good appetite.  Denies chest pain, shortness of breath, nausea or vomiting.  Ambulatory.  Breastfeeding well.          Significant Problems:    None          Review of Systems:    The patient denies any chest pain, shortness of breath, excessive pain, fever, chills, purulent drainage from the wound, nausea or vomiting.          Medications:   All medications related to the patient's surgery have been reviewed          Physical Exam:   All vitals stable  Wound clean and dry with minimal or no drainage.  Surrounding skin with minimal erythema.          Data:     All laboratory data related to this surgery reviewed  Hemoglobin   Date Value Ref Range Status   2019 12.7 11.7 - 15.7 g/dL Final   2019 13.6 11.7 - 15.7 g/dL Final   2019 13.9 11.7 - 15.7 g/dL Final   2018 13.4 11.7 - 15.7 g/dL Final   2018 14.0 11.7 - 15.7 g/dL Final         Marco Marin MD

## 2019-02-14 LAB
ALT SERPL W P-5'-P-CCNC: 44 U/L (ref 0–50)
AST SERPL W P-5'-P-CCNC: 52 U/L (ref 0–45)
CREAT SERPL-MCNC: 0.69 MG/DL (ref 0.52–1.04)
CREAT UR-MCNC: 48 MG/DL
ERYTHROCYTE [DISTWIDTH] IN BLOOD BY AUTOMATED COUNT: 13.4 % (ref 10–15)
GFR SERPL CREATININE-BSD FRML MDRD: >90 ML/MIN/{1.73_M2}
HCT VFR BLD AUTO: 37.9 % (ref 35–47)
HGB BLD-MCNC: 12.3 G/DL (ref 11.7–15.7)
MCH RBC QN AUTO: 30.5 PG (ref 26.5–33)
MCHC RBC AUTO-ENTMCNC: 32.5 G/DL (ref 31.5–36.5)
MCV RBC AUTO: 94 FL (ref 78–100)
PLATELET # BLD AUTO: 267 10E9/L (ref 150–450)
PROT UR-MCNC: 0.11 G/L
PROT/CREAT 24H UR: 0.22 G/G CR (ref 0–0.2)
RBC # BLD AUTO: 4.03 10E12/L (ref 3.8–5.2)
WBC # BLD AUTO: 11 10E9/L (ref 4–11)

## 2019-02-14 PROCEDURE — 36415 COLL VENOUS BLD VENIPUNCTURE: CPT | Performed by: OBSTETRICS & GYNECOLOGY

## 2019-02-14 PROCEDURE — 25000132 ZZH RX MED GY IP 250 OP 250 PS 637: Performed by: OBSTETRICS & GYNECOLOGY

## 2019-02-14 PROCEDURE — 12000000 ZZH R&B MED SURG/OB

## 2019-02-14 PROCEDURE — 82565 ASSAY OF CREATININE: CPT | Performed by: OBSTETRICS & GYNECOLOGY

## 2019-02-14 PROCEDURE — 85027 COMPLETE CBC AUTOMATED: CPT | Performed by: OBSTETRICS & GYNECOLOGY

## 2019-02-14 PROCEDURE — 84460 ALANINE AMINO (ALT) (SGPT): CPT | Performed by: OBSTETRICS & GYNECOLOGY

## 2019-02-14 PROCEDURE — 84450 TRANSFERASE (AST) (SGOT): CPT | Performed by: OBSTETRICS & GYNECOLOGY

## 2019-02-14 PROCEDURE — 25000128 H RX IP 250 OP 636: Performed by: OBSTETRICS & GYNECOLOGY

## 2019-02-14 PROCEDURE — 84156 ASSAY OF PROTEIN URINE: CPT | Performed by: OBSTETRICS & GYNECOLOGY

## 2019-02-14 PROCEDURE — 25800030 ZZH RX IP 258 OP 636: Performed by: OBSTETRICS & GYNECOLOGY

## 2019-02-14 PROCEDURE — 25000132 ZZH RX MED GY IP 250 OP 250 PS 637: Performed by: NURSE ANESTHETIST, CERTIFIED REGISTERED

## 2019-02-14 RX ORDER — MAGNESIUM SULFATE HEPTAHYDRATE 500 MG/ML
4 INJECTION, SOLUTION INTRAMUSCULAR; INTRAVENOUS
Status: DISCONTINUED | OUTPATIENT
Start: 2019-02-14 | End: 2019-02-15

## 2019-02-14 RX ORDER — MAGNESIUM SULFATE HEPTAHYDRATE 40 MG/ML
4 INJECTION, SOLUTION INTRAVENOUS
Status: DISCONTINUED | OUTPATIENT
Start: 2019-02-14 | End: 2019-02-15

## 2019-02-14 RX ORDER — MAGNESIUM SULFATE HEPTAHYDRATE 40 MG/ML
2 INJECTION, SOLUTION INTRAVENOUS
Status: DISCONTINUED | OUTPATIENT
Start: 2019-02-14 | End: 2019-02-15

## 2019-02-14 RX ORDER — NIFEDIPINE 10 MG/1
10 CAPSULE ORAL ONCE
Status: COMPLETED | OUTPATIENT
Start: 2019-02-14 | End: 2019-02-14

## 2019-02-14 RX ORDER — SODIUM CHLORIDE, SODIUM LACTATE, POTASSIUM CHLORIDE, CALCIUM CHLORIDE 600; 310; 30; 20 MG/100ML; MG/100ML; MG/100ML; MG/100ML
10-125 INJECTION, SOLUTION INTRAVENOUS CONTINUOUS
Status: DISCONTINUED | OUTPATIENT
Start: 2019-02-14 | End: 2019-02-15

## 2019-02-14 RX ORDER — MAGNESIUM SULFATE IN WATER 40 MG/ML
2 INJECTION, SOLUTION INTRAVENOUS CONTINUOUS
Status: DISPENSED | OUTPATIENT
Start: 2019-02-14 | End: 2019-02-15

## 2019-02-14 RX ORDER — LABETALOL HYDROCHLORIDE 5 MG/ML
20 INJECTION, SOLUTION INTRAVENOUS EVERY 10 MIN PRN
Status: DISCONTINUED | OUTPATIENT
Start: 2019-02-14 | End: 2019-02-15

## 2019-02-14 RX ORDER — LORAZEPAM 2 MG/ML
2 INJECTION INTRAMUSCULAR
Status: DISCONTINUED | OUTPATIENT
Start: 2019-02-14 | End: 2019-02-15

## 2019-02-14 RX ORDER — LABETALOL HYDROCHLORIDE 5 MG/ML
40 INJECTION, SOLUTION INTRAVENOUS EVERY 10 MIN PRN
Status: DISCONTINUED | OUTPATIENT
Start: 2019-02-14 | End: 2019-02-15

## 2019-02-14 RX ORDER — MAGNESIUM SULFATE HEPTAHYDRATE 40 MG/ML
4 INJECTION, SOLUTION INTRAVENOUS ONCE
Status: COMPLETED | OUTPATIENT
Start: 2019-02-14 | End: 2019-02-14

## 2019-02-14 RX ORDER — NIFEDIPINE 30 MG/1
30 TABLET, EXTENDED RELEASE ORAL DAILY
Status: DISCONTINUED | OUTPATIENT
Start: 2019-02-14 | End: 2019-02-15

## 2019-02-14 RX ORDER — LABETALOL HYDROCHLORIDE 5 MG/ML
80 INJECTION, SOLUTION INTRAVENOUS EVERY 10 MIN PRN
Status: DISCONTINUED | OUTPATIENT
Start: 2019-02-14 | End: 2019-02-15

## 2019-02-14 RX ORDER — FUROSEMIDE 20 MG
20 TABLET ORAL ONCE
Status: COMPLETED | OUTPATIENT
Start: 2019-02-14 | End: 2019-02-14

## 2019-02-14 RX ORDER — CALCIUM GLUCONATE 94 MG/ML
1 INJECTION, SOLUTION INTRAVENOUS
Status: DISCONTINUED | OUTPATIENT
Start: 2019-02-14 | End: 2019-02-15

## 2019-02-14 RX ADMIN — ACETAMINOPHEN 975 MG: 325 TABLET, FILM COATED ORAL at 01:05

## 2019-02-14 RX ADMIN — OXYCODONE HYDROCHLORIDE 10 MG: 5 TABLET ORAL at 15:35

## 2019-02-14 RX ADMIN — MAGNESIUM SULFATE IN WATER 4 G: 40 INJECTION, SOLUTION INTRAVENOUS at 20:06

## 2019-02-14 RX ADMIN — OXYCODONE HYDROCHLORIDE 10 MG: 5 TABLET ORAL at 19:22

## 2019-02-14 RX ADMIN — IBUPROFEN 800 MG: 800 TABLET ORAL at 17:09

## 2019-02-14 RX ADMIN — MAGNESIUM SULFATE IN WATER 2 G/HR: 40 INJECTION, SOLUTION INTRAVENOUS at 21:06

## 2019-02-14 RX ADMIN — ACETAMINOPHEN 650 MG: 325 TABLET, FILM COATED ORAL at 19:22

## 2019-02-14 RX ADMIN — ACETAMINOPHEN 975 MG: 325 TABLET, FILM COATED ORAL at 08:50

## 2019-02-14 RX ADMIN — ACETAMINOPHEN 325 MG: 325 TABLET, FILM COATED ORAL at 01:04

## 2019-02-14 RX ADMIN — PRENATAL VIT W/ FE FUMARATE-FA TAB 27-0.8 MG 1 TABLET: 27-0.8 TAB at 08:50

## 2019-02-14 RX ADMIN — OXYCODONE HYDROCHLORIDE 10 MG: 5 TABLET ORAL at 22:45

## 2019-02-14 RX ADMIN — OXYCODONE HYDROCHLORIDE 10 MG: 5 TABLET ORAL at 08:51

## 2019-02-14 RX ADMIN — IBUPROFEN 800 MG: 800 TABLET ORAL at 10:57

## 2019-02-14 RX ADMIN — NIFEDIPINE 10 MG: 10 CAPSULE ORAL at 17:23

## 2019-02-14 RX ADMIN — FUROSEMIDE 20 MG: 20 TABLET ORAL at 18:23

## 2019-02-14 RX ADMIN — HYDROXYZINE HYDROCHLORIDE 50 MG: 50 TABLET ORAL at 01:05

## 2019-02-14 RX ADMIN — SENNOSIDES AND DOCUSATE SODIUM 1 TABLET: 8.6; 5 TABLET ORAL at 08:50

## 2019-02-14 RX ADMIN — IBUPROFEN 800 MG: 800 TABLET ORAL at 22:45

## 2019-02-14 RX ADMIN — SODIUM CHLORIDE, POTASSIUM CHLORIDE, SODIUM LACTATE AND CALCIUM CHLORIDE 50 ML/HR: 600; 310; 30; 20 INJECTION, SOLUTION INTRAVENOUS at 20:07

## 2019-02-14 RX ADMIN — OXYCODONE HYDROCHLORIDE 5 MG: 5 TABLET ORAL at 12:56

## 2019-02-14 RX ADMIN — NIFEDIPINE 30 MG: 30 TABLET, FILM COATED, EXTENDED RELEASE ORAL at 17:22

## 2019-02-14 RX ADMIN — ESCITALOPRAM OXALATE 10 MG: 10 TABLET, FILM COATED ORAL at 08:54

## 2019-02-14 RX ADMIN — OXYCODONE HYDROCHLORIDE 10 MG: 5 TABLET ORAL at 01:04

## 2019-02-14 NOTE — PROGRESS NOTES
[facility] Obstetrics Post-Op / Progress Note         Assessment and Plan:    Assessment:   Post-operative day #3  Low transverse repeat  section  L&D complications: Intrauterine pregnancy at 38+4 weeks gestation  PIH        Clean wound without signs of infection.  Normal healing wound.  No immediate surgical complications identified.  No excessive bleeding  Pain well-controlled.  BP elevation with increased edema      Plan:   Ambulation encouraged  Breast feeding strategies discussed  Nifedipine per Dr Mario  Anticipate discharge tomorrow  Daily weights  Consider Lasix            Interval History:   Doing well.  Pain is controlled.  No fevers.  No history of wound drainage, warmth or significant erythema.  Good appetite.  Denies chest pain, shortness of breath, nausea or vomiting.  Breastfeeding well. Edema is a concern          Significant Problems:    None          Review of Systems:    The patient denies any chest pain, shortness of breath, excessive pain, fever, chills, purulent drainage from the wound, nausea or vomiting.          Medications:   All medications related to the patient's surgery have been reviewed          Physical Exam:   Vitals were reviewed  All vitals stable  Temp: 98.3  F (36.8  C) Temp src: Oral BP: 174/89(forearm) Pulse: 101 Heart Rate: 101 Resp: 18           191/90 most recent BP  Wound clean and dry with minimal or no drainage.  Surrounding skin with minimal erythema.  Constitutional:   awake, alert, cooperative, no apparent distress, and appears stated age     Lungs:   No increased work of breathing, good air exchange, clear to auscultation bilaterally, no crackles or wheezing     Cardiovascular:   Normal apical impulse, regular rate and rhythm, normal S1 and S2, no S3 or S4, and no murmur noted     Abdomen:   Incision intact and dry, , normal bowel sounds and soft     Musculoskeletal:   There is no redness, warmth, or swelling of the joints.  Full range of motion  noted.  Motor strength is 5 out of 5 all extremities bilaterally.  Tone is normal.     Neurologic:   Awake, alert, oriented to name, place and time.  Cranial nerves II-XII are grossly intact.  Motor is 5 out of 5 bilaterally.  Cerebellar finger to nose, heel to shin intact.  Sensory is intact.  Babinski down going, Romberg negative, and gait is normal.     Neuropsychiatric:   General: normal, calm and normal eye contact  Level of consciousness: alert / normal  Affect: normal  Orientation: oriented to self, place, time and situation  Memory and insight: normal, memory for past and recent events intact and thought process normal     Skin:   no bruising or bleeding BUT swelling of her legs             Data:   All laboratory data related to this surgery reviewed  pending      Marco Marin MD

## 2019-02-14 NOTE — PROVIDER NOTIFICATION
Notified MD that pts blood pressures are increasing and edema has increased since this am.  Return call from Dr Mario is to start pt on nifedipine and obtain some labs-see order

## 2019-02-14 NOTE — PROGRESS NOTES
Pt ambulating in room-enc to walk in hallway today.  Teary at times-missing other child.  Blood pressure elevated however denies headache/visual changes or epigastric pain.  Edema moderate-enc fluids and to keep legs elevated when in bed or sitting in chair.  Tolerating food and fluids.  Using ibuprofen/tylenol and oxycodone for discomfort.  Discussed codeine allergy and pt states the oxycodone does not bother her or make her itchy.    Working on breastfeeding and baby cares.  Enc to call for needs

## 2019-02-14 NOTE — PLAN OF CARE
Data: Vital signs within normal limits. Postpartum checks within normal limits - see flow record. Patient eating and drinking normally. Patient able to empty bladder independently. . Patient ambulating independently..   No apparent signs of infection. Incision healing well. Patient Is performing self cares and Is able to care for infant. Positive attachment behaviors are observed with infant. Support persons are not  present.  Action:  Pain plan was discussed. Patient would like pain meds to be brought in when they are due. Patient was medicated during the shift for pain. See MAR.Patient education done about breastfeeding,  cares, postpartum cares and pain management/plan. See flow record.  Response:   Patient reassessed within 1 hour after each medication for pain. Patient stated that pain had improved. Patient stated that she was comfortable. .   Plan: Anticipate discharge on 19.

## 2019-02-14 NOTE — PLAN OF CARE
Pt vss, afebrile.  Taking Ibup, Oxy & Tylenol for discomfort.  Breastfeeding with some ast.  Indep with self & infant cares.  Pt stable.

## 2019-02-15 LAB
ANION GAP SERPL CALCULATED.3IONS-SCNC: 6 MMOL/L (ref 3–14)
BUN SERPL-MCNC: 20 MG/DL (ref 7–30)
CALCIUM SERPL-MCNC: 8.1 MG/DL (ref 8.5–10.1)
CHLORIDE SERPL-SCNC: 106 MMOL/L (ref 94–109)
CO2 SERPL-SCNC: 26 MMOL/L (ref 20–32)
CREAT SERPL-MCNC: 0.81 MG/DL (ref 0.52–1.04)
GFR SERPL CREATININE-BSD FRML MDRD: >90 ML/MIN/{1.73_M2}
GLUCOSE SERPL-MCNC: 86 MG/DL (ref 70–99)
MAGNESIUM SERPL-MCNC: 3.6 MG/DL (ref 1.6–2.3)
POTASSIUM SERPL-SCNC: 4 MMOL/L (ref 3.4–5.3)
SODIUM SERPL-SCNC: 138 MMOL/L (ref 133–144)

## 2019-02-15 PROCEDURE — 36415 COLL VENOUS BLD VENIPUNCTURE: CPT | Performed by: OBSTETRICS & GYNECOLOGY

## 2019-02-15 PROCEDURE — 25000128 H RX IP 250 OP 636: Performed by: OBSTETRICS & GYNECOLOGY

## 2019-02-15 PROCEDURE — 25000132 ZZH RX MED GY IP 250 OP 250 PS 637: Performed by: OBSTETRICS & GYNECOLOGY

## 2019-02-15 PROCEDURE — 80048 BASIC METABOLIC PNL TOTAL CA: CPT | Performed by: OBSTETRICS & GYNECOLOGY

## 2019-02-15 PROCEDURE — 12000000 ZZH R&B MED SURG/OB

## 2019-02-15 PROCEDURE — 83735 ASSAY OF MAGNESIUM: CPT | Performed by: OBSTETRICS & GYNECOLOGY

## 2019-02-15 RX ORDER — IBUPROFEN 800 MG/1
800 TABLET, FILM COATED ORAL EVERY 6 HOURS PRN
Status: DISCONTINUED | OUTPATIENT
Start: 2019-02-15 | End: 2019-02-16 | Stop reason: HOSPADM

## 2019-02-15 RX ORDER — HYDROXYZINE HYDROCHLORIDE 50 MG/1
50 TABLET, FILM COATED ORAL 3 TIMES DAILY PRN
Status: DISCONTINUED | OUTPATIENT
Start: 2019-02-15 | End: 2019-02-16 | Stop reason: HOSPADM

## 2019-02-15 RX ORDER — SODIUM CHLORIDE, SODIUM LACTATE, POTASSIUM CHLORIDE, CALCIUM CHLORIDE 600; 310; 30; 20 MG/100ML; MG/100ML; MG/100ML; MG/100ML
10-125 INJECTION, SOLUTION INTRAVENOUS CONTINUOUS
Status: DISCONTINUED | OUTPATIENT
Start: 2019-02-15 | End: 2019-02-16 | Stop reason: HOSPADM

## 2019-02-15 RX ORDER — CALCIUM GLUCONATE 94 MG/ML
1 INJECTION, SOLUTION INTRAVENOUS
Status: DISCONTINUED | OUTPATIENT
Start: 2019-02-15 | End: 2019-02-16 | Stop reason: HOSPADM

## 2019-02-15 RX ORDER — LANOLIN 100 %
OINTMENT (GRAM) TOPICAL
Status: DISCONTINUED | OUTPATIENT
Start: 2019-02-15 | End: 2019-02-16 | Stop reason: HOSPADM

## 2019-02-15 RX ORDER — HYDROCORTISONE 2.5 %
CREAM (GRAM) TOPICAL 3 TIMES DAILY PRN
Status: DISCONTINUED | OUTPATIENT
Start: 2019-02-15 | End: 2019-02-16 | Stop reason: HOSPADM

## 2019-02-15 RX ORDER — PRENATAL VIT/IRON FUM/FOLIC AC 27MG-0.8MG
1 TABLET ORAL DAILY
Status: DISCONTINUED | OUTPATIENT
Start: 2019-02-16 | End: 2019-02-16 | Stop reason: HOSPADM

## 2019-02-15 RX ORDER — ESCITALOPRAM OXALATE 10 MG/1
10 TABLET ORAL DAILY
Status: DISCONTINUED | OUTPATIENT
Start: 2019-02-16 | End: 2019-02-16 | Stop reason: HOSPADM

## 2019-02-15 RX ORDER — LABETALOL HYDROCHLORIDE 5 MG/ML
40 INJECTION, SOLUTION INTRAVENOUS EVERY 10 MIN PRN
Status: DISCONTINUED | OUTPATIENT
Start: 2019-02-15 | End: 2019-02-16 | Stop reason: HOSPADM

## 2019-02-15 RX ORDER — OXYCODONE HYDROCHLORIDE 5 MG/1
5-10 TABLET ORAL
Status: DISCONTINUED | OUTPATIENT
Start: 2019-02-15 | End: 2019-02-16 | Stop reason: HOSPADM

## 2019-02-15 RX ORDER — SIMETHICONE 80 MG
80 TABLET,CHEWABLE ORAL 4 TIMES DAILY PRN
Status: DISCONTINUED | OUTPATIENT
Start: 2019-02-15 | End: 2019-02-16 | Stop reason: HOSPADM

## 2019-02-15 RX ORDER — AMOXICILLIN 250 MG
1 CAPSULE ORAL 2 TIMES DAILY PRN
Status: DISCONTINUED | OUTPATIENT
Start: 2019-02-15 | End: 2019-02-16 | Stop reason: HOSPADM

## 2019-02-15 RX ORDER — ACETAMINOPHEN 325 MG/1
650 TABLET ORAL EVERY 4 HOURS PRN
Status: DISCONTINUED | OUTPATIENT
Start: 2019-02-15 | End: 2019-02-16 | Stop reason: HOSPADM

## 2019-02-15 RX ORDER — MAGNESIUM SULFATE HEPTAHYDRATE 40 MG/ML
4 INJECTION, SOLUTION INTRAVENOUS
Status: DISCONTINUED | OUTPATIENT
Start: 2019-02-15 | End: 2019-02-16 | Stop reason: HOSPADM

## 2019-02-15 RX ORDER — LORAZEPAM 2 MG/ML
2 INJECTION INTRAMUSCULAR
Status: DISCONTINUED | OUTPATIENT
Start: 2019-02-15 | End: 2019-02-16 | Stop reason: HOSPADM

## 2019-02-15 RX ORDER — BISACODYL 10 MG
10 SUPPOSITORY, RECTAL RECTAL DAILY PRN
Status: DISCONTINUED | OUTPATIENT
Start: 2019-02-15 | End: 2019-02-16 | Stop reason: HOSPADM

## 2019-02-15 RX ORDER — MAGNESIUM SULFATE HEPTAHYDRATE 500 MG/ML
4 INJECTION, SOLUTION INTRAMUSCULAR; INTRAVENOUS
Status: DISCONTINUED | OUTPATIENT
Start: 2019-02-15 | End: 2019-02-16 | Stop reason: HOSPADM

## 2019-02-15 RX ORDER — LABETALOL HYDROCHLORIDE 5 MG/ML
80 INJECTION, SOLUTION INTRAVENOUS EVERY 10 MIN PRN
Status: DISCONTINUED | OUTPATIENT
Start: 2019-02-15 | End: 2019-02-16 | Stop reason: HOSPADM

## 2019-02-15 RX ORDER — NALOXONE HYDROCHLORIDE 0.4 MG/ML
.1-.4 INJECTION, SOLUTION INTRAMUSCULAR; INTRAVENOUS; SUBCUTANEOUS
Status: DISCONTINUED | OUTPATIENT
Start: 2019-02-15 | End: 2019-02-16 | Stop reason: HOSPADM

## 2019-02-15 RX ORDER — NIFEDIPINE 30 MG/1
30 TABLET, EXTENDED RELEASE ORAL DAILY
Status: DISCONTINUED | OUTPATIENT
Start: 2019-02-16 | End: 2019-02-16 | Stop reason: HOSPADM

## 2019-02-15 RX ORDER — MAGNESIUM SULFATE HEPTAHYDRATE 40 MG/ML
2 INJECTION, SOLUTION INTRAVENOUS
Status: DISCONTINUED | OUTPATIENT
Start: 2019-02-15 | End: 2019-02-16 | Stop reason: HOSPADM

## 2019-02-15 RX ORDER — OXYTOCIN 10 [USP'U]/ML
10 INJECTION, SOLUTION INTRAMUSCULAR; INTRAVENOUS
Status: DISCONTINUED | OUTPATIENT
Start: 2019-02-15 | End: 2019-02-16 | Stop reason: HOSPADM

## 2019-02-15 RX ORDER — OXYTOCIN/0.9 % SODIUM CHLORIDE 30/500 ML
340 PLASTIC BAG, INJECTION (ML) INTRAVENOUS CONTINUOUS PRN
Status: DISCONTINUED | OUTPATIENT
Start: 2019-02-15 | End: 2019-02-16 | Stop reason: HOSPADM

## 2019-02-15 RX ORDER — AMOXICILLIN 250 MG
2 CAPSULE ORAL 2 TIMES DAILY PRN
Status: DISCONTINUED | OUTPATIENT
Start: 2019-02-15 | End: 2019-02-16 | Stop reason: HOSPADM

## 2019-02-15 RX ORDER — ONDANSETRON 2 MG/ML
4 INJECTION INTRAMUSCULAR; INTRAVENOUS EVERY 6 HOURS PRN
Status: DISCONTINUED | OUTPATIENT
Start: 2019-02-15 | End: 2019-02-16 | Stop reason: HOSPADM

## 2019-02-15 RX ORDER — LIDOCAINE 40 MG/G
CREAM TOPICAL
Status: DISCONTINUED | OUTPATIENT
Start: 2019-02-15 | End: 2019-02-16 | Stop reason: HOSPADM

## 2019-02-15 RX ORDER — LABETALOL HYDROCHLORIDE 5 MG/ML
20 INJECTION, SOLUTION INTRAVENOUS EVERY 10 MIN PRN
Status: DISCONTINUED | OUTPATIENT
Start: 2019-02-15 | End: 2019-02-16 | Stop reason: HOSPADM

## 2019-02-15 RX ADMIN — MAGNESIUM SULFATE IN WATER 2 G/HR: 40 INJECTION, SOLUTION INTRAVENOUS at 18:15

## 2019-02-15 RX ADMIN — IBUPROFEN 800 MG: 800 TABLET ORAL at 05:53

## 2019-02-15 RX ADMIN — SENNOSIDES AND DOCUSATE SODIUM 1 TABLET: 8.6; 5 TABLET ORAL at 22:20

## 2019-02-15 RX ADMIN — OXYCODONE HYDROCHLORIDE 5 MG: 5 TABLET ORAL at 12:16

## 2019-02-15 RX ADMIN — IBUPROFEN 800 MG: 800 TABLET ORAL at 18:10

## 2019-02-15 RX ADMIN — IBUPROFEN 800 MG: 800 TABLET ORAL at 12:16

## 2019-02-15 RX ADMIN — MAGNESIUM SULFATE IN WATER 2 G/HR: 40 INJECTION, SOLUTION INTRAVENOUS at 07:50

## 2019-02-15 RX ADMIN — OXYCODONE HYDROCHLORIDE 10 MG: 5 TABLET ORAL at 05:53

## 2019-02-15 RX ADMIN — PRENATAL VIT W/ FE FUMARATE-FA TAB 27-0.8 MG 1 TABLET: 27-0.8 TAB at 07:51

## 2019-02-15 RX ADMIN — ESCITALOPRAM OXALATE 10 MG: 10 TABLET, FILM COATED ORAL at 07:51

## 2019-02-15 RX ADMIN — OXYCODONE HYDROCHLORIDE 10 MG: 5 TABLET ORAL at 22:20

## 2019-02-15 RX ADMIN — OXYCODONE HYDROCHLORIDE 10 MG: 5 TABLET ORAL at 01:27

## 2019-02-15 RX ADMIN — ACETAMINOPHEN 650 MG: 325 TABLET, FILM COATED ORAL at 21:22

## 2019-02-15 RX ADMIN — ACETAMINOPHEN 650 MG: 325 TABLET, FILM COATED ORAL at 01:27

## 2019-02-15 RX ADMIN — ACETAMINOPHEN 650 MG: 325 TABLET, FILM COATED ORAL at 13:07

## 2019-02-15 RX ADMIN — OXYCODONE HYDROCHLORIDE 10 MG: 5 TABLET ORAL at 15:45

## 2019-02-15 RX ADMIN — OXYCODONE HYDROCHLORIDE 10 MG: 5 TABLET ORAL at 18:57

## 2019-02-15 RX ADMIN — OXYCODONE HYDROCHLORIDE 10 MG: 5 TABLET ORAL at 09:15

## 2019-02-15 RX ADMIN — ACETAMINOPHEN 650 MG: 325 TABLET, FILM COATED ORAL at 17:02

## 2019-02-15 RX ADMIN — NIFEDIPINE 30 MG: 30 TABLET, FILM COATED, EXTENDED RELEASE ORAL at 07:51

## 2019-02-15 RX ADMIN — ACETAMINOPHEN 650 MG: 325 TABLET, FILM COATED ORAL at 09:15

## 2019-02-15 RX ADMIN — IBUPROFEN 800 MG: 800 TABLET ORAL at 23:46

## 2019-02-15 NOTE — PLAN OF CARE
Data: Vital signs within normal limits magsulfate. Postpartum checks within normal limits - see flow record. Patient eating and drinking normally. Patient able to empty bladder independently. . Patient ambulating independently..   No apparent signs of infection. Incision healing well. Patient Is performing self cares and Is able to care for infant. Positive attachment behaviors are observed with infant. Support persons are not  present.  Action:  Pain plan was discussed. Patient will request pain med when she is ready for it. Patient was medicated during the shift for pain. See MAR.Patient education done about breastfeeding, formula feeding, postpartum cares and hypertension and magsulfate management . See flow record.  Response:   Patient reassessed within 1 hour after each medication for pain. Patient stated that pain had improved. Patient stated that she was comfortable. .   Plan: Anticipate discharge on 2/15/19.

## 2019-02-15 NOTE — PROGRESS NOTES
Patient still with dull headache and BPs have not come down substantially.      /90 (BP Location: Other (Comment))   Pulse 113   Temp 98.3  F (36.8  C) (Oral)   Resp 18   Wt (!) 166.7 kg (367 lb 6.4 oz)   LMP 05/23/2018   SpO2 98%   Breastfeeding? Unknown   BMI 59.30 kg/m      Component      Latest Ref Rng & Units 2/14/2019   WBC      4.0 - 11.0 10e9/L 11.0   RBC Count      3.8 - 5.2 10e12/L 4.03   Hemoglobin      11.7 - 15.7 g/dL 12.3   Hematocrit      35.0 - 47.0 % 37.9   MCV      78 - 100 fl 94   MCH      26.5 - 33.0 pg 30.5   MCHC      31.5 - 36.5 g/dL 32.5   RDW      10.0 - 15.0 % 13.4   Platelet Count      150 - 450 10e9/L 267   Creatinine      0.52 - 1.04 mg/dL 0.69   GFR Estimate      >60 mL/min/1.73:m2 >90   GFR Estimate If Black      >60 mL/min/1.73:m2 >90   Protein Random Urine      g/L 0.11   Protein Total Urine g/gr Creatinine      0 - 0.2 g/g Cr 0.22 (H)   AST      0 - 45 U/L 52 (H)   ALT      0 - 50 U/L 44   Creatinine Urine      mg/dL 48     A/P superimposed pre-eclampsia POD # 3    1. Given acute hypertension, will start on Magnesium x 24 hours and Iv labetalol as needed.  Continue with nifedipine daily.  May need to increase to 60 mg every day.    Marimar Mario M.D.

## 2019-02-15 NOTE — PLAN OF CARE
Pt is up to the bathroom independently, tolerating caring for her , VSS. Pt complains of headache,blurry vision and is very emotional at times today. Pt appears fatigued, anxious and has been crying at intervals. Discussed pt's frustration with breastfeeding and sore nipples. Assisted, observed with breastfeeding. Pt given emotional support and active listening. Dr. Porter updated with the above information. Pt encouraged to nap. Pt is using hydrogel pads to nipples. IV Magnesium sulfate is infusing at 2gm/hr. Will continue to offer support and reassurance.

## 2019-02-15 NOTE — PLAN OF CARE
Pt had labs drawn and medication for blood pressure given.  Rechecked 1/2 hour after dosing.  Pt will be starting on magnesium per orders.  Saline lock started by Manasa-RN.  Weight to be obtained-oncoming RN is aware.

## 2019-02-15 NOTE — PROGRESS NOTES
Pt c/o of shortness of breath and chest heaviness.  Mag shut off, Mag level drawn, VS stable - WNL - reflexes normal.  Mag level is within therapeutic range.  Respiration 16, , /88 no other s/s of Mag toxicity.  Mag turned on and pt encouraged to sleep.

## 2019-02-15 NOTE — PROGRESS NOTES
[facility] Obstetrics Post-Op / Progress Note         Assessment and Plan:    Assessment:   Post-operative day #4  Low transverse repeat  section  L&D complications: Chronic hypertension with superimposed postpartum preeclampsia      BP improved on Nifedipine XL; on MagSO4 since last PM; no headache or scotomata      Plan:   discharge MagSO4 after 24 hrs; observe overnight, then likely discharge home            Interval History:   Doing well.  Pain is well-controlled.  No fevers.  No history of wound drainage, warmth or significant erythema.  Good appetite.  Denies chest pain, shortness of breath, nausea or vomiting.  Ambulatory.  Breastfeeding well.          Significant Problems:    Active Problems:    Morbid obesity due to excess calories (H)    Essential hypertension    S/P  section    Prenatal care, subsequent pregnancy    Pre-eclampsia superimposed on chronic hypertension, postpartum            Review of Systems:    The patient denies any chest pain, shortness of breath, excessive pain, fever, chills, purulent drainage from the wound, nausea or vomiting.          Medications:   All medications related to the patient's surgery have been reviewed          Physical Exam:     All vitals stable  Patient Vitals for the past 24 hrs:   BP Temp Temp src Pulse Heart Rate Resp SpO2 Weight   02/15/19 0745 137/90 97.5  F (36.4  C) Oral 103 -- 18 98 % --   02/15/19 0315 137/88 -- -- -- -- -- -- --   02/15/19 0215 138/77 -- -- 98 -- -- -- --   02/15/19 0200 147/77 -- -- 94 -- -- -- --   02/15/19 0145 144/75 -- -- 97 -- -- -- --   02/15/19 0130 160/80 -- -- 104 -- -- -- --   02/15/19 0120 158/87 -- -- -- -- -- -- --   02/15/19 0105 145/78 -- -- 107 -- -- -- --   02/15/19 0010 147/88 -- -- 108 -- -- -- --   195 158/85 -- -- 111 -- -- -- --   190 164/84 -- -- 113 -- -- -- --   19 (!) 154/92 -- -- 115 -- -- -- --   19 2130 (!) 161/95 -- -- 107 -- -- -- --   19  2125 153/89 -- -- 101 -- -- -- --   02/14/19 2120 156/90 -- -- 102 -- -- -- --   02/14/19 2115 153/89 -- -- 103 -- -- -- --   02/14/19 2020 156/85 98.7  F (37.1  C) Oral 99 -- -- -- (!) 165.7 kg (365 lb 4.8 oz)   02/14/19 2015 158/85 -- -- -- -- -- -- --   02/14/19 2010 (!) 154/92 -- -- 115 -- -- -- --   02/14/19 1819 181/90 -- -- 113 -- -- -- (!) 166.7 kg (367 lb 6.4 oz)   02/14/19 1723 191/90 -- -- -- -- -- -- --   02/14/19 1540 174/89 98.3  F (36.8  C) Oral -- 101 18 -- --   02/14/19 1109 (!) 167/94 98.3  F (36.8  C) Oral 101 -- 18 -- --     Wound clean and dry with minimal or no drainage.  Surrounding skin with minimal erythema.          Data:     All laboratory data related to this surgery reviewed  Hemoglobin   Date Value Ref Range Status   02/14/2019 12.3 11.7 - 15.7 g/dL Final   02/12/2019 12.7 11.7 - 15.7 g/dL Final   02/11/2019 13.6 11.7 - 15.7 g/dL Final   01/23/2019 13.9 11.7 - 15.7 g/dL Final   12/26/2018 13.4 11.7 - 15.7 g/dL Final     No imaging studies have been ordered    Anita Porter MD

## 2019-02-16 VITALS
TEMPERATURE: 98.4 F | OXYGEN SATURATION: 98 % | DIASTOLIC BLOOD PRESSURE: 80 MMHG | RESPIRATION RATE: 20 BRPM | SYSTOLIC BLOOD PRESSURE: 147 MMHG | BODY MASS INDEX: 58.82 KG/M2 | WEIGHT: 293 LBS | HEART RATE: 89 BPM

## 2019-02-16 LAB
BASOPHILS # BLD AUTO: 0.1 10E9/L (ref 0–0.2)
BASOPHILS NFR BLD AUTO: 0.7 %
DIFFERENTIAL METHOD BLD: NORMAL
EOSINOPHIL # BLD AUTO: 0.3 10E9/L (ref 0–0.7)
EOSINOPHIL NFR BLD AUTO: 2.5 %
ERYTHROCYTE [DISTWIDTH] IN BLOOD BY AUTOMATED COUNT: 13.2 % (ref 10–15)
HCT VFR BLD AUTO: 37.9 % (ref 35–47)
HGB BLD-MCNC: 12.3 G/DL (ref 11.7–15.7)
IMM GRANULOCYTES # BLD: 0.1 10E9/L (ref 0–0.4)
IMM GRANULOCYTES NFR BLD: 0.8 %
LYMPHOCYTES # BLD AUTO: 2.4 10E9/L (ref 0.8–5.3)
LYMPHOCYTES NFR BLD AUTO: 22.6 %
MCH RBC QN AUTO: 30.4 PG (ref 26.5–33)
MCHC RBC AUTO-ENTMCNC: 32.5 G/DL (ref 31.5–36.5)
MCV RBC AUTO: 94 FL (ref 78–100)
MONOCYTES # BLD AUTO: 0.7 10E9/L (ref 0–1.3)
MONOCYTES NFR BLD AUTO: 6.2 %
NEUTROPHILS # BLD AUTO: 7.1 10E9/L (ref 1.6–8.3)
NEUTROPHILS NFR BLD AUTO: 67.2 %
NRBC # BLD AUTO: 0 10*3/UL
NRBC BLD AUTO-RTO: 0 /100
PLATELET # BLD AUTO: 287 10E9/L (ref 150–450)
RBC # BLD AUTO: 4.05 10E12/L (ref 3.8–5.2)
WBC # BLD AUTO: 10.5 10E9/L (ref 4–11)

## 2019-02-16 PROCEDURE — 25000132 ZZH RX MED GY IP 250 OP 250 PS 637: Performed by: OBSTETRICS & GYNECOLOGY

## 2019-02-16 PROCEDURE — 85025 COMPLETE CBC W/AUTO DIFF WBC: CPT | Performed by: OBSTETRICS & GYNECOLOGY

## 2019-02-16 PROCEDURE — 36415 COLL VENOUS BLD VENIPUNCTURE: CPT | Performed by: OBSTETRICS & GYNECOLOGY

## 2019-02-16 RX ORDER — NIFEDIPINE 30 MG
30 TABLET, EXTENDED RELEASE ORAL DAILY
Qty: 30 TABLET | Refills: 3 | Status: ON HOLD | OUTPATIENT
Start: 2019-02-17 | End: 2019-02-20

## 2019-02-16 RX ORDER — AMOXICILLIN 250 MG
1 CAPSULE ORAL 2 TIMES DAILY PRN
Qty: 45 TABLET | Refills: 3 | Status: SHIPPED | OUTPATIENT
Start: 2019-02-16 | End: 2019-03-18

## 2019-02-16 RX ORDER — HYDROCODONE BITARTRATE AND ACETAMINOPHEN 5; 325 MG/1; MG/1
1 TABLET ORAL EVERY 6 HOURS PRN
Qty: 30 TABLET | Refills: 0 | Status: ON HOLD | OUTPATIENT
Start: 2019-02-16 | End: 2019-02-20

## 2019-02-16 RX ORDER — IBUPROFEN 800 MG/1
800 TABLET, FILM COATED ORAL EVERY 6 HOURS PRN
Qty: 45 TABLET | Refills: 1 | Status: SHIPPED | OUTPATIENT
Start: 2019-02-16 | End: 2019-03-07

## 2019-02-16 RX ADMIN — ACETAMINOPHEN 650 MG: 325 TABLET, FILM COATED ORAL at 10:16

## 2019-02-16 RX ADMIN — ESCITALOPRAM OXALATE 10 MG: 10 TABLET, FILM COATED ORAL at 08:03

## 2019-02-16 RX ADMIN — OXYCODONE HYDROCHLORIDE 10 MG: 5 TABLET ORAL at 01:15

## 2019-02-16 RX ADMIN — PRENATAL VIT W/ FE FUMARATE-FA TAB 27-0.8 MG 1 TABLET: 27-0.8 TAB at 08:02

## 2019-02-16 RX ADMIN — OXYCODONE HYDROCHLORIDE 10 MG: 5 TABLET ORAL at 08:02

## 2019-02-16 RX ADMIN — SENNOSIDES AND DOCUSATE SODIUM 1 TABLET: 8.6; 5 TABLET ORAL at 10:16

## 2019-02-16 RX ADMIN — NIFEDIPINE 30 MG: 30 TABLET, FILM COATED, EXTENDED RELEASE ORAL at 08:03

## 2019-02-16 RX ADMIN — OXYCODONE HYDROCHLORIDE 5 MG: 5 TABLET ORAL at 13:22

## 2019-02-16 RX ADMIN — OXYCODONE HYDROCHLORIDE 10 MG: 5 TABLET ORAL at 04:37

## 2019-02-16 RX ADMIN — IBUPROFEN 800 MG: 800 TABLET ORAL at 12:15

## 2019-02-16 RX ADMIN — ACETAMINOPHEN 650 MG: 325 TABLET, FILM COATED ORAL at 01:15

## 2019-02-16 RX ADMIN — IBUPROFEN 800 MG: 800 TABLET ORAL at 06:06

## 2019-02-16 RX ADMIN — ACETAMINOPHEN 650 MG: 325 TABLET, FILM COATED ORAL at 06:06

## 2019-02-16 NOTE — DISCHARGE INSTRUCTIONS
Preeclampsia   Call your doctor right away if you have any of the following:  - Edema (swelling) in your face or hands  - Rapid weight gain-about 1 pound or more in a day  - Headache  - Abdominal pain on your right side  - Vision problems (flashes or spots)  - You have questions or concerns once you return home.Postop  Birth Instructions    Activity       Do not lift more than 10 pounds for 6 weeks after surgery.  Ask family and friends for help when you need it.    No driving until you have stopped taking your pain medications (usually two weeks after surgery).    No heavy exercise or activity for 6 weeks.  Don't do anything that will put a strain on your surgery site.    Don't strain when using the toilet.  Your care team may prescribe a stool softener if you have problems with your bowel movements.     To care for your incision:       Keep the incision clean and dry.    Do not soak your incision in water. No swimming or hot tubs until it has fully healed. You may soak in the bathtub if the water level is below your incision.    Do not use peroxide, gel, cream, lotion, or ointment on your incision.    Adjust your clothes to avoid pressure on your surgery site (check the elastic in your underwear for example).     You may see a small amount of clear or pink drainage and this is normal.  Check with your health care provider:       If the drainage increases or has an odor.    If the incision reddens, you have swelling, or develop a rash.    If you have increased pain and the medicine we prescribed doesn't help.    If you have a fever above 100.4 F (38 C) with or without chills when placing thermometer under your tongue.   The area around your incision (surgery wound), will feel numb.  This is normal. The numbness should go away in less than a year.     Keep your hands clean:  Always wash your hands before touching your incision (surgery wound). This helps reduce your risk of infection. If your hands aren't  dirty, you may use an alcohol hand-rub to clean your hands. Keep your nails clean and short.    Call your healthcare provider if you have any of these symptoms:       You soak a sanitary pad with blood within 1 hour, or you see blood clots larger than a golf ball.    Bleeding that lasts more than 6 weeks.    Vaginal discharge that smells bad.    Severe pain, cramping or tenderness in your lower belly area.    A need to urinate more frequently (use the toilet more often), more urgently (use the toilet very quickly), or it burns when you urinate.    Nausea and vomiting.    Redness, swelling or pain around a vein in your leg.    Problems breastfeeding or a red or painful area on your breast.    Chest pain and cough or are gasping for air.    Problems with coping with sadness, anxiety or depression. If you have concerns about hurting yourself or the baby, call your provider immediately.      You have questions or concerns after you return home.

## 2019-02-16 NOTE — PLAN OF CARE
"Patient improving over night; States her headache has improved and is no longer seeing double.  States she feels less \"drunk and hungover\" since discontinuing magnesium.  Blood pressures remained within normal limits throughout the night.  Patient voiding regularly with large amounts.  Caring for infant and has added breast pumping d/t infant's blood sugars.  Pain well managed.    "

## 2019-02-16 NOTE — DISCHARGE SUMMARY
" DELIVERY DISCHARGE SUMMARY    Admit date: 2019  Discharge date: 2019     Admit Dx:   - 33 year old y/o  at 38w4d   - History of  section   - history of pre-eclamsia  - chronic hypertension  - obesity  - history of depression    Discharge Dx:  - Same as above, s/p repeat low transverse  section  - superimposed pre-eclampsia with severe features    Procedures:  - repeat low transverse  section   - Spinal analgesia  - IV magnesium sulfate    Admit HPI:  Ms. Janine Salas is a 33 year old  at 38w4d who was admitted on 2019 for repeat  in the setting of cHTN.  Please see her admit H&P for full details of her PMH, PSH, Meds, Allergies and exam on admit.    Hospital course:  Janine Salas is a 33 year old  @ 38+4 Weeks EGA  She presented with contractions and elevated BP  on 19  Pre-eclampsia ruled out but gladys q 2 -4 min  Pregnancy was complicated by PIH- normal BPP weekly  Previous CS and desired repeat  GBS neg  Membrane status: intact  Repeat CS   Spinal anesthesia  FHR Cat I  Delivered a viable male  @ 20:15 via repeat CS  Very thin lower uterine segment  Weight 7#12 oz   APGAR's 9/9  Placenta delivered @ 20:16 , was complete with a 3vessel cord  See op report for complete details   ml  \"Peng\"    Please see her  Section Operative Note for full details regarding her delivery.    Her postoperative course was complicated by severe range blood pressures requiring antihypertensive medication and IV magnesium for 24 hours.  Also had a slight elevation in AST. On POD#5, she was meeting all of her postpartum goals and deemed stable for discharge. She was voiding without difficulty, tolerating a regular diet without nausea and vomiting, her pain was well controlled on oral pain medicines and her lochia was appropriate. Her hemoglobin after delivery was 12.3. Her Rh status was pos and Rhogam was not indicated. At the " time of discharge, she was breast feeding her infant and planning nuvaring at 6w postpartum for contraception.     Physical exam on the day of discharge:  Vitals:    02/15/19 2215 02/15/19 2344 02/16/19 0439 02/16/19 0800   BP:  137/65 134/69 147/80   Pulse:  96 92 89   Resp:  18 20 20   Temp:  98.4  F (36.9  C) 97.5  F (36.4  C) 98.4  F (36.9  C)   TempSrc:  Oral Oral Oral   SpO2:       Weight: (!) 165.3 kg (364 lb 6.4 oz)          General: sitting up, alert and cooperative  Abd: soft, non-distended, non-tender. Fundus difficult to palpate due to body habitus.   Incision clean/dry/intact with dermabond in place.  + erythema/scaly rash from contact dermatitis  Extremities: calves nontender, 2+ edema of lower extremities bilaterally    Lab Results   Component Value Date    HGB 12.3 02/16/2019    HGB 12.3 02/14/2019     Blood type:   Lab Results   Component Value Date    RH Pos 02/11/2019       Discharge/Disposition:  Janine Salas was discharged to home in stable condition with the following instructions/medications:  1) Call for temperature > 100.4, foul smelling vaginal discharge, bleeding > 1 pad per hour x 2 hrs, pain not controlled by oral pain meds, severe constipation or severe nausea or vomiting.  2) She received contraceptive counseling.  3) She was instructed to follow-up with her primary OB in 6 weeks for a routine postpartum visit and in 1 week for a blood pressure check.  4) She was instructed to continue her PNV on discharge if she wished to breast feed her infant.  5) She was discharged home with the following medications: ibuprofen, norco, stool softener, and nifedipine.    Tricia Leone MD, MPH  [facility]

## 2019-02-16 NOTE — PLAN OF CARE
Pt is with VSS, postpartum assessments WNL with the exception of a rash/tape reaction along the upper side of her abdominal incision. Dr. Leone is aware. Pt reports feeling better since the Magnesium sulfate was discontinued last evening. Pt is tolerating her activities and is caring for her  with small assistance. Pt reports adequate pain control using oral pain medication. Pt understands the importance of keeping her abdominal incision clean and dry and to monitor for signs of infection. Discharged to boarder status due to infant having low blood sugars.

## 2019-02-16 NOTE — PLAN OF CARE
Data: Vital signs within normal limits. Magnesium discontinued at . Postpartum checks within normal limits - see flow record. Patient eating and drinking normally. Patient able to empty bladder independently. . Patient ambulating independently..   No apparent signs of infection. Incision healing well. Patient Is performing self cares and Is able to care for infant. Positive attachment behaviors are observed with infant. Support persons are not present.  Action:  Pain plan was discussed. Patient would like pain meds to be brought in when they are due. Patient was medicated during the shift for pain. See MAR.Patient education done about breastfeeding, formula feeding,  cares, postpartum cares and rest. See flow record.  Response:   Patient reassessed within 1 hour after each medication for pain. Patient stated that pain had improved. Patient stated that she was comfortable. .   Plan: Anticipate discharge on 19.

## 2019-02-18 ENCOUNTER — MYC MEDICAL ADVICE (OUTPATIENT)
Dept: OBGYN | Facility: CLINIC | Age: 34
End: 2019-02-18

## 2019-02-18 ENCOUNTER — ANESTHESIA (OUTPATIENT)
Dept: SURGERY | Facility: CLINIC | Age: 34
End: 2019-02-18
Payer: MEDICAID

## 2019-02-18 PROCEDURE — 96376 TX/PRO/DX INJ SAME DRUG ADON: CPT

## 2019-02-18 PROCEDURE — 96375 TX/PRO/DX INJ NEW DRUG ADDON: CPT

## 2019-02-18 PROCEDURE — 99285 EMERGENCY DEPT VISIT HI MDM: CPT | Mod: 25

## 2019-02-18 PROCEDURE — 96365 THER/PROPH/DIAG IV INF INIT: CPT

## 2019-02-18 PROCEDURE — 99291 CRITICAL CARE FIRST HOUR: CPT | Mod: 25 | Performed by: EMERGENCY MEDICINE

## 2019-02-18 NOTE — TELEPHONE ENCOUNTER
S-(situation): Pt believes she has a incision infection.    B-(background): Post partum  19.    A-(assessment): Pt reports having chills, not sure if she has a fever.  Reports that she really doesn't have much of a discharge but what is seeping out has a foul odor.  Having a lot of pain to point that she is doubled over.    R-(recommendations): Pt was referred to be seen in UC today.  On call provider is delivering a baby at this time and unable to be seen in clinic today.    Janeth Sethi  [facility] RN

## 2019-02-19 ENCOUNTER — HOSPITAL ENCOUNTER (INPATIENT)
Facility: CLINIC | Age: 34
LOS: 1 days | Discharge: HOME | End: 2019-02-20
Attending: EMERGENCY MEDICINE | Admitting: OBSTETRICS & GYNECOLOGY
Payer: MEDICAID

## 2019-02-19 DIAGNOSIS — Z98.891 S/P CESAREAN SECTION: ICD-10-CM

## 2019-02-19 DIAGNOSIS — R79.89 HYPOURICEMIA: ICD-10-CM

## 2019-02-19 DIAGNOSIS — B35.4 TINEA CORPORIS: ICD-10-CM

## 2019-02-19 DIAGNOSIS — R79.89 ELEVATED LFTS: ICD-10-CM

## 2019-02-19 PROBLEM — O14.90 PRE-ECLAMPSIA: Status: ACTIVE | Noted: 2019-02-19

## 2019-02-19 LAB
ALBUMIN SERPL-MCNC: 2.9 G/DL (ref 3.4–5)
ALBUMIN UR-MCNC: NEGATIVE MG/DL
ALP SERPL-CCNC: 104 U/L (ref 40–150)
ALT SERPL W P-5'-P-CCNC: 47 U/L (ref 0–50)
ALT SERPL W P-5'-P-CCNC: 55 U/L (ref 0–50)
ANION GAP SERPL CALCULATED.3IONS-SCNC: 7 MMOL/L (ref 3–14)
APPEARANCE UR: CLEAR
AST SERPL W P-5'-P-CCNC: 42 U/L (ref 0–45)
AST SERPL W P-5'-P-CCNC: 49 U/L (ref 0–45)
BASOPHILS # BLD AUTO: 0.1 10E9/L (ref 0–0.2)
BASOPHILS NFR BLD AUTO: 0.7 %
BILIRUB SERPL-MCNC: 0.3 MG/DL (ref 0.2–1.3)
BILIRUB UR QL STRIP: NEGATIVE
BUN SERPL-MCNC: 28 MG/DL (ref 7–30)
CALCIUM SERPL-MCNC: 8.6 MG/DL (ref 8.5–10.1)
CHLORIDE SERPL-SCNC: 109 MMOL/L (ref 94–109)
CO2 SERPL-SCNC: 26 MMOL/L (ref 20–32)
COLOR UR AUTO: ABNORMAL
CREAT SERPL-MCNC: 0.63 MG/DL (ref 0.52–1.04)
CREAT SERPL-MCNC: 0.76 MG/DL (ref 0.52–1.04)
DIFFERENTIAL METHOD BLD: NORMAL
EOSINOPHIL # BLD AUTO: 0.3 10E9/L (ref 0–0.7)
EOSINOPHIL NFR BLD AUTO: 2.5 %
ERYTHROCYTE [DISTWIDTH] IN BLOOD BY AUTOMATED COUNT: 13.1 % (ref 10–15)
ERYTHROCYTE [DISTWIDTH] IN BLOOD BY AUTOMATED COUNT: 13.2 % (ref 10–15)
GFR SERPL CREATININE-BSD FRML MDRD: >90 ML/MIN/{1.73_M2}
GFR SERPL CREATININE-BSD FRML MDRD: >90 ML/MIN/{1.73_M2}
GLUCOSE SERPL-MCNC: 77 MG/DL (ref 70–99)
GLUCOSE UR STRIP-MCNC: NEGATIVE MG/DL
HCT VFR BLD AUTO: 37.9 % (ref 35–47)
HCT VFR BLD AUTO: 39.3 % (ref 35–47)
HGB BLD-MCNC: 12.3 G/DL (ref 11.7–15.7)
HGB BLD-MCNC: 12.6 G/DL (ref 11.7–15.7)
HGB UR QL STRIP: ABNORMAL
IMM GRANULOCYTES # BLD: 0.1 10E9/L (ref 0–0.4)
IMM GRANULOCYTES NFR BLD: 1.3 %
KETONES UR STRIP-MCNC: NEGATIVE MG/DL
LEUKOCYTE ESTERASE UR QL STRIP: ABNORMAL
LYMPHOCYTES # BLD AUTO: 2.8 10E9/L (ref 0.8–5.3)
LYMPHOCYTES NFR BLD AUTO: 26.9 %
MCH RBC QN AUTO: 30.1 PG (ref 26.5–33)
MCH RBC QN AUTO: 30.4 PG (ref 26.5–33)
MCHC RBC AUTO-ENTMCNC: 32.1 G/DL (ref 31.5–36.5)
MCHC RBC AUTO-ENTMCNC: 32.5 G/DL (ref 31.5–36.5)
MCV RBC AUTO: 93 FL (ref 78–100)
MCV RBC AUTO: 95 FL (ref 78–100)
MONOCYTES # BLD AUTO: 0.8 10E9/L (ref 0–1.3)
MONOCYTES NFR BLD AUTO: 7.5 %
NEUTROPHILS # BLD AUTO: 6.4 10E9/L (ref 1.6–8.3)
NEUTROPHILS NFR BLD AUTO: 61.1 %
NITRATE UR QL: NEGATIVE
NRBC # BLD AUTO: 0 10*3/UL
NRBC BLD AUTO-RTO: 0 /100
PH UR STRIP: 5 PH (ref 5–7)
PLATELET # BLD AUTO: 321 10E9/L (ref 150–450)
PLATELET # BLD AUTO: 339 10E9/L (ref 150–450)
POTASSIUM SERPL-SCNC: 4.2 MMOL/L (ref 3.4–5.3)
PROT SERPL-MCNC: 6.4 G/DL (ref 6.8–8.8)
RBC # BLD AUTO: 4.08 10E12/L (ref 3.8–5.2)
RBC # BLD AUTO: 4.15 10E12/L (ref 3.8–5.2)
RBC #/AREA URNS AUTO: 1 /HPF (ref 0–2)
SODIUM SERPL-SCNC: 142 MMOL/L (ref 133–144)
SOURCE: ABNORMAL
SP GR UR STRIP: 1.01 (ref 1–1.03)
SQUAMOUS #/AREA URNS AUTO: <1 /HPF (ref 0–1)
UROBILINOGEN UR STRIP-MCNC: 0 MG/DL (ref 0–2)
WBC # BLD AUTO: 10.4 10E9/L (ref 4–11)
WBC # BLD AUTO: 11.9 10E9/L (ref 4–11)
WBC #/AREA URNS AUTO: 8 /HPF (ref 0–5)

## 2019-02-19 PROCEDURE — G0378 HOSPITAL OBSERVATION PER HR: HCPCS

## 2019-02-19 PROCEDURE — 84450 TRANSFERASE (AST) (SGOT): CPT | Performed by: OBSTETRICS & GYNECOLOGY

## 2019-02-19 PROCEDURE — 82565 ASSAY OF CREATININE: CPT | Performed by: OBSTETRICS & GYNECOLOGY

## 2019-02-19 PROCEDURE — 80053 COMPREHEN METABOLIC PANEL: CPT | Performed by: EMERGENCY MEDICINE

## 2019-02-19 PROCEDURE — 81001 URINALYSIS AUTO W/SCOPE: CPT | Performed by: EMERGENCY MEDICINE

## 2019-02-19 PROCEDURE — 25000128 H RX IP 250 OP 636: Performed by: OBSTETRICS & GYNECOLOGY

## 2019-02-19 PROCEDURE — 25000132 ZZH RX MED GY IP 250 OP 250 PS 637: Performed by: EMERGENCY MEDICINE

## 2019-02-19 PROCEDURE — 85027 COMPLETE CBC AUTOMATED: CPT | Performed by: OBSTETRICS & GYNECOLOGY

## 2019-02-19 PROCEDURE — 85025 COMPLETE CBC W/AUTO DIFF WBC: CPT | Performed by: EMERGENCY MEDICINE

## 2019-02-19 PROCEDURE — 25000128 H RX IP 250 OP 636: Performed by: EMERGENCY MEDICINE

## 2019-02-19 PROCEDURE — 12000000 ZZH R&B MED SURG/OB

## 2019-02-19 PROCEDURE — 25000125 ZZHC RX 250: Performed by: OBSTETRICS & GYNECOLOGY

## 2019-02-19 PROCEDURE — 25000132 ZZH RX MED GY IP 250 OP 250 PS 637: Performed by: OBSTETRICS & GYNECOLOGY

## 2019-02-19 PROCEDURE — 36415 COLL VENOUS BLD VENIPUNCTURE: CPT | Performed by: OBSTETRICS & GYNECOLOGY

## 2019-02-19 PROCEDURE — 84460 ALANINE AMINO (ALT) (SGPT): CPT | Performed by: OBSTETRICS & GYNECOLOGY

## 2019-02-19 PROCEDURE — 25800030 ZZH RX IP 258 OP 636: Performed by: OBSTETRICS & GYNECOLOGY

## 2019-02-19 RX ORDER — CLOTRIMAZOLE 1 %
CREAM (GRAM) TOPICAL 2 TIMES DAILY
Status: DISCONTINUED | OUTPATIENT
Start: 2019-02-19 | End: 2019-02-20 | Stop reason: HOSPADM

## 2019-02-19 RX ORDER — MAGNESIUM SULFATE IN WATER 40 MG/ML
2 INJECTION, SOLUTION INTRAVENOUS CONTINUOUS
Status: DISCONTINUED | OUTPATIENT
Start: 2019-02-19 | End: 2019-02-19

## 2019-02-19 RX ORDER — MAGNESIUM SULFATE IN WATER 40 MG/ML
2 INJECTION, SOLUTION INTRAVENOUS CONTINUOUS
Status: DISPENSED | OUTPATIENT
Start: 2019-02-19 | End: 2019-02-20

## 2019-02-19 RX ORDER — HYDROXYZINE HYDROCHLORIDE 25 MG/1
25 TABLET, FILM COATED ORAL EVERY 6 HOURS PRN
Status: DISCONTINUED | OUTPATIENT
Start: 2019-02-19 | End: 2019-02-19

## 2019-02-19 RX ORDER — SODIUM CHLORIDE, SODIUM LACTATE, POTASSIUM CHLORIDE, CALCIUM CHLORIDE 600; 310; 30; 20 MG/100ML; MG/100ML; MG/100ML; MG/100ML
10-125 INJECTION, SOLUTION INTRAVENOUS CONTINUOUS
Status: DISCONTINUED | OUTPATIENT
Start: 2019-02-19 | End: 2019-02-20 | Stop reason: HOSPADM

## 2019-02-19 RX ORDER — HYDRALAZINE HYDROCHLORIDE 20 MG/ML
10 INJECTION INTRAMUSCULAR; INTRAVENOUS ONCE
Status: COMPLETED | OUTPATIENT
Start: 2019-02-19 | End: 2019-02-19

## 2019-02-19 RX ORDER — NIFEDIPINE 30 MG/1
30 TABLET, EXTENDED RELEASE ORAL DAILY
Status: DISCONTINUED | OUTPATIENT
Start: 2019-02-19 | End: 2019-02-19

## 2019-02-19 RX ORDER — MAGNESIUM SULFATE HEPTAHYDRATE 500 MG/ML
4 INJECTION, SOLUTION INTRAMUSCULAR; INTRAVENOUS
Status: DISCONTINUED | OUTPATIENT
Start: 2019-02-19 | End: 2019-02-20 | Stop reason: HOSPADM

## 2019-02-19 RX ORDER — LABETALOL 200 MG/1
200 TABLET, FILM COATED ORAL 3 TIMES DAILY
Status: DISCONTINUED | OUTPATIENT
Start: 2019-02-19 | End: 2019-02-19

## 2019-02-19 RX ORDER — HYDROMORPHONE HYDROCHLORIDE 2 MG/1
2-4 TABLET ORAL EVERY 4 HOURS PRN
Status: DISCONTINUED | OUTPATIENT
Start: 2019-02-19 | End: 2019-02-20 | Stop reason: HOSPADM

## 2019-02-19 RX ORDER — IBUPROFEN 600 MG/1
600 TABLET, FILM COATED ORAL EVERY 6 HOURS PRN
Status: DISCONTINUED | OUTPATIENT
Start: 2019-02-19 | End: 2019-02-20 | Stop reason: HOSPADM

## 2019-02-19 RX ORDER — LORAZEPAM 2 MG/ML
2 INJECTION INTRAMUSCULAR
Status: DISCONTINUED | OUTPATIENT
Start: 2019-02-19 | End: 2019-02-20 | Stop reason: HOSPADM

## 2019-02-19 RX ORDER — LABETALOL HYDROCHLORIDE 5 MG/ML
20 INJECTION, SOLUTION INTRAVENOUS EVERY 10 MIN PRN
Status: DISCONTINUED | OUTPATIENT
Start: 2019-02-19 | End: 2019-02-20 | Stop reason: HOSPADM

## 2019-02-19 RX ORDER — CALCIUM GLUCONATE 94 MG/ML
1 INJECTION, SOLUTION INTRAVENOUS
Status: DISCONTINUED | OUTPATIENT
Start: 2019-02-19 | End: 2019-02-20 | Stop reason: HOSPADM

## 2019-02-19 RX ORDER — LABETALOL 200 MG/1
200 TABLET, FILM COATED ORAL 3 TIMES DAILY
Status: DISCONTINUED | OUTPATIENT
Start: 2019-02-19 | End: 2019-02-20 | Stop reason: HOSPADM

## 2019-02-19 RX ORDER — AMOXICILLIN 250 MG
1 CAPSULE ORAL 2 TIMES DAILY PRN
Status: DISCONTINUED | OUTPATIENT
Start: 2019-02-19 | End: 2019-02-20 | Stop reason: HOSPADM

## 2019-02-19 RX ORDER — FLUTICASONE PROPIONATE 50 MCG
2 SPRAY, SUSPENSION (ML) NASAL 2 TIMES DAILY PRN
Status: DISCONTINUED | OUTPATIENT
Start: 2019-02-19 | End: 2019-02-20 | Stop reason: HOSPADM

## 2019-02-19 RX ORDER — HYDROXYZINE HYDROCHLORIDE 50 MG/1
50 TABLET, FILM COATED ORAL 3 TIMES DAILY PRN
Status: DISCONTINUED | OUTPATIENT
Start: 2019-02-19 | End: 2019-02-20 | Stop reason: HOSPADM

## 2019-02-19 RX ORDER — MAGNESIUM SULFATE HEPTAHYDRATE 40 MG/ML
4 INJECTION, SOLUTION INTRAVENOUS ONCE
Status: COMPLETED | OUTPATIENT
Start: 2019-02-19 | End: 2019-02-19

## 2019-02-19 RX ORDER — MAGNESIUM SULFATE HEPTAHYDRATE 40 MG/ML
4 INJECTION, SOLUTION INTRAVENOUS
Status: DISCONTINUED | OUTPATIENT
Start: 2019-02-19 | End: 2019-02-20 | Stop reason: HOSPADM

## 2019-02-19 RX ORDER — LIDOCAINE 40 MG/G
CREAM TOPICAL
Status: DISCONTINUED | OUTPATIENT
Start: 2019-02-19 | End: 2019-02-20 | Stop reason: HOSPADM

## 2019-02-19 RX ORDER — NIFEDIPINE 10 MG/1
10 CAPSULE ORAL
Status: DISCONTINUED | OUTPATIENT
Start: 2019-02-19 | End: 2019-02-20 | Stop reason: HOSPADM

## 2019-02-19 RX ORDER — HYDRALAZINE HYDROCHLORIDE 20 MG/ML
20 INJECTION INTRAMUSCULAR; INTRAVENOUS ONCE
Status: COMPLETED | OUTPATIENT
Start: 2019-02-19 | End: 2019-02-19

## 2019-02-19 RX ORDER — OXYCODONE HYDROCHLORIDE 5 MG/1
5 TABLET ORAL EVERY 4 HOURS PRN
Status: DISCONTINUED | OUTPATIENT
Start: 2019-02-19 | End: 2019-02-20 | Stop reason: HOSPADM

## 2019-02-19 RX ORDER — HYDROXYZINE HYDROCHLORIDE 50 MG/1
50 TABLET, FILM COATED ORAL EVERY 6 HOURS PRN
Status: DISCONTINUED | OUTPATIENT
Start: 2019-02-19 | End: 2019-02-19

## 2019-02-19 RX ORDER — LABETALOL HYDROCHLORIDE 5 MG/ML
40 INJECTION, SOLUTION INTRAVENOUS EVERY 10 MIN PRN
Status: DISCONTINUED | OUTPATIENT
Start: 2019-02-19 | End: 2019-02-20 | Stop reason: HOSPADM

## 2019-02-19 RX ORDER — ESCITALOPRAM OXALATE 10 MG/1
10 TABLET ORAL DAILY
Status: DISCONTINUED | OUTPATIENT
Start: 2019-02-19 | End: 2019-02-20 | Stop reason: HOSPADM

## 2019-02-19 RX ORDER — ACETAMINOPHEN 325 MG/1
975 TABLET ORAL EVERY 4 HOURS PRN
Status: DISCONTINUED | OUTPATIENT
Start: 2019-02-19 | End: 2019-02-20 | Stop reason: HOSPADM

## 2019-02-19 RX ORDER — NALOXONE HYDROCHLORIDE 0.4 MG/ML
.1-.4 INJECTION, SOLUTION INTRAMUSCULAR; INTRAVENOUS; SUBCUTANEOUS
Status: DISCONTINUED | OUTPATIENT
Start: 2019-02-19 | End: 2019-02-20 | Stop reason: HOSPADM

## 2019-02-19 RX ORDER — HYDROXYZINE HYDROCHLORIDE 50 MG/1
50 TABLET, FILM COATED ORAL 3 TIMES DAILY PRN
Status: DISCONTINUED | OUTPATIENT
Start: 2019-02-19 | End: 2019-02-19

## 2019-02-19 RX ORDER — LABETALOL HYDROCHLORIDE 5 MG/ML
80 INJECTION, SOLUTION INTRAVENOUS EVERY 10 MIN PRN
Status: DISCONTINUED | OUTPATIENT
Start: 2019-02-19 | End: 2019-02-20 | Stop reason: HOSPADM

## 2019-02-19 RX ORDER — OXYMETAZOLINE HYDROCHLORIDE 0.05 G/100ML
2 SPRAY NASAL 2 TIMES DAILY
Status: DISCONTINUED | OUTPATIENT
Start: 2019-02-19 | End: 2019-02-20 | Stop reason: HOSPADM

## 2019-02-19 RX ADMIN — LABETALOL HCL 200 MG: 200 TABLET, FILM COATED ORAL at 11:52

## 2019-02-19 RX ADMIN — LABETALOL HCL 200 MG: 200 TABLET, FILM COATED ORAL at 17:06

## 2019-02-19 RX ADMIN — SODIUM CHLORIDE, POTASSIUM CHLORIDE, SODIUM LACTATE AND CALCIUM CHLORIDE 50 ML/HR: 600; 310; 30; 20 INJECTION, SOLUTION INTRAVENOUS at 03:54

## 2019-02-19 RX ADMIN — IBUPROFEN 600 MG: 600 TABLET ORAL at 20:45

## 2019-02-19 RX ADMIN — ACETAMINOPHEN 650 MG: 325 TABLET, FILM COATED ORAL at 20:45

## 2019-02-19 RX ADMIN — ESCITALOPRAM OXALATE 10 MG: 10 TABLET, FILM COATED ORAL at 08:13

## 2019-02-19 RX ADMIN — MAGNESIUM SULFATE HEPTAHYDRATE 2 G/HR: 40 INJECTION, SOLUTION INTRAVENOUS at 13:53

## 2019-02-19 RX ADMIN — HYDROXYZINE HYDROCHLORIDE 50 MG: 50 TABLET ORAL at 17:05

## 2019-02-19 RX ADMIN — OXYCODONE HYDROCHLORIDE 5 MG: 5 TABLET ORAL at 20:46

## 2019-02-19 RX ADMIN — MICONAZOLE NITRATE: 2 POWDER TOPICAL at 20:15

## 2019-02-19 RX ADMIN — SODIUM CHLORIDE, POTASSIUM CHLORIDE, SODIUM LACTATE AND CALCIUM CHLORIDE 50 ML/HR: 600; 310; 30; 20 INJECTION, SOLUTION INTRAVENOUS at 22:03

## 2019-02-19 RX ADMIN — LABETALOL HCL 200 MG: 200 TABLET, FILM COATED ORAL at 22:03

## 2019-02-19 RX ADMIN — MAGNESIUM SULFATE HEPTAHYDRATE 2 G/HR: 40 INJECTION, SOLUTION INTRAVENOUS at 03:56

## 2019-02-19 RX ADMIN — Medication 2 SPRAY: at 20:14

## 2019-02-19 RX ADMIN — OXYCODONE HYDROCHLORIDE 5 MG: 5 TABLET ORAL at 06:18

## 2019-02-19 RX ADMIN — OXYCODONE HYDROCHLORIDE 5 MG: 5 TABLET ORAL at 17:05

## 2019-02-19 RX ADMIN — CLOTRIMAZOLE: 10 CREAM TOPICAL at 01:11

## 2019-02-19 RX ADMIN — CLOTRIMAZOLE: 10 CREAM TOPICAL at 17:06

## 2019-02-19 RX ADMIN — MICONAZOLE NITRATE: 2 POWDER TOPICAL at 04:12

## 2019-02-19 RX ADMIN — MAGNESIUM SULFATE IN WATER 4 G: 40 INJECTION, SOLUTION INTRAVENOUS at 02:03

## 2019-02-19 RX ADMIN — HYDRALAZINE HYDROCHLORIDE 10 MG: 20 INJECTION INTRAMUSCULAR; INTRAVENOUS at 01:09

## 2019-02-19 RX ADMIN — HYDRALAZINE HYDROCHLORIDE 10 MG: 20 INJECTION INTRAMUSCULAR; INTRAVENOUS at 01:53

## 2019-02-19 RX ADMIN — MICONAZOLE NITRATE: 2 POWDER TOPICAL at 08:15

## 2019-02-19 RX ADMIN — LABETALOL HCL 200 MG: 200 TABLET, FILM COATED ORAL at 04:49

## 2019-02-19 RX ADMIN — CLOTRIMAZOLE: 10 CREAM TOPICAL at 08:15

## 2019-02-19 RX ADMIN — HYDRALAZINE HYDROCHLORIDE 20 MG: 20 INJECTION INTRAMUSCULAR; INTRAVENOUS at 02:30

## 2019-02-19 RX ADMIN — OXYCODONE HYDROCHLORIDE 5 MG: 5 TABLET ORAL at 01:08

## 2019-02-19 RX ADMIN — OXYCODONE HYDROCHLORIDE 5 MG: 5 TABLET ORAL at 11:57

## 2019-02-19 RX ADMIN — HYDROXYZINE HYDROCHLORIDE 50 MG: 50 TABLET ORAL at 06:18

## 2019-02-19 RX ADMIN — Medication 2 SPRAY: at 08:13

## 2019-02-19 ASSESSMENT — ENCOUNTER SYMPTOMS
DIARRHEA: 0
LIGHT-HEADEDNESS: 0
COUGH: 0
DYSURIA: 0
VOMITING: 0
CHEST TIGHTNESS: 0
SHORTNESS OF BREATH: 0
ABDOMINAL PAIN: 1
HEADACHES: 0
FATIGUE: 0
BACK PAIN: 0
NAUSEA: 0
CONSTIPATION: 0
FEVER: 0
CHILLS: 0

## 2019-02-19 ASSESSMENT — MIFFLIN-ST. JEOR
SCORE: 2339.02
SCORE: 2349.7

## 2019-02-19 NOTE — PLAN OF CARE
Patient came from ED via gernie at 0315 for pre eclampsia. A&O, walked to bed. Loading dose of Magnesium infusing upon arrival. Patient states slight pain at incision area of , which is red, cream and powder with gauze. Here with her  and  baby post 8 days. Will continue maintenance dose of mag and monitor blood pressures. Call light within reach, and admitted. Skin assessment done by writer and witnessed by CHAIM Portillo RN.

## 2019-02-19 NOTE — PROGRESS NOTES
Skin affirmation note    Admitting nurse completed full skin assessment, Kvng score and Kvng interventions. This writer agrees with the initial skin assessment findings.

## 2019-02-19 NOTE — PROGRESS NOTES
"HD#1    S: Pt feeling very tired on the mag, exhausted. Incisional pain improved on oral meds.    O:   VS:   Patient Vitals for the past 24 hrs:   BP Temp Temp src Pulse Heart Rate Resp SpO2 Height Weight   02/19/19 0811 120/66 98.2  F (36.8  C) Oral 105 -- 18 96 % -- --   02/19/19 0600 127/65 -- -- 113 -- 18 -- -- --   02/19/19 0545 120/70 -- -- 102 -- 18 98 % -- --   02/19/19 0530 131/72 -- -- 117 -- 18 98 % -- --   02/19/19 0515 143/73 -- -- 116 -- 18 97 % -- --   02/19/19 0500 145/77 -- -- 118 -- 18 98 % -- --   02/19/19 0449 136/71 -- -- 119 -- -- -- -- --   02/19/19 0445 136/71 -- -- 119 -- 18 97 % -- --   02/19/19 0430 136/77 -- -- 127 -- 18 98 % -- --   02/19/19 0415 151/74 -- -- 136 -- 18 98 % -- --   02/19/19 0400 144/74 -- -- 129 -- 18 97 % -- --   02/19/19 0315 139/75 98  F (36.7  C) Oral 122 -- 18 100 % 1.676 m (5' 5.98\") (!) 162.3 kg (357 lb 11.2 oz)   02/19/19 0245 164/82 -- -- -- -- 18 100 % -- --   02/19/19 0230 (!) 178/95 -- -- 106 -- -- -- -- --   02/19/19 0215 (!) 185/99 -- -- -- -- -- 99 % -- --   02/19/19 0200 (!) 178/99 -- -- 103 -- 18 100 % -- --   02/19/19 0145 (!) 187/105 -- -- 103 -- -- 98 % -- --   02/19/19 0130 (!) 173/102 -- -- 98 -- -- -- -- --   02/19/19 0030 -- -- -- -- -- -- 99 % -- --   02/19/19 0005 (!) 178/120 97.8  F (36.6  C) Oral -- 113 18 98 % 1.676 m (5' 6\") (!) 163.3 kg (360 lb)       Intake/Output Summary (Last 24 hours) at 2/19/2019 1031  Last data filed at 2/19/2019 0900  Gross per 24 hour   Intake 818.33 ml   Output 1600 ml   Net -781.67 ml       Component      Latest Ref Rng & Units 2/19/2019   WBC      4.0 - 11.0 10e9/L 11.9 (H)   RBC Count      3.8 - 5.2 10e12/L 4.08   Hemoglobin      11.7 - 15.7 g/dL 12.3   Hematocrit      35.0 - 47.0 % 37.9   MCV      78 - 100 fl 93   MCH      26.5 - 33.0 pg 30.1   MCHC      31.5 - 36.5 g/dL 32.5   RDW      10.0 - 15.0 % 13.2   Platelet Count      150 - 450 10e9/L 321   Creatinine      0.52 - 1.04 mg/dL 0.63   GFR Estimate      >60 " mL/min/1.73:m2 >90   GFR Estimate If Black      >60 mL/min/1.73:m2 >90   AST      0 - 45 U/L 42   ALT      0 - 50 U/L 47     A/P: 32 yo now P2 POD#8 s/p repeat c/s, readmitted with severe pre-e in hypertensive emergency.   - Status post IV hydralazine in the emergency department of 10 mg, 10 mg and 20 mg.  Blood pressures remained elevated but not in the severe range.  I did start 200 mg of labetalol 3 times daily orally for her.  Blood pressures are now mild range elevation and at goal.  -Continue IV magnesium infusion for 24 hours since readmission, excellent urine output  -Repeat help labs this morning have normalized    Angela Silveira MD  OB/GYN

## 2019-02-19 NOTE — PLAN OF CARE
Patient stable and started on labetalol po. Complained of incisional pain. Voiding adequate amounts. Maintenance magnesium infusing.  and family in room with patient.

## 2019-02-19 NOTE — ED NOTES
Pt presents to ED after c section 8 days ago. Pt states she started to notice foul smelling drainage from incision site.  Denies fevers at home.  States area is painful.  Denies n/v.  Pt has bilateral pitting edema, states started after birth.  Denies SOA and CP.  Pt A7Ox4.  Hypertensive upon arrival to ED, states that she has had HTN since birth.

## 2019-02-19 NOTE — H&P
"[facility] Labor and Delivery H&P  2019  Morgan Zamarripa  [id number]      HPI: Morgan Zamarripa is a 33 year old now P2 who presented to the ED POD#8 s/p repeat  delivery for concerns for a wound infection. She reports worsening discomfort at her wound as well as a foul-smelling drainage. She has concern that her wound had \"popped open\". She has been eating and drinking normally, urinating and moving her bowels without issue. Denies any headache, vision changes, RUQ abdominal pain. She does note worsening edema of the lower extremity since discharge. Has been able to take her nifedipine medication as prescribed. She has been breastfeeding her baby and supplementing for low blood sugar issues.     Pregnancy notable for:  -- chronic hypertension with superimposed pre-eclampsia with severe features, was treated with IV magnesium in the pp period starting POD#3  -- obesity, Body mass index is 58.11 kg/m .  -- history of depression/anxiety, follows with a counselor    OBHX:   Obstetric History       T2      L2     SAB0   TAB0   Ectopic0   Multiple0   Live Births2       # Outcome Date GA Lbr Donavon/2nd Weight Sex Delivery Anes PTL Lv   2 Term 19 38w4d  3.51 kg (7 lb 11.8 oz) M CS-LTranv Spinal N CAMRON      Name: MILLIE ZAMARRIPA-MORGAN      Apgar1:  9                Apgar5: 9   1 Term 16 39w0d  2.948 kg (6 lb 8 oz) M CS-LTranv Spinal N CAMRON      Name: Broderick      Complications: Failure to Progress in First Stage      Apgar1:  8                Apgar5: 9          MedicalHX:   Past Medical History:   Diagnosis Date     Acute pancreatitis 1/15     Anxiety      Chickenpox      PONV (postoperative nausea and vomiting)      Pregnancy induced hypertension      PTSD (post-traumatic stress disorder)      Severe postpartum depression 2016    also anxiety       SurgicalHX:   Past Surgical History:   Procedure Laterality Date     APPENDECTOMY  2001      SECTION N/A 2016    " Procedure:  SECTION;  Surgeon: Marco Marin MD;  Location: [facility] OR      SECTION N/A 2019    Procedure:  SECTION;  Surgeon: Marco Marin MD;  Location: [facility] OR     COLONOSCOPY       LAPAROSCOPIC CHOLECYSTECTOMY  2013    Procedure: LAPAROSCOPIC CHOLECYSTECTOMY;  Laparoscopic Cholecystectomy;  Surgeon: Lasha Garcias MD;  Location: [facility] OR     MOUTH SURGERY      wisdom teeth     UPPER GI ENDOSCOPY         Medications:     No current facility-administered medications on file prior to encounter.   Current Outpatient Medications on File Prior to Encounter:  Acetaminophen (TYLENOL PO) Take 500 mg by mouth once as needed    azelaic acid (AZELEX) 20 % cream Apply topically 2 times daily   escitalopram (LEXAPRO) 10 MG tablet Take 1 tablet (10 mg) by mouth daily After 1 week , if tolerated, increase to 2 tabs daily (20 mg)   HYDROcodone-acetaminophen (NORCO) 5-325 MG tablet Take 1 tablet by mouth every 6 hours as needed for pain   hydrOXYzine (ATARAX) 50 MG tablet Take 50 mg by mouth 3 times daily as needed for itching   ibuprofen (ADVIL/MOTRIN) 800 MG tablet Take 1 tablet (800 mg) by mouth every 6 hours as needed for other (cramping)   Misc. Devices (BREAST PUMP) MISC 1 each every hour (Patient not taking: Reported on 2019)   NIFEdipine ER OSMOTIC (ADALAT CC) 30 MG 24 hr tablet Take 1 tablet (30 mg) by mouth daily   oxymetazoline (AFRIN) 0.05 % nasal spray Spray 2 sprays into both nostrils 2 times daily   Prenatal Vit-Fe Fumarate-FA (PRENATAL MULTIVITAMIN W/IRON) 27-0.8 MG tablet Take 1 tablet by mouth daily   senna-docusate (SENOKOT-S/PERICOLACE) 8.6-50 MG tablet Take 1 tablet by mouth 2 times daily as needed for constipation       Allergies:  Allergies   Allergen Reactions     Codeine Itching     Zofran [Ondansetron] Other (See Comments)     Headaches        FamilyHX:  Family History   Problem Relation Age of Onset     Hypertension Mother      Depression Mother       Osteoporosis Mother      Thyroid Disease Mother      Hypertension Father      Alcohol/Drug Father         recovered alcohol- has fetal alcohol syndrome     Cancer Father         melanoma     Hypertension Maternal Grandmother      Alzheimer Disease Maternal Grandmother      Cardiovascular Maternal Grandmother         triple bypass     Cancer Maternal Grandfather         lung     Alcohol/Drug Paternal Grandmother         alcohol     Cancer - colorectal Paternal Grandmother         colon     Alcohol/Drug Paternal Grandfather         alcohol     Cancer Paternal Grandfather         leukemia - adult onset     Cardiovascular Paternal Grandfather         stent     Obesity Sister      Breast Cancer Maternal Aunt      Cancer Sister         cervical cancer, hysterectomy     Substance Abuse Sister         recovered drugs     Bipolar Disorder Sister      Depression Sister      Hypothyroidism Sister      Autism Spectrum Disorder Sister         ASpergers     Bipolar Disorder Other        SocialHX:   Social History     Socioeconomic History     Marital status: Single     Spouse name: Not on file     Number of children: 1     Years of education: Not on file     Highest education level: Not on file   Social Needs     Financial resource strain: Not on file     Food insecurity - worry: Not on file     Food insecurity - inability: Not on file     Transportation needs - medical: Not on file     Transportation needs - non-medical: Not on file   Occupational History     Not on file   Tobacco Use     Smoking status: Former Smoker     Packs/day: 0.50     Types: Cigarettes     Start date: 5/7/2015     Smokeless tobacco: Former User   Substance and Sexual Activity     Alcohol use: No     Alcohol/week: 0.0 oz     Comment: rare- quit with pregnancy     Drug use: No     Sexual activity: Yes     Partners: Male   Other Topics Concern     Parent/sibling w/ CABG, MI or angioplasty before 65F 55M? No   Social History Narrative     Not on file        ROS: 10-point ROS negative except as in HPI . + nasal congestion     Physical Exam:  Vitals:    02/19/19 0200 02/19/19 0215 02/19/19 0230 02/19/19 0245   BP: (!) 178/99 (!) 185/99 (!) 178/95 164/82   BP Location: Left arm Left arm Left arm Left arm   Pulse: 103      Resp: 18   18   Temp:       TempSrc:       SpO2: 100% 99%  100%   Weight:       Height:         GEN: resting comfortably in bed, NAD   CV: mild tachycardia, hypertensive   PULM: no increased work of breathing  ENT: nasal congestion  ABD: soft, non-distended, tenderness diffusely over incision, no obvious fascial defects in incision, but exam is limited by profound pannus   Incisio: clean, dry, intact with anti-fungal cream in place, no appreciated drainage or induration, satellite lesions consistent with topical fungal infection noted  EXT: 3+ pitting edema to the thighs, nontender to palpation    Labs:   Component      Latest Ref Rng & Units 2/19/2019   WBC      4.0 - 11.0 10e9/L 10.4   RBC Count      3.8 - 5.2 10e12/L 4.15   Hemoglobin      11.7 - 15.7 g/dL 12.6   Hematocrit      35.0 - 47.0 % 39.3   MCV      78 - 100 fl 95   MCH      26.5 - 33.0 pg 30.4   MCHC      31.5 - 36.5 g/dL 32.1   RDW      10.0 - 15.0 % 13.1   Platelet Count      150 - 450 10e9/L 339   Diff Method       Automated Method   % Neutrophils      % 61.1   % Lymphocytes      % 26.9   % Monocytes      % 7.5   % Eosinophils      % 2.5   % Basophils      % 0.7   % Immature Granulocytes      % 1.3   Nucleated RBCs      0 /100 0   Absolute Neutrophil      1.6 - 8.3 10e9/L 6.4   Absolute Lymphocytes      0.8 - 5.3 10e9/L 2.8   Absolute Monocytes      0.0 - 1.3 10e9/L 0.8   Absolute Eosinophils      0.0 - 0.7 10e9/L 0.3   Absolute Basophils      0.0 - 0.2 10e9/L 0.1   Abs Immature Granulocytes      0 - 0.4 10e9/L 0.1   Absolute Nucleated RBC       0.0   Sodium      133 - 144 mmol/L 142   Potassium      3.4 - 5.3 mmol/L 4.2   Chloride      94 - 109 mmol/L 109   Carbon Dioxide      20 -  32 mmol/L 26   Anion Gap      3 - 14 mmol/L 7   Glucose      70 - 99 mg/dL 77   Urea Nitrogen      7 - 30 mg/dL 28   Creatinine      0.52 - 1.04 mg/dL 0.76   GFR Estimate      >60 mL/min/1.73:m2 >90   GFR Estimate If Black      >60 mL/min/1.73:m2 >90   Calcium      8.5 - 10.1 mg/dL 8.6   Bilirubin Total      0.2 - 1.3 mg/dL 0.3   Albumin      3.4 - 5.0 g/dL 2.9 (L)   Protein Total      6.8 - 8.8 g/dL 6.4 (L)   Alkaline Phosphatase      40 - 150 U/L 104   ALT      0 - 50 U/L 55 (H)   AST      0 - 45 U/L 49 (H)   Color Urine       Straw   Appearance Urine       Clear   Glucose Urine      NEG:Negative mg/dL Negative   Bilirubin Urine      NEG:Negative Negative   Ketones Urine      NEG:Negative mg/dL Negative   Specific Gravity Urine      1.003 - 1.035 1.014   Blood Urine      NEG:Negative Small (A)   pH Urine      5.0 - 7.0 pH 5.0   Protein Albumin Urine      NEG:Negative mg/dL Negative   Urobilinogen mg/dL      0.0 - 2.0 mg/dL 0.0   Nitrite Urine      NEG:Negative Negative   Leukocyte Esterase Urine      NEG:Negative Small (A)   Source       Midstream Urine   RBC Urine      0 - 2 /HPF 1   WBC Urine      0 - 5 /HPF 8 (H)   Squamous Epithelial /HPF Urine      0 - 1 /HPF <1     A/P: Janine Salas is a 33 year old female now P2, POD#8 s/p repeat c/s who presented for concerns with a wound infection. She was found to be in a hypertensive emergency in the emergency room and was recommend for readmission.     #Postpartum severe range BPs:    - recommended 24 hrs magnesium infusion, 4g ld with 3g gtt   - hypertension: s/p IV hydralazine 10mg, 10 mg, 20mg. BP now mild range elevation, plan for prn IV labetalol for severe range BPs and continue home po nifedipine XL, anticipate she will need an increase dose of this    - HELLP labs - mild elevation of AST and ALT, repeat 0700 this morning     #Wound candidasis:    - start topical anti-fungal powder   - pain control with po dilaudid   - could consider CT imaging to assess  for fascial integrity considering habitus and limited ability to assess for this clinically    #PRN atarax for anxiety, flonase/afrin/saline for nasal congestion     #Reg diet    #Dispo: anticipate discharge to home in 2-3 days, pending improvement in BP control     Angela Silveira MD  OB/GYN

## 2019-02-19 NOTE — ED PROVIDER NOTES
History     Chief Complaint   Patient presents with     Post-op Problem      19     HPI  Janine Salas is a 33 year old female who is 8 days status post  for preeclampsia presented for evaluation of pain and discharge along her abdominal incision site.  She reports she developed some sharp pain abruptly when she was woken from sleep and sat upright quickly.  This pain was in the left lower abdomen.  This is remained sharp and painful with movements ever since.  Today she noticed some drainage and soreness along the incision more prominently on the right side of the incision.  She is unable to visualize this area but did notice some drainage on some gauze which she placed along the incision.  Denies fever or chills.  Denies nausea, vomiting, or diarrhea.  No previous history of cellulitis.  No history of diabetes.  Patient reports that she had some mild hypertension during pregnancy but this substantially worsened right before delivery which is what prompted her delivery.  Since then she has had ongoing hypertension and has been started on medication: Currently on 30 mg of nifedipine daily, last dose this morning.    Allergies:  Allergies   Allergen Reactions     Codeine Itching     Zofran [Ondansetron] Other (See Comments)     Headaches        Problem List:    Patient Active Problem List    Diagnosis Date Noted     Hypertension in pregnancy, delivered with postpartum condition 2019     Priority: Medium     Pre-eclampsia 2019     Priority: Medium     Pre-eclampsia superimposed on chronic hypertension, postpartum 02/15/2019     Priority: Medium     Hip pain, left 2018     Priority: Medium     Bilateral low back pain without sciatica 2018     Priority: Medium     Prenatal care, subsequent pregnancy 2018     Priority: Medium     Moderate episode of recurrent major depressive disorder (H) 03/15/2018     Priority: Medium     Acute bilateral low back pain with  right-sided sciatica 2017     Priority: Medium     S/P  section 2016     Priority: Medium     Essential hypertension 2016     Priority: Medium     Anxiety 02/10/2016     Priority: Medium     Morbid obesity due to excess calories (H) 2016     Priority: Medium     Back pain associated with peripheral numbness 2016     Priority: Medium     CARDIOVASCULAR SCREENING; LDL GOAL LESS THAN 160 2012     Priority: Medium        Past Medical History:    Past Medical History:   Diagnosis Date     Acute pancreatitis 1/15     Anxiety      Chickenpox      PONV (postoperative nausea and vomiting)      Pregnancy induced hypertension      PTSD (post-traumatic stress disorder)      Severe postpartum depression        Past Surgical History:    Past Surgical History:   Procedure Laterality Date     APPENDECTOMY        SECTION N/A 2016    Procedure:  SECTION;  Surgeon: Marco Marin MD;  Location: [facility]      SECTION N/A 2019    Procedure:  SECTION;  Surgeon: Marco Marin MD;  Location: [facility]     COLONOSCOPY       LAPAROSCOPIC CHOLECYSTECTOMY  2013    Procedure: LAPAROSCOPIC CHOLECYSTECTOMY;  Laparoscopic Cholecystectomy;  Surgeon: Lasha Garcias MD;  Location: [facility]     MOUTH SURGERY      wisdom teeth     UPPER GI ENDOSCOPY         Family History:    Family History   Problem Relation Age of Onset     Hypertension Mother      Depression Mother      Osteoporosis Mother      Thyroid Disease Mother      Hypertension Father      Alcohol/Drug Father         recovered alcohol- has fetal alcohol syndrome     Cancer Father         melanoma     Hypertension Maternal Grandmother      Alzheimer Disease Maternal Grandmother      Cardiovascular Maternal Grandmother         triple bypass     Cancer Maternal Grandfather         lung     Alcohol/Drug Paternal Grandmother         alcohol     Cancer - colorectal Paternal  "Grandmother         colon     Alcohol/Drug Paternal Grandfather         alcohol     Cancer Paternal Grandfather         leukemia - adult onset     Cardiovascular Paternal Grandfather         stent     Obesity Sister      Breast Cancer Maternal Aunt      Cancer Sister         cervical cancer, hysterectomy     Substance Abuse Sister         recovered drugs     Bipolar Disorder Sister      Depression Sister      Hypothyroidism Sister      Autism Spectrum Disorder Sister         ASpergers     Bipolar Disorder Other        Social History:  Marital Status:  Single [1]  Social History     Tobacco Use     Smoking status: Former Smoker     Packs/day: 0.50     Types: Cigarettes     Start date: 5/7/2015     Smokeless tobacco: Former User   Substance Use Topics     Alcohol use: No     Alcohol/week: 0.0 oz     Comment: rare- quit with pregnancy     Drug use: No        Medications:      No current outpatient medications on file.      Review of Systems   Constitutional: Negative for chills, fatigue and fever.   HENT: Negative for congestion.    Eyes: Negative for visual disturbance.   Respiratory: Negative for cough, chest tightness and shortness of breath.    Cardiovascular: Negative for chest pain.   Gastrointestinal: Positive for abdominal pain (lower abdomen along incision). Negative for constipation, diarrhea, nausea and vomiting.   Genitourinary: Positive for vaginal bleeding (mild). Negative for decreased urine volume and dysuria.   Musculoskeletal: Negative for back pain.   Skin: Negative for rash.   Neurological: Negative for light-headedness and headaches.   All other systems reviewed and are negative.      Physical Exam   BP: (!) 178/120  Pulse: 98  Heart Rate: 113  Temp: 97.8  F (36.6  C)  Resp: 18  Height: 167.6 cm (5' 6\")  Weight: (!) 163.3 kg (360 lb)  SpO2: 98 %      Physical Exam   Constitutional: She is oriented to person, place, and time. She appears well-developed and well-nourished.   Hypertensive upon arival "   HENT:   Head: Normocephalic and atraumatic.   Mouth/Throat: Oropharynx is clear and moist.   Eyes: Conjunctivae are normal. Pupils are equal, round, and reactive to light.   Cardiovascular: Normal rate.   Pulmonary/Chest: Effort normal.   Abdominal: Soft. There is tenderness (mild tenderness along left incision). There is no guarding.   Musculoskeletal: Normal range of motion. She exhibits edema (1+ pitting b/l).   Neurological: She is alert and oriented to person, place, and time.   Skin: Skin is warm and dry. Capillary refill takes less than 2 seconds. Rash (Fungal appearing rash along the right lower incision of the abdomen.  No induration or warmth to suggest cellulitis.  No active drainage.) noted.   Psychiatric: She has a normal mood and affect.   Nursing note and vitals reviewed.              ED Course        Procedures                   Results for orders placed or performed during the hospital encounter of 02/19/19 (from the past 24 hour(s))   Comprehensive metabolic panel   Result Value Ref Range    Sodium 142 133 - 144 mmol/L    Potassium 4.2 3.4 - 5.3 mmol/L    Chloride 109 94 - 109 mmol/L    Carbon Dioxide 26 20 - 32 mmol/L    Anion Gap 7 3 - 14 mmol/L    Glucose 77 70 - 99 mg/dL    Urea Nitrogen 28 7 - 30 mg/dL    Creatinine 0.76 0.52 - 1.04 mg/dL    GFR Estimate >90 >60 mL/min/[1.73_m2]    GFR Estimate If Black >90 >60 mL/min/[1.73_m2]    Calcium 8.6 8.5 - 10.1 mg/dL    Bilirubin Total 0.3 0.2 - 1.3 mg/dL    Albumin 2.9 (L) 3.4 - 5.0 g/dL    Protein Total 6.4 (L) 6.8 - 8.8 g/dL    Alkaline Phosphatase 104 40 - 150 U/L    ALT 55 (H) 0 - 50 U/L    AST 49 (H) 0 - 45 U/L   CBC with platelets differential   Result Value Ref Range    WBC 10.4 4.0 - 11.0 10e9/L    RBC Count 4.15 3.8 - 5.2 10e12/L    Hemoglobin 12.6 11.7 - 15.7 g/dL    Hematocrit 39.3 35.0 - 47.0 %    MCV 95 78 - 100 fl    MCH 30.4 26.5 - 33.0 pg    MCHC 32.1 31.5 - 36.5 g/dL    RDW 13.1 10.0 - 15.0 %    Platelet Count 339 150 - 450 10e9/L     Diff Method Automated Method     % Neutrophils 61.1 %    % Lymphocytes 26.9 %    % Monocytes 7.5 %    % Eosinophils 2.5 %    % Basophils 0.7 %    % Immature Granulocytes 1.3 %    Nucleated RBCs 0 0 /100    Absolute Neutrophil 6.4 1.6 - 8.3 10e9/L    Absolute Lymphocytes 2.8 0.8 - 5.3 10e9/L    Absolute Monocytes 0.8 0.0 - 1.3 10e9/L    Absolute Eosinophils 0.3 0.0 - 0.7 10e9/L    Absolute Basophils 0.1 0.0 - 0.2 10e9/L    Abs Immature Granulocytes 0.1 0 - 0.4 10e9/L    Absolute Nucleated RBC 0.0    UA reflex to Microscopic   Result Value Ref Range    Color Urine Straw     Appearance Urine Clear     Glucose Urine Negative NEG^Negative mg/dL    Bilirubin Urine Negative NEG^Negative    Ketones Urine Negative NEG^Negative mg/dL    Specific Gravity Urine 1.014 1.003 - 1.035    Blood Urine Small (A) NEG^Negative    pH Urine 5.0 5.0 - 7.0 pH    Protein Albumin Urine Negative NEG^Negative mg/dL    Urobilinogen mg/dL 0.0 0.0 - 2.0 mg/dL    Nitrite Urine Negative NEG^Negative    Leukocyte Esterase Urine Small (A) NEG^Negative    Source Midstream Urine     RBC Urine 1 0 - 2 /HPF    WBC Urine 8 (H) 0 - 5 /HPF    Squamous Epithelial /HPF Urine <1 0 - 1 /HPF       Medications   oxyCODONE (ROXICODONE) tablet 5 mg (5 mg Oral Given 2/19/19 0108)   clotrimazole (LOTRIMIN) 1 % cream ( Topical Given 2/19/19 0111)   magnesium sulfate 4 g in 100 mL sterile water (premade) (4 g Intravenous New Bag 2/19/19 0203)   naloxone (NARCAN) injection 0.1-0.4 mg (not administered)   lactated ringers infusion (50 mL/hr Intravenous New Bag 2/19/19 3534)   calcium gluconate 10 % injection 1 g (not administered)   NIFEdipine (PROCARDIA) capsule 10 mg (not administered)   magnesium sulfate 2 g in NS intermittent infusion (PharMEDium or [facility] Cmpd) (not administered)   magnesium sulfate 4 g in 100 mL sterile water (premade) (not administered)   magnesium sulfate injection 4 g (not administered)   LORazepam (ATIVAN) injection 2 mg (not administered)    lidocaine 1 % 0.1-1 mL (not administered)   lidocaine (LMX4) cream (not administered)   sodium chloride (PF) 0.9% PF flush 3 mL (not administered)   sodium chloride (PF) 0.9% PF flush 3 mL (not administered)   labetalol (NORMODYNE/TRANDATE) injection 20 mg (not administered)   labetalol (NORMODYNE/TRANDATE) injection 40 mg (not administered)   labetalol (NORMODYNE/TRANDATE) injection 80 mg (not administered)   labetalol (NORMODYNE/TRANDATE) algorithm-medication instruction (not administered)   magnesium sulfate infusion (2 g/hr Intravenous New Bag 2/19/19 0356)   miconazole (MICATIN/MICRO GUARD) 2 % powder (not administered)   escitalopram (LEXAPRO) tablet 10 mg (not administered)   hydrOXYzine (ATARAX) tablet 50 mg (not administered)   NIFEdipine ER OSMOTIC (PROCARDIA XL) 24 hr tablet 30 mg (not administered)   oxymetazoline (AFRIN) 0.05 % spray 2 spray (not administered)   senna-docusate (SENOKOT-S/PERICOLACE) 8.6-50 MG per tablet 1 tablet (not administered)   HYDROmorphone (DILAUDID) tablet 2-4 mg (not administered)   fluticasone (FLONASE) 50 MCG/ACT spray 2 spray (not administered)   sodium chloride (OCEAN) 0.65 % nasal spray 1 spray (not administered)   hydrALAZINE (APRESOLINE) injection 10 mg (10 mg Intravenous Given 2/19/19 0109)   hydrALAZINE (APRESOLINE) injection 10 mg (10 mg Intravenous Given 2/19/19 0153)   hydrALAZINE (APRESOLINE) injection 20 mg (20 mg Intravenous Given 2/19/19 0230)     Patient Vitals for the past 24 hrs:   BP Temp Temp src Pulse Heart Rate Resp SpO2 Height Weight   02/19/19 0245 164/82 -- -- -- -- 18 100 % -- --   02/19/19 0230 (!) 178/95 -- -- -- -- -- -- -- --   02/19/19 0215 (!) 185/99 -- -- -- -- -- 99 % -- --   02/19/19 0200 (!) 178/99 -- -- 103 -- 18 100 % -- --   02/19/19 0145 (!) 187/105 -- -- 103 -- -- 98 % -- --   02/19/19 0130 (!) 173/102 -- -- 98 -- -- -- -- --   02/19/19 0030 -- -- -- -- -- -- 99 % -- --   02/19/19 0005 (!) 178/120 97.8  F (36.6  C) Oral -- 113 18 98 %  "1.676 m (5' 6\") (!) 163.3 kg (360 lb)       1:34 AM: Pt re-assessed. Still hypertensive after first hydralazine.    2:16 AM; Discussed with on call OB, Dr. Silveira. Agrees with plan and recommends admission to continue magnesium infusion and bp control.     Assessments & Plan (with Medical Decision Making)  33-year-old female with history of preeclampsia who is 8 days status post  for preeclampsia presented for evaluation of lower abdominal pain and possible surgical site infection.  On exam she has a likely fungal infection along the fold of her lower abdominal skin.  No clear evidence of cellulitis.  Treated with topical Lotrimin.  Also upon arrival patient notably hypertensive with a blood pressure of 178/120 raising concern for inadequately treated preeclampsia.  She had no other acute neurologic symptoms.  Screening urine and blood work obtained showed no proteinuria but did show mildly elevated LFTs.  She was given hydralazine here to help with blood pressure along with a loading dose of magnesium for seizure prophylaxis.  Discussed case with on-call OB who recommended admission with ongoing magnesium infusion.     Critical Care Addendum    My initial assessment, based on my review of nursing observations, review of vital signs, focused history and physical exam, established that Janine Salas has severe hypertension, which requires immediate intervention, and therefore She is critically ill.     After the initial assessment, the care team initiated multiple lab tests, initiated IV fluid administration and initiated medication therapy with magnesium and hydralazine to provide stabilization care. Due to the critical nature of this patient, I reassessed nursing observations, vital signs, physical exam, mental status and neurologic status multiple times prior to She disposition.     Time also spent performing documentation, reviewing test results, discussion with consultants and coordination of " care.     Critical care time (excluding teaching time and procedures): 40 minutes.     I have reviewed the nursing notes.    I have reviewed the findings, diagnosis, plan and need for follow up with the patient.          Medication List      There are no discharge medications for this visit.         Final diagnoses:   Pre-eclampsia, postpartum   Tinea corporis - of lower abdomen   Elevated LFTs       2/18/2019   [facility] EMERGENCY DEPARTMENT     Coffey, Brian Fischer MD  02/19/19 1794

## 2019-02-20 VITALS
DIASTOLIC BLOOD PRESSURE: 81 MMHG | HEART RATE: 98 BPM | WEIGHT: 293 LBS | TEMPERATURE: 98.6 F | SYSTOLIC BLOOD PRESSURE: 162 MMHG | OXYGEN SATURATION: 95 % | BODY MASS INDEX: 47.09 KG/M2 | RESPIRATION RATE: 18 BRPM | HEIGHT: 66 IN

## 2019-02-20 PROCEDURE — 25000132 ZZH RX MED GY IP 250 OP 250 PS 637: Performed by: EMERGENCY MEDICINE

## 2019-02-20 PROCEDURE — 25000132 ZZH RX MED GY IP 250 OP 250 PS 637: Performed by: OBSTETRICS & GYNECOLOGY

## 2019-02-20 RX ORDER — OXYCODONE HYDROCHLORIDE 5 MG/1
5 TABLET ORAL EVERY 4 HOURS PRN
Qty: 24 TABLET | Refills: 0 | Status: SHIPPED | OUTPATIENT
Start: 2019-02-20 | End: 2019-03-07

## 2019-02-20 RX ORDER — LABETALOL 200 MG/1
200 TABLET, FILM COATED ORAL 3 TIMES DAILY
Qty: 90 TABLET | Refills: 0 | Status: SHIPPED | OUTPATIENT
Start: 2019-02-20 | End: 2019-03-07

## 2019-02-20 RX ORDER — CLOTRIMAZOLE 1 %
CREAM (GRAM) TOPICAL 2 TIMES DAILY
Qty: 30 G | Refills: 0 | Status: SHIPPED | OUTPATIENT
Start: 2019-02-20 | End: 2019-03-22

## 2019-02-20 RX ORDER — LABETALOL 300 MG/1
300 TABLET, FILM COATED ORAL 3 TIMES DAILY
Qty: 90 TABLET | Refills: 0 | Status: SHIPPED | OUTPATIENT
Start: 2019-02-20 | End: 2020-07-16

## 2019-02-20 RX ADMIN — HYDROXYZINE HYDROCHLORIDE 50 MG: 50 TABLET ORAL at 00:47

## 2019-02-20 RX ADMIN — OXYCODONE HYDROCHLORIDE 5 MG: 5 TABLET ORAL at 07:17

## 2019-02-20 RX ADMIN — CLOTRIMAZOLE: 10 CREAM TOPICAL at 07:56

## 2019-02-20 RX ADMIN — ACETAMINOPHEN 975 MG: 325 TABLET, FILM COATED ORAL at 00:47

## 2019-02-20 RX ADMIN — IBUPROFEN 600 MG: 600 TABLET ORAL at 07:18

## 2019-02-20 RX ADMIN — SENNOSIDES AND DOCUSATE SODIUM 1 TABLET: 8.6; 5 TABLET ORAL at 08:01

## 2019-02-20 RX ADMIN — LABETALOL HCL 200 MG: 200 TABLET, FILM COATED ORAL at 07:17

## 2019-02-20 RX ADMIN — HYDROXYZINE HYDROCHLORIDE 50 MG: 50 TABLET ORAL at 11:34

## 2019-02-20 RX ADMIN — OXYCODONE HYDROCHLORIDE 5 MG: 5 TABLET ORAL at 00:47

## 2019-02-20 RX ADMIN — Medication 2 SPRAY: at 07:56

## 2019-02-20 RX ADMIN — ACETAMINOPHEN 975 MG: 325 TABLET, FILM COATED ORAL at 11:34

## 2019-02-20 RX ADMIN — HYDROXYZINE HYDROCHLORIDE 50 MG: 50 TABLET ORAL at 07:18

## 2019-02-20 RX ADMIN — ESCITALOPRAM OXALATE 10 MG: 10 TABLET, FILM COATED ORAL at 07:56

## 2019-02-20 RX ADMIN — OXYCODONE HYDROCHLORIDE 5 MG: 5 TABLET ORAL at 11:34

## 2019-02-20 RX ADMIN — MICONAZOLE NITRATE: 2 POWDER TOPICAL at 07:56

## 2019-02-20 ASSESSMENT — MIFFLIN-ST. JEOR: SCORE: 2346.73

## 2019-02-20 NOTE — PROGRESS NOTES
[facility] Obstetrics Post-Op / Progress Note         Assessment and Plan:    Assessment:   Post-operative day #9  Low transverse repeat  section  Postpartum pre eclampsia  L&D complications: Intrauterine pregnancy at 38+4 weeks gestation  Repeat  section for elevated BP and contractions  Re-admitted through ED with Hypertension      Doing well.  Clean wound without signs of infection.  Normal healing wound.  No immediate surgical complications identified.  No excessive bleeding  Pain well-controlled.  Magnesium sulfate discharge this am after 24 hours  She is on Labetalol TID      Plan:   Ambulation encouraged  Breast feeding strategies discussed  If BP normalized after activity, then discontinue to home on Labetalol           Interval History:   Doing well.  Pain is well-controlled.  No fevers.  No history of wound drainage, warmth or significant erythema.  Good appetite.  Denies chest pain, shortness of breath, nausea or vomiting.  Ambulatory.  Breastfeeding well.          Significant Problems:    Active Problems:    Hypertension in pregnancy, delivered with postpartum condition    Pre-eclampsia            Review of Systems:    The patient denies any chest pain, shortness of breath, excessive pain, fever, chills, purulent drainage from the wound, nausea or vomiting.          Medications:     All medications related to the patient's surgery have been reviewed  Current Facility-Administered Medications   Medication     acetaminophen (TYLENOL) tablet 975 mg     calcium gluconate 10 % injection 1 g     clotrimazole (LOTRIMIN) 1 % cream     escitalopram (LEXAPRO) tablet 10 mg     fluticasone (FLONASE) 50 MCG/ACT spray 2 spray     HYDROmorphone (DILAUDID) tablet 2-4 mg     hydrOXYzine (ATARAX) tablet 50 mg     ibuprofen (ADVIL/MOTRIN) tablet 600 mg     labetalol (NORMODYNE) tablet 200 mg     labetalol (NORMODYNE/TRANDATE) algorithm-medication instruction     labetalol (NORMODYNE/TRANDATE)  injection 20 mg     labetalol (NORMODYNE/TRANDATE) injection 40 mg     labetalol (NORMODYNE/TRANDATE) injection 80 mg     lactated ringers infusion     lidocaine (LMX4) cream     lidocaine 1 % 0.1-1 mL     LORazepam (ATIVAN) injection 2 mg     magnesium sulfate 2 g in NS intermittent infusion (PharMEDium or [facility] Cmpd)     magnesium sulfate 4 g in 100 mL sterile water (premade)     magnesium sulfate injection 4 g     miconazole (MICATIN/MICRO GUARD) 2 % powder     naloxone (NARCAN) injection 0.1-0.4 mg     NIFEdipine (PROCARDIA) capsule 10 mg     oxyCODONE (ROXICODONE) tablet 5 mg     oxymetazoline (AFRIN) 0.05 % spray 2 spray     senna-docusate (SENOKOT-S/PERICOLACE) 8.6-50 MG per tablet 1 tablet     sodium chloride (OCEAN) 0.65 % nasal spray 1 spray     sodium chloride (PF) 0.9% PF flush 3 mL     sodium chloride (PF) 0.9% PF flush 3 mL             Physical Exam:   Vitals were reviewed  All vitals stable  Weight 259.4#  Temp: 98.6  F (37  C) Temp src: Oral BP: 154/73 Pulse: 98 Heart Rate: 91 Resp: 18 SpO2: 95 % O2 Device: None (Room air)    Wound clean and dry with minimal or no drainage.  Surrounding skin with minimal erythema.  Constitutional:   awake, alert, cooperative, no apparent distress, and appears stated age     Lungs:   No increased work of breathing, good air exchange, clear to auscultation bilaterally, no crackles or wheezing     Cardiovascular:   Normal apical impulse, regular rate and rhythm, normal S1 and S2, no S3 or S4, and no murmur noted     Abdomen:   Inciison intact and dry with powder vs fungal infection, normal bowel sounds, soft and non-distended     Musculoskeletal:   There is no redness, warmth, or swelling of the joints.  Full range of motion noted.  Motor strength is 5 out of 5 all extremities bilaterally.  Tone is normal.     Neurologic:   Awake, alert, oriented to name, place and time.  Cranial nerves II-XII are grossly intact.  Motor is 5 out of 5 bilaterally.  Cerebellar finger to  nose, heel to shin intact.  Sensory is intact.  Babinski down going, Romberg negative, and gait is normal.     Neuropsychiatric:   General: normal, calm and normal eye contact  Level of consciousness: alert / normal  Affect: normal  Orientation: oriented to self, place, time and situation  Memory and insight: normal, memory for past and recent events intact and thought process normal             Data:     All laboratory data related to this surgery reviewed  Hemoglobin   Date Value Ref Range Status   02/19/2019 12.3 11.7 - 15.7 g/dL Final   02/19/2019 12.6 11.7 - 15.7 g/dL Final   02/16/2019 12.3 11.7 - 15.7 g/dL Final   02/14/2019 12.3 11.7 - 15.7 g/dL Final   02/12/2019 12.7 11.7 - 15.7 g/dL Final         Marco Marin MD

## 2019-02-20 NOTE — PROGRESS NOTES
Pt is complaining of more pain and requesting more oxycodone. Pt is due at 2100 for another dose. Area around the inc has decreased in reddness since admission. Powder applied and placed a ice pack to the area. Talked with Dr. Leone. Orders for ibuprofen. Will give tylenol, ibu and oxy when due. Encourage pt to rest.  is taking the baby home tonight. Will cont to monitor.

## 2019-02-20 NOTE — PLAN OF CARE
Pt had been on a Mag drip at 2 g/hr for her HTN until 0400, she is also receiving po Labetalol.  Her BP have been 142/75, 131/70, and at 0400 she was 158/81.  Pt's pain is controlled with Oxycodone, Advil, and Tylenol.  Pt's incision on her abdomin is healing well.  Will continue to monitor close for changes.

## 2019-02-20 NOTE — PROGRESS NOTES
[facility] DISCHARGE NOTE    Patient discharged to home at 1:28 PM via ambulation. Accompanied by mother and staff. Discharge instructions reviewed with patient and mom, opportunity offered to ask questions. Prescriptions filled and sent with pt. Pt has a public health nurse appointment scheduled for Friday and states that nurse checks her BP during those appointments. Aware to take labetalol 300mg TID per MD orders and to come back if BP is elevated or she has any further sxs. All belongings sent with patient.    Nichole C. Bushweiler

## 2019-02-20 NOTE — PROGRESS NOTES
BP has been stable today on IV mag sulfate and PO labetalol (see flowsheet). Unable to assess reflexes due to swelling and size in legs. Pt has complained of pain/soreness around incision site, which appears WNL, but has otherwise denied any other complaints. Pt given PRN oxycodone for pain, pt also requested ibuprofen for pain but MD declined d/t elevated BP. PRN oxycodone and hydroxyzine ordered. MD also ordered tylenol but suggested limiting this due to elevated LFTs. Pt aware and agreeable to this. Voiding without difficulty.  Call light within reach.

## 2019-02-21 ENCOUNTER — PATIENT OUTREACH (OUTPATIENT)
Dept: CARE COORDINATION | Facility: CLINIC | Age: 34
End: 2019-02-21

## 2019-02-21 NOTE — PROGRESS NOTES
Clinic Care Coordination Contact  [facility] [[email or web address]]/Voicemail    Referral Source: IP Report  Clinical Data: Care Coordinator Outreach  Outreach attempted x 1.  Left message on voicemail with call back information and requested return call.  Plan: Care Coordinator will mail out care coordination introduction letter with care coordinator contact information and explanation of care coordination services. Care Coordinator will try to reach patient again in 1-2 business days.  Ricardo Bush RN  Clinic Care Coordinator  [phone] or [phone]

## 2019-02-21 NOTE — LETTER
Health Care Home - Access Care Plan    About Me  Patient Name:  Janine Salas    YOB: 1985  Age:                             33 year old   [place] MRN:            [id number] Telephone Information:   Home Phone [phone]   Mobile [phone]       Address:    [address] [facility] [address]  [place] [address] Email address:  [email or web address]      Emergency Contact(s)  Name Relationship Lgl Grd Work Phone Home Phone Mobile Phone   1. MARILOUCLAUDETTE* Mother No none [phone]   2. NAOMIHARJINDER MENDEZ Significant ot* No   [phone]             Health Maintenance:   Health Maintenance Due   Topic Date Due     PREVENTIVE CARE VISIT  08/01/2013     PHQ-9 Q6 MONTHS  10/06/2018        My Access Plan  Medical Emergency 911   Questions or concerns during clinic hours Primary Clinic Line, I will call the clinic directly: [facility] - [phone]   24 Hour Appointment Line [phone] or  [phone]) (toll free)   24 Hour Nurse Line [phone] (toll free)   Questions or concerns outside clinic hours 24 Hour Appointment Line, I will call the after-hours on-call line:   [facility] [phone] or [phone]) (toll-free)   Preferred Urgent Care [facility] [phone]   Preferred Hospital [facility] [place]  [phone]   Preferred Pharmacy [facility] [place]  [place] - [address]     Behavioral Health Crisis Line The National Suicide Prevention Lifeline at 1-418.142.2403 or 918     My Care Team Members  Patient Care Team       Relationship Specialty Notifications Start End    Kavya Haider PA-C PCP - General Physician Assistant  4/6/15     Phone: [phone] Fax: [phone] [address] [facility] [address]    Kavya Haider PA-C PCP - Assigned PCP   2/1/15     Phone: [phone] Fax: [phone] [address]  [place] [address]    Ismael Bush, RN Clinic Care Coordinator Primary Care -   19     Phone: [phone] Fax: [phone] [address] [place] [address]           My Medical and Care Information  Problem List   Patient Active Problem List   Diagnosis     CARDIOVASCULAR SCREENING; LDL GOAL LESS THAN 160     Back pain associated with peripheral numbness     Morbid obesity due to excess calories (H)     Anxiety     Essential hypertension     S/P  section     Acute bilateral low back pain with right-sided sciatica     Moderate episode of recurrent major depressive disorder (H)     Prenatal care, subsequent pregnancy     Bilateral low back pain without sciatica     Hip pain, left     Pre-eclampsia superimposed on chronic hypertension, postpartum     Hypertension in pregnancy, delivered with postpartum condition     Pre-eclampsia      Current Medications and Allergies:  See printed Medication Report

## 2019-02-21 NOTE — LETTER
[place] CARE COORDINATION  [facility]  [address] [place] [address]  [phone]    February 21, 2019    Janine Salas  [address]  [facility] [address]      Dear Janine,    I am a clinic care coordinator who works with Kavya Haider PA-C at [facility]. I recently tried to call and was unable to reach you. I wanted to introduce myself and provide you with my contact information so that you can call me with questions or concerns about your health care. Below is a description of clinic care coordination and how I can further assist you.     The clinic care coordinator is a registered nurse and/or  who understand the health care system. The goal of clinic care coordination is to help you manage your health and improve access to the [facility] system in the most efficient manner. The registered nurse can assist you in meeting your health care goals by providing education, coordinating services, and strengthening the communication among your providers. The  can assist you with financial, behavioral, psychosocial, chemical dependency, counseling, and/or psychiatric resources.    Please feel free to contact me at [phone], [phone], with any questions or concerns. We at [facility] are focused on providing you with the highest-quality healthcare experience possible and that all starts with you.     Sincerely,     Ricardo Bush RN  Clinic Care Coordinator    Enclosed: I have enclosed a copy of a 24 Hour Access Plan. This has helpful phone numbers for you to call when needed. Please keep this in an easy to access place to use as needed.

## 2019-02-22 ENCOUNTER — HOSPITAL ENCOUNTER (EMERGENCY)
Facility: CLINIC | Age: 34
Discharge: HOME | End: 2019-02-22
Attending: STUDENT IN AN ORGANIZED HEALTH CARE EDUCATION/TRAINING PROGRAM | Admitting: STUDENT IN AN ORGANIZED HEALTH CARE EDUCATION/TRAINING PROGRAM
Payer: MEDICAID

## 2019-02-22 VITALS
OXYGEN SATURATION: 98 % | DIASTOLIC BLOOD PRESSURE: 109 MMHG | SYSTOLIC BLOOD PRESSURE: 178 MMHG | RESPIRATION RATE: 18 BRPM | TEMPERATURE: 99.2 F | HEART RATE: 89 BPM

## 2019-02-22 DIAGNOSIS — R09.81 NASAL CONGESTION: ICD-10-CM

## 2019-02-22 DIAGNOSIS — J02.9 PHARYNGITIS, UNSPECIFIED ETIOLOGY: ICD-10-CM

## 2019-02-22 LAB
DEPRECATED S PYO AG THROAT QL EIA: NORMAL
SPECIMEN SOURCE: NORMAL

## 2019-02-22 PROCEDURE — 87081 CULTURE SCREEN ONLY: CPT | Performed by: STUDENT IN AN ORGANIZED HEALTH CARE EDUCATION/TRAINING PROGRAM

## 2019-02-22 PROCEDURE — 25000125 ZZHC RX 250: Performed by: STUDENT IN AN ORGANIZED HEALTH CARE EDUCATION/TRAINING PROGRAM

## 2019-02-22 PROCEDURE — 99283 EMERGENCY DEPT VISIT LOW MDM: CPT

## 2019-02-22 PROCEDURE — 87880 STREP A ASSAY W/OPTIC: CPT | Performed by: STUDENT IN AN ORGANIZED HEALTH CARE EDUCATION/TRAINING PROGRAM

## 2019-02-22 PROCEDURE — 99283 EMERGENCY DEPT VISIT LOW MDM: CPT | Mod: Z6 | Performed by: STUDENT IN AN ORGANIZED HEALTH CARE EDUCATION/TRAINING PROGRAM

## 2019-02-22 RX ORDER — DEXAMETHASONE SODIUM PHOSPHATE 4 MG/ML
10 VIAL (ML) INJECTION ONCE
Status: COMPLETED | OUTPATIENT
Start: 2019-02-22 | End: 2019-02-22

## 2019-02-22 RX ADMIN — DEXAMETHASONE SODIUM PHOSPHATE 10 MG: 4 INJECTION, SOLUTION INTRAMUSCULAR; INTRAVENOUS at 03:49

## 2019-02-22 NOTE — ED AVS SNAPSHOT
[facility]  [address] [facility] [address]  Phone:  [phone]  Fax:  [phone]                                    Janine Salas   MRN: [id number]    Department:  [facility]   Date of Visit:  2/22/2019           After Visit Summary Signature Page    I have received my discharge instructions, and my questions have been answered. I have discussed any challenges I see with this plan with the nurse or doctor.    ..........................................................................................................................................  Patient/Patient Representative Signature      ..........................................................................................................................................  Patient Representative Print Name and Relationship to Patient    ..................................................               ................................................  Date                                   Time    ..........................................................................................................................................  Reviewed by Signature/Title    ...................................................              ..............................................  Date                                               Time          22EPIC Rev 08/18

## 2019-02-22 NOTE — ED PROVIDER NOTES
History     Chief Complaint   Patient presents with     Pharyngitis     HPI  Janine Salas is a 33 year old female with medical history which includes anxiety, obesity, essential hypertension, and preeclampsia postpartum day #10 who presents for evaluation of sore throat with congestion and cough.  Patient explains that over the past 3 days she has had increasing symptoms including congestion but overnight her sore throat has grown severe and pain with swallowing.  She has been taking analgesic medications including ibuprofen and prescription oxycodone estimated 5 hours ago with little relief.  She denies fever, chills, headache, neck stiffness, chest pain or shortness of breath.  She is concerned about the possibility of strep pharyngitis.  In reviewing patient's discharge summary from yesterday afternoon after treatment of blood pressure and postpartum preeclampsia.  It appears as though her oral antibiotics were changed from nifedipine to labetalol which she maintains that she has been compliant with medications.    Allergies:  Allergies   Allergen Reactions     Codeine Itching     Zofran [Ondansetron] Other (See Comments)     Headaches        Problem List:    Patient Active Problem List    Diagnosis Date Noted     Hypertension in pregnancy, delivered with postpartum condition 2019     Priority: Medium     Pre-eclampsia 2019     Priority: Medium     Pre-eclampsia superimposed on chronic hypertension, postpartum 02/15/2019     Priority: Medium     Hip pain, left 2018     Priority: Medium     Bilateral low back pain without sciatica 2018     Priority: Medium     Prenatal care, subsequent pregnancy 2018     Priority: Medium     Moderate episode of recurrent major depressive disorder (H) 03/15/2018     Priority: Medium     Acute bilateral low back pain with right-sided sciatica 2017     Priority: Medium     S/P  section 2016     Priority: Medium     Essential  hypertension 2016     Priority: Medium     Anxiety 02/10/2016     Priority: Medium     Morbid obesity due to excess calories (H) 2016     Priority: Medium     Back pain associated with peripheral numbness 2016     Priority: Medium     CARDIOVASCULAR SCREENING; LDL GOAL LESS THAN 160 2012     Priority: Medium        Past Medical History:    Past Medical History:   Diagnosis Date     Acute pancreatitis 1/15     Anxiety      Chickenpox      PONV (postoperative nausea and vomiting)      Pregnancy induced hypertension      PTSD (post-traumatic stress disorder)      Severe postpartum depression        Past Surgical History:    Past Surgical History:   Procedure Laterality Date     APPENDECTOMY        SECTION N/A 2016    Procedure:  SECTION;  Surgeon: Marco Marin MD;  Location: [facility] OR      SECTION N/A 2019    Procedure:  SECTION;  Surgeon: Marco Marin MD;  Location: [facility] OR     COLONOSCOPY       LAPAROSCOPIC CHOLECYSTECTOMY  2013    Procedure: LAPAROSCOPIC CHOLECYSTECTOMY;  Laparoscopic Cholecystectomy;  Surgeon: Lasha Garcias MD;  Location: [facility] OR     MOUTH SURGERY      wisdom teeth     UPPER GI ENDOSCOPY         Family History:    Family History   Problem Relation Age of Onset     Hypertension Mother      Depression Mother      Osteoporosis Mother      Thyroid Disease Mother      Hypertension Father      Alcohol/Drug Father         recovered alcohol- has fetal alcohol syndrome     Cancer Father         melanoma     Hypertension Maternal Grandmother      Alzheimer Disease Maternal Grandmother      Cardiovascular Maternal Grandmother         triple bypass     Cancer Maternal Grandfather         lung     Alcohol/Drug Paternal Grandmother         alcohol     Cancer - colorectal Paternal Grandmother         colon     Alcohol/Drug Paternal Grandfather         alcohol     Cancer Paternal Grandfather         leukemia -  adult onset     Cardiovascular Paternal Grandfather         stent     Obesity Sister      Breast Cancer Maternal Aunt      Cancer Sister         cervical cancer, hysterectomy     Substance Abuse Sister         recovered drugs     Bipolar Disorder Sister      Depression Sister      Hypothyroidism Sister      Autism Spectrum Disorder Sister         ASpergers     Bipolar Disorder Other        Social History:  Marital Status:  Single [1]  Social History     Tobacco Use     Smoking status: Former Smoker     Packs/day: 0.50     Types: Cigarettes     Start date: 5/7/2015     Smokeless tobacco: Former User   Substance Use Topics     Alcohol use: No     Alcohol/week: 0.0 oz     Comment: rare- quit with pregnancy     Drug use: No        Medications:      Acetaminophen (TYLENOL PO)   clotrimazole (LOTRIMIN) 1 % external cream   escitalopram (LEXAPRO) 10 MG tablet   hydrOXYzine (ATARAX) 50 MG tablet   ibuprofen (ADVIL/MOTRIN) 800 MG tablet   labetalol (NORMODYNE) 200 MG tablet   oxyCODONE (ROXICODONE) 5 MG tablet   Prenatal Vit-Fe Fumarate-FA (PRENATAL MULTIVITAMIN W/IRON) 27-0.8 MG tablet   senna-docusate (SENOKOT-S/PERICOLACE) 8.6-50 MG tablet   azelaic acid (AZELEX) 20 % cream   labetalol (NORMODYNE) 300 MG tablet   Misc. Devices (BREAST PUMP) MISC   order for DME         Review of Systems  Constitutional:  Negative for fever or chills.  HENT: Positive for nasal congestion and sore throat.  Eye:  Negative double vision or blurred vision.  Cardiovascular:  Negative for chest pain.  Respiratory: Positive for mild cough.  Negative for shortness of breath.  Gastrointestinal:  Negative for abdominal pain, nausea, vomiting, or diarrhea.  Musculoskeletal: Negative for neck stiffness.  Neurological:  Negative for headache, dizziness, or weakness.    All others reviewed and are negative.      Physical Exam   BP: (!) 186/110  Pulse: 89  Heart Rate: 92  Temp: 99.2  F (37.3  C)  Resp: 18  SpO2: 97 %      Physical Exam  Constitutional:   Well developed, well nourished.  Appears nontoxic and in no acute distress.   HENT:  Normocephalic and atraumatic.  Symmetric in appearance.  Eyes:  Conjunctivae are normal.  Oral:  Patient is without trismus.  Moist oral mucosa.  Dentition is without significant decay.  Negative pharyngeal erythema or exudate.  No uvular asymmetry.  Without sublingual or submental swelling.  Voice is not muffled.  Tolerates secretions.  Neck:  Neck supple and without nuchal rigidity.  Cardiovascular:  No cyanosis.  RRR.  No audible murmurs noted.  2+ lower extremity edema, baseline since third trimester of pregnancy according to patient.  Respiratory:  Effort normal without sign of respiratory distress.  CTAB without diminished regions.    Gastrointestinal:  Soft nondistended abdomen.  Nontender and without guarding.  No rigidity or rebound tenderness.  Negative Guadarrama's sign.  Negative McBurney's point.  Genitourinary:  Noncontributory.   Musculoskeletal:  Moves extremities spontaneously.  Neurological:  Patient is alert.  Skin:  Skin is warm and dry.  Psychiatric:  Normal mood and affect.      ED Course        Procedures              Critical Care time:  none               Results for orders placed or performed during the hospital encounter of 02/22/19 (from the past 24 hour(s))   Rapid Strep Screen   Result Value Ref Range    Specimen Description Throat     Rapid Strep A Screen       NEGATIVE: No Group A streptococcal antigen detected by immunoassay, await culture report.       Medications   dexamethasone (DECADRON) oral solution (inj used orally) 10 mg (not administered)       Assessments & Plan (with Medical Decision Making)   Janine Salas is a 33 year old female who presents to the department for evaluation of persistent sore throat with nasal congestion.  She admits she is having a difficult time sleeping secondary to sore throat but there is no sign of peritonsillar abscess, retropharyngeal abscess, epiglottitis or  acute subglottic stenosis.  Rapid strep test negative.  Likely a viral etiology but inconsistent with mononucleosis.  Of more concern to me is her persistent elevated blood pressure readings.  Further discussions revealed that the patient has not taken her labetalol in >9 hours, last dose around 6 PM yesterday.  The patient states that she must return home tonight and is unwilling to further investigate preeclampsia and/or hospital admission for management again.  Consulted on-call OB/GYN Dr. Mario regarding my concerns and she feels it is likely that the patient's blood pressure is still not well controlled.  She recommends taking an additional dose of her left over extended release nifedipine.  The patient has a home health aide visiting tomorrow at 10 AM, can reassess patient and repeat blood pressures.  Patient is in agreement with discharge plan including calling OB/GYN clinic in the morning to discuss symptoms and continued management plan.      Disclaimer:  This note consists of symbols derived from keyboarding, dictation, and/or voice recognition software.  As a result, there may be errors in the script that have gone undetected.  Please consider this when interpreting information found in the chart.        I have reviewed the nursing notes.    I have reviewed the findings, diagnosis, plan and need for follow up with the patient.         Medication List      There are no discharge medications for this visit.         Final diagnoses:   Pharyngitis, unspecified etiology   Nasal congestion       2/22/2019   [facility] EMERGENCY DEPARTMENT     Ismael Sampson,   02/22/19 0344

## 2019-02-24 LAB
BACTERIA SPEC CULT: NORMAL
SPECIMEN SOURCE: NORMAL

## 2019-02-25 ENCOUNTER — MYC MEDICAL ADVICE (OUTPATIENT)
Dept: OBGYN | Facility: CLINIC | Age: 34
End: 2019-02-25

## 2019-02-25 NOTE — PROGRESS NOTES
Clinic Care Coordination Contact  [facility]/Voicemail    Referral Source: IP Report  Clinical Data: Care Coordinator Outreach  Outreach attempted x 2.  Left message on voicemail with call back information and requested return call.  Plan: Care Coordinator mailed out care coordination introduction letter on 2-21. Care Coordinator will do no further outreaches at this time.  Ricardo Bush RN  Clinic Care Coordinator  [phone] or [phone]

## 2019-02-25 NOTE — RESULT ENCOUNTER NOTE
Final Beta strep group A r/o culture is NEGATIVE for Group A streptococcus.    No treatment or change in treatment per [facility] Strep protocol.

## 2019-02-25 NOTE — DISCHARGE SUMMARY
[facility] Discharge Summary    Janine Salas MRN# [id number]   Age: 33 year old YOB: 1985     Date of Admission:  2019  Date of Discharge::  2019  1:53 PM  Admitting Physician:  Angela Silveira MD  Discharge Physician:  Marco Marin MD     Home clinic: [facility]          Admission Diagnoses:   S/p repeat  section  Tinea corporis [B35.4]  Pre-eclampsia, postpartum [O14.95]  Elevated LFTs [R94.5]          Discharge Diagnosis:   S/p repeat  section   Tinea corporis [B35.4]  Pre-eclampsia, postpartum [O14.95]  Elevated LFTs [R94.5]       Procedures:   Procedure(s):  IV magnesium sulfate versus preeclampsia       No other procedures performed during this admission           Medications Prior to Admission:     No medications prior to admission.             Discharge Medications:     Discharge Medication List as of 2019  1:18 PM      START taking these medications    Details   clotrimazole (LOTRIMIN) 1 % external cream Apply topically 2 times dailyDisp-30 g, [id number]             !! labetalol (NORMODYNE) 300 MG tablet Take 1 tablet (300 mg) by mouth 3 times daily, Disp-90 tablet, R-0, E-Prescribe      order for DME Blood pressure cuffDisp-1 Units, R-0, Local Print      oxyCODONE (ROXICODONE) 5 MG tablet Take 1 tablet (5 mg) by mouth every 4 hours as needed for moderate to severe pain, Disp-24 tablet, R-0, Local Print       !! - Potential duplicate medications found. Please discuss with provider.      CONTINUE these medications which have NOT CHANGED    Details   Acetaminophen (TYLENOL PO) Take 500 mg by mouth once as needed , Historical      azelaic acid (AZELEX) 20 % cream Apply topically 2 times dailyDisp-50 g, [id number]      escitalopram (LEXAPRO) 10 MG tablet Take 1 tablet (10 mg) by mouth daily After 1 week , if tolerated, increase to 2 tabs daily (20 mg), Disp-90 tablet, R-3, E-Prescribe      hydrOXYzine (ATARAX) 50  MG tablet Take 50 mg by mouth 3 times daily as needed for itching, Historical      ibuprofen (ADVIL/MOTRIN) 800 MG tablet Take 1 tablet (800 mg) by mouth every 6 hours as needed for other (cramping), Disp-45 tablet, R-1, E-Prescribe      Misc. Devices (BREAST PUMP) MISC 1 each every hour, Disp-1 each, R-0, Local Print      Prenatal Vit-Fe Fumarate-FA (PRENATAL MULTIVITAMIN W/IRON) 27-0.8 MG tablet Take 1 tablet by mouth daily, Disp-100 tablet, R-1, E-Prescribe      senna-docusate (SENOKOT-S/PERICOLACE) 8.6-50 MG tablet Take 1 tablet by mouth 2 times daily as needed for constipation, Disp-45 tablet, R-3, E-Prescribe         STOP taking these medications       HYDROcodone-acetaminophen (NORCO) 5-325 MG tablet Comments:   Reason for Stopping:         NIFEdipine ER OSMOTIC (ADALAT CC) 30 MG 24 hr tablet Comments:   Reason for Stopping:         oxymetazoline (AFRIN) 0.05 % nasal spray Comments:   Reason for Stopping:                     Consultations:   No consultations were requested during this admission          Brief History of Labor or Admission:   She status post repeat  section on 2019 after presenting with pregnancy-induced hypertension at 38 weeks 4 days.  She is also having active labor which prompted a repeat  section prior to 39 weeks gestation.  She had a home health visit postoperatively that prompted her subsequent admission.  She was found to have elevated liver functions and hypertension.       Hospital Course:   The patient's hospital course was unremarkable.  She underwent a change of her antihypertensive medication to labetalol initially 200 mg twice daily.  She received magnesium sulfate IV for 24 hours.  Her fungal infection of her panniculus over the incision was treated with the topical antifungals.  On discharge, her pain was well controlled. Vaginal bleeding is similar to peak menstrual flow.  Voiding without difficulty.  Ambulating well and tolerating a normal diet.  " No fever or significant wound drainage.  Breastfeeding well.  Infant is stable.   She was discharged on hospital day #2.  /81   Pulse 98   Temp 98.6  F (37  C) (Oral)   Resp 18   Ht 1.676 m (5' 5.98\")   Wt (!) 163 kg (359 lb 6.4 oz)   LMP 05/23/2018   SpO2 95%   BMI 58.04 kg/m    Repeat blood pressure was 158/79  Exam:  Constitutional: healthy, alert and no distress  Head: Normocephalic. No masses, lesions, tenderness or abnormalities  Cardiovascular: negative, PMI normal. No lifts, heaves, or thrills. RRR. No murmurs, clicks gallops or rub  Respiratory: negative  Gastrointestinal: Abdomen soft, non-tender. BS normal. No masses, organomegaly Pfannenstiel incision is intact and dry.  There is powder present in the area under the pannus     : Deferred  Musculoskeletal: extremities normal- no gross deformities noted, gait normal and normal muscle tone  Skin: no suspicious lesions or rashes and erythema - abdomen  Neurologic: Gait normal. Reflexes normal and symmetric. Sensation grossly WNL.  Psychiatric: mentation appears normal and affect normal/bright      Post-partum hemoglobin:   Hemoglobin   Date Value Ref Range Status   02/19/2019 12.3 11.7 - 15.7 g/dL Final             Discharge Instructions and Follow-Up:   Discharge diet: Regular   Discharge activity: Lifting restricted to 15 pounds  No heavy lifting for 5 week(s)  No sex for 5 week(s)  Pelvic rest: abstain from intercourse and do not use tampons for 5 week(s)   Discharge follow-up: Follow up with me in 1 week  Home health nurse visit versus blood pressure cuff with large cuff   Wound care: Drink plenty of fluids  Ice to area for comfort           Discharge Disposition:   Discharged to home      Attestation:  I have reviewed today's vital signs, notes, medications, labs and imaging.  Amount of time performed on this discharge summary: 10 minutes.    Marco Marin MD     "

## 2019-02-27 ENCOUNTER — MYC MEDICAL ADVICE (OUTPATIENT)
Dept: FAMILY MEDICINE | Facility: CLINIC | Age: 34
End: 2019-02-27

## 2019-02-28 NOTE — TELEPHONE ENCOUNTER
LEFT MESSAGE to return call for scheduling appointment.  No appointments with Dr. Thurman or Dr. Jordan on Thursday.  Dr. Jordan has openings on Friday.      ..Tona Nuñez

## 2019-02-28 NOTE — TELEPHONE ENCOUNTER
Patient did not return call.  Reviewed  chart, appointment is made with Dr Jordan 3/1/19 at 1315.  Fawn Malik RN

## 2019-03-01 ENCOUNTER — OFFICE VISIT (OUTPATIENT)
Dept: OBGYN | Facility: CLINIC | Age: 34
End: 2019-03-01
Payer: COMMERCIAL

## 2019-03-01 VITALS
TEMPERATURE: 98.6 F | BODY MASS INDEX: 47.09 KG/M2 | WEIGHT: 293 LBS | HEART RATE: 83 BPM | RESPIRATION RATE: 20 BRPM | DIASTOLIC BLOOD PRESSURE: 81 MMHG | HEIGHT: 66 IN | SYSTOLIC BLOOD PRESSURE: 131 MMHG

## 2019-03-01 DIAGNOSIS — L02.92 FURUNCLE OF SKIN OR SUBCUTANEOUS TISSUE: Primary | ICD-10-CM

## 2019-03-01 PROCEDURE — 99214 OFFICE O/P EST MOD 30 MIN: CPT | Performed by: OBSTETRICS & GYNECOLOGY

## 2019-03-01 RX ORDER — HYDROXYZINE HYDROCHLORIDE 50 MG/1
50 TABLET, FILM COATED ORAL 3 TIMES DAILY PRN
Qty: 30 TABLET | Refills: 1 | Status: SHIPPED | OUTPATIENT
Start: 2019-03-01 | End: 2019-04-23

## 2019-03-01 RX ORDER — CEPHALEXIN 500 MG/1
500 CAPSULE ORAL 2 TIMES DAILY
Qty: 28 CAPSULE | Refills: 0 | Status: SHIPPED | OUTPATIENT
Start: 2019-03-01 | End: 2019-04-23

## 2019-03-01 ASSESSMENT — ANXIETY QUESTIONNAIRES
7. FEELING AFRAID AS IF SOMETHING AWFUL MIGHT HAPPEN: NOT AT ALL
IF YOU CHECKED OFF ANY PROBLEMS ON THIS QUESTIONNAIRE, HOW DIFFICULT HAVE THESE PROBLEMS MADE IT FOR YOU TO DO YOUR WORK, TAKE CARE OF THINGS AT HOME, OR GET ALONG WITH OTHER PEOPLE: NOT DIFFICULT AT ALL
6. BECOMING EASILY ANNOYED OR IRRITABLE: NOT AT ALL
2. NOT BEING ABLE TO STOP OR CONTROL WORRYING: NOT AT ALL
3. WORRYING TOO MUCH ABOUT DIFFERENT THINGS: NOT AT ALL
5. BEING SO RESTLESS THAT IT IS HARD TO SIT STILL: NOT AT ALL
GAD7 TOTAL SCORE: 2
1. FEELING NERVOUS, ANXIOUS, OR ON EDGE: SEVERAL DAYS

## 2019-03-01 ASSESSMENT — PATIENT HEALTH QUESTIONNAIRE - PHQ9
SUM OF ALL RESPONSES TO PHQ QUESTIONS 1-9: 6
5. POOR APPETITE OR OVEREATING: SEVERAL DAYS

## 2019-03-01 ASSESSMENT — MIFFLIN-ST. JEOR: SCORE: 2268.05

## 2019-03-02 ASSESSMENT — ANXIETY QUESTIONNAIRES: GAD7 TOTAL SCORE: 2

## 2019-03-07 ENCOUNTER — PRENATAL OFFICE VISIT (OUTPATIENT)
Dept: OBGYN | Facility: CLINIC | Age: 34
End: 2019-03-07
Payer: COMMERCIAL

## 2019-03-07 VITALS
SYSTOLIC BLOOD PRESSURE: 123 MMHG | WEIGHT: 293 LBS | HEART RATE: 80 BPM | BODY MASS INDEX: 55.15 KG/M2 | DIASTOLIC BLOOD PRESSURE: 79 MMHG | TEMPERATURE: 97.6 F

## 2019-03-07 DIAGNOSIS — Z30.015 ENCOUNTER FOR INITIAL PRESCRIPTION OF VAGINAL RING HORMONAL CONTRACEPTIVE: ICD-10-CM

## 2019-03-07 DIAGNOSIS — Z98.891 S/P CESAREAN SECTION: ICD-10-CM

## 2019-03-07 PROCEDURE — 99207 ZZC POST PARTUM EXAM: CPT | Performed by: OBSTETRICS & GYNECOLOGY

## 2019-03-07 RX ORDER — IBUPROFEN 800 MG/1
800 TABLET, FILM COATED ORAL EVERY 6 HOURS PRN
Qty: 100 TABLET | Refills: 1 | Status: SHIPPED | OUTPATIENT
Start: 2019-03-07 | End: 2020-07-16

## 2019-03-07 RX ORDER — ETONOGESTREL AND ETHINYL ESTRADIOL VAGINAL RING .015; .12 MG/D; MG/D
1 RING VAGINAL
Qty: 3 EACH | Refills: 3 | Status: SHIPPED | OUTPATIENT
Start: 2019-03-07 | End: 2019-06-27 | Stop reason: ALTCHOICE

## 2019-03-07 ASSESSMENT — ANXIETY QUESTIONNAIRES
3. WORRYING TOO MUCH ABOUT DIFFERENT THINGS: NOT AT ALL
IF YOU CHECKED OFF ANY PROBLEMS ON THIS QUESTIONNAIRE, HOW DIFFICULT HAVE THESE PROBLEMS MADE IT FOR YOU TO DO YOUR WORK, TAKE CARE OF THINGS AT HOME, OR GET ALONG WITH OTHER PEOPLE: NOT DIFFICULT AT ALL
5. BEING SO RESTLESS THAT IT IS HARD TO SIT STILL: NOT AT ALL
GAD7 TOTAL SCORE: 1
6. BECOMING EASILY ANNOYED OR IRRITABLE: NOT AT ALL
7. FEELING AFRAID AS IF SOMETHING AWFUL MIGHT HAPPEN: NOT AT ALL
1. FEELING NERVOUS, ANXIOUS, OR ON EDGE: NOT AT ALL
2. NOT BEING ABLE TO STOP OR CONTROL WORRYING: NOT AT ALL

## 2019-03-07 ASSESSMENT — PATIENT HEALTH QUESTIONNAIRE - PHQ9
5. POOR APPETITE OR OVEREATING: SEVERAL DAYS
SUM OF ALL RESPONSES TO PHQ QUESTIONS 1-9: 2

## 2019-03-07 NOTE — PROGRESS NOTES
SUBJECTIVE:  Janine Salas is a 33 year old female  here for a postpartum visit.  She had a  Section  on 2019 delivering a viable male 7 pounds 12 ounces by repeat  section.  Repeat  was performed prior to 39 weeks due to blood pressure changes and contractions.  Delivering a healthy baby boy weighing 7 lbs 12 oz at term.      delivery complications: Yes she had issues with blood pressure was discharged home and subsequently returned for IV magnesium sulfate.  She had been on labetalol 300 mg twice daily.  She had been on nifedipine but this was ineffective.  breast feeding:  Yes, she had issues last week with a furuncle at 12:00 on the left breast.  This was treated with oral antibiotics subsequently opened and drained and now is healing.  bladder problems:  No  bowel problems/hemorrhoids:  No  /incision healed? Yes:   vaginal flow:  None  [place]:  No  contraception:  vaginal contraceptive ring  emotional adjustment:  doing well and happy  back to work: No time soon    OBJECTIVE:  Blood pressure 123/79, pulse 80, temperature 97.6  F (36.4  C), temperature source Tympanic, weight (!) 155 kg (341 lb 11.2 oz), last menstrual period 2018, currently breastfeeding.   General - pleasant female in no acute distress.  Breast - no nodularity, asymmetry or nipple discharge bilaterally.  Left breast at 12:00 outside of the areola is a point of drainage.  This is nonerythematous and appears to be healing well versus last week  Abdomen - soft, nontender, nondistended, no hepatosplenomegaly.  Her incision is under the pannus this appears to be healing well.  It is nontender and dry.  Pelvic - /Rectovaginal - deferred.    ASSESSMENT:  normal postpartum exam  (Z39.2) Routine postpartum follow-up  (primary encounter diagnosis)  Comment: Left breast infection-healing well  Plan: Continue antibiotics until completed course    (Z98.891) S/P  section  Comment: Healing  well  Plan: ibuprofen (ADVIL/MOTRIN) 800 MG tablet        Resume normal activity    (O11.5,  O10.93) Pre-eclampsia superimposed on chronic hypertension, postpartum  Comment: Continue labetalol for another 4 weeks  Plan: ibuprofen (ADVIL/MOTRIN) 800 MG tablet        Follow-up in 4 weeks    (Z30.015) Encounter for initial prescription of vaginal ring hormonal contraceptive  Comment: Written prescription given  Plan: etonogestrel-ethinyl estradiol (NUVARING)         0.12-0.015 MG/24HR vaginal ring        Encouraged maximizing breast milk production prior to starting combination contraceptive.        PLAN:  Discussed kegel exercises   May resume normal activities without restrictions  Pap smear was not  done today    The patient will use vaginal contraceptive ring for birth control. Full counseling was provided, and all questions answered. Compliance is strongly emphasized.  Return to clinic in one year for an annual, when due for a pap smear or PRN.    Marco Marin

## 2019-03-07 NOTE — NURSING NOTE
"Initial /79 (BP Location: Right arm, Patient Position: Sitting, Cuff Size: Adult Large)   Pulse 80   Temp 97.6  F (36.4  C) (Tympanic)   Wt (!) 155 kg (341 lb 11.2 oz)   LMP 05/23/2018   BMI 55.15 kg/m   Estimated body mass index is 55.15 kg/m  as calculated from the following:    Height as of 3/1/19: 1.676 m (5' 6\").    Weight as of this encounter: 155 kg (341 lb 11.2 oz). .    Binh PARRY CMA    "

## 2019-03-08 ENCOUNTER — MYC MEDICAL ADVICE (OUTPATIENT)
Dept: OBGYN | Facility: CLINIC | Age: 34
End: 2019-03-08

## 2019-03-08 ASSESSMENT — ANXIETY QUESTIONNAIRES: GAD7 TOTAL SCORE: 1

## 2019-03-08 NOTE — TELEPHONE ENCOUNTER
Call to patient to notify of below.  Mail box is full.  Unable to leave a message.    Will send MyChart message regarding response of physician as below.    Mary Painting   [facility]  RN

## 2019-03-08 NOTE — TELEPHONE ENCOUNTER
Please inform pt that this far out from her surgery (2/11/19), we can no longer refill narcotics; if she feels she has experienced injury severe enough for narcotics, she needs to go to ER for evaluation   Anita Porter MD   [facility]

## 2019-03-08 NOTE — TELEPHONE ENCOUNTER
Please review patient MyChart message and advise on prescription refill in Dr. Marin's absence.    Thank you.    Mary Painting   [facility]  RN

## 2019-03-08 NOTE — TELEPHONE ENCOUNTER
Spoke to patient on the phone.  Patient called back as she noticed missed call.  Patient advised of message from Dr. Porter as below.    Recommended patient try Ibuprofen and Tylenol per package directions. Also recommended a low set heating pad or heat packs placed on the abdomen - this may help to relax the muscles. Encouraged patient to rest and take it easy.    Pt in agreement and reports understanding.    Mary Painting   [facility]  RN

## 2019-04-22 ENCOUNTER — MYC MEDICAL ADVICE (OUTPATIENT)
Dept: FAMILY MEDICINE | Facility: CLINIC | Age: 34
End: 2019-04-22

## 2019-04-22 ENCOUNTER — E-VISIT (OUTPATIENT)
Dept: FAMILY MEDICINE | Facility: CLINIC | Age: 34
End: 2019-04-22
Payer: COMMERCIAL

## 2019-04-22 DIAGNOSIS — Z53.9 ERRONEOUS ENCOUNTER--DISREGARD: Primary | ICD-10-CM

## 2019-04-22 NOTE — TELEPHONE ENCOUNTER
I agree with kristi from OBGYN nurse from earlier today:    Unfortunately, I do not have a provider that can see you in clinic today.   You may check with your primary care provider or a family practice provider at the [facility] for an appointment.   I do recommend to be seen. If you cannot be seen in clinic, I recommend Urgent Care.     She was seen in March and diagnosed with furuncle not a typical mastitis.  She mycharted about this a couple weeks ago but it was not an abscess and antibiotics were not prescribed that I can see. At that time she was recommended to be seen if signs of infection.    Elisha Calvin PA-C

## 2019-04-23 ENCOUNTER — OFFICE VISIT (OUTPATIENT)
Dept: OBGYN | Facility: CLINIC | Age: 34
End: 2019-04-23
Payer: COMMERCIAL

## 2019-04-23 VITALS
DIASTOLIC BLOOD PRESSURE: 93 MMHG | HEIGHT: 66 IN | RESPIRATION RATE: 16 BRPM | BODY MASS INDEX: 47.09 KG/M2 | WEIGHT: 293 LBS | SYSTOLIC BLOOD PRESSURE: 141 MMHG | HEART RATE: 98 BPM | TEMPERATURE: 98.8 F

## 2019-04-23 DIAGNOSIS — L02.92 FURUNCLE OF SKIN OR SUBCUTANEOUS TISSUE: ICD-10-CM

## 2019-04-23 DIAGNOSIS — S21.002A OPEN WOUND OF LEFT BREAST, INITIAL ENCOUNTER: Primary | ICD-10-CM

## 2019-04-23 PROCEDURE — 87077 CULTURE AEROBIC IDENTIFY: CPT | Performed by: OBSTETRICS & GYNECOLOGY

## 2019-04-23 PROCEDURE — 99212 OFFICE O/P EST SF 10 MIN: CPT | Performed by: OBSTETRICS & GYNECOLOGY

## 2019-04-23 PROCEDURE — 87070 CULTURE OTHR SPECIMN AEROBIC: CPT | Performed by: OBSTETRICS & GYNECOLOGY

## 2019-04-23 PROCEDURE — 87186 SC STD MICRODIL/AGAR DIL: CPT | Performed by: OBSTETRICS & GYNECOLOGY

## 2019-04-23 RX ORDER — HYDROXYZINE HYDROCHLORIDE 50 MG/1
50 TABLET, FILM COATED ORAL 3 TIMES DAILY PRN
Qty: 30 TABLET | Refills: 1 | Status: SHIPPED | OUTPATIENT
Start: 2019-04-23 | End: 2020-07-16

## 2019-04-23 RX ORDER — CALCIUM/MAGNESIUM/ZINC 333-133 MG
TABLET ORAL
COMMUNITY
End: 2020-07-16

## 2019-04-23 RX ORDER — CEPHALEXIN 500 MG/1
500 CAPSULE ORAL 2 TIMES DAILY
Qty: 28 CAPSULE | Refills: 0 | Status: SHIPPED | OUTPATIENT
Start: 2019-04-23 | End: 2019-08-28

## 2019-04-23 ASSESSMENT — MIFFLIN-ST. JEOR: SCORE: 2331.56

## 2019-04-23 NOTE — PROGRESS NOTES
CC:  Follow up  from March for breast lesion or furuncle  HPI:  Janine Salas is a 33 year old female is a   P 2003.  Patient's last menstrual period was 2018.  She has not had a period since February when she delivered via .  She continues to breast-feed and pump  She notes a new lesion on the left breast just lateral and superior to the nipple.  She states that the baby is coming in contact with us on a regular basis.  She continues on Keflex which she was prescribed in March    Patients records are available and reviewed at today's visit.        Past Medical History:   Diagnosis Date     Acute pancreatitis 1/15     Anxiety      Chickenpox      PONV (postoperative nausea and vomiting)      Pregnancy induced hypertension      PTSD (post-traumatic stress disorder)      Severe postpartum depression     also anxiety       Past Surgical History:   Procedure Laterality Date     APPENDECTOMY        SECTION N/A 2016    Procedure:  SECTION;  Surgeon: Marco Marin MD;  Location: [facility] OR      SECTION N/A 2019    Procedure:  SECTION;  Surgeon: Marco Marin MD;  Location: [facility] OR     COLONOSCOPY       LAPAROSCOPIC CHOLECYSTECTOMY  2013    Procedure: LAPAROSCOPIC CHOLECYSTECTOMY;  Laparoscopic Cholecystectomy;  Surgeon: Lasha Garcias MD;  Location: [facility] OR     MOUTH SURGERY      wisdom teeth     UPPER GI ENDOSCOPY         Family History   Problem Relation Age of Onset     Hypertension Mother      Depression Mother      Osteoporosis Mother      Thyroid Disease Mother      Hypertension Father      Alcohol/Drug Father         recovered alcohol- has fetal alcohol syndrome     Cancer Father         melanoma     Hypertension Maternal Grandmother      Alzheimer Disease Maternal Grandmother      Cardiovascular Maternal Grandmother         triple bypass     Cancer Maternal Grandfather         lung     Alcohol/Drug Paternal Grandmother          alcohol     Cancer - colorectal Paternal Grandmother         colon     Alcohol/Drug Paternal Grandfather         alcohol     Cancer Paternal Grandfather         leukemia - adult onset     Cardiovascular Paternal Grandfather         stent     Obesity Sister      Breast Cancer Maternal Aunt      Cancer Sister         cervical cancer, hysterectomy     Substance Abuse Sister         recovered drugs     Bipolar Disorder Sister      Depression Sister      Hypothyroidism Sister      Autism Spectrum Disorder Sister         ASpergers     Bipolar Disorder Other      Kidney Disease Maternal Aunt         Stage 3       Allergies: Codeine and Zofran [ondansetron]    Current Outpatient Medications   Medication Sig Dispense Refill     Acetaminophen (TYLENOL PO) Take 500 mg by mouth once as needed        cephALEXin (KEFLEX) 500 MG capsule Take 1 capsule (500 mg) by mouth 2 times daily 28 capsule 0     escitalopram (LEXAPRO) 10 MG tablet Take 1 tablet (10 mg) by mouth daily After 1 week , if tolerated, increase to 2 tabs daily (20 mg) 90 tablet 3     etonogestrel-ethinyl estradiol (NUVARING) 0.12-0.015 MG/24HR vaginal ring Place 1 each vaginally every 28 days 3 each 3     FENUGREEK PO        hydrOXYzine (ATARAX) 50 MG tablet Take 1 tablet (50 mg) by mouth 3 times daily as needed for itching 30 tablet 1     ibuprofen (ADVIL/MOTRIN) 800 MG tablet Take 1 tablet (800 mg) by mouth every 6 hours as needed for other (cramping) 100 tablet 1     Milk Thistle-Dand-Fennel-Licor (MILK THISTLE XTRA) CAPS capsule        azelaic acid (AZELEX) 20 % cream Apply topically 2 times daily (Patient not taking: Reported on 4/23/2019) 50 g 11     labetalol (NORMODYNE) 300 MG tablet Take 1 tablet (300 mg) by mouth 3 times daily 90 tablet 0     Misc. Devices (BREAST PUMP) MISC 1 each every hour (Patient not taking: Reported on 1/23/2019) 1 each 0     order for DME Blood pressure cuff 1 Units 0     Prenatal Vit-Fe Fumarate-FA (PRENATAL MULTIVITAMIN W/IRON)  "27-0.8 MG tablet Take 1 tablet by mouth daily 100 tablet 1       ROS:  C: NEGATIVE for fever, chills, change in weight  I: NEGATIVE for worrisome rashes, moles or lesions  E: NEGATIVE for vision changes or irritation  E/M: NEGATIVE for ear, mouth and throat problems  R: NEGATIVE for significant cough or SOB  CV: NEGATIVE for chest pain, palpitations or peripheral edema  GI: NEGATIVE for nausea, abdominal pain, heartburn, or change in bowel habits  : NEGATIVE for frequency, dysuria, hematuria, vaginal discharge  M: NEGATIVE for significant arthralgias or myalgia  N: NEGATIVE for weakness, dizziness or paresthesias  E: NEGATIVE for temperature intolerance, skin/hair changes  P: NEGATIVE for changes in mood or affect    EXAM:  Blood pressure (!) 141/93, pulse 98, temperature 98.8  F (37.1  C), resp. rate 16, height 1.676 m (5' 6\"), weight (!) 161.5 kg (356 lb), last menstrual period 05/23/2018, currently breastfeeding.   BMI= Body mass index is 57.46 kg/m .  General - pleasant female in no acute distress.    Breast -left breast with a 1.2 cm ulceration.  The base has a gray exudate.  A culture was performed and sent  Neurological - normal strength, sensation, and mental status.      ASSESSMENT/PLAN:  (S21.002A) Open wound of left breast, initial encounter  (primary encounter diagnosis)  Comment:   Plan: Wound Culture Aerobic Bacterial            (Z39.1) Patient is a currently breast-feeding mother  Comment:   Plan: Wound Culture Aerobic Bacterial        Continue breast-feeding    (L02.92) Furuncle of skin or subcutaneous tissue  Comment: Improvement in a different lesion with Keflex  Plan: hydrOXYzine (ATARAX) 50 MG tablet, cephALEXin         (KEFLEX) 500 MG capsule              Marco Marin  "

## 2019-04-25 LAB
BACTERIA SPEC CULT: ABNORMAL
Lab: ABNORMAL
SPECIMEN SOURCE: ABNORMAL

## 2019-05-01 RX ORDER — SULFAMETHOXAZOLE/TRIMETHOPRIM 800-160 MG
1 TABLET ORAL 2 TIMES DAILY
Qty: 14 TABLET | Refills: 0 | Status: SHIPPED | OUTPATIENT
Start: 2019-05-01 | End: 2019-08-28

## 2019-07-02 ENCOUNTER — MYC MEDICAL ADVICE (OUTPATIENT)
Dept: FAMILY MEDICINE | Facility: CLINIC | Age: 34
End: 2019-07-02

## 2019-08-21 ENCOUNTER — MYC MEDICAL ADVICE (OUTPATIENT)
Dept: OBGYN | Facility: CLINIC | Age: 34
End: 2019-08-21

## 2019-08-21 DIAGNOSIS — O99.343 ANXIETY IN PREGNANCY IN THIRD TRIMESTER, ANTEPARTUM: ICD-10-CM

## 2019-08-21 DIAGNOSIS — Z34.82 PRENATAL CARE, SUBSEQUENT PREGNANCY IN SECOND TRIMESTER: ICD-10-CM

## 2019-08-21 DIAGNOSIS — F41.9 ANXIETY IN PREGNANCY IN THIRD TRIMESTER, ANTEPARTUM: ICD-10-CM

## 2019-08-21 RX ORDER — PRENATAL VIT/IRON FUM/FOLIC AC 27MG-0.8MG
1 TABLET ORAL DAILY
Qty: 100 TABLET | Refills: 1 | Status: SHIPPED | OUTPATIENT
Start: 2019-08-21 | End: 2020-03-01

## 2019-08-21 RX ORDER — HYDROXYZINE PAMOATE 50 MG/1
50 CAPSULE ORAL
Qty: 30 CAPSULE | Refills: 1 | Status: SHIPPED | OUTPATIENT
Start: 2019-08-21 | End: 2020-07-16

## 2019-08-21 NOTE — TELEPHONE ENCOUNTER
Please review and advise on patient request for refills.  Last office visit 4/23/19    OK to refill requested prescription's?    Thank you.    Mary Painting   [facility]  RN

## 2019-08-22 ENCOUNTER — MYC MEDICAL ADVICE (OUTPATIENT)
Dept: OBGYN | Facility: CLINIC | Age: 34
End: 2019-08-22

## 2019-08-22 DIAGNOSIS — O99.343 ANXIETY IN PREGNANCY IN THIRD TRIMESTER, ANTEPARTUM: ICD-10-CM

## 2019-08-22 DIAGNOSIS — F41.9 ANXIETY IN PREGNANCY IN THIRD TRIMESTER, ANTEPARTUM: ICD-10-CM

## 2019-08-28 ENCOUNTER — OFFICE VISIT (OUTPATIENT)
Dept: FAMILY MEDICINE | Facility: CLINIC | Age: 34
End: 2019-08-28
Payer: COMMERCIAL

## 2019-08-28 ENCOUNTER — ANCILLARY PROCEDURE (OUTPATIENT)
Dept: GENERAL RADIOLOGY | Facility: CLINIC | Age: 34
End: 2019-08-28
Attending: PHYSICIAN ASSISTANT
Payer: COMMERCIAL

## 2019-08-28 VITALS
BODY MASS INDEX: 47.09 KG/M2 | DIASTOLIC BLOOD PRESSURE: 84 MMHG | HEART RATE: 78 BPM | HEIGHT: 66 IN | RESPIRATION RATE: 16 BRPM | SYSTOLIC BLOOD PRESSURE: 136 MMHG | TEMPERATURE: 98.6 F | WEIGHT: 293 LBS

## 2019-08-28 DIAGNOSIS — M79.671 RIGHT FOOT PAIN: ICD-10-CM

## 2019-08-28 DIAGNOSIS — L70.0 CYSTIC ACNE: ICD-10-CM

## 2019-08-28 DIAGNOSIS — M54.50 CHRONIC BILATERAL LOW BACK PAIN WITHOUT SCIATICA: ICD-10-CM

## 2019-08-28 DIAGNOSIS — M25.552 HIP PAIN, LEFT: ICD-10-CM

## 2019-08-28 DIAGNOSIS — G89.29 CHRONIC BILATERAL LOW BACK PAIN WITHOUT SCIATICA: ICD-10-CM

## 2019-08-28 DIAGNOSIS — M25.512 CHRONIC LEFT SHOULDER PAIN: ICD-10-CM

## 2019-08-28 DIAGNOSIS — E66.01 MORBID OBESITY DUE TO EXCESS CALORIES (H): ICD-10-CM

## 2019-08-28 DIAGNOSIS — F33.1 MODERATE EPISODE OF RECURRENT MAJOR DEPRESSIVE DISORDER (H): ICD-10-CM

## 2019-08-28 DIAGNOSIS — M77.12 LATERAL EPICONDYLITIS OF LEFT ELBOW: ICD-10-CM

## 2019-08-28 DIAGNOSIS — M79.671 RIGHT FOOT PAIN: Primary | ICD-10-CM

## 2019-08-28 DIAGNOSIS — G89.29 CHRONIC LEFT SHOULDER PAIN: ICD-10-CM

## 2019-08-28 PROCEDURE — 99214 OFFICE O/P EST MOD 30 MIN: CPT | Performed by: PHYSICIAN ASSISTANT

## 2019-08-28 PROCEDURE — 73630 X-RAY EXAM OF FOOT: CPT | Mod: RT

## 2019-08-28 RX ORDER — NAPROXEN 500 MG/1
500 TABLET ORAL 2 TIMES DAILY WITH MEALS
Qty: 60 TABLET | Refills: 0 | Status: SHIPPED | OUTPATIENT
Start: 2019-08-28 | End: 2019-10-01

## 2019-08-28 RX ORDER — CYCLOBENZAPRINE HCL 10 MG
5-10 TABLET ORAL 3 TIMES DAILY PRN
Qty: 60 TABLET | Refills: 0 | Status: SHIPPED | OUTPATIENT
Start: 2019-08-28 | End: 2019-10-01

## 2019-08-28 RX ORDER — CLINDAMYCIN PHOSPHATE 11.9 MG/ML
SOLUTION TOPICAL 2 TIMES DAILY
Qty: 60 ML | Refills: 1 | Status: SHIPPED | OUTPATIENT
Start: 2019-08-28 | End: 2020-07-16

## 2019-08-28 ASSESSMENT — PATIENT HEALTH QUESTIONNAIRE - PHQ9: SUM OF ALL RESPONSES TO PHQ QUESTIONS 1-9: 8

## 2019-08-28 ASSESSMENT — MIFFLIN-ST. JEOR: SCORE: 2340.63

## 2019-08-28 ASSESSMENT — PAIN SCALES - GENERAL: PAINLEVEL: SEVERE PAIN (7)

## 2019-08-28 NOTE — PROGRESS NOTES
"Subjective     Janine Salas is a 33 year old female who presents to clinic today for the following health issues:    HPI   Patient is here today with a few concerns:     -Having some back spasms, has been going on for at least one month.     -Thinking she has tendonitis in her left elbow from holding the baby a lot.     -Having some kink pains in her neck. Has been going to the chiropractor.     -She thinks she has cyst on the top of her right foot. Prone to ganglion cysts.     -Her left hip has been bothering her.    -Left shoulder feeling like it is going to pop out of place.     -Having some cystic acne on her scalp.       SEveral issues  Not easy for her to come in for herself so has them all saved up  Most of them musculoskeletal - understands she is overweight, out of shape and several of the issues from overuse so not much she can do for them  Foot has been getting really bothersome - that has been going on for months  She knows she needs to get back to PT for her hip but has a difficult time getting there  They only have 1 car so she is often left with no transportation      Reviewed and updated as needed this visit by Provider         Review of Systems   Remainder of ROS obtained and found to be negative other than that which was documented above        Objective    /84   Pulse 78   Temp 98.6  F (37  C) (Tympanic)   Resp 16   Ht 1.676 m (5' 6\")   Wt (!) 162.4 kg (358 lb)   BMI 57.78 kg/m    Body mass index is 57.78 kg/m .  Physical Exam   GENERAL: healthy, alert and no distress  RESP: lungs clear to auscultation - no rales, rhonchi or wheezes  CV: regular rates and rhythm, normal S1 S2, no S3 or S4, no murmur, click or rub and no peripheral edema  MS: no gross musculoskeletal defects noted, no edema    Diagnostic Test Results:  Recent Results (from the past 744 hour(s))   XR Foot Right G/E 3 Views    Narrative    FOOT RIGHT THREE OR MORE VIEWS  8/28/2019 3:18 PM     HISTORY: Right foot " pain.    COMPARISON: None.      Impression    IMPRESSION: No bony or soft tissue abnormality.    INDRA WHATLEY MD             Assessment & Plan     (M79.671) Right foot pain  (primary encounter diagnosis)  Comment: xray given worsening pain unremarkable. Nothing palpable on exam - ? Cyst (that is not palpable) vs tendonitis vs stress fracture. Given it has been worsening will have her see podiatry    Plan: XR Foot Right G/E 3 Views, PODIATRY/FOOT &         ANKLE SURGERY REFERRAL, naproxen (NAPROSYN) 500        MG tablet            (M25.512,  G89.29) Chronic left shoulder pain  Comment: reviewed that it would be best to limit use. Discussed that studies to show excretion in breastmilk but still listed as compatible   Plan: naproxen (NAPROSYN) 500 MG tablet            (M77.12) Lateral epicondylitis of left elbow  Comment: see comment above  Plan: naproxen (NAPROSYN) 500 MG tablet            (M25.552) Hip pain, left  Comment: needs to resume PT when able with transportation/childcare  Plan: naproxen (NAPROSYN) 500 MG tablet            (M54.5,  G89.29) Chronic bilateral low back pain without sciatica  Comment:   Plan: cyclobenzaprine (FLEXERIL) 10 MG tablet            (L70.0) Cystic acne  Comment:   Plan: clindamycin (CLEOCIN T) 1 % external solution            (F33.1) Moderate episode of recurrent major depressive disorder (H)  Comment:   Plan: monitor closely. Patient notes it has been a little worse lately although not sleeping well right now with little kids.     (E66.01) Morbid obesity due to excess calories (H)  Comment:   Plan: patient wanting to get on phentermine again when she is done breastfeeding as this has helped her in the past. However I encouraged her to consider the [facility] weight management program as I do feel she needs more than just medication management and would benefit from the approach they take      Return in about 3 months (around 11/28/2019) for If not improving or worsening.    Kavya  REECE Haidre PA-C  [facility]

## 2019-08-28 NOTE — PATIENT INSTRUCTIONS
To schedule an appointment with podiatry - call [facility] [place] [phone]    I don't feel a cyst although that is still a possibility, as is a tendonitis OR even a stress fracture given location and constant weight bearing on the foot in that area

## 2019-10-01 DIAGNOSIS — M54.50 CHRONIC BILATERAL LOW BACK PAIN WITHOUT SCIATICA: ICD-10-CM

## 2019-10-01 DIAGNOSIS — G89.29 CHRONIC LEFT SHOULDER PAIN: ICD-10-CM

## 2019-10-01 DIAGNOSIS — M77.12 LATERAL EPICONDYLITIS OF LEFT ELBOW: ICD-10-CM

## 2019-10-01 DIAGNOSIS — M25.552 HIP PAIN, LEFT: ICD-10-CM

## 2019-10-01 DIAGNOSIS — G89.29 CHRONIC BILATERAL LOW BACK PAIN WITHOUT SCIATICA: ICD-10-CM

## 2019-10-01 DIAGNOSIS — M79.671 RIGHT FOOT PAIN: ICD-10-CM

## 2019-10-01 DIAGNOSIS — M25.512 CHRONIC LEFT SHOULDER PAIN: ICD-10-CM

## 2019-10-01 NOTE — TELEPHONE ENCOUNTER
"NAPROXEN 500MG TABS      Last Written Prescription Date:  8/28/19  Last Fill Quantity: 60,   # refills: 0  Last Office Visit: 8/28/19  Future Office visit:       cyclobenzaprine (FLEXERIL) 10 MG tablet      Last Written Prescription Date:  8/28/19  Last Fill Quantity: 60,   # refills: 0  Last Office Visit: 8/28/19  Future Office visit:       Requested Prescriptions   Pending Prescriptions Disp Refills     naproxen (NAPROSYN) 500 MG tablet [Pharmacy Med Name: NAPROXEN 500MG TABS] 60 tablet 0     Sig: TAKE ONE TABLET BY MOUTH TWICE A DAY WITH MEALS       NSAID Medications Failed - 10/1/2019  4:22 PM        Failed - Normal CBC on file in past 12 months     Recent Labs   Lab Test 02/19/19  0804   WBC 11.9*   RBC 4.08   HGB 12.3   HCT 37.9                    Passed - Blood pressure under 140/90 in past 12 months     BP Readings from Last 3 Encounters:   08/28/19 136/84   04/23/19 (!) 141/93   03/07/19 123/79                 Passed - Normal ALT on file in past 12 months     Recent Labs   Lab Test 02/19/19  0804   ALT 47             Passed - Normal AST on file in past 12 months     Recent Labs   Lab Test 02/19/19  0804   AST 42             Passed - Recent (12 mo) or future (30 days) visit within the authorizing provider's specialty     Patient has had an office visit with the authorizing provider or a provider within the authorizing providers department within the previous 12 mos or has a future within next 30 days. See \"Patient Info\" tab in inbasket, or \"Choose Columns\" in Meds & Orders section of the refill encounter.              Passed - Patient is age 6-64 years        Passed - Medication is active on med list        Passed - No active pregnancy on record        Passed - Normal serum creatinine on file in past 12 months     Recent Labs   Lab Test 02/19/19  0804   CR 0.63             Passed - No positive pregnancy test in past 12 months        cyclobenzaprine (FLEXERIL) 10 MG tablet [Pharmacy Med Name: " CYCLOBENZAPRINE HCL 10MG TABS] 60 tablet 0     Sig: TAKE ONE-HALF TO ONE TABLET BY MOUTH THREE TIMES A DAY AS NEEDED FOR MUSCLE SPASMS.       There is no refill protocol information for this order

## 2019-10-02 RX ORDER — NAPROXEN 500 MG/1
TABLET ORAL
Qty: 60 TABLET | Refills: 0 | Status: SHIPPED | OUTPATIENT
Start: 2019-10-02 | End: 2021-04-01

## 2019-10-02 RX ORDER — CYCLOBENZAPRINE HCL 10 MG
TABLET ORAL
Qty: 60 TABLET | Refills: 0 | Status: SHIPPED | OUTPATIENT
Start: 2019-10-02 | End: 2020-03-31

## 2019-10-02 NOTE — TELEPHONE ENCOUNTER
Routing refill request to provider for review/approval because:  Drug not on the [facility] refill protocol     Swathi Mon RN

## 2019-10-28 ENCOUNTER — TELEPHONE (OUTPATIENT)
Dept: FAMILY MEDICINE | Facility: CLINIC | Age: 34
End: 2019-10-28

## 2019-10-28 NOTE — TELEPHONE ENCOUNTER
Reason for call:  Patient reporting a symptom    Symptom or request: Janine had a cold, with clear runny nose.  Not sure if she should be seen or not.  (separate encounter in for son, who also had a cold).  Please call and assess. Thank you..Tona Nuñez    Duration (how long have symptoms been present): a few days    Have you been treated for this before? No    Additional comments: [employer]    Phone Number patient can be reached at:  Home number on file [phone] (home)    Best Time:  Any time    Can we leave a detailed message on this number:  YES    Call taken on 10/28/2019 at 10:47 AM by Tona Nuñez

## 2019-10-28 NOTE — TELEPHONE ENCOUNTER
Mom advised to push fluids. Use nasal saline drops. Humidfier. Be seen if breathing problems; wheezing.   Janine agreed with plan.  Gonzalo Ellison RN

## 2019-12-04 ENCOUNTER — IMMUNIZATION (OUTPATIENT)
Dept: FAMILY MEDICINE | Facility: CLINIC | Age: 34
End: 2019-12-04
Payer: COMMERCIAL

## 2019-12-04 PROCEDURE — 90686 IIV4 VACC NO PRSV 0.5 ML IM: CPT

## 2019-12-04 PROCEDURE — 90471 IMMUNIZATION ADMIN: CPT

## 2019-12-22 ENCOUNTER — HOSPITAL ENCOUNTER (EMERGENCY)
Facility: CLINIC | Age: 34
Discharge: HOME | End: 2019-12-23
Attending: FAMILY MEDICINE | Admitting: FAMILY MEDICINE
Payer: COMMERCIAL

## 2019-12-22 ENCOUNTER — APPOINTMENT (OUTPATIENT)
Dept: CT IMAGING | Facility: CLINIC | Age: 34
End: 2019-12-22
Attending: FAMILY MEDICINE
Payer: COMMERCIAL

## 2019-12-22 DIAGNOSIS — R10.11 RIGHT UPPER QUADRANT ABDOMINAL PAIN: ICD-10-CM

## 2019-12-22 LAB
ALBUMIN SERPL-MCNC: 3.6 G/DL (ref 3.4–5)
ALBUMIN UR-MCNC: NEGATIVE MG/DL
ALP SERPL-CCNC: 78 U/L (ref 40–150)
ALT SERPL W P-5'-P-CCNC: 42 U/L (ref 0–50)
ANION GAP SERPL CALCULATED.3IONS-SCNC: 4 MMOL/L (ref 3–14)
APPEARANCE UR: CLEAR
AST SERPL W P-5'-P-CCNC: 32 U/L (ref 0–45)
BACTERIA #/AREA URNS HPF: ABNORMAL /HPF
BASOPHILS # BLD AUTO: 0.1 10E9/L (ref 0–0.2)
BASOPHILS NFR BLD AUTO: 0.6 %
BILIRUB SERPL-MCNC: 0.5 MG/DL (ref 0.2–1.3)
BILIRUB UR QL STRIP: NEGATIVE
BUN SERPL-MCNC: 13 MG/DL (ref 7–30)
CALCIUM SERPL-MCNC: 9.2 MG/DL (ref 8.5–10.1)
CHLORIDE SERPL-SCNC: 109 MMOL/L (ref 94–109)
CO2 SERPL-SCNC: 27 MMOL/L (ref 20–32)
COLOR UR AUTO: YELLOW
CREAT SERPL-MCNC: 0.67 MG/DL (ref 0.52–1.04)
CRP SERPL-MCNC: 16.7 MG/L (ref 0–8)
DIFFERENTIAL METHOD BLD: ABNORMAL
EOSINOPHIL # BLD AUTO: 0.2 10E9/L (ref 0–0.7)
EOSINOPHIL NFR BLD AUTO: 1.2 %
ERYTHROCYTE [DISTWIDTH] IN BLOOD BY AUTOMATED COUNT: 12.8 % (ref 10–15)
GFR SERPL CREATININE-BSD FRML MDRD: >90 ML/MIN/{1.73_M2}
GLUCOSE SERPL-MCNC: 96 MG/DL (ref 70–99)
GLUCOSE UR STRIP-MCNC: NEGATIVE MG/DL
HCG SERPL QL: NEGATIVE
HCT VFR BLD AUTO: 43.1 % (ref 35–47)
HGB BLD-MCNC: 14 G/DL (ref 11.7–15.7)
HGB UR QL STRIP: NEGATIVE
IMM GRANULOCYTES # BLD: 0 10E9/L (ref 0–0.4)
IMM GRANULOCYTES NFR BLD: 0.3 %
KETONES UR STRIP-MCNC: NEGATIVE MG/DL
LACTATE BLD-SCNC: 1.2 MMOL/L (ref 0.7–2)
LEUKOCYTE ESTERASE UR QL STRIP: NEGATIVE
LIPASE SERPL-CCNC: 85 U/L (ref 73–393)
LYMPHOCYTES # BLD AUTO: 3.3 10E9/L (ref 0.8–5.3)
LYMPHOCYTES NFR BLD AUTO: 24.2 %
MCH RBC QN AUTO: 29.9 PG (ref 26.5–33)
MCHC RBC AUTO-ENTMCNC: 32.5 G/DL (ref 31.5–36.5)
MCV RBC AUTO: 92 FL (ref 78–100)
MONOCYTES # BLD AUTO: 0.9 10E9/L (ref 0–1.3)
MONOCYTES NFR BLD AUTO: 6.4 %
MUCOUS THREADS #/AREA URNS LPF: PRESENT /LPF
NEUTROPHILS # BLD AUTO: 9.2 10E9/L (ref 1.6–8.3)
NEUTROPHILS NFR BLD AUTO: 67.3 %
NITRATE UR QL: NEGATIVE
NRBC # BLD AUTO: 0 10*3/UL
NRBC BLD AUTO-RTO: 0 /100
PH UR STRIP: 6 PH (ref 5–7)
PLATELET # BLD AUTO: 295 10E9/L (ref 150–450)
POTASSIUM SERPL-SCNC: 4.2 MMOL/L (ref 3.4–5.3)
PROT SERPL-MCNC: 7.6 G/DL (ref 6.8–8.8)
RBC # BLD AUTO: 4.68 10E12/L (ref 3.8–5.2)
RBC #/AREA URNS AUTO: <1 /HPF (ref 0–2)
SODIUM SERPL-SCNC: 140 MMOL/L (ref 133–144)
SOURCE: ABNORMAL
SP GR UR STRIP: 1.02 (ref 1–1.03)
SQUAMOUS #/AREA URNS AUTO: 1 /HPF (ref 0–1)
UROBILINOGEN UR STRIP-MCNC: 0 MG/DL (ref 0–2)
WBC # BLD AUTO: 13.7 10E9/L (ref 4–11)
WBC #/AREA URNS AUTO: 2 /HPF (ref 0–5)

## 2019-12-22 PROCEDURE — 86140 C-REACTIVE PROTEIN: CPT | Performed by: FAMILY MEDICINE

## 2019-12-22 PROCEDURE — 96375 TX/PRO/DX INJ NEW DRUG ADDON: CPT | Performed by: FAMILY MEDICINE

## 2019-12-22 PROCEDURE — 87086 URINE CULTURE/COLONY COUNT: CPT | Performed by: FAMILY MEDICINE

## 2019-12-22 PROCEDURE — 25000128 H RX IP 250 OP 636: Performed by: FAMILY MEDICINE

## 2019-12-22 PROCEDURE — 96361 HYDRATE IV INFUSION ADD-ON: CPT | Performed by: FAMILY MEDICINE

## 2019-12-22 PROCEDURE — 84703 CHORIONIC GONADOTROPIN ASSAY: CPT | Performed by: FAMILY MEDICINE

## 2019-12-22 PROCEDURE — 85025 COMPLETE CBC W/AUTO DIFF WBC: CPT | Performed by: FAMILY MEDICINE

## 2019-12-22 PROCEDURE — 74176 CT ABD & PELVIS W/O CONTRAST: CPT

## 2019-12-22 PROCEDURE — 81001 URINALYSIS AUTO W/SCOPE: CPT | Performed by: FAMILY MEDICINE

## 2019-12-22 PROCEDURE — 80053 COMPREHEN METABOLIC PANEL: CPT | Performed by: FAMILY MEDICINE

## 2019-12-22 PROCEDURE — 99285 EMERGENCY DEPT VISIT HI MDM: CPT | Mod: Z6 | Performed by: FAMILY MEDICINE

## 2019-12-22 PROCEDURE — 83605 ASSAY OF LACTIC ACID: CPT | Performed by: FAMILY MEDICINE

## 2019-12-22 PROCEDURE — 96374 THER/PROPH/DIAG INJ IV PUSH: CPT | Performed by: FAMILY MEDICINE

## 2019-12-22 PROCEDURE — 99285 EMERGENCY DEPT VISIT HI MDM: CPT | Mod: 25 | Performed by: FAMILY MEDICINE

## 2019-12-22 PROCEDURE — 83690 ASSAY OF LIPASE: CPT | Performed by: FAMILY MEDICINE

## 2019-12-22 PROCEDURE — 25800030 ZZH RX IP 258 OP 636: Performed by: FAMILY MEDICINE

## 2019-12-22 RX ORDER — SODIUM CHLORIDE, SODIUM LACTATE, POTASSIUM CHLORIDE, CALCIUM CHLORIDE 600; 310; 30; 20 MG/100ML; MG/100ML; MG/100ML; MG/100ML
1000 INJECTION, SOLUTION INTRAVENOUS CONTINUOUS
Status: DISCONTINUED | OUTPATIENT
Start: 2019-12-22 | End: 2019-12-23 | Stop reason: HOSPADM

## 2019-12-22 RX ORDER — HYDROMORPHONE HYDROCHLORIDE 1 MG/ML
0.5 INJECTION, SOLUTION INTRAMUSCULAR; INTRAVENOUS; SUBCUTANEOUS ONCE
Status: COMPLETED | OUTPATIENT
Start: 2019-12-22 | End: 2019-12-22

## 2019-12-22 RX ADMIN — PROMETHAZINE HYDROCHLORIDE 12.5 MG: 25 INJECTION INTRAMUSCULAR; INTRAVENOUS at 20:23

## 2019-12-22 RX ADMIN — SODIUM CHLORIDE, POTASSIUM CHLORIDE, SODIUM LACTATE AND CALCIUM CHLORIDE 1000 ML: 600; 310; 30; 20 INJECTION, SOLUTION INTRAVENOUS at 20:20

## 2019-12-22 RX ADMIN — HYDROMORPHONE HYDROCHLORIDE 0.5 MG: 1 INJECTION, SOLUTION INTRAMUSCULAR; INTRAVENOUS; SUBCUTANEOUS at 22:23

## 2019-12-22 ASSESSMENT — MIFFLIN-ST. JEOR: SCORE: 2253.98

## 2019-12-22 NOTE — ED AVS SNAPSHOT
[facility] Emergency Department  [address] [address]  Phone:  [phone]  Fax:  [phone]                                    Janine Salas   MRN: [id number]    Department:  [facility] Emergency Department   Date of Visit:  12/22/2019           After Visit Summary Signature Page    I have received my discharge instructions, and my questions have been answered. I have discussed any challenges I see with this plan with the nurse or doctor.    ..........................................................................................................................................  Patient/Patient Representative Signature      ..........................................................................................................................................  Patient Representative Print Name and Relationship to Patient    ..................................................               ................................................  Date                                   Time    ..........................................................................................................................................  Reviewed by Signature/Title    ...................................................              ..............................................  Date                                               Time          22EPIC Rev 08/18

## 2019-12-23 ENCOUNTER — TELEPHONE (OUTPATIENT)
Dept: FAMILY MEDICINE | Facility: CLINIC | Age: 34
End: 2019-12-23

## 2019-12-23 VITALS
OXYGEN SATURATION: 96 % | HEART RATE: 76 BPM | DIASTOLIC BLOOD PRESSURE: 93 MMHG | WEIGHT: 293 LBS | SYSTOLIC BLOOD PRESSURE: 170 MMHG | HEIGHT: 66 IN | BODY MASS INDEX: 47.09 KG/M2 | RESPIRATION RATE: 16 BRPM | TEMPERATURE: 97.7 F

## 2019-12-23 DIAGNOSIS — R42 VERTIGO: Primary | ICD-10-CM

## 2019-12-23 RX ORDER — ONDANSETRON 4 MG/1
4 TABLET, ORALLY DISINTEGRATING ORAL EVERY 8 HOURS PRN
Qty: 20 TABLET | Refills: 0 | Status: SHIPPED | OUTPATIENT
Start: 2019-12-23 | End: 2019-12-26

## 2019-12-23 RX ORDER — OXYCODONE AND ACETAMINOPHEN 5; 325 MG/1; MG/1
1-2 TABLET ORAL EVERY 4 HOURS PRN
Qty: 12 TABLET | Refills: 0 | Status: SHIPPED | OUTPATIENT
Start: 2019-12-23 | End: 2019-12-26

## 2019-12-23 RX ORDER — MECLIZINE HYDROCHLORIDE 25 MG/1
25 TABLET ORAL 3 TIMES DAILY PRN
Qty: 30 TABLET | Refills: 0 | Status: SHIPPED | OUTPATIENT
Start: 2019-12-23 | End: 2020-07-16

## 2019-12-23 RX ORDER — PROMETHAZINE HYDROCHLORIDE 25 MG/1
25 TABLET ORAL EVERY 6 HOURS PRN
Qty: 10 TABLET | Refills: 0 | Status: SHIPPED | OUTPATIENT
Start: 2019-12-23 | End: 2020-07-16

## 2019-12-23 NOTE — TELEPHONE ENCOUNTER
Message left on patient's answering machine to call the [facility] RN back.  Gonzalo Ellison RN

## 2019-12-23 NOTE — TELEPHONE ENCOUNTER
Reason for call:  Patient reporting a symptom    Symptom or request: Janine was in ED last night for nausea, dizziness and vomiting.  They thuy labs and told her they are all normal, but she looked at them and they are not in normal range.  She has a headache and she's alone today to take care of her children.  She states that no one will help her.  Please call and assess. Thank you..Tona Nuñez    Duration (how long have symptoms been present): yesterday    Have you been treated for this before? No    Additional comments: crying on the phone    Phone Number patient can be reached at:  Home number on file [phone] (home)    Best Time:  Any time    Can we leave a detailed message on this number:  YES    Call taken on 12/23/2019 at 8:37 AM by Tona Nuñez

## 2019-12-23 NOTE — ED PROVIDER NOTES
History     Chief Complaint   Patient presents with     Abdominal Pain     started friday, feels like it's distended, started vomiting today     HPI  Janine Salas is a 34 year old female, past medical history is significant for preeclampsia, depression, bilateral low back pain, hypertension, anxiety, morbid obesity, history of acute pancreatitis, anxiety, PTSD, severe postpartum depression, presents to the emergency department with approximately 48 hours of lightheadedness nausea and vomiting associated with right upper quadrant/right lateral abdominal dull aching pain.  The patient is not sure if the pain started first or came on after after the forceful retching.  Fever chills or sweats.  She does note that she experienced more headaches than usual earlier this week some with visual phenomenon which are consistent with what she is experienced with migraine headache previously.  Currently she has no visual phenomenon however she does have a bitemporal headache which is not severe and is consistent with her usual headaches.  No other ill family members.  She does not suspect contaminated food or water, slightly loosened stool but no diarrhea recently.  No recent antibiotics.  No exotic travel outside the contiguous [place].   Status post cholecystectomy, status post appendectomy, status post  section x2    Allergies:  Allergies   Allergen Reactions     Codeine Itching     Zofran [Ondansetron] Other (See Comments)     Headaches        Problem List:    Patient Active Problem List    Diagnosis Date Noted     Hypertension in pregnancy, delivered with postpartum condition 2019     Priority: Medium     Pre-eclampsia 2019     Priority: Medium     Pre-eclampsia superimposed on chronic hypertension, postpartum 02/15/2019     Priority: Medium     Hip pain, left 2018     Priority: Medium     Bilateral low back pain without sciatica 2018     Priority: Medium     Prenatal care,  subsequent pregnancy 2018     Priority: Medium     Moderate episode of recurrent major depressive disorder (H) 03/15/2018     Priority: Medium     Acute bilateral low back pain with right-sided sciatica 2017     Priority: Medium     S/P  section 2016     Priority: Medium     Essential hypertension 2016     Priority: Medium     Anxiety 02/10/2016     Priority: Medium     Morbid obesity due to excess calories (H) 2016     Priority: Medium     Back pain associated with peripheral numbness 2016     Priority: Medium     CARDIOVASCULAR SCREENING; LDL GOAL LESS THAN 160 2012     Priority: Medium        Past Medical History:    Past Medical History:   Diagnosis Date     Acute pancreatitis 1/15     Anxiety      Chickenpox      PONV (postoperative nausea and vomiting)      Pregnancy induced hypertension      PTSD (post-traumatic stress disorder)      Severe postpartum depression        Past Surgical History:    Past Surgical History:   Procedure Laterality Date     APPENDECTOMY        SECTION N/A 2016    Procedure:  SECTION;  Surgeon: Marco Marin MD;  Location: [facility]      SECTION N/A 2019    Procedure:  SECTION;  Surgeon: Marco Marin MD;  Location: [facility]     COLONOSCOPY       LAPAROSCOPIC CHOLECYSTECTOMY  2013    Procedure: LAPAROSCOPIC CHOLECYSTECTOMY;  Laparoscopic Cholecystectomy;  Surgeon: Lasha Garcias MD;  Location: [facility]     MOUTH SURGERY      wisdom teeth     UPPER GI ENDOSCOPY         Family History:    Family History   Problem Relation Age of Onset     Hypertension Mother      Depression Mother      Osteoporosis Mother      Thyroid Disease Mother      Hypertension Father      Alcohol/Drug Father         recovered alcohol- has fetal alcohol syndrome     Cancer Father         melanoma     Hypertension Maternal Grandmother      Alzheimer Disease Maternal Grandmother       Cardiovascular Maternal Grandmother         triple bypass     Cancer Maternal Grandfather         lung     Alcohol/Drug Paternal Grandmother         alcohol     Cancer - colorectal Paternal Grandmother         colon     Alcohol/Drug Paternal Grandfather         alcohol     Cancer Paternal Grandfather         leukemia - adult onset     Cardiovascular Paternal Grandfather         stent     Obesity Sister      Breast Cancer Maternal Aunt      Cancer Sister         cervical cancer, hysterectomy     Substance Abuse Sister         recovered drugs     Bipolar Disorder Sister      Depression Sister      Hypothyroidism Sister      Autism Spectrum Disorder Sister         ASpergers     Bipolar Disorder Other      Kidney Disease Maternal Aunt         Stage 3       Social History:  Marital Status:  Single [1]  Social History     Tobacco Use     Smoking status: Former Smoker     Packs/day: 0.50     Types: Cigarettes     Start date: 5/7/2015     Smokeless tobacco: Former User   Substance Use Topics     Alcohol use: No     Alcohol/week: 0.0 standard drinks     Comment: rare- quit with pregnancy     Drug use: No        Medications:    ondansetron (ZOFRAN ODT) 4 MG ODT tab  oxyCODONE-acetaminophen (PERCOCET) 5-325 MG tablet  promethazine (PHENERGAN) 25 MG tablet  Acetaminophen (TYLENOL PO)  azelaic acid (AZELEX) 20 % cream  clindamycin (CLEOCIN T) 1 % external solution  cyclobenzaprine (FLEXERIL) 10 MG tablet  escitalopram (LEXAPRO) 10 MG tablet  FENUGREEK PO  hydrOXYzine (ATARAX) 50 MG tablet  hydrOXYzine (VISTARIL) 50 MG capsule  ibuprofen (ADVIL/MOTRIN) 800 MG tablet  labetalol (NORMODYNE) 300 MG tablet  levonorgestrel-ethinyl estradiol (AVIANE/ALESSE/LESSINA) 0.1-20 MG-MCG tablet  meclizine (ANTIVERT) 25 MG tablet  Milk Thistle-Dand-Fennel-Licor (MILK THISTLE XTRA) CAPS capsule  Misc. Devices (BREAST PUMP) MISC  naproxen (NAPROSYN) 500 MG tablet  order for DME  Prenatal Vit-Fe Fumarate-FA (PRENATAL MULTIVITAMIN W/IRON) 27-0.8 MG  "tablet          Review of Systems   All other systems reviewed and are negative.      Physical Exam   BP: (!) 166/108  Pulse: 79  Temp: 97.7  F (36.5  C)  Resp: 16  Height: 167.6 cm (5' 6\")  Weight: (!) 154.2 kg (340 lb)(stated)  SpO2: 97 %      Physical Exam  Vitals signs and nursing note reviewed.   Constitutional:       General: She is not in acute distress.     Appearance: She is obese. She is not ill-appearing or toxic-appearing.   HENT:      Head: Normocephalic and atraumatic.      Mouth/Throat:      Mouth: Mucous membranes are moist.   Eyes:      Extraocular Movements: Extraocular movements intact.      Pupils: Pupils are equal, round, and reactive to light.   Cardiovascular:      Rate and Rhythm: Normal rate and regular rhythm.   Pulmonary:      Effort: Pulmonary effort is normal.      Breath sounds: Normal breath sounds.   Abdominal:      General: Abdomen is protuberant. Bowel sounds are normal. There is no distension.      Comments: Slightly tender in the right upper quadrant with voluntary guarding, no rebound and no referred pain.  No CVA tenderness.   Skin:     General: Skin is warm and dry.      Capillary Refill: Capillary refill takes less than 2 seconds.   Neurological:      General: No focal deficit present.      Mental Status: She is alert.         ED Course        Procedures             Labs Ordered and Resulted from Time of ED Arrival Up to the Time of Departure from the ED   CRP INFLAMMATION - Abnormal; Notable for the following components:       Result Value    CRP Inflammation 16.7 (*)     All other components within normal limits   ROUTINE UA WITH MICROSCOPIC REFLEX TO CULTURE - Abnormal; Notable for the following components:    Bacteria Urine Few (*)     Mucous Urine Present (*)     All other components within normal limits   CBC WITH PLATELETS DIFFERENTIAL - Abnormal; Notable for the following components:    WBC 13.7 (*)     Absolute Neutrophil 9.2 (*)     All other components within normal " limits   COMPREHENSIVE METABOLIC PANEL   LIPASE   LACTIC ACID WHOLE BLOOD   HCG QUALITATIVE         Critical Care time:  none   CT ABDOMEN PELVIS WITHOUT CONTRAST   12/22/2019 8:46 PM      HISTORY: Right upper lateral abdominal pain.     TECHNIQUE: Noncontrast CT abdomen and pelvis was performed. Radiation  dose for this scan was reduced using automated exposure control,  adjustment of the mA and/or kV according to patient size, or iterative  reconstruction technique.     COMPARISON: None.     FINDINGS:  No acute inflammatory change of the large or small bowel.  No bowel obstruction. No abscess or free air.     Fatty liver. Cholecystectomy. Spleen, adrenals, pancreas, and kidneys  show no acute abnormalities. No urolithiasis or hydronephrosis.                                                                      IMPRESSION:  1. No acute abnormality is seen.  2. Fatty liver.      BIJAN HALL MD            No results found for this or any previous visit (from the past 24 hour(s)).    Medications   lactated ringers BOLUS 1,000 mL (0 mLs Intravenous Stopped 12/22/19 2120)   HYDROmorphone (PF) (DILAUDID) injection 0.5 mg (0.5 mg Intravenous Given 12/22/19 2223)     I reviewed all of the diagnostics performed in the room with the patient.  She currently feels comfortable and her headache is almost gone after the single dose of Dilaudid.  Unclear etiology for her right upper quadrant abdominal pain at the present time in the context of status post cholecystectomy and appendectomy.  Mild leukocytosis and negative urine.  No significant concerning explanation has been found for the patient's entrance complaint and I have low suspicion for concerning pathology at this point given her work-up above.  I recommended she push fluids, bland diet, Tylenol/ibuprofen as needed for pain, Percocet for breakthrough pain, Zofran or Phenergan for nausea, return if not moving or worse.  Assessments & Plan (with Medical Decision Making)    Assessment and plan with medical decision making at the time stamp above.      Disclaimer: This note consists of symbols derived from keyboarding, dictation and/or voice recognition software. As a result, there may be errors in the script that have gone undetected. Please consider this when interpreting information found in this chart.      I have reviewed the nursing notes.    I have reviewed the findings, diagnosis, plan and need for follow up with the patient.       Discharge Medication List as of 12/23/2019 12:44 AM      START taking these medications    Details   ondansetron (ZOFRAN ODT) 4 MG ODT tab Take 1 tablet (4 mg) by mouth every 8 hours as needed, Disp-20 tablet, R-0, Local Print      oxyCODONE-acetaminophen (PERCOCET) 5-325 MG tablet Take 1-2 tablets by mouth every 4 hours as needed for breakthrough pain, Disp-12 tablet, R-0, Local Print      promethazine (PHENERGAN) 25 MG tablet Take 1 tablet (25 mg) by mouth every 6 hours as needed for nausea, Disp-10 tablet, R-0, Local Print             Final diagnoses:   Right upper quadrant abdominal pain - Etiology unclear       12/22/2019   [facility] EMERGENCY DEPARTMENT     Kavin López MD  12/25/19 4417

## 2019-12-23 NOTE — TELEPHONE ENCOUNTER
"Please see Tona's note below and review lab results that were done in the ED. Janine got home at 4:20 AM this morning. She threw up once since then. \"Movement seems to make me throw up.\"  Reports diarrhea twice since getting home from hospital. Denies blood or mucous in stool. Headache pain score of 6. Reports RUQ pain and RLQ pain. Pain score of 2. Feels distended.  Feels exhausted. She said her Mom is going to help her today by getting the medications. Urinated twice since 4:20 AM.  Denies pain upon urination. Still feels dizzy. She is OK if she is sitting still but \"It gets bad if I move.\" She said that she trying to drink small sips of water. She \"is hoping my  Mom can stay for awhile to help take care of the two children but I am  not sure if she can.   Janine said that she has a history of gall bladder removal and apppendectomy.   How do you advise?   Thank you.   Gonzalo Ellison RN     "

## 2019-12-23 NOTE — DISCHARGE INSTRUCTIONS
Push fluids, bland diet until symptoms fully resolved.  Phenergan as needed for nausea/vomiting.  Percocet if Tylenol or ibuprofen are not sufficient for your headaches.  If you feel that you are continue to require Percocet for headaches please follow-up in clinic with your primary care provider.  Return to the emergency department if worse or changes

## 2019-12-23 NOTE — TELEPHONE ENCOUNTER
Labs overall okay - white blood cell count was a little high as was her inflammatory marker but not significantly so. The CT of her abdomen was normal which is reassuring  It sounds like a bad gastroenteritis with vertigo. She will need to lay down, drink small sips of water or gatorade and use the anti nausea meds she was given (zofran it looks like) to see if that helps. I don't see she was given meclizine - that can sometimes be helpful for the dizziness if she would liek to try that?     Kavya

## 2019-12-23 NOTE — TELEPHONE ENCOUNTER
Reason for call:  Patient reporting a symptom    Symptom or request:  LEFT MESSAGE:   Janine took the antinausea medication,  Meclizine @ 11:28 and vomited at 12:10 p.m.  Didn't see the pill come up but it's not working.  Please call and assess. Thank you..Tona Nuñez    Duration (how long have symptoms been present): today    Have you been treated for this before? unknown    Phone Number patient can be reached at:  Home number on file [phone] (home)    Call taken on 12/23/2019 at 1:31 PM by Tona Nuñez

## 2019-12-23 NOTE — TELEPHONE ENCOUNTER
Janine was notified of all of Kavya's instructions as noted below. She agreed with plans and she would like the meclizine to be ordered.  Gonzalo Ellison RN

## 2019-12-23 NOTE — ED TRIAGE NOTES
Pt presents with fatigue, dizziness, and headaches for one week. Now vomiting since this morning. Also rpeorts some periods where she sees flashing in her vision.

## 2019-12-24 LAB
BACTERIA SPEC CULT: NO GROWTH
Lab: NORMAL
SPECIMEN SOURCE: NORMAL

## 2019-12-24 NOTE — RESULT ENCOUNTER NOTE
Final urine culture report is NEGATIVE per [facility] Lab Result protocol.    If NEGATIVE result, no change in treatment, per [facility] ED Lab Result protocol.

## 2019-12-24 NOTE — TELEPHONE ENCOUNTER
Janine notified of all of Kavya's instructions as noted below. She said that she is feeling a little bit better. She said that she is urinating well. Has not thrown up yet this morning. She said that she will continue to drink small sips of water and she was able to eat some noodles last night and will continue to gradually add bland foods as tolerated. She said if she does not move around, she feels OK.   Gonzalo Ellison RN

## 2019-12-24 NOTE — TELEPHONE ENCOUNTER
Meclizine isn't really a nausea medication - it's more for the dizziness. If the nausea is the main problem then she needs to take something for the nausea first and can try the meclizine if still needed for the dizziness    Kavya

## 2020-01-01 NOTE — NURSING NOTE
"Chief Complaint   Patient presents with     Depression       Initial /78 mmHg  Pulse 80  Temp(Src) 98.1  F (36.7  C) (Tympanic)  Ht 5' 6\" (1.676 m)  Wt 358 lb (162.388 kg)  BMI 57.81 kg/m2 Estimated body mass index is 57.81 kg/(m^2) as calculated from the following:    Height as of this encounter: 5' 6\" (1.676 m).    Weight as of this encounter: 358 lb (162.388 kg).  BP completed using cuff size: ramsey Cervantes CMA    " S/w mother informed normal hip ultrasound results      Mother verbalized her understanding

## 2020-01-21 ENCOUNTER — OFFICE VISIT (OUTPATIENT)
Dept: DERMATOLOGY | Facility: CLINIC | Age: 35
End: 2020-01-21
Payer: COMMERCIAL

## 2020-01-21 VITALS — SYSTOLIC BLOOD PRESSURE: 133 MMHG | HEART RATE: 109 BPM | OXYGEN SATURATION: 96 % | DIASTOLIC BLOOD PRESSURE: 99 MMHG

## 2020-01-21 DIAGNOSIS — L92.0 GRANULOMA ANNULARE: ICD-10-CM

## 2020-01-21 DIAGNOSIS — D23.9 DERMAL NEVUS: Primary | ICD-10-CM

## 2020-01-21 DIAGNOSIS — L72.0 EPIDERMAL CYST: ICD-10-CM

## 2020-01-21 PROCEDURE — 99213 OFFICE O/P EST LOW 20 MIN: CPT | Mod: 25 | Performed by: DERMATOLOGY

## 2020-01-21 PROCEDURE — 11900 INJECT SKIN LESIONS </W 7: CPT | Performed by: DERMATOLOGY

## 2020-01-21 NOTE — LETTER
2020         RE: Janine Salas  [address]  [place] [address]        Dear Colleague,    Thank you for referring your patient, Janine Salas, to the [facility]. Please see a copy of my visit note below.    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  Was getting IL tac which helped.  Site has gotten firm again.  Today she notes spot on forehead and r temploral scalp.  There for a a while.  No symptoms. No treatment.  Patient has no other skin complaints today.  Remainder of the HPI, Meds, PMH, Allergies, FH, and SH was reviewed in chart.      Past Medical History:   Diagnosis Date     Acute pancreatitis 1/15     Anxiety      Chickenpox      PONV (postoperative nausea and vomiting)      Pregnancy induced hypertension      PTSD (post-traumatic stress disorder)      Severe postpartum depression     also anxiety       Past Surgical History:   Procedure Laterality Date     APPENDECTOMY        SECTION N/A 2016    Procedure:  SECTION;  Surgeon: Marco Marin MD;  Location: [facility]      SECTION N/A 2019    Procedure:  SECTION;  Surgeon: Marco Marin MD;  Location: [facility]     COLONOSCOPY       LAPAROSCOPIC CHOLECYSTECTOMY  2013    Procedure: LAPAROSCOPIC CHOLECYSTECTOMY;  Laparoscopic Cholecystectomy;  Surgeon: Lasha Garcias MD;  Location: [facility]     MOUTH SURGERY      wisdom teeth     UPPER GI ENDOSCOPY          Family History   Problem Relation Age of Onset     Hypertension Mother      Depression Mother      Osteoporosis Mother      Thyroid Disease Mother      Hypertension Father      Alcohol/Drug Father         recovered alcohol- has fetal alcohol syndrome     Cancer Father         melanoma     Hypertension Maternal Grandmother      Alzheimer Disease Maternal Grandmother      Cardiovascular Maternal Grandmother         triple  bypass     Cancer Maternal Grandfather         lung     Alcohol/Drug Paternal Grandmother         alcohol     Cancer - colorectal Paternal Grandmother         colon     Alcohol/Drug Paternal Grandfather         alcohol     Cancer Paternal Grandfather         leukemia - adult onset     Cardiovascular Paternal Grandfather         stent     Obesity Sister      Breast Cancer Maternal Aunt      Cancer Sister         cervical cancer, hysterectomy     Substance Abuse Sister         recovered drugs     Bipolar Disorder Sister      Depression Sister      Hypothyroidism Sister      Autism Spectrum Disorder Sister         ASpergers     Bipolar Disorder Other      Kidney Disease Maternal Aunt         Stage 3       Social History     Socioeconomic History     Marital status: Single     Spouse name: Not on file     Number of children: 2     Years of education: Not on file     Highest education level: Not on file   Occupational History     Not on file   Social Needs     Financial resource strain: Not on file     Food insecurity:     Worry: Not on file     Inability: Not on file     Transportation needs:     Medical: Not on file     Non-medical: Not on file   Tobacco Use     Smoking status: Former Smoker     Packs/day: 0.50     Types: Cigarettes     Start date: 5/7/2015     Smokeless tobacco: Former User   Substance and Sexual Activity     Alcohol use: No     Alcohol/week: 0.0 standard drinks     Comment: rare- quit with pregnancy     Drug use: No     Sexual activity: Yes     Partners: Male   Lifestyle     Physical activity:     Days per week: Not on file     Minutes per session: Not on file     Stress: Not on file   Relationships     Social connections:     Talks on phone: Not on file     Gets together: Not on file     Attends Mosque service: Not on file     Active member of club or organization: Not on file     Attends meetings of clubs or organizations: Not on file     Relationship status: Not on file     Intimate partner  violence:     Fear of current or ex partner: Not on file     Emotionally abused: Not on file     Physically abused: Not on file     Forced sexual activity: Not on file   Other Topics Concern     Parent/sibling w/ CABG, MI or angioplasty before 65F 55M? No   Social History Narrative     Not on file       Outpatient Encounter Medications as of 2020   Medication Sig Dispense Refill     Acetaminophen (TYLENOL PO) Take 500 mg by mouth once as needed        azelaic acid (AZELEX) 20 % cream Apply topically 2 times daily 50 g 11     clindamycin (CLEOCIN T) 1 % external solution Apply topically 2 times daily 60 mL 1     cyclobenzaprine (FLEXERIL) 10 MG tablet TAKE ONE-HALF TO ONE TABLET BY MOUTH THREE TIMES A DAY AS NEEDED FOR MUSCLE SPASMS. 60 tablet 0     escitalopram (LEXAPRO) 10 MG tablet Take 1 tablet (10 mg) by mouth daily After 1 week , if tolerated, increase to 2 tabs daily (20 mg) 90 tablet 3     FENUGREEK PO        hydrOXYzine (ATARAX) 50 MG tablet Take 1 tablet (50 mg) by mouth 3 times daily as needed for itching 30 tablet 1     hydrOXYzine (VISTARIL) 50 MG capsule Take 1 capsule (50 mg) by mouth nightly as needed for anxiety or other (sleep) 30 capsule 1     ibuprofen (ADVIL/MOTRIN) 800 MG tablet Take 1 tablet (800 mg) by mouth every 6 hours as needed for other (cramping) 100 tablet 1     labetalol (NORMODYNE) 300 MG tablet Take 1 tablet (300 mg) by mouth 3 times daily 90 tablet 0     levonorgestrel-ethinyl estradiol (AVIANE/ALESSE/LESSINA) 0.1-20 MG-MCG tablet Take 1 tablet by mouth daily 84 tablet 3     meclizine (ANTIVERT) 25 MG tablet Take 1 tablet (25 mg) by mouth 3 times daily as needed for dizziness 30 tablet 0     Milk Thistle-Dand-Fennel-Licor (MILK THISTLE XTRA) CAPS capsule        Misc. Devices (BREAST PUMP) MISC 1 each every hour (Patient not taking: Reported on 2019) 1 each 0     naproxen (NAPROSYN) 500 MG tablet TAKE ONE TABLET BY MOUTH TWICE A DAY WITH MEALS 60 tablet 0     []  ondansetron (ZOFRAN ODT) 4 MG ODT tab Take 1 tablet (4 mg) by mouth every 8 hours as needed 20 tablet 0     order for DME Blood pressure cuff 1 Units 0     [] oxyCODONE-acetaminophen (PERCOCET) 5-325 MG tablet Take 1-2 tablets by mouth every 4 hours as needed for breakthrough pain 12 tablet 0     Prenatal Vit-Fe Fumarate-FA (PRENATAL MULTIVITAMIN W/IRON) 27-0.8 MG tablet Take 1 tablet by mouth daily 100 tablet 1     promethazine (PHENERGAN) 25 MG tablet Take 1 tablet (25 mg) by mouth every 6 hours as needed for nausea 10 tablet 0     No facility-administered encounter medications on file as of 2020.              Review Of Systems  Skin: As above  Eyes: negative  Ears/Nose/Throat: negative  Respiratory: No shortness of breath, dyspnea on exertion, cough, or hemoptysis  Cardiovascular: negative  Gastrointestinal: negative  Genitourinary: negative  Musculoskeletal: negative  Neurologic: negative  Psychiatric: negative  Hematologic/Lymphatic/Immunologic: negative  Endocrine: negative      O:   NAD, WDWN, Alert & Oriented, Mood & Affect wnl, Vitals stable   Here today alone   There were no vitals taken for this visit.   General appearance normal   Vitals stable   Alert, oriented and in no acute distress      L forehead nodule with comedone   L elbow annular plaque   R temporal scalp brown papule unfiorm with hair growing thourhgou   Remainder ofhead and neck and lips normal,       Eyes: Conjunctivae/lids:Normal     ENT: Lips, buccal mucosa, tongue: normal    MSK:Normal    Cardiovascular: peripheral edema none    Pulm: Breathing Normal    Neuro/Psych: Orientation:Alert and Orientedx3 ; Mood/Affect:normal       A/P:  1. Granuloma annulare  IL TAC: PGACAC discussed.  Risks including but not limited to injection site reaction, bruising, no resolution.  All questions answered and entertained to patient s satisfaction.  Informed consent obtained.  IL TAC in concentration of 40mg/ml was injected ID to L elbow.   Total injected was  0.2 ml.  Patient tolerated without complications and given wound care instructions, including not to move product around.  Return in 4 weeks for follow-up and possible additional IL TAC.      [id number]  10/2020  2. Dermal nevus, epidermal cyst  Excision discussed with patient     BENIGN LESIONS DISCUSSED WITH PATIENT:  I discussed the specifics of tumor, prognosis, and genetics of benign lesions.  I explained that treatment of these lesions would be purely cosmetic and not medically neccessary.  I discussed with patient different removal options including excision, cautery and /or laser.      Nature and genetics of benign skin lesions dicussed with patient.  Signs and Symptoms of skin cancer discussed with patient.  ABCDEs of melanoma reviewed with patient.  Patient encouraged to perform monthly skin exams.  UV precautions reviewed with patient.  Patient to follow up with Primary Care provider regarding elevated blood pressure.  Skin care regimen reviewed with patient: Eliminate harsh soaps, i.e. Dial, zest, irsih spring; Mild soaps such as Cetaphil or Dove sensitive skin, avoid hot or cold showers, aggressive use of emollients including vanicream, cetaphil or cerave discussed with patient.    Risks of non-melanoma skin cancer discussed with patient   Return to clinic 8 weeks       Again, thank you for allowing me to participate in the care of your patient.        Sincerely,        Miguel Khan MD

## 2020-01-21 NOTE — PROGRESS NOTES
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  Was getting IL tac which helped.  Site has gotten firm again.  Today she notes spot on forehead and r temploral scalp.  There for a a while.  No symptoms. No treatment.  Patient has no other skin complaints today.  Remainder of the HPI, Meds, PMH, Allergies, FH, and SH was reviewed in chart.      Past Medical History:   Diagnosis Date     Acute pancreatitis 1/15     Anxiety      Chickenpox      PONV (postoperative nausea and vomiting)      Pregnancy induced hypertension      PTSD (post-traumatic stress disorder)      Severe postpartum depression 2016    also anxiety       Past Surgical History:   Procedure Laterality Date     APPENDECTOMY        SECTION N/A 2016    Procedure:  SECTION;  Surgeon: Marco Marin MD;  Location: [facility]      SECTION N/A 2019    Procedure:  SECTION;  Surgeon: Marco Marin MD;  Location: [facility]     COLONOSCOPY       LAPAROSCOPIC CHOLECYSTECTOMY  2013    Procedure: LAPAROSCOPIC CHOLECYSTECTOMY;  Laparoscopic Cholecystectomy;  Surgeon: Lasha Garcias MD;  Location: [facility]     MOUTH SURGERY      wisdom teeth     UPPER GI ENDOSCOPY          Family History   Problem Relation Age of Onset     Hypertension Mother      Depression Mother      Osteoporosis Mother      Thyroid Disease Mother      Hypertension Father      Alcohol/Drug Father         recovered alcohol- has fetal alcohol syndrome     Cancer Father         melanoma     Hypertension Maternal Grandmother      Alzheimer Disease Maternal Grandmother      Cardiovascular Maternal Grandmother         triple bypass     Cancer Maternal Grandfather         lung     Alcohol/Drug Paternal Grandmother         alcohol     Cancer - colorectal Paternal Grandmother         colon     Alcohol/Drug Paternal Grandfather         alcohol     Cancer Paternal Grandfather          leukemia - adult onset     Cardiovascular Paternal Grandfather         stent     Obesity Sister      Breast Cancer Maternal Aunt      Cancer Sister         cervical cancer, hysterectomy     Substance Abuse Sister         recovered drugs     Bipolar Disorder Sister      Depression Sister      Hypothyroidism Sister      Autism Spectrum Disorder Sister         ASpergers     Bipolar Disorder Other      Kidney Disease Maternal Aunt         Stage 3       Social History     Socioeconomic History     Marital status: Single     Spouse name: Not on file     Number of children: 2     Years of education: Not on file     Highest education level: Not on file   Occupational History     Not on file   Social Needs     Financial resource strain: Not on file     Food insecurity:     Worry: Not on file     Inability: Not on file     Transportation needs:     Medical: Not on file     Non-medical: Not on file   Tobacco Use     Smoking status: Former Smoker     Packs/day: 0.50     Types: Cigarettes     Start date: 5/7/2015     Smokeless tobacco: Former User   Substance and Sexual Activity     Alcohol use: No     Alcohol/week: 0.0 standard drinks     Comment: rare- quit with pregnancy     Drug use: No     Sexual activity: Yes     Partners: Male   Lifestyle     Physical activity:     Days per week: Not on file     Minutes per session: Not on file     Stress: Not on file   Relationships     Social connections:     Talks on phone: Not on file     Gets together: Not on file     Attends Baptism service: Not on file     Active member of club or organization: Not on file     Attends meetings of clubs or organizations: Not on file     Relationship status: Not on file     Intimate partner violence:     Fear of current or ex partner: Not on file     Emotionally abused: Not on file     Physically abused: Not on file     Forced sexual activity: Not on file   Other Topics Concern     Parent/sibling w/ CABG, MI or angioplasty before 65F 55M? No    Social History Narrative     Not on file       Outpatient Encounter Medications as of 2020   Medication Sig Dispense Refill     Acetaminophen (TYLENOL PO) Take 500 mg by mouth once as needed        azelaic acid (AZELEX) 20 % cream Apply topically 2 times daily 50 g 11     clindamycin (CLEOCIN T) 1 % external solution Apply topically 2 times daily 60 mL 1     cyclobenzaprine (FLEXERIL) 10 MG tablet TAKE ONE-HALF TO ONE TABLET BY MOUTH THREE TIMES A DAY AS NEEDED FOR MUSCLE SPASMS. 60 tablet 0     escitalopram (LEXAPRO) 10 MG tablet Take 1 tablet (10 mg) by mouth daily After 1 week , if tolerated, increase to 2 tabs daily (20 mg) 90 tablet 3     FENUGREEK PO        hydrOXYzine (ATARAX) 50 MG tablet Take 1 tablet (50 mg) by mouth 3 times daily as needed for itching 30 tablet 1     hydrOXYzine (VISTARIL) 50 MG capsule Take 1 capsule (50 mg) by mouth nightly as needed for anxiety or other (sleep) 30 capsule 1     ibuprofen (ADVIL/MOTRIN) 800 MG tablet Take 1 tablet (800 mg) by mouth every 6 hours as needed for other (cramping) 100 tablet 1     labetalol (NORMODYNE) 300 MG tablet Take 1 tablet (300 mg) by mouth 3 times daily 90 tablet 0     levonorgestrel-ethinyl estradiol (AVIANE/ALESSE/LESSINA) 0.1-20 MG-MCG tablet Take 1 tablet by mouth daily 84 tablet 3     meclizine (ANTIVERT) 25 MG tablet Take 1 tablet (25 mg) by mouth 3 times daily as needed for dizziness 30 tablet 0     Milk Thistle-Dand-Fennel-Licor (MILK THISTLE XTRA) CAPS capsule        Misc. Devices (BREAST PUMP) MISC 1 each every hour (Patient not taking: Reported on 2019) 1 each 0     naproxen (NAPROSYN) 500 MG tablet TAKE ONE TABLET BY MOUTH TWICE A DAY WITH MEALS 60 tablet 0     [] ondansetron (ZOFRAN ODT) 4 MG ODT tab Take 1 tablet (4 mg) by mouth every 8 hours as needed 20 tablet 0     order for DME Blood pressure cuff 1 Units 0     [] oxyCODONE-acetaminophen (PERCOCET) 5-325 MG tablet Take 1-2 tablets by mouth every 4 hours as  needed for breakthrough pain 12 tablet 0     Prenatal Vit-Fe Fumarate-FA (PRENATAL MULTIVITAMIN W/IRON) 27-0.8 MG tablet Take 1 tablet by mouth daily 100 tablet 1     promethazine (PHENERGAN) 25 MG tablet Take 1 tablet (25 mg) by mouth every 6 hours as needed for nausea 10 tablet 0     No facility-administered encounter medications on file as of 1/21/2020.              Review Of Systems  Skin: As above  Eyes: negative  Ears/Nose/Throat: negative  Respiratory: No shortness of breath, dyspnea on exertion, cough, or hemoptysis  Cardiovascular: negative  Gastrointestinal: negative  Genitourinary: negative  Musculoskeletal: negative  Neurologic: negative  Psychiatric: negative  Hematologic/Lymphatic/Immunologic: negative  Endocrine: negative      O:   NAD, WDWN, Alert & Oriented, Mood & Affect wnl, Vitals stable   Here today alone   There were no vitals taken for this visit.   General appearance normal   Vitals stable   Alert, oriented and in no acute distress      L forehead nodule with comedone   L elbow annular plaque   R temporal scalp brown papule unfiorm with hair growing thourhgou   Remainder ofhead and neck and lips normal,       Eyes: Conjunctivae/lids:Normal     ENT: Lips, buccal mucosa, tongue: normal    MSK:Normal    Cardiovascular: peripheral edema none    Pulm: Breathing Normal    Neuro/Psych: Orientation:Alert and Orientedx3 ; Mood/Affect:normal       A/P:  1. Granuloma annulare  IL TAC: PGACAC discussed.  Risks including but not limited to injection site reaction, bruising, no resolution.  All questions answered and entertained to patient s satisfaction.  Informed consent obtained.  IL TAC in concentration of 40mg/ml was injected ID to L elbow.  Total injected was  0.2 ml.  Patient tolerated without complications and given wound care instructions, including not to move product around.  Return in 4 weeks for follow-up and possible additional IL TAC.      [id number]  10/2020  2. Dermal nevus, epidermal  cyst  Excision discussed with patient     BENIGN LESIONS DISCUSSED WITH PATIENT:  I discussed the specifics of tumor, prognosis, and genetics of benign lesions.  I explained that treatment of these lesions would be purely cosmetic and not medically neccessary.  I discussed with patient different removal options including excision, cautery and /or laser.      Nature and genetics of benign skin lesions dicussed with patient.  Signs and Symptoms of skin cancer discussed with patient.  ABCDEs of melanoma reviewed with patient.  Patient encouraged to perform monthly skin exams.  UV precautions reviewed with patient.  Patient to follow up with Primary Care provider regarding elevated blood pressure.  Skin care regimen reviewed with patient: Eliminate harsh soaps, i.e. Dial, zest, irsih spring; Mild soaps such as Cetaphil or Dove sensitive skin, avoid hot or cold showers, aggressive use of emollients including vanicream, cetaphil or cerave discussed with patient.    Risks of non-melanoma skin cancer discussed with patient   Return to clinic 8 weeks

## 2020-01-30 ENCOUNTER — OFFICE VISIT (OUTPATIENT)
Dept: FAMILY MEDICINE | Facility: CLINIC | Age: 35
End: 2020-01-30
Payer: COMMERCIAL

## 2020-01-30 VITALS
DIASTOLIC BLOOD PRESSURE: 78 MMHG | TEMPERATURE: 98.2 F | HEART RATE: 80 BPM | BODY MASS INDEX: 47.09 KG/M2 | SYSTOLIC BLOOD PRESSURE: 132 MMHG | HEIGHT: 66 IN | WEIGHT: 293 LBS

## 2020-01-30 DIAGNOSIS — E66.01 MORBID OBESITY DUE TO EXCESS CALORIES (H): ICD-10-CM

## 2020-01-30 DIAGNOSIS — I10 ESSENTIAL HYPERTENSION: Primary | ICD-10-CM

## 2020-01-30 DIAGNOSIS — F33.1 MODERATE EPISODE OF RECURRENT MAJOR DEPRESSIVE DISORDER (H): ICD-10-CM

## 2020-01-30 PROCEDURE — 99214 OFFICE O/P EST MOD 30 MIN: CPT | Performed by: PHYSICIAN ASSISTANT

## 2020-01-30 RX ORDER — ATENOLOL 50 MG/1
50 TABLET ORAL DAILY
Qty: 90 TABLET | Refills: 1 | Status: SHIPPED | OUTPATIENT
Start: 2020-01-30 | End: 2020-08-06

## 2020-01-30 ASSESSMENT — MIFFLIN-ST. JEOR: SCORE: 2362.84

## 2020-01-30 ASSESSMENT — PAIN SCALES - GENERAL: PAINLEVEL: MODERATE PAIN (5)

## 2020-01-30 NOTE — PROGRESS NOTES
"Subjective     Janine Salas is a 34 year old female who presents to clinic today for the following health issues:    HPI   ED/UC Followup:    Facility:  [facility]   Date of visit: 12/22/19   Reason for visit: Abdominal Pain   Current Status: She is feeling okay since then. She did get a cold over the last few weeks but that is better too.        -She would like to discuss headaches today. She gets them almost daily. She thinks it is due to a pinched nerve.     BP Readings from Last 3 Encounters:   01/30/20 132/78   01/21/20 (!) 133/99   12/23/19 (!) 170/93    Wt Readings from Last 3 Encounters:   01/30/20 (!) 165.1 kg (364 lb)   12/22/19 (!) 154.2 kg (340 lb)   08/28/19 (!) 162.4 kg (358 lb)           Has a nurse that comes out to her home weekly  Worried about her headaches and blood pressure  She was on labetolol for blood pressure with her last pregnancy  Weight continues to trend up - she has not looked into the weight management program   Says she has had headaches since she was a young child - remembers her mom crushing up ibuprofen for her  Most recently feels a lot of her headaches are due to pinched nerves and neck issues  Has started seeing her chiropractor  No new symptoms with headaches  No auras or visual changes prior to development of headaches        Reviewed and updated as needed this visit by Provider         Review of Systems   Remainder of ROS obtained and found to be negative other than that which was documented above        Objective    /78   Pulse 80   Temp 98.2  F (36.8  C) (Tympanic)   Ht 1.676 m (5' 6\")   Wt (!) 165.1 kg (364 lb)   BMI 58.75 kg/m    Body mass index is 58.75 kg/m .  Physical Exam   GENERAL: healthy, alert and no distress  EYES: Eyes grossly normal to inspection, PERRL and conjunctivae and sclerae normal  NECK: no adenopathy  RESP: lungs clear to auscultation - no rales, rhonchi or wheezes  CV: regular rates and rhythm, normal S1 S2, " no S3 or S4 and no murmur, click or rub  NEURO: Normal strength and tone, mentation intact and speech normal    Diagnostic Test Results:  None  Reviewed labs in Epic from recent ED visit        Assessment & Plan     (I10) Essential hypertension  (primary encounter diagnosis)  Comment: bp did improve on recheck but in review of flowsheets often elevated. Discussed trial of atenolol for consistent/better bp control and possible some headache help  Plan: atenolol (TENORMIN) 50 MG tablet            (F33.1) Moderate episode of recurrent major depressive disorder (H)  Comment: in good spirits today  Plan: stable    (E66.01) Morbid obesity due to excess calories (H)  Comment: weight continues to climb  Plan: patient would benefit from weight management program. Will continue to suggest this, especially given her ongoing back issues      Return in about 3 months (around 4/30/2020) for Physical Exam.    Kavya Haider PA-C  [facility]

## 2020-01-30 NOTE — PATIENT INSTRUCTIONS
START the atenolol for blood pressure and headaches    Follow up in 3-6 months, sooner if needed    You are due for a preventative (yearly) exam in March so we can follow up at that time

## 2020-02-03 ENCOUNTER — MYC MEDICAL ADVICE (OUTPATIENT)
Dept: FAMILY MEDICINE | Facility: CLINIC | Age: 35
End: 2020-02-03

## 2020-02-03 DIAGNOSIS — F41.9 ANXIETY: ICD-10-CM

## 2020-02-03 RX ORDER — ESCITALOPRAM OXALATE 20 MG/1
20 TABLET ORAL DAILY
Qty: 90 TABLET | Refills: 1 | Status: SHIPPED | OUTPATIENT
Start: 2020-02-03 | End: 2020-05-07

## 2020-02-03 RX ORDER — ESCITALOPRAM OXALATE 10 MG/1
10 TABLET ORAL DAILY
Qty: 90 TABLET | Refills: 1 | Status: CANCELLED | OUTPATIENT
Start: 2020-02-03

## 2020-02-03 NOTE — TELEPHONE ENCOUNTER
Routing refill request to provider for review/approval because:  Drug interaction warning      Fawn Malik RN

## 2020-02-04 ENCOUNTER — TELEPHONE (OUTPATIENT)
Dept: FAMILY MEDICINE | Facility: CLINIC | Age: 35
End: 2020-02-04

## 2020-02-04 NOTE — TELEPHONE ENCOUNTER
The following medication was faxed over for refill    Disp Refills Start End GERMAN   escitalopram (LEXAPRO) 20 MG tablet 90 tablet 1 2/3/2020  No   Sig - Route: Take 1 tablet (20 mg) by mouth daily - Oral   Sent to pharmacy as: escitalopram (LEXAPRO) 20 MG tablet   Class: E-Prescribe   Order: [id number]   E-Prescribing Status: Receipt confirmed by pharmacy (2/3/2020  5:19 PM CST)     Per med list states is is for 10 mg.    Yoselin Pérez  [facility]

## 2020-02-12 ENCOUNTER — MYC MEDICAL ADVICE (OUTPATIENT)
Dept: DERMATOLOGY | Facility: CLINIC | Age: 35
End: 2020-02-12

## 2020-02-12 ENCOUNTER — OFFICE VISIT (OUTPATIENT)
Dept: DERMATOLOGY | Facility: CLINIC | Age: 35
End: 2020-02-12
Payer: COMMERCIAL

## 2020-02-12 VITALS — DIASTOLIC BLOOD PRESSURE: 80 MMHG | SYSTOLIC BLOOD PRESSURE: 126 MMHG | OXYGEN SATURATION: 96 % | HEART RATE: 83 BPM

## 2020-02-12 DIAGNOSIS — L72.0 EPIDERMAL CYST: Primary | ICD-10-CM

## 2020-02-12 PROCEDURE — 88331 PATH CONSLTJ SURG 1 BLK 1SPC: CPT | Performed by: DERMATOLOGY

## 2020-02-12 PROCEDURE — 11441 EXC FACE-MM B9+MARG 0.6-1 CM: CPT | Mod: 51 | Performed by: DERMATOLOGY

## 2020-02-12 PROCEDURE — 12051 INTMD RPR FACE/MM 2.5 CM/<: CPT | Performed by: DERMATOLOGY

## 2020-02-12 NOTE — PATIENT INSTRUCTIONS
Sutured Wound Care     [facility]: [phone]    [facility]: [phone]          ? No strenuous activity for 48 hours. Resume moderate activity in 48 hours. No heavy exercising until you are seen for follow up in one week.     ? Take Tylenol as needed for discomfort.                         ? Do not drink alcoholic beverages for 48 hours.     ? Keep the pressure bandage in place for 24 hours. If the bandage becomes blood tinged or loose, reinforce it with gauze and tape.        (Refer to the reverse side of this page for management of bleeding).    ? Remove pressure bandage in 24 hours     ? Leave the flat bandage in place until your follow up appointment.    ? Keep the bandage dry. Wash around it carefully.    ? If the tape becomes soiled or starts to come off, reinforce it with additional paper tape.    ? Do not smoke for 3 weeks; smoking is detrimental to wound healing.    ? It is normal to have swelling and bruising around the surgical site. The bruising will fade in approximately 10-14 days. Elevate the area to reduce swelling.    ? Numbness, itchiness and sensitivity to temperature changes can occur after surgery and may take up to 18 months to normalize.      POSSIBLE COMPLICATIONS    BLEEDIN. Leave the bandage in place.  2. Use tightly rolled up gauze or a cloth to apply direct pressure over the bandage for 20   minutes.  3. Reapply pressure for an additional 20 minutes if necessary  4. Call the office or go to the nearest emergency room if pressure fails to stop the bleeding.  5. Use additional gauze and tape to maintain pressure once the bleeding has stopped.        PAIN:    1. Post operative pain should slowly get better, never worse.  2. A severe increase in pain may indicate a problem. Call the office if this occurs.    In case of emergency phone:Dr Khan [phone]

## 2020-02-12 NOTE — LETTER
2020         RE: Janine Salas  [address]  [place] [place] [facility] [address]        Dear Colleague,    Thank you for referring your patient, Janine Salas, to the [facility]. Please see a copy of my visit note below.    Janine Salas is a 34 year old year old female patient here today for tender cyst on forehead.   .  Patient states this has been present for a while.  Patient reports the following symptoms:  tender.  Patient reports the following previous treatments none.  These treatments did not work.  Patient reports the following modifying factors none.  Associated symptoms: none.  Patient has no other skin complaints today.  Remainder of the HPI, Meds, PMH, Allergies, FH, and SH was reviewed in chart.      Past Medical History:   Diagnosis Date     Acute pancreatitis 1/15     Anxiety      Chickenpox      PONV (postoperative nausea and vomiting)      Pregnancy induced hypertension      PTSD (post-traumatic stress disorder)      Severe postpartum depression 2016    also anxiety       Past Surgical History:   Procedure Laterality Date     APPENDECTOMY        SECTION N/A 2016    Procedure:  SECTION;  Surgeon: Marco Marin MD;  Location: [facility]      SECTION N/A 2019    Procedure:  SECTION;  Surgeon: Marco Marin MD;  Location: [facility]     COLONOSCOPY       LAPAROSCOPIC CHOLECYSTECTOMY  2013    Procedure: LAPAROSCOPIC CHOLECYSTECTOMY;  Laparoscopic Cholecystectomy;  Surgeon: Lasha Garcias MD;  Location: [facility]     MOUTH SURGERY      wisdom teeth     UPPER GI ENDOSCOPY          Family History   Problem Relation Age of Onset     Hypertension Mother      Depression Mother      Osteoporosis Mother      Thyroid Disease Mother      Hypertension Father      Alcohol/Drug Father         recovered alcohol- has fetal alcohol syndrome     Cancer Father         melanoma     Hypertension Maternal Grandmother       Alzheimer Disease Maternal Grandmother      Cardiovascular Maternal Grandmother         triple bypass     Cancer Maternal Grandfather         lung     Alcohol/Drug Paternal Grandmother         alcohol     Cancer - colorectal Paternal Grandmother         colon     Alcohol/Drug Paternal Grandfather         alcohol     Cancer Paternal Grandfather         leukemia - adult onset     Cardiovascular Paternal Grandfather         stent     Obesity Sister      Breast Cancer Maternal Aunt      Cancer Sister         cervical cancer, hysterectomy     Substance Abuse Sister         recovered drugs     Bipolar Disorder Sister      Depression Sister      Hypothyroidism Sister      Autism Spectrum Disorder Sister         ASpergers     Bipolar Disorder Other      Kidney Disease Maternal Aunt         Stage 3       Social History     Socioeconomic History     Marital status: Single     Spouse name: Not on file     Number of children: 2     Years of education: Not on file     Highest education level: Not on file   Occupational History     Not on file   Social Needs     Financial resource strain: Not on file     Food insecurity:     Worry: Not on file     Inability: Not on file     Transportation needs:     Medical: Not on file     Non-medical: Not on file   Tobacco Use     Smoking status: Former Smoker     Packs/day: 0.50     Types: Cigarettes     Start date: 5/7/2015     Smokeless tobacco: Former User   Substance and Sexual Activity     Alcohol use: No     Alcohol/week: 0.0 standard drinks     Comment: rare- quit with pregnancy     Drug use: No     Sexual activity: Yes     Partners: Male   Lifestyle     Physical activity:     Days per week: Not on file     Minutes per session: Not on file     Stress: Not on file   Relationships     Social connections:     Talks on phone: Not on file     Gets together: Not on file     Attends Mandaeism service: Not on file     Active member of club or organization: Not on file     Attends meetings of  clubs or organizations: Not on file     Relationship status: Not on file     Intimate partner violence:     Fear of current or ex partner: Not on file     Emotionally abused: Not on file     Physically abused: Not on file     Forced sexual activity: Not on file   Other Topics Concern     Parent/sibling w/ CABG, MI or angioplasty before 65F 55M? No   Social History Narrative     Not on file       Outpatient Encounter Medications as of 2/12/2020   Medication Sig Dispense Refill     Acetaminophen (TYLENOL PO) Take 500 mg by mouth once as needed        atenolol (TENORMIN) 50 MG tablet Take 1 tablet (50 mg) by mouth daily 90 tablet 1     azelaic acid (AZELEX) 20 % cream Apply topically 2 times daily 50 g 11     clindamycin (CLEOCIN T) 1 % external solution Apply topically 2 times daily 60 mL 1     cyclobenzaprine (FLEXERIL) 10 MG tablet TAKE ONE-HALF TO ONE TABLET BY MOUTH THREE TIMES A DAY AS NEEDED FOR MUSCLE SPASMS. 60 tablet 0     escitalopram (LEXAPRO) 20 MG tablet Take 1 tablet (20 mg) by mouth daily 90 tablet 1     FENUGREEK PO        hydrOXYzine (ATARAX) 50 MG tablet Take 1 tablet (50 mg) by mouth 3 times daily as needed for itching 30 tablet 1     hydrOXYzine (VISTARIL) 50 MG capsule Take 1 capsule (50 mg) by mouth nightly as needed for anxiety or other (sleep) 30 capsule 1     ibuprofen (ADVIL/MOTRIN) 800 MG tablet Take 1 tablet (800 mg) by mouth every 6 hours as needed for other (cramping) 100 tablet 1     levonorgestrel-ethinyl estradiol (AVIANE/ALESSE/LESSINA) 0.1-20 MG-MCG tablet Take 1 tablet by mouth daily 84 tablet 3     meclizine (ANTIVERT) 25 MG tablet Take 1 tablet (25 mg) by mouth 3 times daily as needed for dizziness 30 tablet 0     Milk Thistle-Dand-Fennel-Licor (MILK THISTLE XTRA) CAPS capsule        naproxen (NAPROSYN) 500 MG tablet TAKE ONE TABLET BY MOUTH TWICE A DAY WITH MEALS 60 tablet 0     Prenatal Vit-Fe Fumarate-FA (PRENATAL MULTIVITAMIN W/IRON) 27-0.8 MG tablet Take 1 tablet by mouth  daily 100 tablet 1     promethazine (PHENERGAN) 25 MG tablet Take 1 tablet (25 mg) by mouth every 6 hours as needed for nausea 10 tablet 0     labetalol (NORMODYNE) 300 MG tablet Take 1 tablet (300 mg) by mouth 3 times daily 90 tablet 0     Misc. Devices (BREAST PUMP) MISC 1 each every hour (Patient not taking: Reported on 2019) 1 each 0     [] ondansetron (ZOFRAN ODT) 4 MG ODT tab Take 1 tablet (4 mg) by mouth every 8 hours as needed 20 tablet 0     [] oxyCODONE-acetaminophen (PERCOCET) 5-325 MG tablet Take 1-2 tablets by mouth every 4 hours as needed for breakthrough pain 12 tablet 0     No facility-administered encounter medications on file as of 2020.              Review Of Systems  Skin: As above  Eyes: negative  Ears/Nose/Throat: negative  Respiratory: No shortness of breath, dyspnea on exertion, cough, or hemoptysis  Cardiovascular: negative  Gastrointestinal: negative  Genitourinary: negative  Musculoskeletal: negative  Neurologic: negative  Psychiatric: negative  Hematologic/Lymphatic/Immunologic: negative  Endocrine: negative      O:   NAD, WDWN, Alert & Oriented, Mood & Affect wnl, Vitals stable   Here today alone   /80 (BP Location: Left arm, Patient Position: Chair, Cuff Size: Adult Large)   Pulse 83   SpO2 96%    General appearance normal   Vitals stable   Alert, oriented and in no acute distress     L forehead 6mm firm nodiule      Eyes: Conjunctivae/lids:Normal     ENT: Lips, buccal mucosa, tongue: normal    MSK:Normal    Cardiovascular: peripheral edema none    Pulm: Breathing Normal    Neuro/Psych: Orientation:Alert and Orientedx3 ; Mood/Affect:normal       MICRO:   L forehead: Normal epidermis with cyst lined by stratified squamous epithelium with epidermal keratinization with overlying connection to epidermis.    A/P:  1. Cyst forehead  EXCISION OF CYST AND INT: After thorough discussion of PGACAC, consent obtained, anesthesia and prep, the margins of the cyst were  identified and an elliptical incision was made encompassing the cyst. The incisions were made through the skin and down to and including the subcutaneous tissue. The cyst was removed en bloc and submitted for frozen pathologic review. hemostasis was achieved. The wound edges were then closed in a layered fashion, being careful not to leave any dead space. Postoperative length was 1 cm.   EBL minimal; complications none; wound care routine. The patient was discharged in good condition and will return in one week for wound evaluation.      Again, thank you for allowing me to participate in the care of your patient.        Sincerely,        Miguel Khan MD

## 2020-02-12 NOTE — PROGRESS NOTES
Janine Salas is a 34 year old year old female patient here today for tender cyst on forehead.   .  Patient states this has been present for a while.  Patient reports the following symptoms:  tender.  Patient reports the following previous treatments none.  These treatments did not work.  Patient reports the following modifying factors none.  Associated symptoms: none.  Patient has no other skin complaints today.  Remainder of the HPI, Meds, PMH, Allergies, FH, and SH was reviewed in chart.      Past Medical History:   Diagnosis Date     Acute pancreatitis 1/15     Anxiety      Chickenpox      PONV (postoperative nausea and vomiting)      Pregnancy induced hypertension      PTSD (post-traumatic stress disorder)      Severe postpartum depression 2016    also anxiety       Past Surgical History:   Procedure Laterality Date     APPENDECTOMY        SECTION N/A 2016    Procedure:  SECTION;  Surgeon: Marco Marin MD;  Location: [facility]      SECTION N/A 2019    Procedure:  SECTION;  Surgeon: Marco Marin MD;  Location: [facility]     COLONOSCOPY       LAPAROSCOPIC CHOLECYSTECTOMY  2013    Procedure: LAPAROSCOPIC CHOLECYSTECTOMY;  Laparoscopic Cholecystectomy;  Surgeon: Lasha Garcias MD;  Location: [facility]     MOUTH SURGERY      wisdom teeth     UPPER GI ENDOSCOPY          Family History   Problem Relation Age of Onset     Hypertension Mother      Depression Mother      Osteoporosis Mother      Thyroid Disease Mother      Hypertension Father      Alcohol/Drug Father         recovered alcohol- has fetal alcohol syndrome     Cancer Father         melanoma     Hypertension Maternal Grandmother      Alzheimer Disease Maternal Grandmother      Cardiovascular Maternal Grandmother         triple bypass     Cancer Maternal Grandfather         lung     Alcohol/Drug Paternal Grandmother         alcohol     Cancer - colorectal Paternal Grandmother          colon     Alcohol/Drug Paternal Grandfather         alcohol     Cancer Paternal Grandfather         leukemia - adult onset     Cardiovascular Paternal Grandfather         stent     Obesity Sister      Breast Cancer Maternal Aunt      Cancer Sister         cervical cancer, hysterectomy     Substance Abuse Sister         recovered drugs     Bipolar Disorder Sister      Depression Sister      Hypothyroidism Sister      Autism Spectrum Disorder Sister         ASpergers     Bipolar Disorder Other      Kidney Disease Maternal Aunt         Stage 3       Social History     Socioeconomic History     Marital status: Single     Spouse name: Not on file     Number of children: 2     Years of education: Not on file     Highest education level: Not on file   Occupational History     Not on file   Social Needs     Financial resource strain: Not on file     Food insecurity:     Worry: Not on file     Inability: Not on file     Transportation needs:     Medical: Not on file     Non-medical: Not on file   Tobacco Use     Smoking status: Former Smoker     Packs/day: 0.50     Types: Cigarettes     Start date: 5/7/2015     Smokeless tobacco: Former User   Substance and Sexual Activity     Alcohol use: No     Alcohol/week: 0.0 standard drinks     Comment: rare- quit with pregnancy     Drug use: No     Sexual activity: Yes     Partners: Male   Lifestyle     Physical activity:     Days per week: Not on file     Minutes per session: Not on file     Stress: Not on file   Relationships     Social connections:     Talks on phone: Not on file     Gets together: Not on file     Attends Mosque service: Not on file     Active member of club or organization: Not on file     Attends meetings of clubs or organizations: Not on file     Relationship status: Not on file     Intimate partner violence:     Fear of current or ex partner: Not on file     Emotionally abused: Not on file     Physically abused: Not on file     Forced sexual activity: Not on  file   Other Topics Concern     Parent/sibling w/ CABG, MI or angioplasty before 65F 55M? No   Social History Narrative     Not on file       Outpatient Encounter Medications as of 2/12/2020   Medication Sig Dispense Refill     Acetaminophen (TYLENOL PO) Take 500 mg by mouth once as needed        atenolol (TENORMIN) 50 MG tablet Take 1 tablet (50 mg) by mouth daily 90 tablet 1     azelaic acid (AZELEX) 20 % cream Apply topically 2 times daily 50 g 11     clindamycin (CLEOCIN T) 1 % external solution Apply topically 2 times daily 60 mL 1     cyclobenzaprine (FLEXERIL) 10 MG tablet TAKE ONE-HALF TO ONE TABLET BY MOUTH THREE TIMES A DAY AS NEEDED FOR MUSCLE SPASMS. 60 tablet 0     escitalopram (LEXAPRO) 20 MG tablet Take 1 tablet (20 mg) by mouth daily 90 tablet 1     FENUGREEK PO        hydrOXYzine (ATARAX) 50 MG tablet Take 1 tablet (50 mg) by mouth 3 times daily as needed for itching 30 tablet 1     hydrOXYzine (VISTARIL) 50 MG capsule Take 1 capsule (50 mg) by mouth nightly as needed for anxiety or other (sleep) 30 capsule 1     ibuprofen (ADVIL/MOTRIN) 800 MG tablet Take 1 tablet (800 mg) by mouth every 6 hours as needed for other (cramping) 100 tablet 1     levonorgestrel-ethinyl estradiol (AVIANE/ALESSE/LESSINA) 0.1-20 MG-MCG tablet Take 1 tablet by mouth daily 84 tablet 3     meclizine (ANTIVERT) 25 MG tablet Take 1 tablet (25 mg) by mouth 3 times daily as needed for dizziness 30 tablet 0     Milk Thistle-Dand-Fennel-Licor (MILK THISTLE XTRA) CAPS capsule        naproxen (NAPROSYN) 500 MG tablet TAKE ONE TABLET BY MOUTH TWICE A DAY WITH MEALS 60 tablet 0     Prenatal Vit-Fe Fumarate-FA (PRENATAL MULTIVITAMIN W/IRON) 27-0.8 MG tablet Take 1 tablet by mouth daily 100 tablet 1     promethazine (PHENERGAN) 25 MG tablet Take 1 tablet (25 mg) by mouth every 6 hours as needed for nausea 10 tablet 0     labetalol (NORMODYNE) 300 MG tablet Take 1 tablet (300 mg) by mouth 3 times daily 90 tablet 0     Misc. Devices  (BREAST PUMP) MISC 1 each every hour (Patient not taking: Reported on 2019) 1 each 0     [] ondansetron (ZOFRAN ODT) 4 MG ODT tab Take 1 tablet (4 mg) by mouth every 8 hours as needed 20 tablet 0     [] oxyCODONE-acetaminophen (PERCOCET) 5-325 MG tablet Take 1-2 tablets by mouth every 4 hours as needed for breakthrough pain 12 tablet 0     No facility-administered encounter medications on file as of 2020.              Review Of Systems  Skin: As above  Eyes: negative  Ears/Nose/Throat: negative  Respiratory: No shortness of breath, dyspnea on exertion, cough, or hemoptysis  Cardiovascular: negative  Gastrointestinal: negative  Genitourinary: negative  Musculoskeletal: negative  Neurologic: negative  Psychiatric: negative  Hematologic/Lymphatic/Immunologic: negative  Endocrine: negative      O:   NAD, WDWN, Alert & Oriented, Mood & Affect wnl, Vitals stable   Here today alone   /80 (BP Location: Left arm, Patient Position: Chair, Cuff Size: Adult Large)   Pulse 83   SpO2 96%    General appearance normal   Vitals stable   Alert, oriented and in no acute distress     L forehead 6mm firm nodiule      Eyes: Conjunctivae/lids:Normal     ENT: Lips, buccal mucosa, tongue: normal    MSK:Normal    Cardiovascular: peripheral edema none    Pulm: Breathing Normal    Neuro/Psych: Orientation:Alert and Orientedx3 ; Mood/Affect:normal       MICRO:   L forehead: Normal epidermis with cyst lined by stratified squamous epithelium with epidermal keratinization with overlying connection to epidermis.    A/P:  1. Cyst forehead  EXCISION OF CYST AND INT: After thorough discussion of PGACAC, consent obtained, anesthesia and prep, the margins of the cyst were identified and an elliptical incision was made encompassing the cyst. The incisions were made through the skin and down to and including the subcutaneous tissue. The cyst was removed en bloc and submitted for frozen pathologic review. hemostasis was  achieved. The wound edges were then closed in a layered fashion, being careful not to leave any dead space. Postoperative length was 1 cm.   EBL minimal; complications none; wound care routine. The patient was discharged in good condition and will return in one week for wound evaluation.

## 2020-02-12 NOTE — NURSING NOTE
"Chief Complaint   Patient presents with     Derm Problem     cyst excision       Initial /80 (BP Location: Left arm, Patient Position: Chair, Cuff Size: Adult Large)   Pulse 83   SpO2 96%  Estimated body mass index is 58.75 kg/m  as calculated from the following:    Height as of 1/30/20: 1.676 m (5' 6\").    Weight as of 1/30/20: 165.1 kg (364 lb).  Medications and allergies reviewed.    Astrid SCHERER CMA (AAMA)    "

## 2020-02-13 NOTE — TELEPHONE ENCOUNTER
I would have her come in to check wound    It would be abnormal to have this much pain after such a small cyst was excised.     I would alternate tyelnol every two hours with ibuprofen and come in to check the wound

## 2020-02-13 NOTE — TELEPHONE ENCOUNTER
See 2nd My chart message. Patient wants to try alternating Tylenol/Ibuprofen first. Tabby Patel RN

## 2020-02-19 ENCOUNTER — ALLIED HEALTH/NURSE VISIT (OUTPATIENT)
Dept: DERMATOLOGY | Facility: CLINIC | Age: 35
End: 2020-02-19
Payer: COMMERCIAL

## 2020-02-19 DIAGNOSIS — Z48.01 ENCOUNTER FOR CHANGE OR REMOVAL OF SURGICAL WOUND DRESSING: Primary | ICD-10-CM

## 2020-02-19 PROCEDURE — 99207 ZZC NO CHARGE NURSE ONLY: CPT

## 2020-02-19 NOTE — PROGRESS NOTES
Pt returned to clinic for post surgery 1 week follow up bandage change. Pt has no complaints, denies pain. Bandage removed from forehead, area cleansed with normal saline. Site is healing and wound edges approximating well. Reapplied new steri strips and paper tape.    Advised to watch for signs/sx of infection; spreading redness, drainage, odor, fever. Call or report promptly to clinic. Pt given written instructions and informed to rtc as needed. Patient verbalized understanding.     Kanchan Pichardo LPN.................2/19/2020

## 2020-02-19 NOTE — PATIENT INSTRUCTIONS
WOUND CARE INSTRUCTIONS  for  ONE WEEK AFTER SURGERY  forehead        1) Leave flat bandage on your skin for one week after today s bandage change.  2) In one week when you remove the bandage, you may resume your regular skin care routine, including washing with mild soap and water, applying moisturizer, make-up and sunscreen.    3) If there are any open or bleeding areas at the incision/graft site you should begin to cover the area with a bandage daily as follows:    1) Clean and dry the area with plain tap water using a Q-tip or sterile gauze pad.  2) Apply Polysporin or Bacitracin ointment to the open area.  3) Cover the wound with a band-aid or a sterile non-stick gauze pad and micropore paper tape.         SIGNS OF INFECTION  - If you notice any of these signs of infection, call your doctor right away: expanding redness around the wound.  - Yellow or greenish-colored pus or cloudy wound drainage.    - Red streaking spreading from the wound.  - Increased swelling, tenderness, or pain around the wound.   - Fever.    Please remember that yellow and clear drainage from a wound can be normal and related to normal wound healing.  Isolated drainage from a wound without a combination of the above features does not indicate infection.       *Once the bandages are removed, the scar will be red and firm (especially in the lip/chin area). This is normal and will fade in time. It might take 6-12 months for this to happen.     *Massaging the area will help the scar soften and fade quicker. Begin to massage the area one month after the bandages have been removed. To massage apply pressure directly and firmly over the scar with the fingertips and move in a circular motion. Massage the area for a few minutes several times a day. Continue to massage the site for several months.    *Approximately 6-8 weeks after surgery it is not uncommon to see the formation of  tender pimple-like  bump along the scar. This is normal. As the  scar continues to mature and the stitches underneath the skin begin to dissolve, this might occur. Do not pick or squeeze, this will resolve on it s own. Should one break open producing a small amount of drainage, apply Polysporin or Bacitracin ointment a few times a day until the wound is completely healed.    *Numbness in the surgical area is expected. It might take 12-18 months for the feeling to return to normal. During this time sensations of itchiness, tingling and occasional sharp pains might be noted. These feelings are normal and will subside once the nerves have completely healed.         IN CASE OF EMERGENCY: Dr Khan [phone]     If you were seen in [place] call: [phone]    If you were seen in [place] call: [phone]

## 2020-02-28 DIAGNOSIS — Z34.82 PRENATAL CARE, SUBSEQUENT PREGNANCY IN SECOND TRIMESTER: ICD-10-CM

## 2020-02-28 NOTE — TELEPHONE ENCOUNTER
Routing refill request to provider for review/approval because:  Drug not on the [facility] refill protocol   Ordered by OB/Gyn provider    Requested Prescriptions   Pending Prescriptions Disp Refills     Prenatal Vit-Fe Fumarate-FA (PRENATAL MULTIVITAMIN W/IRON) 27-0.8 MG tablet 100 tablet 1     Sig: Take 1 tablet by mouth daily       There is no refill protocol information for this order        Last Written Prescription Date:  8/21/19  Last Fill Quantity: 100,  # refills: 1   Last office visit: 1/30/2020 with KENZIE Haider  Future Office Visit:      Celine SALDIVAR RN

## 2020-03-01 RX ORDER — PRENATAL VIT/IRON FUM/FOLIC AC 27MG-0.8MG
1 TABLET ORAL DAILY
Qty: 100 TABLET | Refills: 1 | Status: SHIPPED | OUTPATIENT
Start: 2020-03-01 | End: 2020-09-09

## 2020-03-30 ENCOUNTER — MYC MEDICAL ADVICE (OUTPATIENT)
Dept: FAMILY MEDICINE | Facility: CLINIC | Age: 35
End: 2020-03-30

## 2020-03-30 ENCOUNTER — E-VISIT (OUTPATIENT)
Dept: FAMILY MEDICINE | Facility: CLINIC | Age: 35
End: 2020-03-30
Payer: COMMERCIAL

## 2020-03-30 DIAGNOSIS — M54.50 CHRONIC BILATERAL LOW BACK PAIN WITHOUT SCIATICA: ICD-10-CM

## 2020-03-30 DIAGNOSIS — G89.29 CHRONIC BILATERAL LOW BACK PAIN WITHOUT SCIATICA: ICD-10-CM

## 2020-03-30 PROCEDURE — 99422 OL DIG E/M SVC 11-20 MIN: CPT | Performed by: PHYSICIAN ASSISTANT

## 2020-03-31 RX ORDER — CYCLOBENZAPRINE HCL 10 MG
TABLET ORAL
Qty: 30 TABLET | Refills: 0 | Status: SHIPPED | OUTPATIENT
Start: 2020-03-31 | End: 2021-04-01

## 2020-05-06 DIAGNOSIS — F41.9 ANXIETY: ICD-10-CM

## 2020-05-07 RX ORDER — ESCITALOPRAM OXALATE 20 MG/1
TABLET ORAL
Qty: 90 TABLET | Refills: 0 | Status: SHIPPED | OUTPATIENT
Start: 2020-05-07 | End: 2020-08-06

## 2020-06-02 ENCOUNTER — MYC MEDICAL ADVICE (OUTPATIENT)
Dept: FAMILY MEDICINE | Facility: CLINIC | Age: 35
End: 2020-06-02

## 2020-06-02 DIAGNOSIS — Z30.9 CONTRACEPTIVE MANAGEMENT: Primary | ICD-10-CM

## 2020-06-02 RX ORDER — LEVONORGESTREL/ETHIN.ESTRADIOL 0.1-0.02MG
1 TABLET ORAL DAILY
Qty: 84 TABLET | Refills: 0 | Status: SHIPPED | OUTPATIENT
Start: 2020-06-02 | End: 2020-06-03

## 2020-06-02 NOTE — TELEPHONE ENCOUNTER
Last office visit 4/23/2019  Last prescription 6/27/2019    BP Readings from Last 2 Encounters:   02/12/20 126/80   01/30/20 132/78     Annalee refill per protocol.  Note on prescription for patient to schedule appointment for additional refills.    Mary Painting   [facility]  RN

## 2020-07-16 ENCOUNTER — VIRTUAL VISIT (OUTPATIENT)
Dept: FAMILY MEDICINE | Facility: CLINIC | Age: 35
End: 2020-07-16
Payer: COMMERCIAL

## 2020-07-16 DIAGNOSIS — F33.1 MODERATE EPISODE OF RECURRENT MAJOR DEPRESSIVE DISORDER (H): Primary | ICD-10-CM

## 2020-07-16 PROCEDURE — 99213 OFFICE O/P EST LOW 20 MIN: CPT | Mod: 95 | Performed by: FAMILY MEDICINE

## 2020-07-16 RX ORDER — BUPROPION HYDROCHLORIDE 150 MG/1
150 TABLET ORAL EVERY MORNING
Qty: 90 TABLET | Refills: 3 | Status: SHIPPED | OUTPATIENT
Start: 2020-07-16 | End: 2021-07-22

## 2020-07-16 NOTE — PROGRESS NOTES
"Janine Salas is a 34 year old female who is being evaluated via a billable video visit.      The patient has been notified of following:     \"This video visit will be conducted via a call between you and your physician/provider. We have found that certain health care needs can be provided without the need for an in-person physical exam.  This service lets us provide the care you need with a video conversation.  If a prescription is necessary we can send it directly to your pharmacy.  If lab work is needed we can place an order for that and you can then stop by our lab to have the test done at a later time.    Video visits are billed at different rates depending on your insurance coverage.  Please reach out to your insurance provider with any questions.    If during the course of the call the physician/provider feels a video visit is not appropriate, you will not be charged for this service.\"    Patient has given verbal consent for Video visit? Yes  How would you like to obtain your AVS? MyChart  If you are dropped from the video visit, the video invite should be resent to: Text to cell phone: [phone]  Will anyone else be joining your video visit? No    Subjective     Janine Salas is a 34 year old female who presents today via video visit for the following health issues:    HPI    Depression and Anxiety Follow-Up    How are you doing with your depression since your last visit? Worsened, patient states that she has been having increased depression and anxiety over the last 3 months. Has been off over the last 2 years     How are you doing with your anxiety since your last visit?  Worsened     Are you having other symptoms that might be associated with depression or anxiety? No    Have you had a significant life event? Grief or Loss and OTHER: Family problems     Do you have any concerns with your use of alcohol or other drugs? No    Social History     Tobacco Use     Smoking status: Former Smoker     " Packs/day: 0.50     Types: Cigarettes     Start date: 5/7/2015     Smokeless tobacco: Former User   Substance Use Topics     Alcohol use: No     Alcohol/week: 0.0 standard drinks     Comment: rare- quit with pregnancy     Drug use: No     PHQ 3/7/2019 8/28/2019 2/3/2020   PHQ-9 Total Score 2 8 3   Q9: Thoughts of better off dead/self-harm past 2 weeks Not at all Several days Not at all   F/U: Thoughts of suicide or self-harm - No -   F/U: Safety concerns - No -     ELAINA-7 SCORE 4/6/2018 3/1/2019 3/7/2019   Total Score 3 2 1     Last PHQ-9 7/16/2020   1.  Little interest or pleasure in doing things 3   2.  Feeling down, depressed, or hopeless 2   3.  Trouble falling or staying asleep, or sleeping too much 2   4.  Feeling tired or having little energy 3   5.  Poor appetite or overeating 2   6.  Feeling bad about yourself 1   7.  Trouble concentrating 2   8.  Moving slowly or restless 0   Q9: Thoughts of better off dead/self-harm past 2 weeks 0   PHQ-9 Total Score 15   Difficulty at work, home, or with people -   In the past two weeks have you had thoughts of suicide or self harm? -   Do you have concerns about your personal safety or the safety of others? -     ELAINA-7  7/16/2020   1. Feeling nervous, anxious, or on edge 1   2. Not being able to stop or control worrying 1   3. Worrying too much about different things 2   4. Trouble relaxing 3   5. Being so restless that it is hard to sit still 0   6. Becoming easily annoyed or irritable 2   7. Feeling afraid, as if something awful might happen 1   ELAINA-7 Total Score 10   If you checked any problems, how difficult have they made it for you to do your work, take care of things at home, or get along with other people? -       Suicide Assessment Five-step Evaluation and Treatment (SAFE-T)            Video Start Time: 3:34 PM        Patient Active Problem List   Diagnosis     CARDIOVASCULAR SCREENING; LDL GOAL LESS THAN 160     Back pain associated with peripheral numbness      Morbid obesity due to excess calories (H)     Anxiety     Essential hypertension     S/P  section     Acute bilateral low back pain with right-sided sciatica     Moderate episode of recurrent major depressive disorder (H)     Prenatal care, subsequent pregnancy     Bilateral low back pain without sciatica     Hip pain, left     Pre-eclampsia superimposed on chronic hypertension, postpartum     Hypertension in pregnancy, delivered with postpartum condition     Pre-eclampsia     Past Surgical History:   Procedure Laterality Date     APPENDECTOMY        SECTION N/A 2016    Procedure:  SECTION;  Surgeon: Marco Marin MD;  Location: [facility]      SECTION N/A 2019    Procedure:  SECTION;  Surgeon: Marco Marin MD;  Location: [facility]     COLONOSCOPY       LAPAROSCOPIC CHOLECYSTECTOMY  2013    Procedure: LAPAROSCOPIC CHOLECYSTECTOMY;  Laparoscopic Cholecystectomy;  Surgeon: Lasha Garcias MD;  Location: [facility]     MOUTH SURGERY      wisdom teeth     UPPER GI ENDOSCOPY         Social History     Tobacco Use     Smoking status: Former Smoker     Packs/day: 0.50     Types: Cigarettes     Start date: 2015     Smokeless tobacco: Former User   Substance Use Topics     Alcohol use: No     Alcohol/week: 0.0 standard drinks     Comment: rare- quit with pregnancy     Family History   Problem Relation Age of Onset     Hypertension Mother      Depression Mother      Osteoporosis Mother      Thyroid Disease Mother      Hypertension Father      Alcohol/Drug Father         recovered alcohol- has fetal alcohol syndrome     Cancer Father         melanoma     Hypertension Maternal Grandmother      Alzheimer Disease Maternal Grandmother      Cardiovascular Maternal Grandmother         triple bypass     Cancer Maternal Grandfather         lung     Alcohol/Drug Paternal Grandmother         alcohol     Cancer - colorectal Paternal Grandmother         colon      Alcohol/Drug Paternal Grandfather         alcohol     Cancer Paternal Grandfather         leukemia - adult onset     Cardiovascular Paternal Grandfather         stent     Obesity Sister      Breast Cancer Maternal Aunt      Cancer Sister         cervical cancer, hysterectomy     Substance Abuse Sister         recovered drugs     Bipolar Disorder Sister      Depression Sister      Hypothyroidism Sister      Autism Spectrum Disorder Sister         ASpergers     Bipolar Disorder Other      Kidney Disease Maternal Aunt         Stage 3         Allergies   Allergen Reactions     Codeine Itching     Zofran [Ondansetron] Other (See Comments)     Headaches        Reviewed and updated as needed this visit by Provider         Review of Systems   Constitutional, HEENT, cardiovascular, pulmonary, gi and gu systems are negative, except as otherwise noted.      Objective             Physical Exam     GENERAL: Healthy, alert and no distress  EYES: Eyes grossly normal to inspection.  No discharge or erythema, or obvious scleral/conjunctival abnormalities.  RESP: No audible wheeze, cough, or visible cyanosis.  No visible retractions or increased work of breathing.    SKIN: Visible skin clear. No significant rash, abnormal pigmentation or lesions.  NEURO: Cranial nerves grossly intact.  Mentation and speech appropriate for age.  PSYCH: Mentation appears normal, affect normal/bright, judgement and insight intact, normal speech and appearance well-groomed.      Diagnostic Test Results:  Labs reviewed in Epic        Assessment & Plan     1. Moderate episode of recurrent major depressive disorder (H)  Will add the wellbutrin back  In she had some left over and had started taking it when she started feeling more depressed   As she has been on this in the past , she will call if it is not working but I anticipate that she will do fine with this..   - buPROPion (WELLBUTRIN XL) 150 MG 24 hr tablet; Take 1 tablet (150 mg) by mouth every  "morning  Dispense: 90 tablet; Refill: 3     BMI:   Estimated body mass index is 58.75 kg/m  as calculated from the following:    Height as of 1/30/20: 1.676 m (5' 6\").    Weight as of 1/30/20: 165.1 kg (364 lb).   Weight management plan: Discussed healthy diet and exercise guidelines        See Patient Instructions    No follow-ups on file.    Graciela Olmedo MD  [facility]      Video-Visit Details    Type of service:  Video Visit    Video End Time:3:40 PM    Originating Location (pt. Location): Home    Distant Location (provider location):  [facility]     Platform used for Video Visit: Doximity    Return in about 6 months (around 1/16/2021) for Routine Visit, med check.       Graciela Olmedo MD      "

## 2020-08-06 ENCOUNTER — MYC MEDICAL ADVICE (OUTPATIENT)
Dept: FAMILY MEDICINE | Facility: CLINIC | Age: 35
End: 2020-08-06

## 2020-08-06 DIAGNOSIS — M54.50 CHRONIC BILATERAL LOW BACK PAIN WITHOUT SCIATICA: Primary | ICD-10-CM

## 2020-08-06 DIAGNOSIS — G89.29 CHRONIC BILATERAL LOW BACK PAIN WITHOUT SCIATICA: Primary | ICD-10-CM

## 2020-08-06 DIAGNOSIS — I10 ESSENTIAL HYPERTENSION: ICD-10-CM

## 2020-08-06 DIAGNOSIS — M25.552 HIP PAIN, LEFT: ICD-10-CM

## 2020-08-06 DIAGNOSIS — F41.9 ANXIETY: ICD-10-CM

## 2020-08-06 RX ORDER — ATENOLOL 50 MG/1
50 TABLET ORAL DAILY
Qty: 90 TABLET | Refills: 0 | Status: SHIPPED | OUTPATIENT
Start: 2020-08-06 | End: 2020-11-16

## 2020-08-07 RX ORDER — ESCITALOPRAM OXALATE 20 MG/1
20 TABLET ORAL DAILY
Qty: 90 TABLET | Refills: 0 | Status: SHIPPED | OUTPATIENT
Start: 2020-08-07 | End: 2020-11-16

## 2020-09-08 DIAGNOSIS — Z34.82 PRENATAL CARE, SUBSEQUENT PREGNANCY IN SECOND TRIMESTER: ICD-10-CM

## 2020-09-08 NOTE — TELEPHONE ENCOUNTER
Last Written Prescription Date:  3/1/2020  Last Fill Quantity: 100,  # refills: 1   Last office visit:4/23/2019 with prescribing provider:  Dr. Marin   Future Office Visit:  Currently not scheduled    Requested Prescriptions   Pending Prescriptions Disp Refills     Prenatal Vit-Fe Fumarate-FA (PRENATAL MULTIVITAMIN W/IRON) 27-0.8 MG tablet 100 tablet 1     Sig: Take 1 tablet by mouth daily       There is no refill protocol information for this order        Mary Painting   [facility]  RN

## 2020-09-09 RX ORDER — PRENATAL VIT/IRON FUM/FOLIC AC 27MG-0.8MG
1 TABLET ORAL DAILY
Qty: 100 TABLET | Refills: 1 | Status: SHIPPED | OUTPATIENT
Start: 2020-09-09 | End: 2021-04-01

## 2020-09-18 ENCOUNTER — TELEPHONE (OUTPATIENT)
Dept: FAMILY MEDICINE | Facility: CLINIC | Age: 35
End: 2020-09-18

## 2020-09-18 NOTE — TELEPHONE ENCOUNTER
"Red flag call handed off as patient reporting severe left hip pain, patient crying.  States left hip \"won't stay in place\".  Patient has been seeing PT Chiropractor regularly with no relief.  Difficulty ambulating and lifting leg, left foot numbness.  Patient has been icing and taking naproxen, friend gave her percocet 5 mg with no relief.  Patient has not followed with ortho-advised to make this appointment.  Advised with current level of severe pain with numbness foot needs ED visit, with .  Patient states she will try to get  to drive her. Stressors with small children,  working long hours and helping his father with cancer. Patient states she has no family or friends or neighbors to help her.  Allowed patient time to talk, denies thoughts of self harm or plan.  Verbalizes frustration with chronic pain.  Encouraged again to get ride to ED to address her pain, she agrees.  Fawn Malik RN     "

## 2020-10-11 ENCOUNTER — MYC MEDICAL ADVICE (OUTPATIENT)
Dept: FAMILY MEDICINE | Facility: CLINIC | Age: 35
End: 2020-10-11

## 2020-10-12 NOTE — TELEPHONE ENCOUNTER
I can't order Michael a test through Janine's chart. Michael will need to call himself and schedule a telephone visit.   If the exposure was at his work, then I could understand he needing to be tested but Janine should not need to be tested unless Michael comes back positive.     Kavya

## 2020-10-12 NOTE — TELEPHONE ENCOUNTER
Call placed to patient.  Voicemail message left per Kavya's recommendations.  Scheduling number given.  Fawn Malik RN

## 2021-02-09 DIAGNOSIS — Z30.41 ENCOUNTER FOR SURVEILLANCE OF CONTRACEPTIVE PILLS: ICD-10-CM

## 2021-02-10 RX ORDER — LEVONORGESTREL AND ETHINYL ESTRADIOL 0.1-0.02MG
KIT ORAL
Qty: 84 TABLET | Refills: 2 | Status: SHIPPED | OUTPATIENT
Start: 2021-02-10 | End: 2021-10-22

## 2021-02-10 NOTE — TELEPHONE ENCOUNTER
"Routing refill request to provider for review/approval because:  Drug interaction warning    Requested Prescriptions   Pending Prescriptions Disp Refills     FALMINA 0.1-20 MG-MCG tablet [Pharmacy Med Name: FALMINA 0.1-20MG-MCG TABS] 84 tablet 2     Sig: TAKE ONE TABLET BY MOUTH ONCE DAILY       Contraceptives Protocol Passed - 2/9/2021  5:42 PM        Passed - Patient is not a current smoker if age is 35 or older        Passed - Recent (12 mo) or future (30 days) visit within the authorizing provider's specialty     Patient has had an office visit with the authorizing provider or a provider within the authorizing providers department within the previous 12 mos or has a future within next 30 days. See \"Patient Info\" tab in inbasket, or \"Choose Columns\" in Meds & Orders section of the refill encounter.              Passed - Medication is active on med list        Passed - No active pregnancy on record        Passed - No positive pregnancy test in past 12 months                   "

## 2021-04-01 ENCOUNTER — TELEPHONE (OUTPATIENT)
Dept: FAMILY MEDICINE | Facility: CLINIC | Age: 36
End: 2021-04-01

## 2021-04-01 ENCOUNTER — OFFICE VISIT (OUTPATIENT)
Dept: FAMILY MEDICINE | Facility: CLINIC | Age: 36
End: 2021-04-01
Payer: COMMERCIAL

## 2021-04-01 VITALS
WEIGHT: 293 LBS | SYSTOLIC BLOOD PRESSURE: 130 MMHG | HEART RATE: 76 BPM | TEMPERATURE: 98.6 F | HEIGHT: 66 IN | DIASTOLIC BLOOD PRESSURE: 72 MMHG | BODY MASS INDEX: 47.09 KG/M2

## 2021-04-01 DIAGNOSIS — M25.572 PAIN IN JOINT, ANKLE AND FOOT, LEFT: ICD-10-CM

## 2021-04-01 DIAGNOSIS — E66.01 MORBID OBESITY DUE TO EXCESS CALORIES (H): Primary | ICD-10-CM

## 2021-04-01 DIAGNOSIS — G56.01 CARPAL TUNNEL SYNDROME OF RIGHT WRIST: ICD-10-CM

## 2021-04-01 PROCEDURE — 99214 OFFICE O/P EST MOD 30 MIN: CPT | Performed by: PHYSICIAN ASSISTANT

## 2021-04-01 RX ORDER — PHENTERMINE HYDROCHLORIDE 37.5 MG/1
37.5 CAPSULE ORAL EVERY MORNING
Qty: 30 CAPSULE | Refills: 1 | Status: SHIPPED | OUTPATIENT
Start: 2021-04-01 | End: 2021-05-11

## 2021-04-01 ASSESSMENT — PAIN SCALES - GENERAL: PAINLEVEL: SEVERE PAIN (7)

## 2021-04-01 ASSESSMENT — MIFFLIN-ST. JEOR: SCORE: 2276.2

## 2021-04-01 NOTE — TELEPHONE ENCOUNTER
Prior Authorization Required on: Phentermine 37.5mg Capsules  Insurance: [facility]  Insurance Phone: [phone]  Patient ID: [id number]  Please Contact the Pharmacy with Prior Auth Status (approval/denial).   Thank You!    See Judah  Pharmacy Technician  [facility]  [phone]

## 2021-04-01 NOTE — PROGRESS NOTES
"    Assessment & Plan     (E66.01) Morbid obesity due to excess calories (H)  (primary encounter diagnosis)  Comment: has been on phentermine in the past with positive improvements and no side effects. Discussed we can restart that today but I would also like her to pursue the weight management program if insurance will cover. We discussed that weight loss is not going to be achieved by medication alone and I think in her case especially there are some psychological factors that I think really need to be addressed as well.   Plan: COMPREHENSIVE WEIGHT MANAGEMENT, phentermine         (ADIPEX-P) 37.5 MG capsule            (G56.01) Carpal tunnel syndrome of right wrist  Comment: was present prior to last pregnancy but got worse during pregnancy and hasn't improved back to the tolerable baseline  Plan: Orthopedic & Spine  Referral            (M25.572) Pain in joint, ankle and foot, left  Comment: rolled ankle - recent so swelling/tenderness to be expected. No red flags on exam  Plan: conservative therapy for now      BMI:   Estimated body mass index is 55.85 kg/m  as calculated from the following:    Height as of this encounter: 1.676 m (5' 6\").    Weight as of this encounter: 156.9 kg (346 lb).   Weight management plan: Patient referred to endocrine and/or weight management specialty      Return in about 4 weeks (around 4/29/2021) for With specialist.    Kavya Haider PA-C  [facility] [place] is a 35 year old who presents for the following health issues    HPI       Patient would like to discuss getting back on Phentermine.   Knows she needs to lose weight  She and her  have not ruled out having another child but she wants to be at a healthier weight  Knows that her weight is contributing to her knee, back and feet pain  Did well on phentermine before - no side effects    -She has been having trouble with carpal tunnel in her right wrist. It has really been " "bothering her.   Was present prior to her last pregnancy but worsened during the pregnancy  Whole hand can go numb  Hard to  or twist things    -A few days ago she stepping a hole in her yard and fell. She thinks she may have sprained her right knee and ankle.     Review of Systems   Remainder of ROS obtained and found to be negative other than that which was documented above        Objective    /72   Pulse 76   Temp 98.6  F (37  C) (Tympanic)   Ht 1.676 m (5' 6\")   Wt (!) 156.9 kg (346 lb)   BMI 55.85 kg/m    Body mass index is 55.85 kg/m .  Physical Exam   GENERAL: healthy, alert and no distress  PSYCH: mentation appears normal, affect normal/bright  ANKLE, left:   Swelling and faint bruising over entire ankle. Normal ROM. Can weight bear. No focal area of pain. Strength and sensation intacty          "

## 2021-04-01 NOTE — TELEPHONE ENCOUNTER
Central Prior Authorization Team   Phone: [phone]      PA PLAN EXCLUSION    Medication: Phentermine 37.5mg capsules  Insurance Company: [employer] - Phone [phone] Fax [phone]  Pharmacy Filling the Rx: [facility] [place]  [place] - [address]  Filling Pharmacy Phone: [phone]  Filling Pharmacy Fax:    Start Date: 4/1/2021    Started PA on CMM and a response of This medication may be excluded from the patient's benefit. For more information, please reach out to [employer] directly.  Called insurance and they state medication is a plan exclusion and there is not an option to complete PA. No alternatives listed.

## 2021-04-05 ENCOUNTER — MYC MEDICAL ADVICE (OUTPATIENT)
Dept: FAMILY MEDICINE | Facility: CLINIC | Age: 36
End: 2021-04-05

## 2021-04-05 NOTE — TELEPHONE ENCOUNTER
Janine notified that phentermine is not covered. She said that it cost $43 for the month so she plans to pay for it.  Gonzalo Ellison RN

## 2021-04-28 ENCOUNTER — IMMUNIZATION (OUTPATIENT)
Dept: FAMILY MEDICINE | Facility: CLINIC | Age: 36
End: 2021-04-28
Payer: COMMERCIAL

## 2021-04-28 PROCEDURE — 0011A PR COVID VAC MODERNA 100 MCG/0.5 ML IM: CPT

## 2021-04-28 PROCEDURE — 91301 PR COVID VAC MODERNA 100 MCG/0.5 ML IM: CPT

## 2021-05-20 ENCOUNTER — TELEPHONE (OUTPATIENT)
Dept: FAMILY MEDICINE | Facility: CLINIC | Age: 36
End: 2021-05-20

## 2021-05-20 NOTE — TELEPHONE ENCOUNTER
Call received from patient    Patient reports she had sent a few [employer] messages regarding her symptoms and felt like she was not expressing her concern correctly     Patient states 2 nights ago around 1900, she was getting ready to take a shower; states she had turned the water on in the shower and had went to the sink  As patient was at the sink she states she started to feel a tickle in her throat / chest and was suddenly overcome by a choking sensation   States she felt like she was choking but had not choked on anything   Patient states as she was experiencing this choking feeling she started to cough and suddenly coughed up blood tinged sputum.   Patient states the choking sensation resolved after coughing up blood tinged sputum  -See [employer] message for picture of blood tinged sputum   Patient reports she has had a persistent dry cough since above episode  Patient denies further episodes of blood tinged sputum    Patient states prior to above episodes patient had no symptoms     Since episode, patient has continued to have a persistent dry cough - occasionally has clear sputum with coughing  Patient states she has had right sided discomfort just below right breast that comes and goes  States on occasion she feels as though she cannot get a full breath   Denies pain with deep breathing  Denies feeling short of breath - other than not being able to take in a full breath   Patient also has a constant tickle in her throat and chest    Denies ill contacts  Denies Covid-19 exposure  Denies feeling dizzy / lightheaded  Denies having the symptoms above happen before  Denies a sore throat    Reporting adequate intake and fluids and adequate appetite    Will route to Darinel to review.     Faustino Flores RN

## 2021-05-20 NOTE — TELEPHONE ENCOUNTER
Patient returned phone call  Relayed Darinel's message    Appointment scheduled for 5.21.2021 at 0800 with Darinel    Patient verbalized understanding and agrees with plan  No further questions/concerns    Faustino Flores RN

## 2021-05-20 NOTE — TELEPHONE ENCOUNTER
I did reply to her last my chart although didn't have those specific details. I still think it sounds like she can be seen tomorrow (I have no openings today) but if she feels it is more urgent or her breathing is worsening, she should go to urgent care for evaluation    Kavya Haider PA-C

## 2021-05-20 NOTE — TELEPHONE ENCOUNTER
Call placed to patient   No answer; generic voicemail left requesting call back to Clinic RN at [phone]    Faustino Flores RN

## 2021-05-21 ENCOUNTER — ANCILLARY PROCEDURE (OUTPATIENT)
Dept: GENERAL RADIOLOGY | Facility: CLINIC | Age: 36
End: 2021-05-21
Attending: PHYSICIAN ASSISTANT
Payer: COMMERCIAL

## 2021-05-21 ENCOUNTER — OFFICE VISIT (OUTPATIENT)
Dept: FAMILY MEDICINE | Facility: CLINIC | Age: 36
End: 2021-05-21
Payer: COMMERCIAL

## 2021-05-21 VITALS
OXYGEN SATURATION: 98 % | DIASTOLIC BLOOD PRESSURE: 88 MMHG | BODY MASS INDEX: 47.09 KG/M2 | SYSTOLIC BLOOD PRESSURE: 130 MMHG | HEART RATE: 102 BPM | HEIGHT: 66 IN | TEMPERATURE: 98.3 F | WEIGHT: 293 LBS

## 2021-05-21 DIAGNOSIS — R05.9 COUGH: ICD-10-CM

## 2021-05-21 DIAGNOSIS — M72.2 PLANTAR FASCIITIS: ICD-10-CM

## 2021-05-21 DIAGNOSIS — H91.90 PERCEIVED HEARING CHANGES: ICD-10-CM

## 2021-05-21 DIAGNOSIS — R05.9 COUGH: Primary | ICD-10-CM

## 2021-05-21 PROCEDURE — 71046 X-RAY EXAM CHEST 2 VIEWS: CPT | Mod: FY | Performed by: RADIOLOGY

## 2021-05-21 PROCEDURE — 99214 OFFICE O/P EST MOD 30 MIN: CPT | Performed by: PHYSICIAN ASSISTANT

## 2021-05-21 ASSESSMENT — MIFFLIN-ST. JEOR: SCORE: 2203.62

## 2021-05-21 ASSESSMENT — PAIN SCALES - GENERAL: PAINLEVEL: NO PAIN (0)

## 2021-05-21 NOTE — PATIENT INSTRUCTIONS
You have 2 referrals for orthopedics -     1. For carpal tunnel (this was placed at a previous visit)  2. For plantar fasciitis (placed today)    The number to schedule for each one is the same and because I placed the foot one today, they may call you in 1-2 days. However, if you would like to reach out to them or miss their call, the number to schedule is [phone]      There is also a referral for the weight management program which as we discussed is going to be helpful in terms of the foot and back problems    You can call [phone] to schedule this      For the audiology (hearing) test - they should be calling you, but if you do not hear from them, call [phone]

## 2021-05-21 NOTE — PROGRESS NOTES
Assessment & Plan     (R05) Cough  (primary encounter diagnosis)  Comment: concerned about flecks of blood in sputum and cancer given she used to smoke. CXR in clinic today unremarkable other than scarring or atelectasis noted on the left side;lungs sound clear. I am more suspicious of upper airway allergies/inflammation and associated irritation causing symptoms which we discussed at length during our visit. Patient seemed reassured but then later called stating she was still coughing up large volumes of phlegm with blood noted per patient and was concerned with xray findings. CT ordered.   Plan: XR Chest 2 Views, CT Chest w Contrast,         CANCELED: CT Chest w/o Contrast            (M72.2) Plantar fasciitis  Comment: reviewed again the etiology of plantar fasciitis as well as treatment which can often be very long in length. Also discussed weight as a likely contributing factor. Discussed over the counter treatments including night splints. Offered referral to foot/ankle should she like to discuss any possible changes to the treatment  Plan: Orthopedic & Spine  Referral            (H91.90) Perceived hearing changes  Comment:   Plan: AUDIOLOGY ADULT REFERRAL           Return in about 4 weeks (around 6/18/2021) for follow up.    KENZIE Ling [facility] [place] is a 35 year old who presents for the following health issues    HPI       Patient is here today to address a cough and coughing up blood. Pain with taking a very deep breath in her right lung.     -She is wanting a list of all the referral numbers she has gotten in the past.     -Wondering if she can get a referral to get her hearing checked.      See my chart messages regarding coughing spells  Was coughing so hard at one point that she was gagging and choking  Felt like the cough was precipitated by the sensation of something in her throat choking her    Review of Systems   Remainder of ROS  "obtained and found to be negative other than that which was documented above        Objective    /88   Pulse 102   Temp 98.3  F (36.8  C) (Tympanic)   Ht 1.676 m (5' 6\")   Wt 149.7 kg (330 lb)   SpO2 98%   BMI 53.26 kg/m    Body mass index is 53.26 kg/m .  Physical Exam   GENERAL: healthy, alert and no distress  EYES: Eyes grossly normal to inspection  HENT: ear canals and TM's normal, nose and mouth without ulcers or lesions  RESP: lungs clear to auscultation - no rales, rhonchi or wheezes  CV: regular rates and rhythm, normal S1 S2, no S3 or S4, no murmur, click or rub and no peripheral edema    Results for orders placed or performed in visit on 05/21/21   XR Chest 2 Views     Status: None    Narrative    XR CHEST 2 VW 5/21/2021 8:44 AM     HISTORY: pain with deep breaths after coughing fit episode yesterday  with tinges of blood in her sputum; Cough      Impression    IMPRESSION: Mild linear atelectatic or fibrotic changes at the right  lung base, new since 8/3/2013. The lungs are otherwise clear. No  pleural effusion.    GILLIAN MEJÍA MD             "

## 2021-05-24 ENCOUNTER — NURSE TRIAGE (OUTPATIENT)
Dept: NURSING | Facility: CLINIC | Age: 36
End: 2021-05-24

## 2021-05-25 NOTE — TELEPHONE ENCOUNTER
I sent her a my chart message suggesting she make an appointment with our same day provider Nora Collins.mari

## 2021-05-25 NOTE — TELEPHONE ENCOUNTER
"Pt reports she has sent several [employer] messages regarding f/u on chest x-ray and OV 5/21/21. Pt thinks she should have a CT scan \"possibly\". Requesting provider advice.      Reason for Disposition    [1] Follow-up call from patient regarding patient's clinical status AND [2] information NON-URGENT    Protocols used: PCP CALL - NO TRIAGE-A-AH      "

## 2021-06-02 ENCOUNTER — HOSPITAL ENCOUNTER (OUTPATIENT)
Dept: CT IMAGING | Facility: CLINIC | Age: 36
Discharge: HOME | End: 2021-06-02
Attending: PHYSICIAN ASSISTANT | Admitting: PHYSICIAN ASSISTANT
Payer: COMMERCIAL

## 2021-06-02 ENCOUNTER — IMMUNIZATION (OUTPATIENT)
Dept: FAMILY MEDICINE | Facility: CLINIC | Age: 36
End: 2021-06-02
Attending: FAMILY MEDICINE
Payer: COMMERCIAL

## 2021-06-02 DIAGNOSIS — R05.9 COUGH: ICD-10-CM

## 2021-06-02 PROCEDURE — 91301 PR COVID VAC MODERNA 100 MCG/0.5 ML IM: CPT

## 2021-06-02 PROCEDURE — 0012A PR COVID VAC MODERNA 100 MCG/0.5 ML IM: CPT

## 2021-06-02 PROCEDURE — 71260 CT THORAX DX C+: CPT

## 2021-06-02 PROCEDURE — 250N000011 HC RX IP 250 OP 636: Performed by: PHYSICIAN ASSISTANT

## 2021-06-02 PROCEDURE — 250N000009 HC RX 250: Performed by: PHYSICIAN ASSISTANT

## 2021-06-02 RX ORDER — IOPAMIDOL 755 MG/ML
80 INJECTION, SOLUTION INTRAVASCULAR ONCE
Status: COMPLETED | OUTPATIENT
Start: 2021-06-02 | End: 2021-06-02

## 2021-06-02 RX ADMIN — IOPAMIDOL 80 ML: 755 INJECTION, SOLUTION INTRAVENOUS at 08:21

## 2021-06-02 RX ADMIN — SODIUM CHLORIDE 80 ML: 9 INJECTION, SOLUTION INTRAVENOUS at 08:21

## 2021-06-08 ENCOUNTER — TELEPHONE (OUTPATIENT)
Dept: PALLIATIVE MEDICINE | Facility: CLINIC | Age: 36
End: 2021-06-08

## 2021-06-08 ENCOUNTER — MYC MEDICAL ADVICE (OUTPATIENT)
Dept: FAMILY MEDICINE | Facility: CLINIC | Age: 36
End: 2021-06-08

## 2021-06-08 NOTE — TELEPHONE ENCOUNTER

## 2021-06-16 ENCOUNTER — TRANSFERRED RECORDS (OUTPATIENT)
Dept: HEALTH INFORMATION MANAGEMENT | Facility: CLINIC | Age: 36
End: 2021-06-16

## 2021-07-04 ENCOUNTER — MYC MEDICAL ADVICE (OUTPATIENT)
Dept: FAMILY MEDICINE | Facility: CLINIC | Age: 36
End: 2021-07-04

## 2021-07-04 DIAGNOSIS — R10.2 PELVIC PAIN IN FEMALE: Primary | ICD-10-CM

## 2021-07-05 ENCOUNTER — TELEPHONE (OUTPATIENT)
Dept: OBGYN | Facility: CLINIC | Age: 36
End: 2021-07-05

## 2021-07-05 RX ORDER — HYDROCODONE BITARTRATE AND ACETAMINOPHEN 5; 325 MG/1; MG/1
1 TABLET ORAL EVERY 6 HOURS PRN
Qty: 5 TABLET | Refills: 0 | Status: SHIPPED | OUTPATIENT
Start: 2021-07-05 | End: 2021-07-05

## 2021-07-05 RX ORDER — HYDROCODONE BITARTRATE AND ACETAMINOPHEN 5; 325 MG/1; MG/1
1 TABLET ORAL EVERY 6 HOURS PRN
Qty: 5 TABLET | Refills: 0 | Status: SHIPPED | OUTPATIENT
Start: 2021-07-05 | End: 2021-08-22

## 2021-07-05 NOTE — TELEPHONE ENCOUNTER
Call placed to patient  No answer; generically left message that Darinel recommended patient be evaluated by her OBGYN for symptoms discussed in [facility]inic RN call back number [phone] provided if patient had questions / concerns     Faustino Flores RN

## 2021-07-05 NOTE — TELEPHONE ENCOUNTER
[facility] to [place] and that she needs to enter the building by the ED entrance if she is going to arrive after 6 PM. This writer reiterated that as long as she will be at the [place] [facility] Entrance to get medication; that if her pain is severe like she is describing ; that she should just be seen there in the Emergency Room.  Gonzalo Ellison RN

## 2021-07-05 NOTE — TELEPHONE ENCOUNTER
"Patient  States she has a prolapsed uterus. Happened Saturday. Uncomfortable. Feels urination is \"off\", scared to  kids, move, etc. Very tearful and scared.   Of note- is 2 weeks late on period- HPT negative, which is adding to stress and fear.     Appointment scheduled for 7/6/2021 with Dr. James. If this needs to be rescheduled- please call patient.     Thank you,   Lukas FRASER RN   Specialty Clinics   "

## 2021-07-05 NOTE — TELEPHONE ENCOUNTER
Script sent to [facility]  I didn't get the impression from her initial email that the pain was that severe or persistent - that is more concerning to me and since I am not quite sure from the description what exactly is going on, can we please call her and advise that if the pain is not managed, especially with the norco, or if unrelenting she should be seen more urgently    Kavya Haider PA-C

## 2021-07-05 NOTE — TELEPHONE ENCOUNTER
Patient was not able to get to [facility] in time for them to dispense the norco. This writer called [facility] and spoke with Mata Keller. Arianna said that the [place] Pharmacist talked with the [facility] Pharmacist and the [facility] Pharmacist cancelled the order but that Kavya would need to reorder it to [facility].  Gonzalo Ellison RN

## 2021-07-05 NOTE — TELEPHONE ENCOUNTER
Patient's call transferred to author  Patient tearful on the phone as she is overwhelmed with current symptoms and discomfort    Patient has upcoming appointment with OBGYN tomorrow - patient states this is the earliest she could be seen    Patient reporting significant pain and requesting Drainel prescribe a pain medication (Vicoden or Percocet) to get her through until her appointment with the OBGYN tomorrow     Patient reports pain is worse with movement - rates pain a 7 - 8 / 10 on the pain scale  Describes the pain as pressure  Has attempted OTC Tylenol and Ibuprofen with some relief     Reporting urinating is uncomfortable   Urine is clear yellow and non - odorous     Will route to Darinel to review and provide recommendation     Faustino Flores RN

## 2021-07-05 NOTE — TELEPHONE ENCOUNTER
Janine notified of all of Kavya's instructions as noted below. Janine agreed with plan.  Gonzalo Ellison RN

## 2021-07-06 ENCOUNTER — MYC MEDICAL ADVICE (OUTPATIENT)
Dept: OBGYN | Facility: CLINIC | Age: 36
End: 2021-07-06

## 2021-07-06 ENCOUNTER — OFFICE VISIT (OUTPATIENT)
Dept: OBGYN | Facility: CLINIC | Age: 36
End: 2021-07-06
Payer: COMMERCIAL

## 2021-07-06 VITALS
HEART RATE: 84 BPM | HEIGHT: 66 IN | SYSTOLIC BLOOD PRESSURE: 144 MMHG | WEIGHT: 293 LBS | BODY MASS INDEX: 47.09 KG/M2 | DIASTOLIC BLOOD PRESSURE: 85 MMHG | TEMPERATURE: 97.3 F

## 2021-07-06 DIAGNOSIS — B96.89 BV (BACTERIAL VAGINOSIS): Primary | ICD-10-CM

## 2021-07-06 DIAGNOSIS — R30.0 DYSURIA: ICD-10-CM

## 2021-07-06 DIAGNOSIS — N76.0 BV (BACTERIAL VAGINOSIS): Primary | ICD-10-CM

## 2021-07-06 DIAGNOSIS — N91.2 AMENORRHEA: ICD-10-CM

## 2021-07-06 PROBLEM — O14.90 PRE-ECLAMPSIA: Status: RESOLVED | Noted: 2019-02-19 | Resolved: 2021-07-06

## 2021-07-06 PROBLEM — Z34.80 PRENATAL CARE, SUBSEQUENT PREGNANCY: Status: RESOLVED | Noted: 2018-06-20 | Resolved: 2021-07-06

## 2021-07-06 LAB
ALBUMIN UR-MCNC: NEGATIVE MG/DL
APPEARANCE UR: CLEAR
B-HCG SERPL-ACNC: <1 IU/L (ref 0–5)
BILIRUB UR QL STRIP: NEGATIVE
COLOR UR AUTO: YELLOW
GLUCOSE UR STRIP-MCNC: NEGATIVE MG/DL
HGB UR QL STRIP: NEGATIVE
KETONES UR STRIP-MCNC: NEGATIVE MG/DL
LEUKOCYTE ESTERASE UR QL STRIP: NEGATIVE
NITRATE UR QL: NEGATIVE
PH UR STRIP: 6 PH (ref 5–7)
SOURCE: NORMAL
SP GR UR STRIP: 1.01 (ref 1–1.03)
SPECIMEN SOURCE: ABNORMAL
UROBILINOGEN UR STRIP-ACNC: 0.2 EU/DL (ref 0.2–1)
WET PREP SPEC: ABNORMAL

## 2021-07-06 PROCEDURE — 99214 OFFICE O/P EST MOD 30 MIN: CPT | Performed by: OBSTETRICS & GYNECOLOGY

## 2021-07-06 PROCEDURE — 84702 CHORIONIC GONADOTROPIN TEST: CPT | Performed by: OBSTETRICS & GYNECOLOGY

## 2021-07-06 PROCEDURE — 87086 URINE CULTURE/COLONY COUNT: CPT | Performed by: OBSTETRICS & GYNECOLOGY

## 2021-07-06 PROCEDURE — 87210 SMEAR WET MOUNT SALINE/INK: CPT | Performed by: OBSTETRICS & GYNECOLOGY

## 2021-07-06 PROCEDURE — 87591 N.GONORRHOEAE DNA AMP PROB: CPT | Performed by: OBSTETRICS & GYNECOLOGY

## 2021-07-06 PROCEDURE — 36415 COLL VENOUS BLD VENIPUNCTURE: CPT | Performed by: OBSTETRICS & GYNECOLOGY

## 2021-07-06 PROCEDURE — 81003 URINALYSIS AUTO W/O SCOPE: CPT | Performed by: OBSTETRICS & GYNECOLOGY

## 2021-07-06 PROCEDURE — 87491 CHLMYD TRACH DNA AMP PROBE: CPT | Performed by: OBSTETRICS & GYNECOLOGY

## 2021-07-06 RX ORDER — PHENAZOPYRIDINE HYDROCHLORIDE 100 MG/1
100 TABLET, FILM COATED ORAL 3 TIMES DAILY PRN
Qty: 9 TABLET | Refills: 0 | Status: SHIPPED | OUTPATIENT
Start: 2021-07-06 | End: 2021-08-20

## 2021-07-06 RX ORDER — METRONIDAZOLE 500 MG/1
500 TABLET ORAL 2 TIMES DAILY
Qty: 14 TABLET | Refills: 0 | Status: SHIPPED | OUTPATIENT
Start: 2021-07-06 | End: 2021-07-13

## 2021-07-06 RX ORDER — METRONIDAZOLE 500 MG/1
500 TABLET ORAL 2 TIMES DAILY
Qty: 14 TABLET | Refills: 0 | OUTPATIENT
Start: 2021-07-06 | End: 2021-07-13

## 2021-07-06 ASSESSMENT — MIFFLIN-ST. JEOR: SCORE: 2180.94

## 2021-07-06 NOTE — PROGRESS NOTES
[facility]    Gynecology Office Note    CC:   Chief Complaint   Patient presents with     Uterine Prolapse        HPI: Janine Salas is a 35 year old  who presents for pelvic pain. Patient reports history of acute onset pelvic pain. Reports she was straining and suddenly felt pelvic pressure and pain. Reports the pain as severe. Since then has had some voiding dysfunction with pain with urination and some difficulty with urination. Concerned she could have prolapse as she has read this can cause pelvic pressure. Denies any vaginal discharge. Does report amenorrhea over the past two months. She is on an oral contraceptive and never got her menses during the placebo week. UPT at home was negative.     GYN Hx:     Last Pap Smear:   Lab Results   Component Value Date    PAP NIL 2018     Contraception: OCP    ROS: A 10 pt ROS was completed and found to be otherwise negative unless mentioned in the HPI.     PMH:   Past Medical History:   Diagnosis Date     Acute pancreatitis 1/15     Anxiety      Chickenpox      PONV (postoperative nausea and vomiting)      Pregnancy induced hypertension      PTSD (post-traumatic stress disorder)      Severe postpartum depression     also anxiety       PSHx:   Past Surgical History:   Procedure Laterality Date     APPENDECTOMY        SECTION N/A 2016    Procedure:  SECTION;  Surgeon: Marco Marin MD;  Location: [facility]      SECTION N/A 2019    Procedure:  SECTION;  Surgeon: Marco Marin MD;  Location: [facility]     COLONOSCOPY       HC REMOVAL GALLBLADDER      Description: Cholecystectomy;  Recorded: 10/04/2013;     LAPAROSCOPIC CHOLECYSTECTOMY  2013    Procedure: LAPAROSCOPIC CHOLECYSTECTOMY;  Laparoscopic Cholecystectomy;  Surgeon: Lasha Garcias MD;  Location: [facility] OR     MOUTH SURGERY  1999    wisdom teeth     UPPER GI ENDOSCOPY         OBHx:   OB History     Para Term  AB Living   2 2 2 0 0 2   SAB TAB Ectopic Multiple Live Births   0 0 0 0 2      # Outcome Date GA Lbr [place]/2nd Weight Sex Delivery Anes PTL Lv   2 Term 19 38w4d  3.51 kg (7 lb 11.8 oz) M CS-LTranv Spinal N CAMRON      Name: Peng      Apgar1: 9  Apgar5: 9   1 Term 16 39w0d  2.948 kg (6 lb 8 oz) M CS-LTranv Spinal N CAMRON      Complications: Failure to Progress in First Stage      Name: Jack      Apgar1: 8  Apgar5: 9       Medications:   albuterol (PROAIR HFA/PROVENTIL HFA/VENTOLIN HFA) 108 (90 Base) MCG/ACT inhaler, Inhale 2 puffs into the lungs every 6 hours  atenolol (TENORMIN) 50 MG tablet, Take 1 tablet (50 mg) by mouth daily  buPROPion (WELLBUTRIN XL) 150 MG 24 hr tablet, Take 1 tablet (150 mg) by mouth every morning  escitalopram (LEXAPRO) 20 MG tablet, Take 1 tablet (20 mg) by mouth daily  FALMINA 0.1-20 MG-MCG tablet, TAKE ONE TABLET BY MOUTH ONCE DAILY  HYDROcodone-acetaminophen (NORCO) 5-325 MG tablet, Take 1 tablet by mouth every 6 hours as needed for severe pain  phentermine (ADIPEX-P) 37.5 MG capsule, Take 1 capsule (37.5 mg) by mouth every morning    No current facility-administered medications on file prior to visit.       Allergies:      Allergies   Allergen Reactions     Codeine Itching     Zofran [Ondansetron] Other (See Comments)     Headaches        Social History:   Social History     Socioeconomic History     Marital status: Single     Spouse name: Not on file     Number of children: 2     Years of education: Not on file     Highest education level: Not on file   Occupational History     Not on file   Social Needs     Financial resource strain: Not on file     Food insecurity     Worry: Not on file     Inability: Not on file     Transportation needs     Medical: Not on file     Non-medical: Not on file   Tobacco Use     Smoking status: Former Smoker     Packs/day: 0.50     Types: Cigarettes     Start date: 2015     Smokeless tobacco: Former User   Substance and Sexual  Activity     Alcohol use: No     Alcohol/week: 0.0 standard drinks     Comment: rare- quit with pregnancy     Drug use: No     Sexual activity: Yes     Partners: Male   Lifestyle     Physical activity     Days per week: Not on file     Minutes per session: Not on file     Stress: Not on file   Relationships     Social connections     Talks on phone: Not on file     Gets together: Not on file     Attends Mu-ism service: Not on file     Active member of club or organization: Not on file     Attends meetings of clubs or organizations: Not on file     Relationship status: Not on file     Intimate partner violence     Fear of current or ex partner: Not on file     Emotionally abused: Not on file     Physically abused: Not on file     Forced sexual activity: Not on file   Other Topics Concern     Parent/sibling w/ CABG, MI or angioplasty before 65F 55M? No   Social History Narrative     Not on file         Family History:   Family History   Problem Relation Age of Onset     Hypertension Mother      Depression Mother      Osteoporosis Mother      Thyroid Disease Mother      Hypertension Father      Alcohol/Drug Father         recovered alcohol- has fetal alcohol syndrome     Cancer Father         melanoma     Dementia Father      Hypertension Maternal Grandmother      Alzheimer Disease Maternal Grandmother      Cardiovascular Maternal Grandmother         triple bypass     Cancer Maternal Grandfather         lung     Alcohol/Drug Paternal Grandmother         alcohol     Cancer - colorectal Paternal Grandmother         colon     Alcohol/Drug Paternal Grandfather         alcohol     Cancer Paternal Grandfather         leukemia - adult onset     Cardiovascular Paternal Grandfather         stent     Obesity Sister      Breast Cancer Maternal Aunt      Cancer Sister         cervical cancer, hysterectomy     Substance Abuse Sister         recovered drugs     Bipolar Disorder Sister      Depression Sister      Hypothyroidism  "Sister      Autism Spectrum Disorder Sister         ASpergers     Bipolar Disorder Other      Kidney Disease Maternal Aunt         Stage 3       Physical Exam:   Vitals:    07/06/21 1039   Weight: 147.4 kg (325 lb)   Height: 1.676 m (5' 6\")      Estimated body mass index is 52.46 kg/m  as calculated from the following:    Height as of this encounter: 1.676 m (5' 6\").    Weight as of this encounter: 147.4 kg (325 lb).    General appearance: well-hydrated, A&O x 3, no apparent distress  Lungs: Equal expansion bilaterally, no accessory muscle use  Heart: No heaves or thrills. No peripheral varicosities  Constitutional: See vitals  Neuro: CN II-XII grossly intact  Genitourinary:  External genitalia: no erythema, no lesions.   Urethral meatus appropriate location without lesions or prolapse  Urethra: +tenderness to palpation  Bladder +exquisit tenderness to palpation, some fullness  Anus and Perineum: Unremarkable, no visible lesions  Vagina: unable to fully exam due to tenderness  Cervix: unable to examine due to tenderness  Uterus: unable to examine due to tenderness  Adnexa: unable to examen      Assessment and Plan:     Encounter Diagnoses   Name Primary?     Dysuria Yes     Amenorrhea      Exquisit tenderness of bladder and urethra on exam, unable to perform complete pelvic exam. Suspicious for urinary tract infection. Urine culture and UA collected today. Rx for pyridium. Will await results for antibiotics. Pelvic ultrasound also ordered for amenorrhea and pelvic pain. Await results to guide further treatment/plan.     Laura James DO      30 minutes spent on the date of the encounter doing chart review, patient visit and documentation.         "

## 2021-07-06 NOTE — TELEPHONE ENCOUNTER
Please review patients lab results and advise.     Wet prep positive for Clue Cells, possible order for Flagyl T'ed up.     Thanks,    Shanique Cardoso RN

## 2021-07-07 ENCOUNTER — MYC REFILL (OUTPATIENT)
Dept: FAMILY MEDICINE | Facility: CLINIC | Age: 36
End: 2021-07-07

## 2021-07-07 DIAGNOSIS — R10.2 PELVIC PAIN IN FEMALE: ICD-10-CM

## 2021-07-07 LAB
BACTERIA SPEC CULT: NORMAL
C TRACH DNA SPEC QL NAA+PROBE: NEGATIVE
Lab: NORMAL
N GONORRHOEA DNA SPEC QL NAA+PROBE: NEGATIVE
SPECIMEN SOURCE: NORMAL

## 2021-07-07 PROCEDURE — 99285 EMERGENCY DEPT VISIT HI MDM: CPT | Performed by: EMERGENCY MEDICINE

## 2021-07-07 PROCEDURE — 99285 EMERGENCY DEPT VISIT HI MDM: CPT | Mod: 25 | Performed by: EMERGENCY MEDICINE

## 2021-07-07 RX ORDER — HYDROCODONE BITARTRATE AND ACETAMINOPHEN 5; 325 MG/1; MG/1
1 TABLET ORAL EVERY 6 HOURS PRN
Qty: 5 TABLET | Refills: 0 | OUTPATIENT
Start: 2021-07-07

## 2021-07-07 NOTE — TELEPHONE ENCOUNTER
She was given qty#5 less than 48 hours ago  There are several my chart messages between her and OB and given their concerns with her pain (which would be unusual for BV alone to cause) they suggested the ED or urgent care to make sure there isn't an abscess or something more serious. For the same reasons as before, I don't feel comfortable potentially masking something more serious going on (and OB/GYN felt the same so I am not going to go above them either)    Kavya Haider PA-C

## 2021-07-07 NOTE — TELEPHONE ENCOUNTER
Call placed to patient  Relayed Darinel's message    Patient verbalized understanding  No further questions/concerns    Faustino Flores RN

## 2021-07-07 NOTE — TELEPHONE ENCOUNTER
Routing refill request to provider for review/approval because:  Drug not on the [facility] refill protocol     Faustino Flores RN

## 2021-07-08 ENCOUNTER — APPOINTMENT (OUTPATIENT)
Dept: CT IMAGING | Facility: CLINIC | Age: 36
End: 2021-07-08
Attending: EMERGENCY MEDICINE
Payer: COMMERCIAL

## 2021-07-08 ENCOUNTER — HOSPITAL ENCOUNTER (EMERGENCY)
Facility: CLINIC | Age: 36
Discharge: HOME | End: 2021-07-08
Attending: EMERGENCY MEDICINE | Admitting: EMERGENCY MEDICINE
Payer: COMMERCIAL

## 2021-07-08 VITALS
HEART RATE: 74 BPM | OXYGEN SATURATION: 97 % | SYSTOLIC BLOOD PRESSURE: 143 MMHG | DIASTOLIC BLOOD PRESSURE: 87 MMHG | TEMPERATURE: 98.6 F | BODY MASS INDEX: 47.09 KG/M2 | RESPIRATION RATE: 16 BRPM | HEIGHT: 66 IN | WEIGHT: 293 LBS

## 2021-07-08 DIAGNOSIS — D72.829 LEUKOCYTOSIS, UNSPECIFIED TYPE: ICD-10-CM

## 2021-07-08 DIAGNOSIS — B96.89 BACTERIAL VAGINOSIS: ICD-10-CM

## 2021-07-08 DIAGNOSIS — R10.84 ABDOMINAL PAIN, GENERALIZED: ICD-10-CM

## 2021-07-08 DIAGNOSIS — N76.0 BACTERIAL VAGINOSIS: ICD-10-CM

## 2021-07-08 LAB
ALBUMIN SERPL-MCNC: 3.4 G/DL (ref 3.4–5)
ALBUMIN UR-MCNC: 30 MG/DL
ALP SERPL-CCNC: 106 U/L (ref 40–150)
ALT SERPL W P-5'-P-CCNC: 28 U/L (ref 0–50)
ANION GAP SERPL CALCULATED.3IONS-SCNC: 5 MMOL/L (ref 3–14)
APPEARANCE UR: CLEAR
AST SERPL W P-5'-P-CCNC: 23 U/L (ref 0–45)
BASOPHILS # BLD AUTO: 0.1 10E9/L (ref 0–0.2)
BASOPHILS NFR BLD AUTO: 0.5 %
BILIRUB SERPL-MCNC: 0.5 MG/DL (ref 0.2–1.3)
BILIRUB UR QL STRIP: NEGATIVE
BUN SERPL-MCNC: 20 MG/DL (ref 7–30)
CALCIUM SERPL-MCNC: 8.8 MG/DL (ref 8.5–10.1)
CHLORIDE SERPL-SCNC: 106 MMOL/L (ref 94–109)
CO2 SERPL-SCNC: 28 MMOL/L (ref 20–32)
COLOR UR AUTO: ABNORMAL
CREAT SERPL-MCNC: 1.03 MG/DL (ref 0.52–1.04)
DIFFERENTIAL METHOD BLD: ABNORMAL
EOSINOPHIL # BLD AUTO: 0.2 10E9/L (ref 0–0.7)
EOSINOPHIL NFR BLD AUTO: 1.2 %
ERYTHROCYTE [DISTWIDTH] IN BLOOD BY AUTOMATED COUNT: 13.3 % (ref 10–15)
GFR SERPL CREATININE-BSD FRML MDRD: 70 ML/MIN/{1.73_M2}
GLUCOSE SERPL-MCNC: 75 MG/DL (ref 70–99)
GLUCOSE UR STRIP-MCNC: NEGATIVE MG/DL
HCG UR QL: NEGATIVE
HCT VFR BLD AUTO: 40.6 % (ref 35–47)
HGB BLD-MCNC: 13.5 G/DL (ref 11.7–15.7)
HGB UR QL STRIP: NEGATIVE
IMM GRANULOCYTES # BLD: 0.1 10E9/L (ref 0–0.4)
IMM GRANULOCYTES NFR BLD: 0.3 %
KETONES UR STRIP-MCNC: 5 MG/DL
LEUKOCYTE ESTERASE UR QL STRIP: NEGATIVE
LYMPHOCYTES # BLD AUTO: 3.2 10E9/L (ref 0.8–5.3)
LYMPHOCYTES NFR BLD AUTO: 17.4 %
MCH RBC QN AUTO: 31.6 PG (ref 26.5–33)
MCHC RBC AUTO-ENTMCNC: 33.3 G/DL (ref 31.5–36.5)
MCV RBC AUTO: 95 FL (ref 78–100)
MONOCYTES # BLD AUTO: 1.2 10E9/L (ref 0–1.3)
MONOCYTES NFR BLD AUTO: 6.6 %
MUCOUS THREADS #/AREA URNS LPF: PRESENT /LPF
NEUTROPHILS # BLD AUTO: 13.5 10E9/L (ref 1.6–8.3)
NEUTROPHILS NFR BLD AUTO: 74 %
NITRATE UR QL: POSITIVE
NRBC # BLD AUTO: 0 10*3/UL
NRBC BLD AUTO-RTO: 0 /100
PH UR STRIP: 5 PH (ref 5–7)
PLATELET # BLD AUTO: 234 10E9/L (ref 150–450)
POTASSIUM SERPL-SCNC: 4.4 MMOL/L (ref 3.4–5.3)
PROT SERPL-MCNC: 7.2 G/DL (ref 6.8–8.8)
RBC # BLD AUTO: 4.27 10E12/L (ref 3.8–5.2)
RBC #/AREA URNS AUTO: 2 /HPF (ref 0–2)
SODIUM SERPL-SCNC: 139 MMOL/L (ref 133–144)
SOURCE: ABNORMAL
SP GR UR STRIP: 1.03 (ref 1–1.03)
SQUAMOUS #/AREA URNS AUTO: 6 /HPF (ref 0–1)
UROBILINOGEN UR STRIP-MCNC: 4 MG/DL (ref 0–2)
WBC # BLD AUTO: 18.2 10E9/L (ref 4–11)
WBC #/AREA URNS AUTO: 3 /HPF (ref 0–5)

## 2021-07-08 PROCEDURE — 250N000011 HC RX IP 250 OP 636: Performed by: EMERGENCY MEDICINE

## 2021-07-08 PROCEDURE — 85025 COMPLETE CBC W/AUTO DIFF WBC: CPT | Performed by: EMERGENCY MEDICINE

## 2021-07-08 PROCEDURE — 96375 TX/PRO/DX INJ NEW DRUG ADDON: CPT | Performed by: EMERGENCY MEDICINE

## 2021-07-08 PROCEDURE — 81025 URINE PREGNANCY TEST: CPT | Performed by: EMERGENCY MEDICINE

## 2021-07-08 PROCEDURE — 87086 URINE CULTURE/COLONY COUNT: CPT | Performed by: EMERGENCY MEDICINE

## 2021-07-08 PROCEDURE — 80053 COMPREHEN METABOLIC PANEL: CPT | Performed by: EMERGENCY MEDICINE

## 2021-07-08 PROCEDURE — 74177 CT ABD & PELVIS W/CONTRAST: CPT

## 2021-07-08 PROCEDURE — 96374 THER/PROPH/DIAG INJ IV PUSH: CPT | Mod: 59 | Performed by: EMERGENCY MEDICINE

## 2021-07-08 PROCEDURE — 250N000009 HC RX 250: Performed by: EMERGENCY MEDICINE

## 2021-07-08 PROCEDURE — 81001 URINALYSIS AUTO W/SCOPE: CPT | Performed by: EMERGENCY MEDICINE

## 2021-07-08 RX ORDER — POLYETHYLENE GLYCOL 3350 17 G/17G
1 POWDER, FOR SOLUTION ORAL DAILY
Qty: 527 G | Refills: 0 | COMMUNITY
Start: 2021-07-08 | End: 2021-08-07

## 2021-07-08 RX ORDER — HYDROMORPHONE HYDROCHLORIDE 1 MG/ML
0.5 INJECTION, SOLUTION INTRAMUSCULAR; INTRAVENOUS; SUBCUTANEOUS
Status: DISCONTINUED | OUTPATIENT
Start: 2021-07-08 | End: 2021-07-08 | Stop reason: HOSPADM

## 2021-07-08 RX ORDER — IOPAMIDOL 755 MG/ML
100 INJECTION, SOLUTION INTRAVASCULAR ONCE
Status: COMPLETED | OUTPATIENT
Start: 2021-07-08 | End: 2021-07-08

## 2021-07-08 RX ORDER — ASPIRIN 81 MG
100 TABLET, DELAYED RELEASE (ENTERIC COATED) ORAL DAILY
Qty: 60 TABLET | Refills: 1 | COMMUNITY
Start: 2021-07-08 | End: 2021-08-22

## 2021-07-08 RX ORDER — METOCLOPRAMIDE HYDROCHLORIDE 5 MG/ML
10 INJECTION INTRAMUSCULAR; INTRAVENOUS ONCE
Status: COMPLETED | OUTPATIENT
Start: 2021-07-08 | End: 2021-07-08

## 2021-07-08 RX ADMIN — HYDROMORPHONE HYDROCHLORIDE 0.5 MG: 1 INJECTION, SOLUTION INTRAMUSCULAR; INTRAVENOUS; SUBCUTANEOUS at 00:49

## 2021-07-08 RX ADMIN — IOPAMIDOL 100 ML: 755 INJECTION, SOLUTION INTRAVENOUS at 02:21

## 2021-07-08 RX ADMIN — METOCLOPRAMIDE HYDROCHLORIDE 10 MG: 5 INJECTION INTRAMUSCULAR; INTRAVENOUS at 03:24

## 2021-07-08 RX ADMIN — SODIUM CHLORIDE 74 ML: 9 INJECTION, SOLUTION INTRAVENOUS at 02:21

## 2021-07-08 ASSESSMENT — ENCOUNTER SYMPTOMS
BACK PAIN: 0
APPETITE CHANGE: 0
HEADACHES: 0
WOUND: 0
VOMITING: 0
NAUSEA: 0
FLANK PAIN: 0
FATIGUE: 0
CONFUSION: 0
DYSURIA: 1
SHORTNESS OF BREATH: 0
ABDOMINAL PAIN: 1
HEMATURIA: 0
COUGH: 0
DYSPHORIC MOOD: 0
DIARRHEA: 0
CHEST TIGHTNESS: 0
FEVER: 0
RHINORRHEA: 0
LIGHT-HEADEDNESS: 0
FREQUENCY: 0
CHILLS: 0
CONSTIPATION: 0

## 2021-07-08 ASSESSMENT — MIFFLIN-ST. JEOR: SCORE: 2184.57

## 2021-07-08 NOTE — ED PROVIDER NOTES
History     Chief Complaint   Patient presents with     Abdominal Pain     lower pelvic pain getting progressively worse and now seems bloated and distended. States intermitent nausea, denies pregnancy.      HPI  Janine Salas is a 35 year old female with history of preeclampsia, chronic hypertension, and low back pain presenting for evaluation of abdominal pain, bloating, some nausea, vaginal discomfort and urinary discomfort.  Has been having progressive symptoms over the past roughly 2 weeks.  Was seen in the clinic a few days and had a work-up identifying bacterial vaginosis however, given her severe vaginal pain and abdominal pain symptoms, was advised to come in for further evaluation earlier.  Patient was reluctant to do this but tonight her pain became more intense and persistent that she decided to come in for evaluation.  Denies fevers or chills.  Reports normal appetite.  Pain has not changed with eating or drinking.  Patient denies any constipation or diarrhea.  Denies injury.  Was started on metronidazole but has only taken 2 doses so far.  Denies any vaginal discharge or bleeding.  Reports ongoing urinary discomfort.  Pain in abdomen is variable affecting all areas of the abdomen at different times.  Currently reporting low abdominal pain.    Allergies:  Allergies   Allergen Reactions     Codeine Itching     Zofran [Ondansetron] Other (See Comments)     Headaches        Problem List:    Patient Active Problem List    Diagnosis Date Noted     Pre-eclampsia superimposed on chronic hypertension, postpartum 02/15/2019     Priority: Medium     Hip pain, left 2018     Priority: Medium     Bilateral low back pain without sciatica 2018     Priority: Medium     Moderate episode of recurrent major depressive disorder (H) 03/15/2018     Priority: Medium     Acute bilateral low back pain with right-sided sciatica 2017     Priority: Medium     S/P  section 2016     Priority:  Medium     Essential hypertension 2016     Priority: Medium     Anxiety 02/10/2016     Priority: Medium     Morbid obesity due to excess calories (H) 2016     Priority: Medium     Back pain associated with peripheral numbness 2016     Priority: Medium     CARDIOVASCULAR SCREENING; LDL GOAL LESS THAN 160 2012     Priority: Medium        Past Medical History:    Past Medical History:   Diagnosis Date     Acute pancreatitis 1/15     Anxiety      Chickenpox      PONV (postoperative nausea and vomiting)      Pregnancy induced hypertension      PTSD (post-traumatic stress disorder)      Severe postpartum depression        Past Surgical History:    Past Surgical History:   Procedure Laterality Date     APPENDECTOMY        SECTION N/A 2016    Procedure:  SECTION;  Surgeon: Marco Marin MD;  Location: [facility]      SECTION N/A 2019    Procedure:  SECTION;  Surgeon: Marco Marin MD;  Location: [facility]     COLONOSCOPY       HC REMOVAL GALLBLADDER      Description: Cholecystectomy;  Recorded: 10/04/2013;     LAPAROSCOPIC CHOLECYSTECTOMY  2013    Procedure: LAPAROSCOPIC CHOLECYSTECTOMY;  Laparoscopic Cholecystectomy;  Surgeon: Lasha Garcias MD;  Location: [facility]     MOUTH SURGERY      wisdom teeth     UPPER GI ENDOSCOPY         Family History:    Family History   Problem Relation Age of Onset     Hypertension Mother      Depression Mother      Osteoporosis Mother      Thyroid Disease Mother      Hypertension Father      Alcohol/Drug Father         recovered alcohol- has fetal alcohol syndrome     Cancer Father         melanoma     Dementia Father      Hypertension Maternal Grandmother      Alzheimer Disease Maternal Grandmother      Cardiovascular Maternal Grandmother         triple bypass     Cancer Maternal Grandfather         lung     Alcohol/Drug Paternal Grandmother         alcohol     Cancer - colorectal Paternal  Grandmother         colon     Alcohol/Drug Paternal Grandfather         alcohol     Cancer Paternal Grandfather         leukemia - adult onset     Cardiovascular Paternal Grandfather         stent     Obesity Sister      Breast Cancer Maternal Aunt      Cancer Sister         cervical cancer, hysterectomy     Substance Abuse Sister         recovered drugs     Bipolar Disorder Sister      Depression Sister      Hypothyroidism Sister      Autism Spectrum Disorder Sister         ASpergers     Bipolar Disorder Other      Kidney Disease Maternal Aunt         Stage 3       Social History:  Marital Status:  Single [1]  Social History     Tobacco Use     Smoking status: Former Smoker     Packs/day: 0.50     Types: Cigarettes     Start date: 5/7/2015     Smokeless tobacco: Former User   Substance Use Topics     Alcohol use: No     Alcohol/week: 0.0 standard drinks     Comment: rare- quit with pregnancy     Drug use: No        Medications:    docusate sodium (COLACE) 100 MG tablet  magnesium citrate solution  polyethylene glycol (MIRALAX) 17 GM/Dose powder  albuterol (PROAIR HFA/PROVENTIL HFA/VENTOLIN HFA) 108 (90 Base) MCG/ACT inhaler  atenolol (TENORMIN) 50 MG tablet  buPROPion (WELLBUTRIN XL) 150 MG 24 hr tablet  escitalopram (LEXAPRO) 20 MG tablet  FALMINA 0.1-20 MG-MCG tablet  HYDROcodone-acetaminophen (NORCO) 5-325 MG tablet  metroNIDAZOLE (FLAGYL) 500 MG tablet  phenazopyridine (PYRIDIUM) 100 MG tablet  phentermine (ADIPEX-P) 37.5 MG capsule          Review of Systems   Constitutional: Negative for appetite change, chills, fatigue and fever.   HENT: Negative for congestion and rhinorrhea.    Respiratory: Negative for cough, chest tightness and shortness of breath.    Cardiovascular: Negative for chest pain.   Gastrointestinal: Positive for abdominal pain. Negative for constipation, diarrhea, nausea and vomiting.   Genitourinary: Positive for dysuria and vaginal pain. Negative for decreased urine volume, flank pain,  "frequency, hematuria, urgency, vaginal bleeding and vaginal discharge.   Musculoskeletal: Negative for back pain.   Skin: Negative for rash and wound.   Neurological: Negative for light-headedness and headaches.   Psychiatric/Behavioral: Negative for confusion and dysphoric mood.   All other systems reviewed and are negative.      Physical Exam   BP: (!) 142/83  Pulse: 90  Temp: 98.6  F (37  C)  Resp: 16  Height: 167.6 cm (5' 6\")  Weight: 147.8 kg (325 lb 12.8 oz)(bed scale)  SpO2: 98 %      Physical Exam  Vitals signs and nursing note reviewed.   Constitutional:       Appearance: She is obese. She is not ill-appearing or diaphoretic.      Comments: Appears uncomfortable but nontoxic   HENT:      Nose: Nose normal.      Mouth/Throat:      Mouth: Mucous membranes are moist.   Eyes:      Conjunctiva/sclera: Conjunctivae normal.   Neck:      Musculoskeletal: Normal range of motion.   Cardiovascular:      Rate and Rhythm: Normal rate.      Heart sounds: Normal heart sounds.   Pulmonary:      Effort: Pulmonary effort is normal.      Breath sounds: Normal breath sounds.   Abdominal:      General: Abdomen is protuberant. Bowel sounds are normal.      Palpations: Abdomen is soft.      Tenderness: There is generalized abdominal tenderness (Mild diffuse tenderness, no localized peritonitis).   Skin:     General: Skin is warm and dry.      Capillary Refill: Capillary refill takes less than 2 seconds.   Neurological:      Mental Status: She is alert and oriented to person, place, and time.   Psychiatric:         Mood and Affect: Mood normal.         ED Course        Procedures                   Results for orders placed or performed during the hospital encounter of 07/08/21 (from the past 24 hour(s))   Comprehensive metabolic panel   Result Value Ref Range    Sodium 139 133 - 144 mmol/L    Potassium 4.4 3.4 - 5.3 mmol/L    Chloride 106 94 - 109 mmol/L    Carbon Dioxide 28 20 - 32 mmol/L    Anion Gap 5 3 - 14 mmol/L    Glucose " 75 70 - 99 mg/dL    Urea Nitrogen 20 7 - 30 mg/dL    Creatinine 1.03 0.52 - 1.04 mg/dL    GFR Estimate 70 >60 mL/min/[1.73_m2]    GFR Estimate If Black 81 >60 mL/min/[1.73_m2]    Calcium 8.8 8.5 - 10.1 mg/dL    Bilirubin Total 0.5 0.2 - 1.3 mg/dL    Albumin 3.4 3.4 - 5.0 g/dL    Protein Total 7.2 6.8 - 8.8 g/dL    Alkaline Phosphatase 106 40 - 150 U/L    ALT 28 0 - 50 U/L    AST 23 0 - 45 U/L   CBC with platelets differential   Result Value Ref Range    WBC 18.2 (H) 4.0 - 11.0 10e9/L    RBC Count 4.27 3.8 - 5.2 10e12/L    Hemoglobin 13.5 11.7 - 15.7 g/dL    Hematocrit 40.6 35.0 - 47.0 %    MCV 95 78 - 100 fl    MCH 31.6 26.5 - 33.0 pg    MCHC 33.3 31.5 - 36.5 g/dL    RDW 13.3 10.0 - 15.0 %    Platelet Count 234 150 - 450 10e9/L    Diff Method Automated Method     % Neutrophils 74.0 %    % Lymphocytes 17.4 %    % Monocytes 6.6 %    % Eosinophils 1.2 %    % Basophils 0.5 %    % Immature Granulocytes 0.3 %    Nucleated RBCs 0 0 /100    Absolute Neutrophil 13.5 (H) 1.6 - 8.3 10e9/L    Absolute Lymphocytes 3.2 0.8 - 5.3 10e9/L    Absolute Monocytes 1.2 0.0 - 1.3 10e9/L    Absolute Eosinophils 0.2 0.0 - 0.7 10e9/L    Absolute Basophils 0.1 0.0 - 0.2 10e9/L    Abs Immature Granulocytes 0.1 0 - 0.4 10e9/L    Absolute Nucleated RBC 0.0    UA reflex to Microscopic and Culture    Specimen: Midstream Urine   Result Value Ref Range    Color Urine Phoebe     Appearance Urine Clear     Glucose Urine Negative NEG^Negative mg/dL    Bilirubin Urine Negative NEG^Negative    Ketones Urine 5 (A) NEG^Negative mg/dL    Specific Gravity Urine 1.032 1.003 - 1.035    Blood Urine Negative NEG^Negative    pH Urine 5.0 5.0 - 7.0 pH    Protein Albumin Urine 30 (A) NEG^Negative mg/dL    Urobilinogen mg/dL 4.0 (H) 0.0 - 2.0 mg/dL    Nitrite Urine Positive (A) NEG^Negative    Leukocyte Esterase Urine Negative NEG^Negative    Source Midstream Urine     RBC Urine 2 0 - 2 /HPF    WBC Urine 3 0 - 5 /HPF    Squamous Epithelial /HPF Urine 6 (H) 0 - 1 /HPF     Mucous Urine Present (A) NEG^Negative /LPF   HCG qualitative urine (UPT)   Result Value Ref Range    HCG Qual Urine Negative NEG^Negative   CT Abdomen Pelvis w Contrast    Narrative    EXAM: CT ABDOMEN PELVIS W CONTRAST  LOCATION: [facility]  DATE/TIME: 7/8/2021 2:20 AM    INDICATION: Abdominal pain, acute, nonlocalized.  COMPARISON: 12/22/2019.  TECHNIQUE: CT scan of the abdomen and pelvis was performed following injection of IV contrast. Multiplanar reformats were obtained. Dose reduction techniques were used.  CONTRAST: 100 mL Isovue 370.    FINDINGS:   LOWER CHEST: Normal.    HEPATOBILIARY: Liver is negative. Gallbladder is absent. No biliary dilatation.    PANCREAS: Normal.    SPLEEN: Normal.    ADRENAL GLANDS: Normal.    KIDNEYS/BLADDER: Normal.    BOWEL: The bowel is normal in caliber with no evidence for obstruction. Appendix not clearly seen and may be absent. Regardless, no inflammatory changes in the right lower quadrant. Moderate to large amounts of stool in the colon. No definite acute bowel   findings.    LYMPH NODES: Normal.    VASCULATURE: Unremarkable.    PELVIC ORGANS: Nonspecific 3 cm right ovarian cyst. This is incompletely evaluated on CT but most likely represents a physiologic cyst with no specific follow-up needed unless clinically warranted.    MUSCULOSKELETAL: Mild degenerative changes in the lower lumbar spine.      Impression    IMPRESSION:   1.  No definite acute abnormalities or CT findings to explain the patient's symptoms.    2.  Gallbladder is absent. No biliary dilatation.    3.  Moderate to large amount of stool in the colon. No evidence for obstruction and no definite acute bowel findings.    4.  Nonspecific 3 cm right ovarian cyst. This is most likely physiologic in nature with no specific follow-up needed unless clinically warranted.       Medications   HYDROmorphone (PF) (DILAUDID) injection 0.5 mg (0.5 mg Intravenous Given 7/8/21 0049)   metoclopramide  (REGLAN) injection 10 mg (has no administration in time range)   iopamidol (ISOVUE-370) solution 100 mL (100 mLs Intravenous Given 7/8/21 0221)   sodium chloride 0.9 % bag 500mL for CT scan flush use (74 mLs Intravenous Given 7/8/21 0221)     3:01 AM Patient re-assessed: Patient resting comfortably.  Pain improved but still present.  Abdomen remains soft and nonperitoneal.  Reviewed reassuring labs and imaging and plan for symptomatic treatment with follow-up. Recommended a trial of treatment for constipation as this is the only apparent cause of her pain found on work-up.    Assessments & Plan (with Medical Decision Making)  35-year-old female presenting for evaluation of abdominal pain with bloating and nausea. Also has been dealing with vaginal discomfort and was diagnosed with bacterial vaginosis for which she is currently being treated by GYN. Had increasing abdominal pain today so came in for evaluation. Patient has diffuse and migratory pain in her abdomen on exam. Has been having urinary discomfort but with a negative UA few days ago, the discomfort is thought to be secondary to bacterial vaginosis which was identified. Repeat UA today showed positive nitrite but negative leukocyte esterase and only 3 white cells. This was also dirty catch with 6 epithelial cells so unlikely to represent bacterial infection. CT obtained due to her reported severe pain. No concerning abnormalities for severe pathology but did identify a large amount of stool throughout the colon without evidence of obstruction. Also right ovarian cyst was noted. Patient's pain is not localized in the right lower pelvic area so unlikely to be the cause of her pain. Recommended trial treatment for constipation with plan for primary care/GYN follow-up to assure resolution of her bacterial vaginosis and vaginal discomfort symptoms.     I have reviewed the nursing notes.    I have reviewed the findings, diagnosis, plan and need for follow up with  the patient.       New Prescriptions    DOCUSATE SODIUM (COLACE) 100 MG TABLET    Take 1 tablet (100 mg) by mouth daily    MAGNESIUM CITRATE SOLUTION    Take 296 mLs by mouth once for 1 dose    POLYETHYLENE GLYCOL (MIRALAX) 17 GM/DOSE POWDER    Take 17 g (1 capful) by mouth daily       Final diagnoses:   Abdominal pain, generalized - possible constipation associated pain   Leukocytosis, unspecified type   Bacterial vaginosis       7/7/2021   [facility] EMERGENCY DEPT     Coffey, Brian Fischer MD  07/08/21 8471

## 2021-07-09 LAB
BACTERIA SPEC CULT: NO GROWTH
Lab: NORMAL
SPECIMEN SOURCE: NORMAL

## 2021-07-09 NOTE — RESULT ENCOUNTER NOTE
"Final urine culture report shows \"NO GROWTH\" and is NEGATIVE.  Recommendations in treatment per [facility] ED Lab result Urine culture protocol.  "
[facility] Emergency Dept discharge antibiotic (if prescribed): None  No changes in treatment per [facility] ED Lab Result Urine culture protocol.  
Have You Had Laser Hair Removal Before?: has had previous treatment
When Was Your Last Laser Treatment?: 08/09/2017

## 2021-07-11 ENCOUNTER — MYC MEDICAL ADVICE (OUTPATIENT)
Dept: OBGYN | Facility: CLINIC | Age: 36
End: 2021-07-11

## 2021-07-15 ENCOUNTER — OFFICE VISIT (OUTPATIENT)
Dept: OBGYN | Facility: CLINIC | Age: 36
End: 2021-07-15
Payer: COMMERCIAL

## 2021-07-15 VITALS
WEIGHT: 293 LBS | DIASTOLIC BLOOD PRESSURE: 104 MMHG | TEMPERATURE: 97.8 F | SYSTOLIC BLOOD PRESSURE: 150 MMHG | BODY MASS INDEX: 47.09 KG/M2 | HEIGHT: 66 IN | RESPIRATION RATE: 18 BRPM | HEART RATE: 83 BPM

## 2021-07-15 DIAGNOSIS — N36.41 URETHRAL HYPERMOBILITY: ICD-10-CM

## 2021-07-15 DIAGNOSIS — N36.8: ICD-10-CM

## 2021-07-15 DIAGNOSIS — B37.0 THRUSH: ICD-10-CM

## 2021-07-15 DIAGNOSIS — Z01.411 ENCOUNTER FOR GYNECOLOGICAL EXAMINATION WITH ABNORMAL FINDING: Primary | ICD-10-CM

## 2021-07-15 DIAGNOSIS — F32.1 CURRENT MODERATE EPISODE OF MAJOR DEPRESSIVE DISORDER, UNSPECIFIED WHETHER RECURRENT (H): ICD-10-CM

## 2021-07-15 PROCEDURE — 87624 HPV HI-RISK TYP POOLED RSLT: CPT | Performed by: OBSTETRICS & GYNECOLOGY

## 2021-07-15 PROCEDURE — G0145 SCR C/V CYTO,THINLAYER,RESCR: HCPCS | Performed by: OBSTETRICS & GYNECOLOGY

## 2021-07-15 PROCEDURE — 99213 OFFICE O/P EST LOW 20 MIN: CPT | Performed by: OBSTETRICS & GYNECOLOGY

## 2021-07-15 RX ORDER — NYSTATIN 100000/ML
500000 SUSPENSION, ORAL (FINAL DOSE FORM) ORAL 4 TIMES DAILY
Qty: 60 ML | Refills: 1 | Status: SHIPPED | OUTPATIENT
Start: 2021-07-15 | End: 2021-08-18

## 2021-07-15 ASSESSMENT — PATIENT HEALTH QUESTIONNAIRE - PHQ9
5. POOR APPETITE OR OVEREATING: MORE THAN HALF THE DAYS
SUM OF ALL RESPONSES TO PHQ QUESTIONS 1-9: 7

## 2021-07-15 ASSESSMENT — ANXIETY QUESTIONNAIRES
3. WORRYING TOO MUCH ABOUT DIFFERENT THINGS: SEVERAL DAYS
5. BEING SO RESTLESS THAT IT IS HARD TO SIT STILL: SEVERAL DAYS
6. BECOMING EASILY ANNOYED OR IRRITABLE: MORE THAN HALF THE DAYS
1. FEELING NERVOUS, ANXIOUS, OR ON EDGE: MORE THAN HALF THE DAYS
IF YOU CHECKED OFF ANY PROBLEMS ON THIS QUESTIONNAIRE, HOW DIFFICULT HAVE THESE PROBLEMS MADE IT FOR YOU TO DO YOUR WORK, TAKE CARE OF THINGS AT HOME, OR GET ALONG WITH OTHER PEOPLE: SOMEWHAT DIFFICULT
7. FEELING AFRAID AS IF SOMETHING AWFUL MIGHT HAPPEN: SEVERAL DAYS
GAD7 TOTAL SCORE: 10
2. NOT BEING ABLE TO STOP OR CONTROL WORRYING: SEVERAL DAYS

## 2021-07-15 ASSESSMENT — MIFFLIN-ST. JEOR: SCORE: 2159.17

## 2021-07-15 NOTE — TELEPHONE ENCOUNTER
No narcotics without office visit.  Patient was seen today.    Mary Painting   [facility]  RN

## 2021-07-15 NOTE — PROGRESS NOTES
"Janine Salas is a 35 year old   Patient's last menstrual period was 2021. .  She presents for consultation sent by herself for possible bladder prolapse.  Her symptoms began within the last week.  She noted sudden onset of a popping sensation when she was squatting to change her 5-year-old clothing.  She notes that the popping happen several times a day as usually associated with increased abdominal pressure.  She notes that voiding is different with a fan shaped lateral flow of urine.  She has no difficulty emptying her bladder.  She feels that empties completely.  She has no incontinence.  She has no vaginal discharge.  She has not been sexually active recently.  She has had 2  sections.  No vaginal deliveries.  They were/ not associated with any trauma.  She has nocturia x 0.  She voids every 3-4 hours.  She does/not  use protection.  She does/ not have urine loss with sexual activity.  She has/ no history of enuresis.  She has /not  had vaginal deliveries.  Her largest baby was 7 pounds 12 ounces  She had/ no  post partum incontinence or prolapse.  She has/ not  had surgery for incontinence.  Pelvic pressure and Feeling like something is falling out  \"Popping out\"  Past Surgical History:   Procedure Laterality Date     APPENDECTOMY        SECTION N/A 2016    Procedure:  SECTION;  Surgeon: Marco Marin MD;  Location: [facility]      SECTION N/A 2019    Procedure:  SECTION;  Surgeon: Marco Marin MD;  Location: [facility]     COLONOSCOPY       HC REMOVAL GALLBLADDER      Description: Cholecystectomy;  Recorded: 10/04/2013;     LAPAROSCOPIC CHOLECYSTECTOMY  2013    Procedure: LAPAROSCOPIC CHOLECYSTECTOMY;  Laparoscopic Cholecystectomy;  Surgeon: Lasha Garcias MD;  Location: [facility]     MOUTH SURGERY      wisdom teeth     UPPER GI ENDOSCOPY         Past Medical History:   Diagnosis Date     Acute pancreatitis 1/15     " "Anxiety      Chickenpox      PONV (postoperative nausea and vomiting)      Pregnancy induced hypertension      PTSD (post-traumatic stress disorder)      Severe postpartum depression 2016    also anxiety       albuterol (PROAIR HFA/PROVENTIL HFA/VENTOLIN HFA) 108 (90 Base) MCG/ACT inhaler, Inhale 2 puffs into the lungs every 6 hours  atenolol (TENORMIN) 50 MG tablet, Take 1 tablet (50 mg) by mouth daily  buPROPion (WELLBUTRIN XL) 150 MG 24 hr tablet, Take 1 tablet (150 mg) by mouth every morning  docusate sodium (COLACE) 100 MG tablet, Take 1 tablet (100 mg) by mouth daily  escitalopram (LEXAPRO) 20 MG tablet, Take 1 tablet (20 mg) by mouth daily  FALMINA 0.1-20 MG-MCG tablet, TAKE ONE TABLET BY MOUTH ONCE DAILY  HYDROcodone-acetaminophen (NORCO) 5-325 MG tablet, Take 1 tablet by mouth every 6 hours as needed for severe pain  phenazopyridine (PYRIDIUM) 100 MG tablet, Take 1 tablet (100 mg) by mouth 3 times daily as needed for urinary tract discomfort  phentermine (ADIPEX-P) 37.5 MG capsule, Take 1 capsule (37.5 mg) by mouth every morning  polyethylene glycol (MIRALAX) 17 GM/Dose powder, Take 17 g (1 capful) by mouth daily    No current facility-administered medications on file prior to visit.         Allergies   Allergen Reactions     Codeine Itching     Zofran [Ondansetron] Other (See Comments)     Headaches        BP (!) 150/104 (BP Location: Right arm, Cuff Size: Adult Large)   Pulse 83   Temp 97.8  F (36.6  C)   Resp 18   Ht 1.676 m (5' 6\")   Wt 145.2 kg (320 lb 3.2 oz)   LMP 07/14/2021   BMI 51.68 kg/m     HEENT-significant for oral thrush     Abdomen-NTND, no organomegaly, normal BS in all quadrants, without rebound or guarding  normal vagina and vulva, normal cervix without lesions or tenderness, exam chaperoned by nurse  For suburethral area is enlarged.  Her transverse perineal muscle has increased tone.  She does have some small amount of urethral hypermobility.    (Z01.419) Encounter for routine " gynecological examination  (primary encounter diagnosis)  Comment:   Plan: Pap imaged thin layer screen with HPV -         recommended age 30 - 65 years          (Z01.419) Encounter for routine gynecological examination  (primary encounter diagnosis)  Comment:   Plan: Pap imaged thin layer screen with HPV -         recommended age 30 - 65 years, HPV Hold (Lab         Only)            (B37.0) Thrush  Comment:   Plan: nystatin (MYCOSTATIN) 594229 UNIT/ML suspension            (F32.1) Current moderate episode of major depressive disorder, unspecified whether recurrent (H)  Comment: We had a long Discussion regarding issues that she has been dealing with since she was a child.  It is evident she has not been dealing with these well.  I think she has some relationship issues that need to be addressed as well.  She denies suicidal ideation.  She has more of a passive sense that life would be better if she was not around.  Plan: MENTAL HEALTH REFERRAL  - Adult; Assessments         and Testing, Outpatient Treatment; General         [place] [facility] [place] [facility] [phone];         Individual/Couples/Family/Group Therapy/Health         [place] [facility] [place] [facility]...            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  Plan: I explained the situation.  I attempted to milk the periurethral glands with limited success.  I recommended sitz bath's and reassurance.          Follow-up in 2 to 3 weeks      Marco Marin MD

## 2021-07-16 ASSESSMENT — ANXIETY QUESTIONNAIRES: GAD7 TOTAL SCORE: 10

## 2021-07-19 LAB
BKR LAB AP GYN ADEQUACY: NORMAL
BKR LAB AP GYN INTERPRETATION: NORMAL
BKR LAB AP HPV REFLEX: NORMAL
BKR LAB AP PREVIOUS ABNORMAL: NORMAL
PATH REPORT.COMMENTS IMP SPEC: NORMAL
PATH REPORT.RELEVANT HX SPEC: NORMAL

## 2021-07-21 DIAGNOSIS — F33.1 MODERATE EPISODE OF RECURRENT MAJOR DEPRESSIVE DISORDER (H): ICD-10-CM

## 2021-07-21 NOTE — TELEPHONE ENCOUNTER
Routing refill request to provider for review/approval because:  PHQ9: 7 on 7/2021    Faustino Flores RN

## 2021-07-22 ENCOUNTER — PATIENT OUTREACH (OUTPATIENT)
Dept: OBGYN | Facility: CLINIC | Age: 36
End: 2021-07-22

## 2021-07-22 DIAGNOSIS — R87.810 CERVICAL HIGH RISK HPV (HUMAN PAPILLOMAVIRUS) TEST POSITIVE: ICD-10-CM

## 2021-07-22 LAB
HUMAN PAPILLOMA VIRUS 16 DNA: NEGATIVE
HUMAN PAPILLOMA VIRUS 18 DNA: NEGATIVE
HUMAN PAPILLOMA VIRUS FINAL DIAGNOSIS: NORMAL
HUMAN PAPILLOMA VIRUS OTHER HR: POSITIVE

## 2021-07-22 RX ORDER — BUPROPION HYDROCHLORIDE 150 MG/1
TABLET ORAL
Qty: 90 TABLET | Refills: 3 | Status: SHIPPED | OUTPATIENT
Start: 2021-07-22 | End: 2022-07-19

## 2021-08-04 ENCOUNTER — OFFICE VISIT (OUTPATIENT)
Dept: OBGYN | Facility: CLINIC | Age: 36
End: 2021-08-04
Payer: COMMERCIAL

## 2021-08-04 VITALS
BODY MASS INDEX: 47.09 KG/M2 | DIASTOLIC BLOOD PRESSURE: 78 MMHG | RESPIRATION RATE: 16 BRPM | TEMPERATURE: 96.9 F | HEIGHT: 66 IN | SYSTOLIC BLOOD PRESSURE: 130 MMHG | OXYGEN SATURATION: 99 % | WEIGHT: 293 LBS | HEART RATE: 78 BPM

## 2021-08-04 DIAGNOSIS — B96.89 BV (BACTERIAL VAGINOSIS): ICD-10-CM

## 2021-08-04 DIAGNOSIS — N89.8 VAGINAL ODOR: Primary | ICD-10-CM

## 2021-08-04 DIAGNOSIS — N76.0 BV (BACTERIAL VAGINOSIS): ICD-10-CM

## 2021-08-04 DIAGNOSIS — B37.0 THRUSH: ICD-10-CM

## 2021-08-04 LAB
CLUE CELLS: PRESENT
TRICHOMONAS, WET PREP: ABNORMAL
WBC'S/HIGH POWER FIELD, WET PREP: ABNORMAL
YEAST, WET PREP: ABNORMAL

## 2021-08-04 PROCEDURE — 99213 OFFICE O/P EST LOW 20 MIN: CPT | Performed by: OBSTETRICS & GYNECOLOGY

## 2021-08-04 PROCEDURE — 87210 SMEAR WET MOUNT SALINE/INK: CPT | Performed by: OBSTETRICS & GYNECOLOGY

## 2021-08-04 RX ORDER — METRONIDAZOLE 500 MG/1
500 TABLET ORAL 2 TIMES DAILY
Qty: 14 TABLET | Refills: 0 | Status: SHIPPED | OUTPATIENT
Start: 2021-08-04 | End: 2021-08-11

## 2021-08-04 RX ORDER — FLUCONAZOLE 150 MG/1
150 TABLET ORAL DAILY
Qty: 3 TABLET | Refills: 0 | Status: SHIPPED | OUTPATIENT
Start: 2021-08-04 | End: 2021-08-11

## 2021-08-04 ASSESSMENT — MIFFLIN-ST. JEOR: SCORE: 2186.38

## 2021-08-04 NOTE — PROGRESS NOTES
CC:  Follow up  from herself for ongoing thrush affecting her tongue.    HPI:  Janine Salas is a 35 year old female is a   .  Patient's last menstrual period was 2021.  Menses are regular q 28-30 days, lasting 4 days.   She has been treated with Nystatin for thrush and was getting better during her course of medication.  She has worsening symptoms now.  She had a retained tampon last week and now has vaginal itching.  She has a hx of BV in the past while breastfeeding.      Patients records are available and reviewed at today's visit.    Past GYN history:  No STD history       Last PAP smear:  Normal  Last TSH:   TSH   Date Value Ref Range Status   2015 3.12 0.40 - 4.00 mU/L Final     Comment:     Effective 2014, the reference range for this assay has changed to reflect   new instrumentation/methodology.        , normal?  Yes    Past Medical History:   Diagnosis Date     Acute pancreatitis 1/15     Anxiety      Cervical high risk HPV (human papillomavirus) test positive 7/15/2021    8/1/12 NIL 3/10/15 NIL pap, neg HPV / NIL pap, neg HPV 7/15/21 NIL pap, +HR HPV (not 16/18). Plan: cotest in 1 year     Chickenpox      PONV (postoperative nausea and vomiting)      Pregnancy induced hypertension      PTSD (post-traumatic stress disorder)      Severe postpartum depression 2016    also anxiety       Past Surgical History:   Procedure Laterality Date     APPENDECTOMY        SECTION N/A 2016    Procedure:  SECTION;  Surgeon: Marco Marin MD;  Location: [facility]      SECTION N/A 2019    Procedure:  SECTION;  Surgeon: Marco Marin MD;  Location: [facility]     COLONOSCOPY       HC REMOVAL GALLBLADDER      Description: Cholecystectomy;  Recorded: 10/04/2013;     LAPAROSCOPIC CHOLECYSTECTOMY  2013    Procedure: LAPAROSCOPIC CHOLECYSTECTOMY;  Laparoscopic Cholecystectomy;  Surgeon: Lasha Garcias MD;  Location: [facility]      MOUTH SURGERY  1999    wisdom teeth     UPPER GI ENDOSCOPY         Family History   Problem Relation Age of Onset     Hypertension Mother      Depression Mother      Osteoporosis Mother      Thyroid Disease Mother      Hypertension Father      Alcohol/Drug Father         recovered alcohol- has fetal alcohol syndrome     Cancer Father         melanoma     Dementia Father      Hypertension Maternal Grandmother      Alzheimer Disease Maternal Grandmother      Cardiovascular Maternal Grandmother         triple bypass     Cancer Maternal Grandfather         lung     Alcohol/Drug Paternal Grandmother         alcohol     Cancer - colorectal Paternal Grandmother         colon     Alcohol/Drug Paternal Grandfather         alcohol     Cancer Paternal Grandfather         leukemia - adult onset     Cardiovascular Paternal Grandfather         stent     Obesity Sister      Breast Cancer Maternal Aunt      Cancer Sister         cervical cancer, hysterectomy     Substance Abuse Sister         recovered drugs     Bipolar Disorder Sister      Depression Sister      Hypothyroidism Sister      Autism Spectrum Disorder Sister         ASpergers     Bipolar Disorder Other      Kidney Disease Maternal Aunt         Stage 3       Allergies: Codeine and Zofran [ondansetron]    Current Outpatient Medications   Medication Sig Dispense Refill     albuterol (PROAIR HFA/PROVENTIL HFA/VENTOLIN HFA) 108 (90 Base) MCG/ACT inhaler Inhale 2 puffs into the lungs every 6 hours 18 g 0     atenolol (TENORMIN) 50 MG tablet Take 1 tablet (50 mg) by mouth daily 90 tablet 3     buPROPion (WELLBUTRIN XL) 150 MG 24 hr tablet TAKE ONE TABLET BY MOUTH EVERY MORNING 90 tablet 3     docusate sodium (COLACE) 100 MG tablet Take 1 tablet (100 mg) by mouth daily 60 tablet 1     escitalopram (LEXAPRO) 20 MG tablet Take 1 tablet (20 mg) by mouth daily 90 tablet 3     FALMINA 0.1-20 MG-MCG tablet TAKE ONE TABLET BY MOUTH ONCE DAILY 84 tablet 2     fluconazole (DIFLUCAN)  "150 MG tablet Take 1 tablet (150 mg) by mouth daily for 3 days 3 tablet 0     HYDROcodone-acetaminophen (NORCO) 5-325 MG tablet Take 1 tablet by mouth every 6 hours as needed for severe pain 5 tablet 0     metroNIDAZOLE (FLAGYL) 500 MG tablet Take 1 tablet (500 mg) by mouth 2 times daily for 7 days 14 tablet 0     phenazopyridine (PYRIDIUM) 100 MG tablet Take 1 tablet (100 mg) by mouth 3 times daily as needed for urinary tract discomfort 9 tablet 0     phentermine (ADIPEX-P) 37.5 MG capsule Take 1 capsule (37.5 mg) by mouth every morning 30 capsule 1     polyethylene glycol (MIRALAX) 17 GM/Dose powder Take 17 g (1 capful) by mouth daily 527 g 0     nystatin (MYCOSTATIN) 408477 UNIT/ML suspension Take 5 mLs (500,000 Units) by mouth 4 times daily (Patient not taking: Reported on 8/4/2021) 60 mL 1       ROS:  C: NEGATIVE for fever, chills, change in weight  I: NEGATIVE for worrisome rashes, moles or lesions  E: NEGATIVE for vision changes or irritation  E/M: NEGATIVE for ear, mouth and throat problems  R: NEGATIVE for significant cough or SOB  CV: NEGATIVE for chest pain, palpitations or peripheral edema  GI: NEGATIVE for nausea, abdominal pain, heartburn, or change in bowel habits  : NEGATIVE for frequency, dysuria, hematuria, vaginal discharge  M: NEGATIVE for significant arthralgias or myalgia  N: NEGATIVE for weakness, dizziness or paresthesias  E: NEGATIVE for temperature intolerance, skin/hair changes  P: NEGATIVE for changes in mood or affect    EXAM:  Blood pressure 130/78, pulse 78, temperature 96.9  F (36.1  C), resp. rate 16, height 1.676 m (5' 6\"), weight 148 kg (326 lb 3.2 oz), last menstrual period 07/14/2021, SpO2 99 %, not currently breastfeeding.   BMI= Body mass index is 52.65 kg/m .  General - pleasant female in no acute distress.  HEENT- geographic tongue with white exudate over a hyperemic base  Abdomen - soft, nontender, nondistended, no hepatosplenomegaly.  Pelvic - EG: normal adult female,   " BUS: within normal limits,   Vagina: well rugated, small white discharge,   Cervix: no lesions or CMT,   Uterus: firm, normal sized and nontender, Adnexae: no masses or tenderness.  Rectovaginal - deferred.  Musculoskeletal - no gross deformities.  Neurological - normal strength, sensation, and mental status.      ASSESSMENT/PLAN:  (N89.8) Vaginal odor  (primary encounter diagnosis)  (N76.0,  B96.89) BV (bacterial vaginosis)  Plan: Wet prep - Clinic Collect, metroNIDAZOLE         (FLAGYL) 500 MG tablet              B37.0) Thrush  Comment: some improvement with Nystatin  Plan: fluconazole (DIFLUCAN) 150 MG tablet                Letter will be sent to the referring provider.    Marco Marin MD

## 2021-08-05 ENCOUNTER — MYC MEDICAL ADVICE (OUTPATIENT)
Dept: FAMILY MEDICINE | Facility: CLINIC | Age: 36
End: 2021-08-05

## 2021-08-11 ENCOUNTER — MYC REFILL (OUTPATIENT)
Dept: OBGYN | Facility: CLINIC | Age: 36
End: 2021-08-11

## 2021-08-11 DIAGNOSIS — B37.0 THRUSH: ICD-10-CM

## 2021-08-11 DIAGNOSIS — E66.01 MORBID OBESITY DUE TO EXCESS CALORIES (H): ICD-10-CM

## 2021-08-11 RX ORDER — PHENTERMINE HYDROCHLORIDE 37.5 MG/1
CAPSULE ORAL
Qty: 30 CAPSULE | Refills: 1 | OUTPATIENT
Start: 2021-08-11

## 2021-08-16 DIAGNOSIS — B37.0 THRUSH: ICD-10-CM

## 2021-08-17 RX ORDER — FLUCONAZOLE 150 MG/1
150 TABLET ORAL DAILY
Qty: 3 TABLET | Refills: 0 | Status: SHIPPED | OUTPATIENT
Start: 2021-08-17 | End: 2021-08-20

## 2021-08-17 NOTE — TELEPHONE ENCOUNTER
"Last Written Prescription Date:  8/4/2021  Last Fill Quantity: 3,  # refills: 0   Last office visit: 8/4/2021 with prescribing provider:  Dr. Marin    Future Office Visit:     Next 5 appointments (look out 90 days)    Aug 20, 2021  8:40 AM  Giuseppe Joy with Kavya Haider PA-C  [facility] ([facility] ) [address] [address] [phone]           Requested Prescriptions   Pending Prescriptions Disp Refills     fluconazole (DIFLUCAN) 150 MG tablet 3 tablet 0     Sig: Take 1 tablet (150 mg) by mouth daily       Antifungal Agents Failed - 8/11/2021  4:03 PM        Failed - Not Fluconazole or Terconazole      If oral Fluconazole or Terconazole, may refill if indicated in progress notes.           Passed - Recent (12 mo) or future (30 days) visit within the authorizing provider's specialty     Patient has had an office visit with the authorizing provider or a provider within the authorizing providers department within the previous 12 mos or has a future within next 30 days. See \"Patient Info\" tab in inbasket, or \"Choose Columns\" in Meds & Orders section of the refill encounter.              Passed - Medication is active on med list           Routing refill request to provider for review/approval because:  Drug not active on patient's medication list    Mary Painting   [facility]  RN        "

## 2021-08-18 RX ORDER — NYSTATIN 100000/ML
500000 SUSPENSION, ORAL (FINAL DOSE FORM) ORAL 4 TIMES DAILY
Qty: 60 ML | Refills: 1 | Status: SHIPPED | OUTPATIENT
Start: 2021-08-18 | End: 2021-12-21

## 2021-08-19 ENCOUNTER — OFFICE VISIT (OUTPATIENT)
Dept: OBGYN | Facility: CLINIC | Age: 36
End: 2021-08-19
Payer: COMMERCIAL

## 2021-08-19 VITALS
HEART RATE: 111 BPM | WEIGHT: 293 LBS | SYSTOLIC BLOOD PRESSURE: 133 MMHG | HEIGHT: 66 IN | RESPIRATION RATE: 16 BRPM | TEMPERATURE: 99.7 F | DIASTOLIC BLOOD PRESSURE: 90 MMHG | BODY MASS INDEX: 47.09 KG/M2

## 2021-08-19 DIAGNOSIS — R10.2 PELVIC PAIN IN FEMALE: Primary | ICD-10-CM

## 2021-08-19 LAB
CLUE CELLS: NORMAL
TRICHOMONAS, WET PREP: NORMAL
WBC'S/HIGH POWER FIELD, WET PREP: NORMAL
YEAST, WET PREP: NORMAL

## 2021-08-19 PROCEDURE — 87210 SMEAR WET MOUNT SALINE/INK: CPT | Performed by: OBSTETRICS & GYNECOLOGY

## 2021-08-19 PROCEDURE — 99213 OFFICE O/P EST LOW 20 MIN: CPT | Performed by: OBSTETRICS & GYNECOLOGY

## 2021-08-19 ASSESSMENT — MIFFLIN-ST. JEOR: SCORE: 2184.12

## 2021-08-19 NOTE — PROGRESS NOTES
SUBJECTIVE:   35 year old female complains of  vaginal discharge for weeks . She has been diagnosed with BV and treated with po Flagyl with improvement in symptoms. She thinks that the problem has reoccurred .    Denies abnormal vaginal bleeding or significant pelvic pain or  fever. No UTI symptoms. Denies history of known exposure to STD.    Patient's last menstrual period was 07/14/2021.    OBJECTIVE:   She appears well, afebrile.  Abdomen: benign, soft, nontender, no masses.  Pelvic Exam: normal vagina and vulva, normal cervix without lesions or tenderness, uterus normal size anteverted, wet mount exam shows normal epithelial cells, exam chaperoned by nurse.  Urine dipstick: not done.    ASSESSMENT:   nonspecific vaginitis    PLAN:     Treatment: probiotics op  ROV prn if symptoms persist or worsen.  Marco Marin MD

## 2021-08-20 ENCOUNTER — OFFICE VISIT (OUTPATIENT)
Dept: FAMILY MEDICINE | Facility: CLINIC | Age: 36
End: 2021-08-20
Payer: COMMERCIAL

## 2021-08-20 VITALS
OXYGEN SATURATION: 98 % | WEIGHT: 293 LBS | HEART RATE: 79 BPM | DIASTOLIC BLOOD PRESSURE: 78 MMHG | SYSTOLIC BLOOD PRESSURE: 132 MMHG | TEMPERATURE: 97 F | HEIGHT: 66 IN | BODY MASS INDEX: 47.09 KG/M2

## 2021-08-20 DIAGNOSIS — M54.50 CHRONIC BILATERAL LOW BACK PAIN WITHOUT SCIATICA: Primary | ICD-10-CM

## 2021-08-20 DIAGNOSIS — E66.01 MORBID OBESITY DUE TO EXCESS CALORIES (H): ICD-10-CM

## 2021-08-20 DIAGNOSIS — B37.0 THRUSH: ICD-10-CM

## 2021-08-20 DIAGNOSIS — G89.29 CHRONIC BILATERAL LOW BACK PAIN WITHOUT SCIATICA: Primary | ICD-10-CM

## 2021-08-20 PROBLEM — J45.909 ASTHMA: Status: ACTIVE | Noted: 2021-08-20

## 2021-08-20 PROCEDURE — 99214 OFFICE O/P EST MOD 30 MIN: CPT | Performed by: PHYSICIAN ASSISTANT

## 2021-08-20 RX ORDER — BACLOFEN 10 MG/1
5-10 TABLET ORAL 3 TIMES DAILY PRN
Qty: 60 TABLET | Refills: 3 | Status: SHIPPED | OUTPATIENT
Start: 2021-08-20 | End: 2021-12-21

## 2021-08-20 RX ORDER — FLUCONAZOLE 150 MG/1
150 TABLET ORAL DAILY
Qty: 14 TABLET | Refills: 0 | Status: SHIPPED | OUTPATIENT
Start: 2021-08-20 | End: 2021-09-03

## 2021-08-20 RX ORDER — PHENTERMINE HYDROCHLORIDE 37.5 MG/1
37.5 CAPSULE ORAL EVERY MORNING
Qty: 30 CAPSULE | Refills: 3 | Status: SHIPPED | OUTPATIENT
Start: 2021-08-20 | End: 2021-12-27

## 2021-08-20 RX ORDER — NABUMETONE 500 MG/1
500 TABLET, FILM COATED ORAL 2 TIMES DAILY
Qty: 180 TABLET | Refills: 1 | Status: SHIPPED | OUTPATIENT
Start: 2021-08-20 | End: 2022-03-15

## 2021-08-20 ASSESSMENT — PAIN SCALES - GENERAL: PAINLEVEL: SEVERE PAIN (6)

## 2021-08-20 ASSESSMENT — MIFFLIN-ST. JEOR: SCORE: 2180.94

## 2021-08-20 NOTE — PROGRESS NOTES
"    Assessment & Plan       (E66.01) Morbid obesity due to excess calories (H)  Comment: has been helping with apppetite suppression  Plan: phentermine (ADIPEX-P) 37.5 MG capsule            (M54.5,  G89.29) Chronic bilateral low back pain without sciatica  Comment:   Plan: nabumetone (RELAFEN) 500 MG tablet, baclofen         (LIORESAL) 10 MG tablet         Continue working with chiropractor, also trying acupuncture  Continue working on weight loss as this will be critical for long term management of pain    (B37.0) Thrush  Comment: recurrent - will extend fluconazole course  Plan: fluconazole (DIFLUCAN) 150 MG tablet                  No follow-ups on file.    KENZIE Ling [facility] DINA Mehta is a 35 year old who presents for the following health issues     HPI     Medication Followup of Phentermine 37.5 mg    Taking Medication as prescribed: NO-she ran out on Colt 8/15    Side Effects:  None    Medication Helping Symptoms:  yes     -She would like some muscle relaxer's for her back. She has been going to a chiropractor and he said she is in a constant spasm.     Back still very tight - lots of spasms  Working closely with her chiropactor who suggested she try different medications to try and relax muscles     Out of phentermine  Does feel it helps control her appetite and has been losing weight while on it      Review of Systems   Remainder of ROS obtained and found to be negative other than that which was documented above        Objective    /78   Pulse 79   Temp 97  F (36.1  C) (Tympanic)   Ht 1.676 m (5' 6\")   Wt 147.4 kg (325 lb)   SpO2 98%   BMI 52.46 kg/m    Body mass index is 52.46 kg/m .  Physical Exam   GENERAL: healthy, alert and no distress  RESP: lungs clear to auscultation - no rales, rhonchi or wheezes  CV: regular rates and rhythm, normal S1 S2, no S3 or S4, no murmur, click or rub and no peripheral edema  PSYCH: mentation appears normal, " affect normal/bright    Diagnostic Tests:   none

## 2021-08-23 ENCOUNTER — MYC MEDICAL ADVICE (OUTPATIENT)
Dept: FAMILY MEDICINE | Facility: CLINIC | Age: 36
End: 2021-08-23

## 2021-08-23 ENCOUNTER — TELEPHONE (OUTPATIENT)
Dept: FAMILY MEDICINE | Facility: CLINIC | Age: 36
End: 2021-08-23

## 2021-08-23 DIAGNOSIS — J45.909 ASTHMA: Primary | ICD-10-CM

## 2021-08-23 NOTE — TELEPHONE ENCOUNTER
Panel Management Review      Patient has the following on her problem list:     Depression / Dysthymia review    Measure:  Needs PHQ-9 score of 4 or less during index window.  Administer PHQ-9 and if score is 5 or more, send encounter to provider for next steps.    5 - 7 month window range:     PHQ-9 SCORE 2/3/2020 7/16/2020 7/15/2021   PHQ-9 Total Score MyChart 3 (Minimal depression) 15 (Moderately severe depression) -   PHQ-9 Total Score 3 15 7       If PHQ-9 recheck is 5 or more, route to provider for next steps.    Patient is due for:  None    Asthma review   No flowsheet data found.   1. Is Asthma diagnosis on the Problem List? Yes    2. Is Asthma listed on Health Maintenance? Yes    3. Patient is due for:  ACT and AAP    Hypertension   Last three blood pressure readings:  BP Readings from Last 3 Encounters:   08/20/21 132/78   08/19/21 (!) 133/90   08/04/21 130/78     Blood pressure: Passed    HTN Guidelines:  Less than 140/90      Composite cancer screening  Chart review shows that this patient is due/due soon for the following None  Summary:    Patient is due/failing the following:   UDA, lipids, AAP, ACT and PHYSICAL    Action needed:   Patient needs to do ACT.    Type of outreach:    Sent [facility] message.    Questions for provider review:    None                                                                                                                                    Susan Curiel CMA       Chart routed to none .

## 2021-08-23 NOTE — LETTER
My Asthma Action Plan    Name: Janine Salas   YOB: 1985  Date: 8/23/2021   My doctor: Kavya Haider PA-C   My clinic: [facility]        My Rescue Medicine:   Albuterol inhaler (Proair/Ventolin/Proventil HFA)  2-4 puffs EVERY 4 HOURS as needed. Use a spacer if recommended by your provider.   My Asthma Severity:     Know your asthma triggers:              GREEN ZONE   Good Control    I feel good    No cough or wheeze    Can work, sleep and play without asthma symptoms       Take your asthma control medicine every day.     1. If exercise triggers your asthma, take your rescue medication    15 minutes before exercise or sports, and    During exercise if you have asthma symptoms  2. Spacer to use with inhaler: If you have a spacer, make sure to use it with your inhaler             YELLOW ZONE Getting Worse  I have ANY of these:    I do not feel good    Cough or wheeze    Chest feels tight    Wake up at night   1. Keep taking your Green Zone medications  2. Start taking your rescue medicine:    every 20 minutes for up to 1 hour. Then every 4 hours for 24-48 hours.  3. If you stay in the Yellow Zone for more than 12-24 hours, contact your doctor.  4. If you do not return to the Green Zone in 12-24 hours or you get worse, start taking your oral steroid medicine if prescribed by your provider.           RED ZONE Medical Alert - Get Help  I have ANY of these:    I feel awful    Medicine is not helping    Breathing getting harder    Trouble walking or talking    Nose opens wide to breathe       1. Take your rescue medicine NOW  2. If your provider has prescribed an oral steroid medicine, start taking it NOW  3. Call your doctor NOW  4. If you are still in the Red Zone after 20 minutes and you have not reached your doctor:    Take your rescue medicine again and    Call 911 or go to the emergency room right away    See your regular doctor within 2 weeks of an Emergency Room or  Urgent Care visit for follow-up treatment.          Annual Reminders:  Meet with Asthma Educator,  Flu Shot in the Fall, consider Pneumonia Vaccination for patients with asthma (aged 19 and older).    Pharmacy:    [facility], [place] - [address]  [facility], [place] - [address]    Electronically signed by Kavya Haider PA-C   Date: 08/23/21                    Asthma Triggers  How To Control Things That Make Your Asthma Worse    Triggers are things that make your asthma worse.  Look at the list below to help you find your triggers and   what you can do about them. You can help prevent asthma flare-ups by staying away from your triggers.      Trigger                                                          What you can do   Cigarette Smoke  Tobacco smoke can make asthma worse. Do not allow smoking in your home, car or around you.  Be sure no one smokes at a child s day care or school.  If you smoke, ask your health care provider for ways to help you quit.  Ask family members to quit too.  Ask your health care provider for a referral to Quit Plan to help you quit smoking, or call 6-928-164-PLAN.     Colds, Flu, Bronchitis  These are common triggers of asthma. Wash your hands often.  Don t touch your eyes, nose or mouth.  Get a flu shot every year.     Dust Mites  These are tiny bugs that live in cloth or carpet. They are too small to see. Wash sheets and blankets in hot water every week.   Encase pillows and mattress in dust mite proof covers.  Avoid having carpet if you can. If you have carpet, vacuum weekly.   Use a dust mask and HEPA vacuum.   Pollen and Outdoor Mold  Some people are allergic to trees, grass, or weed pollen, or molds. Try to keep your windows closed.  Limit time out doors when pollen count is high.   Ask you health care provider about taking medicine during allergy season.     Animal Dander  Some people are allergic to skin flakes, urine  or saliva from pets with fur or feathers. Keep pets with fur or feathers out of your home.    If you can t keep the pet outdoors, then keep the pet out of your bedroom.  Keep the bedroom door closed.  Keep pets off cloth furniture and away from stuffed toys.     Mice, Rats, and Cockroaches  Some people are allergic to the waste from these pests.   Cover food and garbage.  Clean up spills and food crumbs.  Store grease in the refrigerator.   Keep food out of the bedroom.   Indoor Mold  This can be a trigger if your home has high moisture. Fix leaking faucets, pipes, or other sources of water.   Clean moldy surfaces.  Dehumidify basement if it is damp and smelly.   Smoke, Strong Odors, and Sprays  These can reduce air quality. Stay away from strong odors and sprays, such as perfume, powder, hair spray, paints, smoke incense, paint, cleaning products, candles and new carpet.   Exercise or Sports  Some people with asthma have this trigger. Be active!  Ask your doctor about taking medicine before sports or exercise to prevent symptoms.    Warm up for 5-10 minutes before and after sports or exercise.     Other Triggers of Asthma  Cold air:  Cover your nose and mouth with a scarf.  Sometimes laughing or crying can be a trigger.  Some medicines and food can trigger asthma.

## 2021-08-28 ASSESSMENT — ASTHMA QUESTIONNAIRES: ACT_TOTALSCORE: 17

## 2021-09-11 ENCOUNTER — TRANSFERRED RECORDS (OUTPATIENT)
Dept: HEALTH INFORMATION MANAGEMENT | Facility: CLINIC | Age: 36
End: 2021-09-11

## 2021-09-20 DIAGNOSIS — B96.89 BV (BACTERIAL VAGINOSIS): ICD-10-CM

## 2021-09-20 DIAGNOSIS — F41.9 ANXIETY: ICD-10-CM

## 2021-09-20 DIAGNOSIS — N76.0 BV (BACTERIAL VAGINOSIS): ICD-10-CM

## 2021-09-20 DIAGNOSIS — I10 ESSENTIAL HYPERTENSION: ICD-10-CM

## 2021-09-20 DIAGNOSIS — N89.8 VAGINAL ODOR: ICD-10-CM

## 2021-09-20 RX ORDER — ATENOLOL 50 MG/1
TABLET ORAL
Qty: 90 TABLET | Refills: 3 | Status: SHIPPED | OUTPATIENT
Start: 2021-09-20 | End: 2021-10-22

## 2021-09-20 NOTE — TELEPHONE ENCOUNTER
Metronidazole 500mg  Last Written Prescription Date:  08/04/2021 #14 x 1  Last filled 08/04/2021  Last office visit: 8/20/2021 UBALDO Haider   Future Office Visit:  None    Note: Medication not Rx'd by clinic provider   Note: Medication is not on the current med list

## 2021-09-21 NOTE — TELEPHONE ENCOUNTER
Routing escitalopram refill request to Provider because:  PHQ 2/3/2020 7/16/2020 7/15/2021   PHQ-9 Total Score 3 15 7   Q9: Thoughts of better off dead/self-harm past 2 weeks Not at all Not at all Several days   F/U: Thoughts of suicide or self-harm - - -   F/U: Safety concerns - - -     ELAINA-7 SCORE 3/7/2019 7/16/2020 7/15/2021   Total Score - 10 (moderate anxiety) -   Total Score 1 10 10       Gonzalo Ellison, RN

## 2021-09-22 RX ORDER — ESCITALOPRAM OXALATE 20 MG/1
TABLET ORAL
Qty: 90 TABLET | Refills: 3 | Status: SHIPPED | OUTPATIENT
Start: 2021-09-22 | End: 2022-11-09

## 2021-09-22 RX ORDER — METRONIDAZOLE 500 MG/1
500 TABLET ORAL 2 TIMES DAILY
Qty: 14 TABLET | Refills: 0 | OUTPATIENT
Start: 2021-09-22

## 2021-10-11 ENCOUNTER — LAB (OUTPATIENT)
Dept: LAB | Facility: CLINIC | Age: 36
End: 2021-10-11
Payer: COMMERCIAL

## 2021-10-11 DIAGNOSIS — N89.8 VAGINAL DISCHARGE: ICD-10-CM

## 2021-10-11 PROCEDURE — 87210 SMEAR WET MOUNT SALINE/INK: CPT

## 2021-11-04 ENCOUNTER — MYC MEDICAL ADVICE (OUTPATIENT)
Dept: FAMILY MEDICINE | Facility: CLINIC | Age: 36
End: 2021-11-04

## 2021-11-09 DIAGNOSIS — F41.9 ANXIETY: ICD-10-CM

## 2021-11-09 RX ORDER — ESCITALOPRAM OXALATE 20 MG/1
TABLET ORAL
Qty: 90 TABLET | Refills: 3 | OUTPATIENT
Start: 2021-11-09

## 2021-11-22 ENCOUNTER — E-VISIT (OUTPATIENT)
Dept: FAMILY MEDICINE | Facility: CLINIC | Age: 36
End: 2021-11-22
Payer: COMMERCIAL

## 2021-11-22 DIAGNOSIS — J06.9 VIRAL UPPER RESPIRATORY TRACT INFECTION: Primary | ICD-10-CM

## 2021-11-22 PROCEDURE — 99421 OL DIG E/M SVC 5-10 MIN: CPT | Performed by: PHYSICIAN ASSISTANT

## 2021-12-08 NOTE — PATIENT INSTRUCTIONS
URI symptoms - likely viral.   Follow up if no improvement  
Patient will comply with treatment recommendiations and engage in milieu while on the unit. She will reprot decreased anxiety and depression x7 days

## 2021-12-20 ENCOUNTER — MYC MEDICAL ADVICE (OUTPATIENT)
Dept: FAMILY MEDICINE | Facility: CLINIC | Age: 36
End: 2021-12-20
Payer: COMMERCIAL

## 2021-12-21 ENCOUNTER — OFFICE VISIT (OUTPATIENT)
Dept: FAMILY MEDICINE | Facility: CLINIC | Age: 36
End: 2021-12-21
Payer: COMMERCIAL

## 2021-12-21 VITALS
HEART RATE: 89 BPM | HEIGHT: 66 IN | DIASTOLIC BLOOD PRESSURE: 76 MMHG | OXYGEN SATURATION: 98 % | SYSTOLIC BLOOD PRESSURE: 122 MMHG | WEIGHT: 293 LBS | TEMPERATURE: 97.6 F | BODY MASS INDEX: 47.09 KG/M2

## 2021-12-21 DIAGNOSIS — R39.89 URINARY PROBLEM: Primary | ICD-10-CM

## 2021-12-21 DIAGNOSIS — G89.29 CHRONIC BILATERAL LOW BACK PAIN WITHOUT SCIATICA: ICD-10-CM

## 2021-12-21 DIAGNOSIS — B37.0 THRUSH: ICD-10-CM

## 2021-12-21 DIAGNOSIS — N76.0 BACTERIAL VAGINOSIS: ICD-10-CM

## 2021-12-21 DIAGNOSIS — B96.89 BACTERIAL VAGINOSIS: ICD-10-CM

## 2021-12-21 DIAGNOSIS — M54.50 CHRONIC BILATERAL LOW BACK PAIN WITHOUT SCIATICA: ICD-10-CM

## 2021-12-21 DIAGNOSIS — K14.3 COATED TONGUE: ICD-10-CM

## 2021-12-21 LAB
ALBUMIN UR-MCNC: NEGATIVE MG/DL
APPEARANCE UR: CLEAR
BACTERIA #/AREA URNS HPF: ABNORMAL /HPF
BILIRUB UR QL STRIP: NEGATIVE
CLUE CELLS: PRESENT
COLOR UR AUTO: YELLOW
GLUCOSE UR STRIP-MCNC: NEGATIVE MG/DL
HGB UR QL STRIP: ABNORMAL
KETONES UR STRIP-MCNC: NEGATIVE MG/DL
LEUKOCYTE ESTERASE UR QL STRIP: NEGATIVE
NITRATE UR QL: NEGATIVE
PH UR STRIP: 5.5 [PH] (ref 5–7)
RBC #/AREA URNS AUTO: ABNORMAL /HPF
SP GR UR STRIP: >=1.03 (ref 1–1.03)
SQUAMOUS #/AREA URNS AUTO: ABNORMAL /LPF
TRICHOMONAS, WET PREP: ABNORMAL
UROBILINOGEN UR STRIP-ACNC: 0.2 E.U./DL
WBC #/AREA URNS AUTO: ABNORMAL /HPF
WBC'S/HIGH POWER FIELD, WET PREP: ABNORMAL
YEAST, WET PREP: ABNORMAL

## 2021-12-21 PROCEDURE — 87210 SMEAR WET MOUNT SALINE/INK: CPT | Performed by: FAMILY MEDICINE

## 2021-12-21 PROCEDURE — 81001 URINALYSIS AUTO W/SCOPE: CPT | Performed by: FAMILY MEDICINE

## 2021-12-21 PROCEDURE — 99214 OFFICE O/P EST MOD 30 MIN: CPT | Performed by: FAMILY MEDICINE

## 2021-12-21 RX ORDER — METRONIDAZOLE 7.5 MG/G
GEL VAGINAL
Qty: 70 G | Refills: 3 | Status: SHIPPED | OUTPATIENT
Start: 2021-12-21 | End: 2022-06-02

## 2021-12-21 RX ORDER — CLOTRIMAZOLE 10 MG/1
10 LOZENGE ORAL
Qty: 70 LOZENGE | Refills: 0 | Status: SHIPPED | OUTPATIENT
Start: 2021-12-21 | End: 2022-01-04

## 2021-12-21 RX ORDER — BACLOFEN 10 MG/1
5-10 TABLET ORAL 3 TIMES DAILY PRN
Qty: 60 TABLET | Refills: 3 | Status: SHIPPED | OUTPATIENT
Start: 2021-12-21 | End: 2022-03-15

## 2021-12-21 ASSESSMENT — MIFFLIN-ST. JEOR: SCORE: 2180.48

## 2021-12-21 NOTE — PROGRESS NOTES
"  Assessment & Plan     Urinary problem    - UA Macro with Reflex to Micro and Culture - lab collect  - Wet prep - Clinic Collect  - Urine Microscopic    Thrush  I do not think this is thrush. I think she has pnd and gerd that are doing this thye got a dog last spring and she is allergic to it   - clotrimazole (MYCELEX) 10 MG lozenge; Place 1 lozenge (10 mg) inside cheek 5 times daily for 14 days    Bacterial vaginosis  Recurrent will try 2/wk treatment   - metroNIDAZOLE (METROGEL) 0.75 % vaginal gel; Use intervaginally 2 times per week for 3 months    Coated tongue      Chronic bilateral low back pain without sciatica  This helps her   - baclofen (LIORESAL) 10 MG tablet; Take 0.5-1 tablets (5-10 mg) by mouth 3 times daily as needed for muscle spasms         BMI:   Estimated body mass index is 52.62 kg/m  as calculated from the following:    Height as of this encounter: 1.676 m (5' 6\").    Weight as of this encounter: 147.9 kg (326 lb).       Regular exercise    No follow-ups on file.    Graciela Olmedo MD  [facility] DINA Mehta is a 36 year old who presents for the following health issues:     HPI     *Thrush - on going.     Vaginal Symptoms  Onset/Duration: 2 months, recurring. Increased again about 2 weeks ago when she had her period.   Description:  Vaginal Discharge: white clear - a little   Itching (Pruritis): YES  Burning sensation:  no  Odor: YES  Accompanying Signs & Symptoms:  Urinary symptoms: YES - increased and urgent   Abdominal pain: YES - cramping   Fever: no  History:   Sexually active: no  New Partner: no  Possibility of Pregnancy:  no  Recent antibiotic use: no  Previous vaginitis issues: YES  Precipitating or alleviating factors: None  Therapies tried and outcome: none    She has had thrush valerie nd off she is brushing her tongue 2 times per day. It's never anywhere else   They got a dog 7 months ago and she is allergic and her kids might be alkso   She also has " "intermittent gerd     Review of Systems   As above       Objective    /76   Pulse 89   Temp 97.6  F (36.4  C) (Tympanic)   Ht 1.676 m (5' 6\")   Wt 147.9 kg (326 lb)   SpO2 98%   BMI 52.62 kg/m    Body mass index is 52.62 kg/m .  Physical Exam   GENERAL: healthy, alert and no distress  EYES: Eyes grossly normal to inspection, PERRL and conjunctivae and sclerae normal  HENT: normal cephalic/atraumatic, ear canals and TM's normal, nose and mouth without ulcers or lesions, oropharynx clear, oral mucous membranes moist and minimal white coating   NECK: no adenopathy, no asymmetry, masses, or scars and thyroid normal to palpation  RESP: lungs clear to auscultation - no rales, rhonchi or wheezes    Results for orders placed or performed in visit on 12/21/21   UA Macro with Reflex to Micro and Culture - lab collect     Status: Abnormal    Specimen: Urine, Clean Catch   Result Value Ref Range    Color Urine Yellow Colorless, Straw, Light Yellow, Yellow    Appearance Urine Clear Clear    Glucose Urine Negative Negative mg/dL    Bilirubin Urine Negative Negative    Ketones Urine Negative Negative mg/dL    Specific Gravity Urine >=1.030 1.003 - 1.035    Blood Urine Trace (A) Negative    pH Urine 5.5 5.0 - 7.0    Protein Albumin Urine Negative Negative mg/dL    Urobilinogen Urine 0.2 0.2, 1.0 E.U./dL    Nitrite Urine Negative Negative    Leukocyte Esterase Urine Negative Negative   Urine Microscopic     Status: Abnormal   Result Value Ref Range    Bacteria Urine Moderate (A) None Seen /HPF    RBC Urine 5-10 (A) 0-2 /HPF /HPF    WBC Urine 0-5 0-5 /HPF /HPF    Squamous Epithelials Urine Moderate (A) None Seen /LPF    Narrative    Urine Culture not indicated   Wet prep - Clinic Collect     Status: Abnormal    Specimen: Vagina; Swab   Result Value Ref Range    Trichomonas Absent Absent    Yeast Absent Absent    Clue Cells Present (A) Absent    WBCs/high power field None None       Graciela Olmedo M.D.          "

## 2021-12-22 DIAGNOSIS — E66.01 MORBID OBESITY DUE TO EXCESS CALORIES (H): ICD-10-CM

## 2021-12-27 RX ORDER — PHENTERMINE HYDROCHLORIDE 37.5 MG/1
CAPSULE ORAL
Qty: 30 CAPSULE | Refills: 3 | Status: SHIPPED | OUTPATIENT
Start: 2021-12-27 | End: 2022-05-13

## 2022-03-14 DIAGNOSIS — G89.29 CHRONIC BILATERAL LOW BACK PAIN WITHOUT SCIATICA: ICD-10-CM

## 2022-03-14 DIAGNOSIS — M54.50 CHRONIC BILATERAL LOW BACK PAIN WITHOUT SCIATICA: ICD-10-CM

## 2022-03-14 NOTE — TELEPHONE ENCOUNTER
Routing refill request to provider for review/approval because:  Labs out of range:  WBC: 18.2 on 7/2021    Faustino Flores RN

## 2022-03-15 RX ORDER — NABUMETONE 500 MG/1
TABLET, FILM COATED ORAL
Qty: 180 TABLET | Refills: 1 | Status: SHIPPED | OUTPATIENT
Start: 2022-03-15 | End: 2022-10-12

## 2022-03-15 RX ORDER — BACLOFEN 10 MG/1
TABLET ORAL
Qty: 60 TABLET | Refills: 3 | Status: SHIPPED | OUTPATIENT
Start: 2022-03-15 | End: 2022-06-10

## 2022-04-20 ENCOUNTER — NURSE TRIAGE (OUTPATIENT)
Dept: FAMILY MEDICINE | Facility: CLINIC | Age: 37
End: 2022-04-20

## 2022-04-20 NOTE — TELEPHONE ENCOUNTER
"Red flag triage call for ankle pain. Patient with hx of foot pain c/o pain and some bruising  to back of ankle. She thinks she may have tripped in the yard yesterday or the day before but she isn't sure. No deformity, able to bear weight. No fever, redness, or leg swelling. Recommended ice, rest, elevated, and ibuprofen, along with ACE wrap.Transferred back to [facility] to  [facility] if patient would like to see provider to rule-out further injury or cause of pain.     Torri Bundy RN      Reason for Disposition    Patient wants to be seen    Answer Assessment - Initial Assessment Questions  1. ONSET: \"When did the pain start?\"       Yesterday or the day before-no known specific injury  2. LOCATION: \"Where is the pain located?\"       R ankle  3. PAIN: \"How bad is the pain?\"    (Scale 1-10; or mild, moderate, severe)   - MILD (1-3): doesn't interfere with normal activities    - MODERATE (4-7): interferes with normal activities (e.g., work or school) or awakens from sleep, limping    - SEVERE (8-10): excruciating pain, unable to do any normal activities, unable to walk       7/10  4. WORK OR EXERCISE: \"Has there been any recent work or exercise that involved this part of the body?\"       Active lifestyle with 3 kids at home, no known specific injury, but thinks she may have rolled her ankle outside in the past  5. CAUSE: \"What do you think is causing the ankle pain?\"      Unknown, possible ankle sprain  6. OTHER SYMPTOMS: \"Do you have any other symptoms?\" (e.g., calf pain, rash, fever, swelling)      No  7. PREGNANCY: \"Is there any chance you are pregnant?\" \"When was your last menstrual period?\"      LMP: a few weeks ago    Protocols used: ANKLE PAIN-A-OH      "

## 2022-04-23 ENCOUNTER — TRANSFERRED RECORDS (OUTPATIENT)
Dept: HEALTH INFORMATION MANAGEMENT | Facility: CLINIC | Age: 37
End: 2022-04-23

## 2022-05-06 ENCOUNTER — TELEPHONE (OUTPATIENT)
Dept: FAMILY MEDICINE | Facility: CLINIC | Age: 37
End: 2022-05-06

## 2022-05-06 DIAGNOSIS — E66.01 MORBID OBESITY DUE TO EXCESS CALORIES (H): ICD-10-CM

## 2022-05-13 RX ORDER — PHENTERMINE HYDROCHLORIDE 37.5 MG/1
CAPSULE ORAL
Qty: 30 CAPSULE | Refills: 0 | Status: SHIPPED | OUTPATIENT
Start: 2022-05-13 | End: 2022-12-26

## 2022-05-13 NOTE — TELEPHONE ENCOUNTER
Call placed to patient  No answer; no voicemail option; will need to re-call at another time    Faustino Flores RN

## 2022-05-16 NOTE — TELEPHONE ENCOUNTER
Talked to pt.  She will make appt to follow up on her meds.   Her whole family has covid right now and would like a nurse to call her back.   She has some questions regarding covid.  Best number to call her back at is [phone].

## 2022-05-16 NOTE — TELEPHONE ENCOUNTER
Spoke with Janine. Discussed covid at home covid care. Symptoms started 2 weeks ago.  They are getting better. Also discussed Covid immunizations and to wait until they all feel better.  Gonzalo Ellison RN

## 2022-05-20 DIAGNOSIS — B96.89 BACTERIAL VAGINOSIS: ICD-10-CM

## 2022-05-20 DIAGNOSIS — N76.0 BACTERIAL VAGINOSIS: ICD-10-CM

## 2022-05-20 NOTE — TELEPHONE ENCOUNTER
Routing refill request to provider for review/approval because:  Drug not on the [facility] refill protocol   Fawn Malik RN

## 2022-05-26 NOTE — TELEPHONE ENCOUNTER
Typically we will do 3-6 months of treatment and then stop and monitor to see if the BV infections recur. Has she been using this or was she off for awhile and feels she is getting them again?     Kavya

## 2022-05-26 NOTE — TELEPHONE ENCOUNTER
Call placed to patient.  Relayed message per JOZEF Haider.  Patient states she has used the Metrogel continuously for 3 months and states when she gets her period, she becomes symptomatic(even when taking this  for 3 months).  Patient has the gel at home but runs out of the applicator as they only give 5 per tube of Metrogel.  Patient states she has tried to was them to reuse and finds the gel is too thick to clean applicator appropriately.  Will forward to JOZEF Haider to advise.  Fawn Malik RN

## 2022-06-02 RX ORDER — METRONIDAZOLE 7.5 MG/G
GEL VAGINAL
Qty: 70 G | Refills: 3 | Status: SHIPPED | OUTPATIENT
Start: 2022-06-02 | End: 2022-12-26

## 2022-06-08 DIAGNOSIS — G89.29 CHRONIC BILATERAL LOW BACK PAIN WITHOUT SCIATICA: ICD-10-CM

## 2022-06-08 DIAGNOSIS — M54.50 CHRONIC BILATERAL LOW BACK PAIN WITHOUT SCIATICA: ICD-10-CM

## 2022-06-08 NOTE — TELEPHONE ENCOUNTER
Routing refill request to provider for review/approval because:  Drug not on the [facility] refill protocol   Thank you.  Gonzalo Ellison RN

## 2022-06-10 RX ORDER — BACLOFEN 10 MG/1
TABLET ORAL
Qty: 60 TABLET | Refills: 3 | Status: SHIPPED | OUTPATIENT
Start: 2022-06-10 | End: 2022-09-02

## 2022-06-24 ENCOUNTER — PATIENT OUTREACH (OUTPATIENT)
Dept: OBGYN | Facility: CLINIC | Age: 37
End: 2022-06-24

## 2022-06-24 DIAGNOSIS — R87.810 CERVICAL HIGH RISK HPV (HUMAN PAPILLOMAVIRUS) TEST POSITIVE: ICD-10-CM

## 2022-07-18 DIAGNOSIS — F33.1 MODERATE EPISODE OF RECURRENT MAJOR DEPRESSIVE DISORDER (H): ICD-10-CM

## 2022-07-18 NOTE — TELEPHONE ENCOUNTER
Routing refill request to provider for review/approval because:  PHQ9 > 6 months - Cordia message sent with questionnaires attached for patient to update    Faustino Flores RN

## 2022-07-19 RX ORDER — BUPROPION HYDROCHLORIDE 150 MG/1
TABLET ORAL
Qty: 90 TABLET | Refills: 3 | Status: SHIPPED | OUTPATIENT
Start: 2022-07-19 | End: 2023-02-13

## 2022-07-27 ENCOUNTER — TRANSFERRED RECORDS (OUTPATIENT)
Dept: HEALTH INFORMATION MANAGEMENT | Facility: CLINIC | Age: 37
End: 2022-07-27

## 2022-08-14 ENCOUNTER — TRANSFERRED RECORDS (OUTPATIENT)
Dept: HEALTH INFORMATION MANAGEMENT | Facility: CLINIC | Age: 37
End: 2022-08-14

## 2022-08-24 ENCOUNTER — TRANSFERRED RECORDS (OUTPATIENT)
Dept: HEALTH INFORMATION MANAGEMENT | Facility: CLINIC | Age: 37
End: 2022-08-24

## 2022-08-24 NOTE — TELEPHONE ENCOUNTER
FYI to provider - Patient is lost to pap tracking follow-up. Attempts to contact pt have been made per reminder process and there has been no reply and/or no appt scheduled. Contact hx listed below.     8/1/12 NIL  3/10/15 NIL pap, neg HPV  7/19/18 NIL pap, neg HPV  7/15/21 NIL pap, +HR HPV (not 16/18). Plan: cotest in 1 year  7/22/21 Message left to return call. Pt viewed result on [employer].  6/24/22 Reminder [employer]  7/26/22 Reminder call -- left message  8/24/22 Lost to follow-up for pap tracking       Romi Toussaint RN BSN, Pap Tracking

## 2022-08-30 NOTE — TELEPHONE ENCOUNTER
Reason for Call:  Other     Detailed comments: Pt is calling with concerns, she believe she broke her right middle toe today, bruised, swollen, etc.  Wondering what she should do?  X-ray, wrap, boot etc.  Please advise  She can be reach after 3:30 today       Phone Number Patient can be reached at: Home number on file [phone] (home)    Best Time: any    Can we leave a detailed message on this number? YES    Call taken on 1/17/2019 at 2:42 PM by Yoselin Pérez       Dapsone Pregnancy And Lactation Text: This medication is Pregnancy Category C and is not considered safe during pregnancy or breast feeding.

## 2022-09-01 DIAGNOSIS — Z30.41 ENCOUNTER FOR SURVEILLANCE OF CONTRACEPTIVE PILLS: ICD-10-CM

## 2022-09-01 DIAGNOSIS — G89.29 CHRONIC BILATERAL LOW BACK PAIN WITHOUT SCIATICA: ICD-10-CM

## 2022-09-01 DIAGNOSIS — M54.50 CHRONIC BILATERAL LOW BACK PAIN WITHOUT SCIATICA: ICD-10-CM

## 2022-09-01 NOTE — TELEPHONE ENCOUNTER
Routing refill request to provider for review/approval because:  Patient needs to be seen because it has been more than 1 year since last office visit. Due for PAP,labs.  Fawn Malik RN

## 2022-09-02 RX ORDER — LEVONORGESTREL/ETHIN.ESTRADIOL 0.1-0.02MG
1 TABLET ORAL DAILY
Qty: 84 TABLET | Refills: 0 | Status: SHIPPED | OUTPATIENT
Start: 2022-09-02 | End: 2022-12-14

## 2022-09-02 RX ORDER — LEVONORGESTREL AND ETHINYL ESTRADIOL 0.1-0.02MG
KIT ORAL
Qty: 84 TABLET | Refills: 3 | Status: SHIPPED | OUTPATIENT
Start: 2022-09-02 | End: 2022-09-02

## 2022-09-02 RX ORDER — BACLOFEN 10 MG/1
TABLET ORAL
Qty: 60 TABLET | Refills: 1 | Status: SHIPPED | OUTPATIENT
Start: 2022-09-02 | End: 2022-09-14

## 2022-09-11 ENCOUNTER — TRANSFERRED RECORDS (OUTPATIENT)
Dept: HEALTH INFORMATION MANAGEMENT | Facility: CLINIC | Age: 37
End: 2022-09-11

## 2022-09-21 ENCOUNTER — TRANSFERRED RECORDS (OUTPATIENT)
Dept: HEALTH INFORMATION MANAGEMENT | Facility: CLINIC | Age: 37
End: 2022-09-21

## 2022-09-30 ENCOUNTER — E-VISIT (OUTPATIENT)
Dept: FAMILY MEDICINE | Facility: CLINIC | Age: 37
End: 2022-09-30

## 2022-09-30 ENCOUNTER — LAB (OUTPATIENT)
Dept: LAB | Facility: CLINIC | Age: 37
End: 2022-09-30
Payer: COMMERCIAL

## 2022-09-30 DIAGNOSIS — N30.00 ACUTE CYSTITIS WITHOUT HEMATURIA: ICD-10-CM

## 2022-09-30 DIAGNOSIS — R35.0 URINARY FREQUENCY: ICD-10-CM

## 2022-09-30 DIAGNOSIS — B37.31 CANDIDIASIS OF VAGINA: ICD-10-CM

## 2022-09-30 DIAGNOSIS — R35.0 URINARY FREQUENCY: Primary | ICD-10-CM

## 2022-09-30 LAB
ALBUMIN UR-MCNC: NEGATIVE MG/DL
APPEARANCE UR: CLEAR
BACTERIA #/AREA URNS HPF: ABNORMAL /HPF
BILIRUB UR QL STRIP: NEGATIVE
CLUE CELLS: ABNORMAL
COLOR UR AUTO: YELLOW
GLUCOSE UR STRIP-MCNC: NEGATIVE MG/DL
HGB UR QL STRIP: ABNORMAL
KETONES UR STRIP-MCNC: NEGATIVE MG/DL
LEUKOCYTE ESTERASE UR QL STRIP: ABNORMAL
NITRATE UR QL: NEGATIVE
PH UR STRIP: 6 [PH] (ref 5–7)
RBC #/AREA URNS AUTO: ABNORMAL /HPF
SP GR UR STRIP: 1.02 (ref 1–1.03)
SQUAMOUS #/AREA URNS AUTO: ABNORMAL /LPF
TRICHOMONAS, WET PREP: ABNORMAL
UROBILINOGEN UR STRIP-ACNC: 0.2 E.U./DL
WBC #/AREA URNS AUTO: ABNORMAL /HPF
WBC CLUMPS #/AREA URNS HPF: PRESENT /HPF
WBC'S/HIGH POWER FIELD, WET PREP: ABNORMAL
YEAST, WET PREP: ABNORMAL

## 2022-09-30 PROCEDURE — 99421 OL DIG E/M SVC 5-10 MIN: CPT | Performed by: PHYSICIAN ASSISTANT

## 2022-09-30 PROCEDURE — 81001 URINALYSIS AUTO W/SCOPE: CPT

## 2022-09-30 PROCEDURE — 87210 SMEAR WET MOUNT SALINE/INK: CPT

## 2022-09-30 RX ORDER — FLUCONAZOLE 150 MG/1
150 TABLET ORAL ONCE
Qty: 1 TABLET | Refills: 0 | Status: SHIPPED | OUTPATIENT
Start: 2022-09-30 | End: 2022-09-30

## 2022-09-30 RX ORDER — SULFAMETHOXAZOLE/TRIMETHOPRIM 800-160 MG
1 TABLET ORAL 2 TIMES DAILY
Qty: 6 TABLET | Refills: 0 | Status: SHIPPED | OUTPATIENT
Start: 2022-09-30 | End: 2022-10-03

## 2022-09-30 NOTE — TELEPHONE ENCOUNTER
Call placed to Patient  Reviewed note from JOZEF Haider.   Set up lab appointment for 9/30/22 at 2:15 at the [facility] for urine test  Patient verbalizes understanding  Car Gage RN

## 2022-10-10 DIAGNOSIS — G89.29 CHRONIC BILATERAL LOW BACK PAIN WITHOUT SCIATICA: ICD-10-CM

## 2022-10-10 DIAGNOSIS — M54.50 CHRONIC BILATERAL LOW BACK PAIN WITHOUT SCIATICA: ICD-10-CM

## 2022-10-11 ENCOUNTER — E-VISIT (OUTPATIENT)
Dept: FAMILY MEDICINE | Facility: CLINIC | Age: 37
End: 2022-10-11
Payer: COMMERCIAL

## 2022-10-11 ENCOUNTER — MYC MEDICAL ADVICE (OUTPATIENT)
Dept: FAMILY MEDICINE | Facility: CLINIC | Age: 37
End: 2022-10-11

## 2022-10-11 DIAGNOSIS — R35.0 URINARY FREQUENCY: Primary | ICD-10-CM

## 2022-10-11 DIAGNOSIS — N30.01 ACUTE CYSTITIS WITH HEMATURIA: Primary | ICD-10-CM

## 2022-10-11 PROCEDURE — 99421 OL DIG E/M SVC 5-10 MIN: CPT | Performed by: PHYSICIAN ASSISTANT

## 2022-10-11 NOTE — TELEPHONE ENCOUNTER
She should at the very least initiate a e visit to start. I did order a UA and wet prep. I think given the frequency of this happening,  a visit would be ideal to discuss as she has not been seen for awhile but I know it can be hard to get appointments so we can address current symptoms if needed via e visit and then assist her in scheduling a clinic visit    Kavya Haider PA-C

## 2022-10-12 RX ORDER — NABUMETONE 500 MG/1
TABLET, FILM COATED ORAL
Qty: 180 TABLET | Refills: 1 | Status: SHIPPED | OUTPATIENT
Start: 2022-10-12 | End: 2023-04-18

## 2022-10-18 ENCOUNTER — VIRTUAL VISIT (OUTPATIENT)
Dept: GASTROENTEROLOGY | Facility: CLINIC | Age: 37
End: 2022-10-18
Attending: PHYSICIAN ASSISTANT
Payer: COMMERCIAL

## 2022-10-18 ENCOUNTER — LAB (OUTPATIENT)
Dept: LAB | Facility: CLINIC | Age: 37
End: 2022-10-18
Payer: COMMERCIAL

## 2022-10-18 DIAGNOSIS — R35.0 URINARY FREQUENCY: ICD-10-CM

## 2022-10-18 DIAGNOSIS — Z87.19 HX OF ACUTE PANCREATITIS: ICD-10-CM

## 2022-10-18 DIAGNOSIS — R10.13 ABDOMINAL PAIN, EPIGASTRIC: Primary | ICD-10-CM

## 2022-10-18 LAB
ALBUMIN UR-MCNC: NEGATIVE MG/DL
AMORPH CRY #/AREA URNS HPF: ABNORMAL /HPF
APPEARANCE UR: ABNORMAL
BACTERIA #/AREA URNS HPF: ABNORMAL /HPF
BILIRUB UR QL STRIP: NEGATIVE
CLUE CELLS: ABNORMAL
COLOR UR AUTO: YELLOW
GLUCOSE UR STRIP-MCNC: NEGATIVE MG/DL
HGB UR QL STRIP: ABNORMAL
KETONES UR STRIP-MCNC: NEGATIVE MG/DL
LEUKOCYTE ESTERASE UR QL STRIP: ABNORMAL
NITRATE UR QL: NEGATIVE
PH UR STRIP: 6 [PH] (ref 5–7)
RBC #/AREA URNS AUTO: ABNORMAL /HPF
SP GR UR STRIP: 1.02 (ref 1–1.03)
SQUAMOUS #/AREA URNS AUTO: ABNORMAL /LPF
TRICHOMONAS, WET PREP: ABNORMAL
UROBILINOGEN UR STRIP-ACNC: 0.2 E.U./DL
WBC #/AREA URNS AUTO: ABNORMAL /HPF
WBC CLUMPS #/AREA URNS HPF: PRESENT /HPF
WBC'S/HIGH POWER FIELD, WET PREP: ABNORMAL
YEAST, WET PREP: ABNORMAL

## 2022-10-18 PROCEDURE — 87088 URINE BACTERIA CULTURE: CPT

## 2022-10-18 PROCEDURE — 99205 OFFICE O/P NEW HI 60 MIN: CPT | Mod: 95 | Performed by: PHYSICIAN ASSISTANT

## 2022-10-18 PROCEDURE — 87086 URINE CULTURE/COLONY COUNT: CPT

## 2022-10-18 PROCEDURE — 81001 URINALYSIS AUTO W/SCOPE: CPT

## 2022-10-18 PROCEDURE — 87210 SMEAR WET MOUNT SALINE/INK: CPT

## 2022-10-18 NOTE — PROGRESS NOTES
Janine is a 36 year old who is being evaluated via a billable video visit.      How would you like to obtain your AVS? MyChart  If the video visit is dropped, the invitation should be resent by: Text to cell phone: [phone]  Will anyone else be joining your video visit? No          GASTROENTEROLOGY NEW PATIENT VIDEO VISIT     CC/REFERRING MD:    Kavya Haider    REASON FOR CONSULTATION:   Referred by Kavya Haider for Video Visit      HISTORY OF PRESENT ILLNESS:    Janine Salas is 36 year old female who presents for evaluation of epigastric abdominal pain.     She reports having repetitive epigastric abdominal pains that occur intermittently. Pain when present can last for days before resolving. She is not able to correlate pain to her dietary intake. She also does not believe it is related to heartburn. She occ has heartburn that easily resolves with Tums. The pain reminds her of when she had acute pancreatitis in 2015. A cause for her pancreatitis was not found and was determined to be idiopathic. She had already had her gallbladder removed in 2013 and denies regular alcohol use.     She does use nabumetone twice daily for pain and has been on this for the past year. She does have some constipation. Usually able to have a BM if she moves in a certain way. No blood in stool. No unintentional weight loss.     She did have extensive work up in 2015 for continued pains after the suspected acute pancreatitis. Work up included MR enterography, GES, EGD/colonoscopy which was all normal.       Janine  has a past medical history of Acute pancreatitis (1/15), Anxiety, Cervical high risk HPV (human papillomavirus) test positive (7/15/2021), Chickenpox, PONV (postoperative nausea and vomiting), Pregnancy induced hypertension, PTSD (post-traumatic stress disorder), and Severe postpartum depression (2016).    She  has a past surgical history that includes  appendectomy (); Laparoscopic cholecystectomy (2013); colonoscopy; upper gi endoscopy; Mouth surgery ();  section (N/A, 2016);  section (N/A, 2019); and REMOVAL GALLBLADDER.    She  reports that she has quit smoking. Her smoking use included cigarettes. She started smoking about 7 years ago. She smoked an average of .5 packs per day. She has quit using smokeless tobacco. She reports current alcohol use. She reports that she does not use drugs.    Her family history includes Alcohol/Drug in her father, paternal grandfather, and paternal grandmother; Alzheimer Disease in her maternal grandmother; Autism Spectrum Disorder in her sister; Bipolar Disorder in her sister and another family member; Breast Cancer in her maternal aunt; Cancer in her father, maternal grandfather, paternal grandfather, and sister; Cancer - colorectal in her paternal grandmother; Cardiovascular in her maternal grandmother and paternal grandfather; Dementia in her father; Depression in her mother and sister; Hypertension in her father, maternal grandmother, and mother; Hypothyroidism in her sister; Kidney Disease in her maternal aunt; Obesity in her sister; Osteoporosis in her mother; Substance Abuse in her sister; Thyroid Disease in her mother.    ALLERGIES:  Codeine and Zofran [ondansetron]      PERTINENT MEDICATIONS:    Current Outpatient Medications:      atenolol (TENORMIN) 50 MG tablet, TAKE ONE TABLET BY MOUTH ONCE DAILY, Disp: 90 tablet, Rfl: 3     baclofen (LIORESAL) 10 MG tablet, Take 1 tablet (10 mg) by mouth 4 times daily, Disp: 120 tablet, Rfl: 1     buPROPion (WELLBUTRIN XL) 150 MG 24 hr tablet, TAKE ONE TABLET BY MOUTH EVERY MORNING, Disp: 90 tablet, Rfl: 3     escitalopram (LEXAPRO) 20 MG tablet, TAKE ONE TABLET BY MOUTH ONCE DAILY, Disp: 90 tablet, Rfl: 3     levonorgestrel-ethinyl estradiol (FALMINA) 0.1-20 MG-MCG tablet, Take 1 tablet by mouth daily, Disp: 84 tablet, Rfl: 0     nabumetone  (RELAFEN) 500 MG tablet, TAKE ONE TABLET BY MOUTH TWICE A DAY, Disp: 180 tablet, Rfl: 1     metroNIDAZOLE (METROGEL) 0.75 % vaginal gel, USE ONE APPLICATORFUL INTERVAGINALLY 2 TIMES PER WEEK FOR 3 MONTHS (Patient not taking: Reported on 10/18/2022), Disp: 70 g, Rfl: 3     phentermine (ADIPEX-P) 37.5 MG capsule, TAKE ONE CAPSULE BY MOUTH EVERY MORNING (Patient not taking: Reported on 10/18/2022), Disp: 30 capsule, Rfl: 0        PHYSICAL EXAMINATION:  Constitutional: aaox3, cooperative, pleasant, not dyspneic/diaphoretic, no acute distress    GENERAL: Healthy, alert and no distress  EYES: Eyes grossly normal to inspection.  No discharge or erythema, or obvious scleral/conjunctival abnormalities.  RESP: No audible wheeze, cough, or visible cyanosis.  No visible retractions or increased work of breathing.    SKIN: Visible skin clear. No significant rash, abnormal pigmentation or lesions.  NEURO: Cranial nerves grossly intact.  Mentation and speech appropriate for age.  PSYCH: Mentation appears normal, affect normal/bright, judgement and insight intact, normal speech and appearance well-groomed.      ASSESSMENT/PLAN:    1. Abdominal pain, epigastric  - Adult Gastro Ref - Consult Only  - Adult GI  Referral - Procedure Only; Future  2. Hx of acute pancreatitis  - MR Abdomen MRCP w/o & w Contrast; Future      Janine Salas is a 36 year old female who presents for evaluation of repetitive epigastric abdominal pain.     Symptoms remind her of when she was diagnosed with acute pancreatitis in 2015. At that time cause for pancreatitis was not found and thought to be idiopathic. I reviewed prior records and was able to find an ED visit from January 2015 when she was diagnosed with acute pancreatitis. At that time work up noted an elevated lipase of 967 but without imaging findings of pancreatitis. She continued to have symptoms and was further evaluated as an outpatient with GES, MRE, EGD/colonoscopy which was  unrevealing. She presents today with concerns for reoccurring epigastric abdominal pain. Her symptoms however are not consistent with pancreatic disease. Of note she was also found to have a normal pancreas on CT in July 2021. Recommended an EGD for further evaluation of her ongoing epigastric pain. Differential includes gastritis and/or pud. She is on nabumetone twice daily which could also be causing stomach irritation.     Further recommendations after work up.       Thank you for this consultation.  It was a pleasure to participate in the care of this patient; please contact us with any further questions.        This note was created with voice recognition software, and while reviewed for accuracy, typos may remain.       60 minutes spent on the date of the encounter doing chart review, history and exam, documentation and further activities per the note    Sharon Raygoza PA-C  Division of Gastroenterology, Hepatology and Nutrition   [facility]       Video-Visit Details    Video Start Time: 1:08 PM    Type of service:  Video Visit    Video End Time: 1:34 PM    Originating Location (pt. Location): Home    Distant Location (provider location):  On-site    Platform used for Video Visit: Crys

## 2022-10-18 NOTE — PATIENT INSTRUCTIONS
It was a pleasure taking care of you today.  I've included a brief summary of our discussion and care plan from today's visit below.  Please review this information with your primary care provider.  _______________________________________________________________________     My recommendations are summarized as follows:     - we will further evaluate your symptoms with an an upper endoscopy and an MRI    ______________________________________________________________________     How do I schedule labs, imaging studies, or procedures that were ordered in clinic today?      Labs: To schedule lab appointment you can contact your local [facility] or call [phone] to schedule at any convenient [facility] location.     Procedures: If a colonoscopy, upper endoscopy, breath test, esophageal manometry, or pH impedence was ordered today, our endoscopy team will call you to schedule this. If you have not heard from our endoscopy team within a week, please call [phone] to schedule.      Imaging Studies: If you were scheduled for a CT scan, X-ray, MRI, ultrasound, HIDA scan or other imaging study, please call [phone] to have this scheduled.      Referral: If a referral to another specialty was ordered, expect a phone call or follow instructions above. If you have not heard from anyone regarding your referral in a week, please call our clinic to check the status.      Who do I call with any questions after my visit?  Please be in touch if there are any further questions that arise following today's visit.  There are multiple ways to contact your gastroenterology care team.       During business hours, you may reach a Gastroenterology nurse at [phone]     To schedule or reschedule an appointment, please call [phone].      You can always send a secure message through [employer].  MyChart messages are answered by your nurse or doctor typically within 24 hours.  Please allow extra time on weekends  and holidays.       For urgent/emergent questions after business hours, you may reach the on-call GI Fellow by contacting the [facility]  at [phone].     How will I get the results of any tests ordered?    You will receive all of your results.  If you have signed up for [email or web address], any tests ordered at your visit will be available to you after your physician reviews them.  Typically this takes 1-2 weeks.  If there are urgent results that require a change in your care plan, your physician or nurse will call you to discuss the next steps.       What is DICOM Grid?  DICOM Grid is a secure way for you to access all of your healthcare records from the [facility].  It is a web based computer program, so you can sign on to it from any location.  It also allows you to send secure messages to your care team.  I recommend signing up for DICOM Grid access if you have not already done so and are comfortable with using a computer.       How to I schedule a follow-up visit?  If you did not schedule a follow-up visit today, please call [phone] to schedule a follow-up office visit.      Sharon Raygoza PA-C  Division of Gastroenterology, Hepatology and Nutrition   [facility]

## 2022-10-19 RX ORDER — SULFAMETHOXAZOLE/TRIMETHOPRIM 800-160 MG
1 TABLET ORAL 2 TIMES DAILY
Qty: 6 TABLET | Refills: 0 | Status: SHIPPED | OUTPATIENT
Start: 2022-10-19 | End: 2022-12-26

## 2022-10-19 NOTE — PATIENT INSTRUCTIONS
Dear Janine Salas    After reviewing your responses, I've been able to diagnose you with a urinary tract infection, which is a common infection of the bladder with bacteria.  This is not a sexually transmitted infection, though urinating immediately after intercourse can help prevent infections.  Drinking lots of fluids is also helpful to clear your current infection and prevent the next one.      I have sent a prescription for antibiotics to your pharmacy to treat this infection.    It is important that you take all of your prescribed medication even if your symptoms are improving after a few doses.  Taking all of your medicine helps prevent the symptoms from returning.     If your symptoms worsen, you develop pain in your back or stomach, develop fevers, or are not improving in 5 days, please contact your primary care provider for an appointment or visit any of our convenient Walk-in or Urgent Care Centers to be seen, which can be found on our website here.    Thanks again for choosing us as your health care partner,    Kavya Haider PA-C

## 2022-10-20 ENCOUNTER — HOSPITAL ENCOUNTER (OUTPATIENT)
Facility: CLINIC | Age: 37
End: 2022-10-20
Attending: INTERNAL MEDICINE | Admitting: INTERNAL MEDICINE
Payer: COMMERCIAL

## 2022-10-20 ENCOUNTER — TELEPHONE (OUTPATIENT)
Dept: GASTROENTEROLOGY | Facility: CLINIC | Age: 37
End: 2022-10-20

## 2022-10-20 LAB — BACTERIA UR CULT: ABNORMAL

## 2022-10-20 NOTE — TELEPHONE ENCOUNTER
Screening Questions  BLUE  KIND OF PREP RED  LOCATION [review exclusion criteria] GREEN  SEDATION TYPE        Y Are you active on mychart?       Sharon Raygoza PA-C in  GI Ordering/Referring Provider?        UCARE What type of coverage do you have?      N Have you had a positive covid test in the last 90 days?     52.6 1. BMI  [BMI 40+ - review exclusion criteria]    Y  2. Are you able to give consent for your medical care? [IF NO,RN REVIEW]        N  3. Are you taking any prescription pain medications on a routine schedule?      N  3a. EXTENDED PREP What kind of prescription?     N 4. Do you have any chemical dependencies such as alcohol, street drugs, or methadone?    Y, PTSD & ANXIETY BUT NOT SEVERE 5. Do you have any history of post-traumatic stress syndrome, severe anxiety or history of psychosis?      **If yes 3- 5 , please schedule with MAC sedation.**          IF YES TO ANY 6 - 10 - HOSPITAL SETTING ONLY.     N 6.   Do you need assistance transferring?     N 7.   Have you had a heart or lung transplant?    N 8.   Are you currently on dialysis?   N 9.   Do you use daily home oxygen?   N 10. Do you take nitroglycerin?   10a. N If yes, how often?     11. [FEMALES]  N Are you currently pregnant?    11a. N If yes, how many weeks? [ Greater than 12 weeks, OR NEEDED]    N 12. Do you have Pulmonary Hypertension? *NEED PAC APPT AT [facility]*     N 13. [review exclusion criteria]  Do you have any implantable devices in your body (pacemaker, defib, LVAD)?    N 14. In the past 6 months, have you had any heart related issues including cardiomyopathy or heart attack?     14a. N If yes, did it require cardiac stenting if so when?     N 15. Have you had a stroke or Transient ischemic attack (TIA - aka  mini stroke ) within 6 months?      N 16. Do you have mod to severe Obstructive Sleep Apnea?  [Hospital only - Ok at [place]]    N 17. Do you have SEVERE AND UNCONTROLLED asthma? *NEED PAC APPT AT [facility]*     N 18. Are you  "currently taking any blood thinners?     18a. If yes, inform patient to \"follow up w/ ordering provider for bridging instructions.\"    N 19. Do you take the medication Phentermine?    19a. If yes, \"Hold for 7 days before procedure.  Please consult your prescribing provider if you have questions about holding this medication.\"     N  20. Do you have chronic kidney disease?      N  21. Do you have a diagnosis of diabetes?       22. On a regular basis do you go 3-5 days between bowel movements?      23. Preferred LOCAL Pharmacy for Pre Prescription    [ LIST ONLY ONE PHARMACY]          - CLOSING REMINDERS -    Informed patient they will need an adult    Cannot take any type of public or medical transportation alone    Conscious Sedation- Needs  for 6 hours after the procedure       MAC/General-Needs  for 24 hours after procedure    Pre-Procedure Covid test to be completed [[facility] PCR Testing Required]    Confirmed Nurse will call to complete assessment       - SCHEDULING DETAILS -     Jayesh  Surgeon    11/29   Date of Procedure  Upper Endoscopy [EGD]  Type of Procedure Scheduled   [facility] Location  Which Colonoscopy Prep was Sent?     MAC per order Sedation Type     Y, scheduled w/ PAC: virtual 11/8  PAC / Pre-op Required       Additional comments:  BMI          "

## 2022-10-24 NOTE — TELEPHONE ENCOUNTER
FUTURE VISIT INFORMATION      SURGERY INFORMATION:    Date: 11/29/22    Location:  gi    Surgeon:  Oneil Mcconnell DO    Anesthesia Type:  General    Procedure: ESOPHAGOGASTRODUODENOSCOPY (EGD)    RECORDS REQUESTED FROM:       [facility] Care [facility]    Pertinent Medical History: hypertension

## 2022-11-08 ENCOUNTER — PRE VISIT (OUTPATIENT)
Dept: SURGERY | Facility: CLINIC | Age: 37
End: 2022-11-08

## 2022-11-17 DIAGNOSIS — M54.50 CHRONIC BILATERAL LOW BACK PAIN WITHOUT SCIATICA: ICD-10-CM

## 2022-11-17 DIAGNOSIS — G89.29 CHRONIC BILATERAL LOW BACK PAIN WITHOUT SCIATICA: ICD-10-CM

## 2022-11-18 ENCOUNTER — TELEPHONE (OUTPATIENT)
Dept: GASTROENTEROLOGY | Facility: CLINIC | Age: 37
End: 2022-11-18

## 2022-11-18 RX ORDER — BACLOFEN 10 MG/1
TABLET ORAL
Qty: 120 TABLET | Refills: 1 | Status: SHIPPED | OUTPATIENT
Start: 2022-11-18 | End: 2023-02-22

## 2022-11-18 NOTE — TELEPHONE ENCOUNTER
Attempted to contact patient regarding upcoming EGD procedure on 11.29.22 for pre assessment questions. No answer.     Left message to return call to [phone] #4    Covid test scheduled?  Discuss at home rapid antigen COVID test 1-2 days prior to procedure.    Pre op exam scheduled: Had a pre op scheduled for 11.8.22 with the PAC clinic however was a no show. Needs PAC pre op eval d/t BMI 52.6    Arrival time: 0745    Facility location: [facility]    Sedation type: MAC    Indication for procedure: Epigastric pain    Anticoagulants: No.     Phentermine?    MyChart message sent      Akila Kapadia RN

## 2022-11-22 NOTE — TELEPHONE ENCOUNTER
2nd attempt    Attempted to contact patient regarding upcoming EGD procedure on 11.29.22 for pre assessment questions. No answer.     Unable to leave message - VM box full    [employer] message sent    Akila Kapadia RN

## 2022-11-28 ENCOUNTER — TELEPHONE (OUTPATIENT)
Dept: GASTROENTEROLOGY | Facility: CLINIC | Age: 37
End: 2022-11-28

## 2022-11-28 NOTE — CONFIDENTIAL NOTE
Caller: unable to leave VM for Janine Salas    Procedure: EGD    Date, Location, and Surgeon of Procedure Cancelled: 11/29/2022, Jayesh [facility]    Ordering Provider:Sharon Raygoza PA-C    Reason for cancel (please be detailed, any staff messages or encounters to note?): pt cancelled PAC apt (which is needed due to BMI) and has not responded to nursing staff. Unable to reach patient via phone today and VM is full.         Rescheduled: not at this time.      If rescheduled:    Date:    Location:    Prep Resent: (changes to prep?)   Covid Test Rescheduled:    Note any change or update to original order/sedation:

## 2022-11-28 NOTE — TELEPHONE ENCOUNTER
Third attempt for pre-assessment prior to upcoming EGD.    No answer.  Unable to leave a message.    Pre-op?    MyChart message sent.    Patsy Lee RN

## 2022-12-02 NOTE — TELEPHONE ENCOUNTER
3rd and final rescheduling attempt made, closed loop with provider, [employer] message sent.    [facility] Endoscopy Scheduling Pool  3rd att needed to reschedule EGD -- pt cancelled PAC appt and no response to nurse call for PREP .     Voicemal Box was full -- unable to leave VM.     Please try again for 3rd att.

## 2022-12-12 ENCOUNTER — TRANSFERRED RECORDS (OUTPATIENT)
Dept: HEALTH INFORMATION MANAGEMENT | Facility: CLINIC | Age: 37
End: 2022-12-12

## 2022-12-13 DIAGNOSIS — Z30.41 ENCOUNTER FOR SURVEILLANCE OF CONTRACEPTIVE PILLS: ICD-10-CM

## 2022-12-14 RX ORDER — LEVONORGESTREL AND ETHINYL ESTRADIOL 0.1-0.02MG
KIT ORAL
Qty: 84 TABLET | Refills: 0 | Status: SHIPPED | OUTPATIENT
Start: 2022-12-14 | End: 2023-02-13

## 2022-12-26 ENCOUNTER — HOSPITAL ENCOUNTER (EMERGENCY)
Facility: CLINIC | Age: 37
Discharge: HOME | End: 2022-12-26
Attending: EMERGENCY MEDICINE | Admitting: EMERGENCY MEDICINE
Payer: COMMERCIAL

## 2022-12-26 ENCOUNTER — APPOINTMENT (OUTPATIENT)
Dept: GENERAL RADIOLOGY | Facility: CLINIC | Age: 37
End: 2022-12-26
Attending: EMERGENCY MEDICINE
Payer: COMMERCIAL

## 2022-12-26 VITALS
HEART RATE: 64 BPM | RESPIRATION RATE: 15 BRPM | SYSTOLIC BLOOD PRESSURE: 153 MMHG | WEIGHT: 293 LBS | DIASTOLIC BLOOD PRESSURE: 92 MMHG | TEMPERATURE: 99.1 F | OXYGEN SATURATION: 99 % | HEIGHT: 67 IN | BODY MASS INDEX: 45.99 KG/M2

## 2022-12-26 DIAGNOSIS — R00.2 PALPITATIONS: ICD-10-CM

## 2022-12-26 LAB
ANION GAP SERPL CALCULATED.3IONS-SCNC: 9 MMOL/L (ref 7–15)
BASOPHILS # BLD AUTO: 0.1 10E3/UL (ref 0–0.2)
BASOPHILS NFR BLD AUTO: 1 %
BUN SERPL-MCNC: 11.8 MG/DL (ref 6–20)
CALCIUM SERPL-MCNC: 9.2 MG/DL (ref 8.6–10)
CHLORIDE SERPL-SCNC: 103 MMOL/L (ref 98–107)
CREAT SERPL-MCNC: 0.78 MG/DL (ref 0.51–0.95)
D DIMER PPP FEU-MCNC: 0.33 UG/ML FEU (ref 0–0.5)
DEPRECATED HCO3 PLAS-SCNC: 27 MMOL/L (ref 22–29)
EOSINOPHIL # BLD AUTO: 0.3 10E3/UL (ref 0–0.7)
EOSINOPHIL NFR BLD AUTO: 2 %
ERYTHROCYTE [DISTWIDTH] IN BLOOD BY AUTOMATED COUNT: 12.8 % (ref 10–15)
GFR SERPL CREATININE-BSD FRML MDRD: >90 ML/MIN/1.73M2
GLUCOSE SERPL-MCNC: 89 MG/DL (ref 70–99)
HCT VFR BLD AUTO: 42 % (ref 35–47)
HGB BLD-MCNC: 14 G/DL (ref 11.7–15.7)
HOLD SPECIMEN: NORMAL
HOLD SPECIMEN: NORMAL
IMM GRANULOCYTES # BLD: 0 10E3/UL
IMM GRANULOCYTES NFR BLD: 0 %
LYMPHOCYTES # BLD AUTO: 4.3 10E3/UL (ref 0.8–5.3)
LYMPHOCYTES NFR BLD AUTO: 29 %
MCH RBC QN AUTO: 31.4 PG (ref 26.5–33)
MCHC RBC AUTO-ENTMCNC: 33.3 G/DL (ref 31.5–36.5)
MCV RBC AUTO: 94 FL (ref 78–100)
MONOCYTES # BLD AUTO: 1 10E3/UL (ref 0–1.3)
MONOCYTES NFR BLD AUTO: 7 %
NEUTROPHILS # BLD AUTO: 9.1 10E3/UL (ref 1.6–8.3)
NEUTROPHILS NFR BLD AUTO: 61 %
NRBC # BLD AUTO: 0 10E3/UL
NRBC BLD AUTO-RTO: 0 /100
PLATELET # BLD AUTO: 281 10E3/UL (ref 150–450)
POTASSIUM SERPL-SCNC: 4 MMOL/L (ref 3.4–5.3)
RBC # BLD AUTO: 4.46 10E6/UL (ref 3.8–5.2)
SODIUM SERPL-SCNC: 139 MMOL/L (ref 136–145)
TROPONIN T SERPL HS-MCNC: <6 NG/L
TSH SERPL DL<=0.005 MIU/L-ACNC: 1.86 UIU/ML (ref 0.3–4.2)
WBC # BLD AUTO: 14.9 10E3/UL (ref 4–11)

## 2022-12-26 PROCEDURE — 93005 ELECTROCARDIOGRAM TRACING: CPT | Performed by: EMERGENCY MEDICINE

## 2022-12-26 PROCEDURE — 84484 ASSAY OF TROPONIN QUANT: CPT | Performed by: EMERGENCY MEDICINE

## 2022-12-26 PROCEDURE — 93010 ELECTROCARDIOGRAM REPORT: CPT | Performed by: EMERGENCY MEDICINE

## 2022-12-26 PROCEDURE — 84443 ASSAY THYROID STIM HORMONE: CPT | Performed by: EMERGENCY MEDICINE

## 2022-12-26 PROCEDURE — 99285 EMERGENCY DEPT VISIT HI MDM: CPT | Mod: 25 | Performed by: EMERGENCY MEDICINE

## 2022-12-26 PROCEDURE — 82310 ASSAY OF CALCIUM: CPT | Performed by: EMERGENCY MEDICINE

## 2022-12-26 PROCEDURE — 85025 COMPLETE CBC W/AUTO DIFF WBC: CPT | Performed by: EMERGENCY MEDICINE

## 2022-12-26 PROCEDURE — 71046 X-RAY EXAM CHEST 2 VIEWS: CPT

## 2022-12-26 PROCEDURE — 85379 FIBRIN DEGRADATION QUANT: CPT | Performed by: EMERGENCY MEDICINE

## 2022-12-26 PROCEDURE — 36415 COLL VENOUS BLD VENIPUNCTURE: CPT | Performed by: EMERGENCY MEDICINE

## 2022-12-26 ASSESSMENT — ACTIVITIES OF DAILY LIVING (ADL): ADLS_ACUITY_SCORE: 37

## 2022-12-27 NOTE — DISCHARGE INSTRUCTIONS
Call [phone] to schedule cardiac monitor    Wear cardiac monitor and follow-up with primary care    Return here for severe pain, passing out, trouble breathing or any other concern    Avoid caffeine, decongestants, nicotine, alcohol, THC

## 2022-12-27 NOTE — ED PROVIDER NOTES
History     Chief Complaint   Patient presents with     Chest Pain     HPI  Janine Salas is a 37 year old female who presents with palpitations for about a year, fast heart rate that will come and go, more frequent and more prolonged duration over the past month.  Painful thumping substernal since yesterday prompts evaluation.  Has been taking aspirin.  Feeling a little lightheaded and tightness in her chest.  Low-grade temp.  99 4.  No history of or risk factor for DVT/pulmonary embolism.  Denies leg pain or swelling.  Typically drinks a cup of coffee a day, vapes nicotine, rare alcohol, occasional marijuana.  No change in medications.  Denies shortness of air cough nausea vomiting abdominal pain.    Allergies:  Allergies   Allergen Reactions     Codeine Itching     Zofran [Ondansetron] Other (See Comments)     Headaches        Problem List:    Patient Active Problem List    Diagnosis Date Noted     Asthma 2021     Priority: Medium     Cervical high risk HPV (human papillomavirus) test positive 07/15/2021     Priority: Medium     12 NIL  3/10/15 NIL pap, neg HPV  18 NIL pap, neg HPV  7/15/21 NIL pap, +HR HPV (not 16/18). Plan: cotest in 1 year  22 Lost to follow-up for pap tracking              Hip pain, left 2018     Priority: Medium     Bilateral low back pain without sciatica 2018     Priority: Medium     Moderate episode of recurrent major depressive disorder (H) 03/15/2018     Priority: Medium     Acute bilateral low back pain with right-sided sciatica 2017     Priority: Medium     S/P  section 2016     Priority: Medium     Essential hypertension 2016     Priority: Medium     Anxiety 02/10/2016     Priority: Medium     Morbid obesity due to excess calories (H) 2016     Priority: Medium     Back pain associated with peripheral numbness 2016     Priority: Medium     CARDIOVASCULAR SCREENING; LDL GOAL LESS THAN 160 2012      Priority: Medium        Past Medical History:    Past Medical History:   Diagnosis Date     Acute pancreatitis 1/15     Anxiety      Cervical high risk HPV (human papillomavirus) test positive 7/15/2021     Chickenpox      PONV (postoperative nausea and vomiting)      Pregnancy induced hypertension      PTSD (post-traumatic stress disorder)      Severe postpartum depression        Past Surgical History:    Past Surgical History:   Procedure Laterality Date     APPENDECTOMY        SECTION N/A 2016    Procedure:  SECTION;  Surgeon: Marco Marin MD;  Location: [facility]      SECTION N/A 2019    Procedure:  SECTION;  Surgeon: Marco Marin MD;  Location: [facility]     COLONOSCOPY       HC REMOVAL GALLBLADDER      Description: Cholecystectomy;  Recorded: 10/04/2013;     LAPAROSCOPIC CHOLECYSTECTOMY  2013    Procedure: LAPAROSCOPIC CHOLECYSTECTOMY;  Laparoscopic Cholecystectomy;  Surgeon: Lasha Garcias MD;  Location: [facility]     MOUTH SURGERY      wisdom teeth     UPPER GI ENDOSCOPY         Family History:    Family History   Problem Relation Age of Onset     Hypertension Mother      Depression Mother      Osteoporosis Mother      Thyroid Disease Mother      Hypertension Father      Alcohol/Drug Father         recovered alcohol- has fetal alcohol syndrome     Cancer Father         melanoma     Dementia Father      Hypertension Maternal Grandmother      Alzheimer Disease Maternal Grandmother      Cardiovascular Maternal Grandmother         triple bypass     Cancer Maternal Grandfather         lung     Alcohol/Drug Paternal Grandmother         alcohol     Cancer - colorectal Paternal Grandmother         colon     Alcohol/Drug Paternal Grandfather         alcohol     Cancer Paternal Grandfather         leukemia - adult onset     Cardiovascular Paternal Grandfather         stent     Obesity Sister      Breast Cancer Maternal Aunt      Cancer Sister   "       cervical cancer, hysterectomy     Substance Abuse Sister         recovered drugs     Bipolar Disorder Sister      Depression Sister      Hypothyroidism Sister      Autism Spectrum Disorder Sister         ASpergers     Bipolar Disorder Other      Kidney Disease Maternal Aunt         Stage 3       Social History:  Marital Status:  Single [1]  Social History     Tobacco Use     Smoking status: Former     Packs/day: 0.50     Types: Cigarettes     Start date: 5/7/2015     Smokeless tobacco: Former   Vaping Use     Vaping Use: Some days     Substances: CBD   Substance Use Topics     Alcohol use: Yes     Alcohol/week: 0.0 standard drinks     Comment: Rare     Drug use: No        Medications:    atenolol (TENORMIN) 50 MG tablet  baclofen (LIORESAL) 10 MG tablet  buPROPion (WELLBUTRIN XL) 150 MG 24 hr tablet  escitalopram (LEXAPRO) 20 MG tablet  FALMINA 0.1-20 MG-MCG tablet  nabumetone (RELAFEN) 500 MG tablet          Review of Systems  All other systems reviewed and are negative.    Physical Exam   BP: (!) 154/94  Pulse: 68  Temp: 99.1  F (37.3  C)  Resp: 18  Height: 168.9 cm (5' 6.5\")  Weight: (!) 161.2 kg (355 lb 6.4 oz)  SpO2: 97 %      Physical Exam  Nontoxic appearing no respiratory distress alert and oriented ×3  Head atraumatic normocephalic  Neck supple full active painless range of motion  Lungs clear to auscultation  Heart regular no murmur  Abdomen soft nontender bowel sounds positive   Strength and sensation grossly intact throughout the extremities, gait and station normal  Speech is fluent, good eye contact, thought processes are rational  Lower extremities without swelling, redness or tenderness  Pedal pulses symmetrical and strong    ED Course          ECG: Normal rate and rhythm, axis and intervals within normal limits, no Q waves, no ectopy no acute ST or T wave changes, unchanged from previous, read by Dr. Leonardo Braswell         Procedures                Critical Care time:  none           "     Results for orders placed or performed during the hospital encounter of 12/26/22 (from the past 24 hour(s))   [place] Draw    Narrative    The following orders were created for panel order [place] Draw.  Procedure                               Abnormality         Status                     ---------                               -----------         ------                     Extra Blue Top Tube[[id number]]                              Final result               Extra Red Top Tube[[id number]]                               Final result               Extra Green Top (Lithium...[[id number]]                                                 Extra Purple Top Tube[[id number]]                                                         Please view results for these tests on the individual orders.   CBC with Platelets & Differential    Narrative    The following orders were created for panel order CBC with Platelets & Differential.  Procedure                               Abnormality         Status                     ---------                               -----------         ------                     CBC with platelets and d...[[id number]]  Abnormal            Final result                 Please view results for these tests on the individual orders.   Basic metabolic panel   Result Value Ref Range    Sodium 139 136 - 145 mmol/L    Potassium 4.0 3.4 - 5.3 mmol/L    Chloride 103 98 - 107 mmol/L    Carbon Dioxide (CO2) 27 22 - 29 mmol/L    Anion Gap 9 7 - 15 mmol/L    Urea Nitrogen 11.8 6.0 - 20.0 mg/dL    Creatinine 0.78 0.51 - 0.95 mg/dL    Calcium 9.2 8.6 - 10.0 mg/dL    Glucose 89 70 - 99 mg/dL    GFR Estimate >90 >60 mL/min/1.73m2   Troponin T, High Sensitivity   Result Value Ref Range    Troponin T, High Sensitivity <6 <=14 ng/L   Extra Blue Top Tube   Result Value Ref Range    Hold Specimen JIC    Extra Red Top Tube   Result Value Ref Range    Hold Specimen JIC    CBC with platelets and differential   Result Value Ref Range     WBC Count 14.9 (H) 4.0 - 11.0 10e3/uL    RBC Count 4.46 3.80 - 5.20 10e6/uL    Hemoglobin 14.0 11.7 - 15.7 g/dL    Hematocrit 42.0 35.0 - 47.0 %    MCV 94 78 - 100 fL    MCH 31.4 26.5 - 33.0 pg    MCHC 33.3 31.5 - 36.5 g/dL    RDW 12.8 10.0 - 15.0 %    Platelet Count 281 150 - 450 10e3/uL    % Neutrophils 61 %    % Lymphocytes 29 %    % Monocytes 7 %    % Eosinophils 2 %    % Basophils 1 %    % Immature Granulocytes 0 %    NRBCs per 100 WBC 0 <1 /100    Absolute Neutrophils 9.1 (H) 1.6 - 8.3 10e3/uL    Absolute Lymphocytes 4.3 0.8 - 5.3 10e3/uL    Absolute Monocytes 1.0 0.0 - 1.3 10e3/uL    Absolute Eosinophils 0.3 0.0 - 0.7 10e3/uL    Absolute Basophils 0.1 0.0 - 0.2 10e3/uL    Absolute Immature Granulocytes 0.0 <=0.4 10e3/uL    Absolute NRBCs 0.0 10e3/uL   D dimer quantitative   Result Value Ref Range    D-Dimer Quantitative 0.33 0.00 - 0.50 ug/mL FEU    Narrative    This D-dimer assay is intended for use in conjunction with a clinical pretest probability assessment model to exclude pulmonary embolism (PE) and deep venous thrombosis (DVT) in outpatients suspected of PE or DVT. The cut-off value is 0.50 ug/mL FEU.   TSH with free T4 reflex   Result Value Ref Range    TSH 1.86 0.30 - 4.20 uIU/mL   XR Chest 2 Views    Narrative    EXAM: XR CHEST 2 VIEWS  LOCATION: [facility]  DATE/TIME: 12/26/2022 9:38 PM    INDICATION: Palpitations substernal chest pain  COMPARISON: Chest radiograph 05/21/2021      Impression    IMPRESSION: Negative chest.       Medications - No data to display    Assessments & Plan (with Medical Decision Making)  Potation as described above, normal sinus rhythm throughout stay, ECG normal, troponin D-dimer basic labs normal including TSH with exception of mildly elevated white count which is nonspecific.  Low-grade temp.  Safe for discharge.  Outpatient cardiac monitor.  Return criteria reviewed.  Avoid nicotine, caffeine, decongestants, alcohol and THC.     I have  reviewed the nursing notes.    I have reviewed the findings, diagnosis, plan and need for follow up with the patient.       Discharge Medication List as of 12/26/2022 10:05 PM          Final diagnoses:   Palpitations       12/26/2022   [facility] EMERGENCY DEPT     Leonardo Braswell MD  12/27/22 0112

## 2022-12-28 ENCOUNTER — HOSPITAL ENCOUNTER (OUTPATIENT)
Dept: CARDIOLOGY | Facility: CLINIC | Age: 37
Discharge: HOME | End: 2022-12-28
Attending: EMERGENCY MEDICINE | Admitting: EMERGENCY MEDICINE
Payer: COMMERCIAL

## 2022-12-28 DIAGNOSIS — R00.2 PALPITATIONS: ICD-10-CM

## 2022-12-28 PROCEDURE — 93248 EXT ECG>7D<15D REV&INTERPJ: CPT | Performed by: INTERNAL MEDICINE

## 2022-12-28 PROCEDURE — 93242 EXT ECG>48HR<7D RECORDING: CPT

## 2023-02-03 DIAGNOSIS — I10 ESSENTIAL HYPERTENSION: ICD-10-CM

## 2023-02-03 DIAGNOSIS — F41.9 ANXIETY: ICD-10-CM

## 2023-02-03 RX ORDER — ATENOLOL 50 MG/1
TABLET ORAL
Qty: 90 TABLET | Refills: 0 | Status: SHIPPED | OUTPATIENT
Start: 2023-02-03 | End: 2023-02-13

## 2023-02-03 RX ORDER — ESCITALOPRAM OXALATE 20 MG/1
TABLET ORAL
Qty: 90 TABLET | Refills: 0 | Status: SHIPPED | OUTPATIENT
Start: 2023-02-03 | End: 2023-02-13

## 2023-02-03 NOTE — TELEPHONE ENCOUNTER
Routing refill request to provider for review/approval because:  Last recorded blood pressure does not meet RN protocol parameters  PHQ9: 5 on 7/2022    Faustino Flores RN

## 2023-02-13 ENCOUNTER — TELEPHONE (OUTPATIENT)
Dept: FAMILY MEDICINE | Facility: CLINIC | Age: 38
End: 2023-02-13

## 2023-02-13 ENCOUNTER — OFFICE VISIT (OUTPATIENT)
Dept: FAMILY MEDICINE | Facility: CLINIC | Age: 38
End: 2023-02-13
Payer: COMMERCIAL

## 2023-02-13 VITALS
TEMPERATURE: 99.3 F | DIASTOLIC BLOOD PRESSURE: 94 MMHG | BODY MASS INDEX: 45.99 KG/M2 | WEIGHT: 293 LBS | OXYGEN SATURATION: 98 % | SYSTOLIC BLOOD PRESSURE: 144 MMHG | HEIGHT: 67 IN | HEART RATE: 84 BPM | RESPIRATION RATE: 16 BRPM

## 2023-02-13 DIAGNOSIS — Z30.41 ENCOUNTER FOR SURVEILLANCE OF CONTRACEPTIVE PILLS: ICD-10-CM

## 2023-02-13 DIAGNOSIS — E66.01 MORBID OBESITY (H): ICD-10-CM

## 2023-02-13 DIAGNOSIS — F33.1 MODERATE EPISODE OF RECURRENT MAJOR DEPRESSIVE DISORDER (H): ICD-10-CM

## 2023-02-13 DIAGNOSIS — Z13.220 SCREENING FOR HYPERLIPIDEMIA: ICD-10-CM

## 2023-02-13 DIAGNOSIS — F41.9 ANXIETY: ICD-10-CM

## 2023-02-13 DIAGNOSIS — E66.01 MORBID OBESITY DUE TO EXCESS CALORIES (H): Primary | ICD-10-CM

## 2023-02-13 DIAGNOSIS — Z79.899 ENCOUNTER FOR LONG-TERM (CURRENT) USE OF MEDICATIONS: Primary | ICD-10-CM

## 2023-02-13 DIAGNOSIS — N93.9 ABNORMAL UTERINE BLEEDING: ICD-10-CM

## 2023-02-13 DIAGNOSIS — I10 ESSENTIAL HYPERTENSION: ICD-10-CM

## 2023-02-13 DIAGNOSIS — Z12.4 CERVICAL CANCER SCREENING: ICD-10-CM

## 2023-02-13 LAB
CHOLEST SERPL-MCNC: 171 MG/DL
HDLC SERPL-MCNC: 50 MG/DL
LDLC SERPL CALC-MCNC: 77 MG/DL
NONHDLC SERPL-MCNC: 121 MG/DL
TRIGL SERPL-MCNC: 220 MG/DL

## 2023-02-13 PROCEDURE — 90471 IMMUNIZATION ADMIN: CPT | Performed by: PHYSICIAN ASSISTANT

## 2023-02-13 PROCEDURE — 99395 PREV VISIT EST AGE 18-39: CPT | Mod: 25 | Performed by: PHYSICIAN ASSISTANT

## 2023-02-13 PROCEDURE — 90686 IIV4 VACC NO PRSV 0.5 ML IM: CPT | Performed by: PHYSICIAN ASSISTANT

## 2023-02-13 PROCEDURE — 80061 LIPID PANEL: CPT | Performed by: PHYSICIAN ASSISTANT

## 2023-02-13 PROCEDURE — 36415 COLL VENOUS BLD VENIPUNCTURE: CPT | Performed by: PHYSICIAN ASSISTANT

## 2023-02-13 RX ORDER — ESCITALOPRAM OXALATE 20 MG/1
20 TABLET ORAL DAILY
Qty: 90 TABLET | Refills: 3 | Status: SHIPPED | OUTPATIENT
Start: 2023-02-13 | End: 2024-05-06

## 2023-02-13 RX ORDER — ATENOLOL 100 MG/1
100 TABLET ORAL DAILY
Qty: 90 TABLET | Refills: 3 | Status: SHIPPED | OUTPATIENT
Start: 2023-02-13 | End: 2024-02-21

## 2023-02-13 RX ORDER — LEVONORGESTREL/ETHIN.ESTRADIOL 0.1-0.02MG
1 TABLET ORAL DAILY
Qty: 84 TABLET | Refills: 3 | Status: SHIPPED | OUTPATIENT
Start: 2023-02-13 | End: 2024-02-05

## 2023-02-13 RX ORDER — BUPROPION HYDROCHLORIDE 150 MG/1
150 TABLET ORAL EVERY MORNING
Qty: 90 TABLET | Refills: 3 | Status: SHIPPED | OUTPATIENT
Start: 2023-02-13 | End: 2024-01-16

## 2023-02-13 ASSESSMENT — ENCOUNTER SYMPTOMS
SHORTNESS OF BREATH: 0
MYALGIAS: 0
HEMATURIA: 0
BREAST MASS: 0
HEARTBURN: 1
PALPITATIONS: 1
WEAKNESS: 1
DYSURIA: 0
JOINT SWELLING: 0
DIZZINESS: 0
SORE THROAT: 0
DIARRHEA: 0
HEADACHES: 1
CONSTIPATION: 0
COUGH: 0
HEMATOCHEZIA: 0
ABDOMINAL PAIN: 0
PARESTHESIAS: 0
FREQUENCY: 1
ARTHRALGIAS: 1
NAUSEA: 1
EYE PAIN: 0
CHILLS: 0
FEVER: 0
NERVOUS/ANXIOUS: 0

## 2023-02-13 ASSESSMENT — ASTHMA QUESTIONNAIRES
QUESTION_4 LAST FOUR WEEKS HOW OFTEN HAVE YOU USED YOUR RESCUE INHALER OR NEBULIZER MEDICATION (SUCH AS ALBUTEROL): NOT AT ALL
ACT_TOTALSCORE: 24
QUESTION_2 LAST FOUR WEEKS HOW OFTEN HAVE YOU HAD SHORTNESS OF BREATH: NOT AT ALL
QUESTION_5 LAST FOUR WEEKS HOW WOULD YOU RATE YOUR ASTHMA CONTROL: WELL CONTROLLED
QUESTION_1 LAST FOUR WEEKS HOW MUCH OF THE TIME DID YOUR ASTHMA KEEP YOU FROM GETTING AS MUCH DONE AT WORK, SCHOOL OR AT HOME: NONE OF THE TIME
QUESTION_3 LAST FOUR WEEKS HOW OFTEN DID YOUR ASTHMA SYMPTOMS (WHEEZING, COUGHING, SHORTNESS OF BREATH, CHEST TIGHTNESS OR PAIN) WAKE YOU UP AT NIGHT OR EARLIER THAN USUAL IN THE MORNING: NOT AT ALL
ACT_TOTALSCORE: 24

## 2023-02-13 ASSESSMENT — PATIENT HEALTH QUESTIONNAIRE - PHQ9
SUM OF ALL RESPONSES TO PHQ QUESTIONS 1-9: 12
10. IF YOU CHECKED OFF ANY PROBLEMS, HOW DIFFICULT HAVE THESE PROBLEMS MADE IT FOR YOU TO DO YOUR WORK, TAKE CARE OF THINGS AT HOME, OR GET ALONG WITH OTHER PEOPLE: EXTREMELY DIFFICULT
SUM OF ALL RESPONSES TO PHQ QUESTIONS 1-9: 12

## 2023-02-13 NOTE — PROGRESS NOTES
SUBJECTIVE:   CC: Janine is an 37 year old who presents for preventive health visit.   Patient has been advised of split billing requirements and indicates understanding: Yes  Healthy Habits:     Getting at least 3 servings of Calcium per day:  Yes    Bi-annual eye exam:  NO    Dental care twice a year:  NO    Sleep apnea or symptoms of sleep apnea:  Daytime drowsiness and Excessive snoring    Diet:  Regular (no restrictions)    Frequency of exercise:  None    Taking medications regularly:  Yes    Barriers to taking medications:  None    Medication side effects:  None    PHQ-2 Total Score: 4    Additional concerns today:  No               Today's PHQ-2 Score:   PHQ-2 ( 1999 [employer]) 2/13/2023   Q1: Little interest or pleasure in doing things 3   Q2: Feeling down, depressed or hopeless 1   PHQ-2 Score 4   PHQ-2 Total Score (12-17 Years)- Positive if 3 or more points; Administer PHQ-A if positive -   Q1: Little interest or pleasure in doing things Nearly every day   Q2: Feeling down, depressed or hopeless Several days   PHQ-2 Score 4           Social History     Tobacco Use     Smoking status: Former     Packs/day: 0.50     Types: Cigarettes     Start date: 5/7/2015     Smokeless tobacco: Former   Substance Use Topics     Alcohol use: Yes     Alcohol/week: 0.0 standard drinks     Comment: Rare     If you drink alcohol do you typically have >3 drinks per day or >7 drinks per week? No    Alcohol Use 2/13/2023   Prescreen: >3 drinks/day or >7 drinks/week? No   Prescreen: >3 drinks/day or >7 drinks/week? -       Reviewed orders with patient.  Reviewed health maintenance and updated orders accordingly - Yes  BP Readings from Last 3 Encounters:   02/13/23 (!) 144/94   12/26/22 (!) 153/92   12/21/21 122/76    Wt Readings from Last 3 Encounters:   02/13/23 (!) 166 kg (366 lb)   12/26/22 (!) 161.2 kg (355 lb 6.4 oz)   12/21/21 147.9 kg (326 lb)                    Breast Cancer Screening:    FHS-7:   Breast CA Risk Assessment  (FHS-7) 2/13/2023   Did any of your first-degree relatives have breast or ovarian cancer? No   Did any of your relatives have bilateral breast cancer? Yes   Did any man in your family have breast cancer? No   Did any woman in your family have breast and ovarian cancer? Yes   Did any woman in your family have breast cancer before age 50 y? Yes   Do you have 2 or more relatives with breast and/or ovarian cancer? Yes   Do you have 2 or more relatives with breast and/or bowel cancer? Yes       Pertinent mammograms are reviewed under the imaging tab.    History of abnormal Pap smear: YES - updated in Problem List and Health Maintenance accordingly  PAP / HPV Latest Ref Rng & Units 7/15/2021 7/19/2018 3/10/2015   PAP   Negative for Intraepithelial Lesion or Malignancy (NILM) - -   PAP (Historical) - - NIL NIL   HPV16 - Negative Negative -   HPV18 - Negative Negative -   HRHPV - Positive Negative -     Reviewed and updated as needed this visit by clinical staff     Meds              Reviewed and updated as needed this visit by Provider                     Review of Systems   Constitutional: Negative for chills and fever.   HENT: Positive for hearing loss. Negative for congestion, ear pain and sore throat.    Eyes: Negative for pain and visual disturbance.   Respiratory: Negative for cough and shortness of breath.    Cardiovascular: Positive for palpitations. Negative for chest pain.   Gastrointestinal: Positive for heartburn and nausea. Negative for abdominal pain, constipation, diarrhea and hematochezia.   Breasts:  Negative for tenderness, breast mass and discharge.   Genitourinary: Positive for frequency and urgency. Negative for dysuria, genital sores, hematuria, pelvic pain, vaginal bleeding and vaginal discharge.   Musculoskeletal: Positive for arthralgias. Negative for joint swelling and myalgias.   Skin: Negative for rash.   Neurological: Positive for weakness and headaches. Negative for dizziness and  "paresthesias.   Psychiatric/Behavioral: Positive for mood changes. The patient is not nervous/anxious.        OBJECTIVE:   BP (!) 144/94 (BP Location: Right arm, Patient Position: Sitting, Cuff Size: Adult Large)   Pulse 84   Temp 99.3  F (37.4  C) (Tympanic)   Resp 16   Ht 1.689 m (5' 6.5\")   Wt (!) 166 kg (366 lb)   LMP 02/06/2023 (Exact Date)   SpO2 98%   BMI 58.19 kg/m    Physical Exam  GENERAL: healthy, alert and no distress  EYES: Eyes grossly normal to inspection, PERRL and conjunctivae and sclerae normal  HENT: ear canals and TM's normal, nose and mouth without ulcers or lesions  NECK: no adenopathy, no asymmetry, masses, or scars and thyroid normal to palpation  RESP: lungs clear to auscultation - no rales, rhonchi or wheezes  BREAST: normal without masses, tenderness or nipple discharge and no palpable axillary masses or adenopathy  CV: regular rate and rhythm, normal S1 S2, no S3 or S4, no murmur, click or rub, no peripheral edema and peripheral pulses strong  ABDOMEN: soft, nontender, no hepatosplenomegaly, no masses and bowel sounds normal  MS: no gross musculoskeletal defects noted, no edema  SKIN: no suspicious lesions or rashes  NEURO: Normal strength and tone, mentation intact and speech normal  PSYCH: mentation appears normal, affect normal/bright    Diagnostic Test Results:  pending    ASSESSMENT/PLAN:   (Z79.899) Encounter for long-term (current) use of medications  (primary encounter diagnosis)  Comment:   Plan:     (Z13.220) Screening for hyperlipidemia  Comment:   Plan: Lipid panel reflex to direct LDL Non-fasting           (I10) Essential hypertension  Comment: refilled. Elevated - asked patient to check levels at home and report back  Plan: atenolol (TENORMIN) 100 MG tablet            (Z30.41) Encounter for surveillance of contraceptive pills  Comment:   Plan: levonorgestrel-ethinyl estradiol (FALMINA)         0.1-20 MG-MCG tablet            (F41.9) Anxiety  Comment:   Plan: " "escitalopram (LEXAPRO) 20 MG tablet            (F33.1) Moderate episode of recurrent major depressive disorder (H)  Comment:   Plan: buPROPion (WELLBUTRIN XL) 150 MG 24 hr tablet            (E66.01) Morbid obesity (H)  Comment:   Plan: DISCONTINUED: Semaglutide-Weight Management 0.5        MG/0.5ML SOAJ            (N93.9) Abnormal uterine bleeding  Comment: abnormal bleeding - would like to discuss options with OB/GYN. She would like to defer PAP to have them do it  Plan: Ob/Gyn Referral                Patient has been advised of split billing requirements and indicates understanding: Yes      COUNSELING:  Reviewed preventive health counseling, as reflected in patient instructions      BMI:   Estimated body mass index is 58.19 kg/m  as calculated from the following:    Height as of this encounter: 1.689 m (5' 6.5\").    Weight as of this encounter: 166 kg (366 lb).   Weight management plan: discussed medications - side effects, benefits.       She reports that she has quit smoking. Her smoking use included cigarettes. She started smoking about 7 years ago. She smoked an average of .5 packs per day. She has quit using smokeless tobacco.      Kavya Haider PA-C  [facility]  "

## 2023-02-13 NOTE — TELEPHONE ENCOUNTER
On Janine's Wegovy rx you wrote for 0.5mg with directions of 0.25mg, you can't get that dose out of that pen. Please clarify what dose you want and send new order. Pen comes as a 0.25 so if that's the dose you want you'll need to select that in epic.     Thank you!   [facility]   [phone]

## 2023-02-15 ENCOUNTER — TELEPHONE (OUTPATIENT)
Dept: FAMILY MEDICINE | Facility: CLINIC | Age: 38
End: 2023-02-15

## 2023-02-15 ENCOUNTER — TELEPHONE (OUTPATIENT)
Dept: FAMILY MEDICINE | Facility: CLINIC | Age: 38
End: 2023-02-15
Payer: COMMERCIAL

## 2023-02-15 DIAGNOSIS — E66.01 MORBID OBESITY DUE TO EXCESS CALORIES (H): Primary | ICD-10-CM

## 2023-02-15 NOTE — TELEPHONE ENCOUNTER
Prior Authorization Retail Medication Request    Medication/Dose: Ozempic  ICD code (if different than what is on RX):  E66.01  Previously Tried and Failed:    Rationale:      Insurance Name:  [facility]  Insurance ID:  [id number]      See Judah  Pharmacy Technician, [facility]  Phone: [phone]  Fax: [phone]

## 2023-02-15 NOTE — TELEPHONE ENCOUNTER
Central Prior Authorization Team   Phone: [phone]      PRIOR AUTHORIZATION DENIED    Medication: semaglutide (OZEMPIC) 2 MG/1.5ML SOPN pen - EPA DENIED    Denial Date: 2/14/2023    Denial Rational:       Appeal Information:

## 2023-02-21 ENCOUNTER — MYC MEDICAL ADVICE (OUTPATIENT)
Dept: FAMILY MEDICINE | Facility: CLINIC | Age: 38
End: 2023-02-21
Payer: COMMERCIAL

## 2023-02-21 DIAGNOSIS — G89.29 CHRONIC BILATERAL LOW BACK PAIN WITHOUT SCIATICA: ICD-10-CM

## 2023-02-21 DIAGNOSIS — M54.50 CHRONIC BILATERAL LOW BACK PAIN WITHOUT SCIATICA: ICD-10-CM

## 2023-02-21 NOTE — TELEPHONE ENCOUNTER
Pt would like baclofen filled as soon as possible, she only hector 2 left.   Yoselin Perdomo RN

## 2023-02-22 RX ORDER — BACLOFEN 10 MG/1
TABLET ORAL
Qty: 120 TABLET | Refills: 0 | Status: SHIPPED | OUTPATIENT
Start: 2023-02-22 | End: 2023-03-24

## 2023-03-10 NOTE — TELEPHONE ENCOUNTER
Sorry - I  Must have bad eyes but I cannot read the prior auth denial reasons to know if they want her to try something else first or if all weight loss medications are not covered?     Kavya

## 2023-03-14 NOTE — TELEPHONE ENCOUNTER
They do indicate Bydureon Bcise, Byetta & Victoza are formulary alternatives but the first paragraph reads that the the diagnosis might not be FDA approved for these classes of medications, it isn't totally clear.    CLAUDIA GORDON

## 2023-03-15 ENCOUNTER — TELEPHONE (OUTPATIENT)
Dept: FAMILY MEDICINE | Facility: CLINIC | Age: 38
End: 2023-03-15

## 2023-03-15 NOTE — TELEPHONE ENCOUNTER
Please call patient - the semaglutide (wegovy) for weight was denied (prior auth denied). I sent over a similar medication (same medication class) to see if this one would be covered. It is also used for weight loss - it's called liraglutide or saxenda.     Kavya Haider PA-C

## 2023-03-15 NOTE — TELEPHONE ENCOUNTER
Prior Authorization Retail Medication Request    Medication/Dose: Saxenda 18mg/3ml  ICD code (if different than what is on RX):  E66.01  Previously Tried and Failed:    Rationale:      Insurance Name:  [facility]  Insurance ID:  [id number]          See Judah  Pharmacy Technician, [facility]  Phone: [phone]  Fax: [phone]

## 2023-03-15 NOTE — LETTER
April 16, 2023      Janine Salas  [address] [address]        To Whom It May Concern,     This in regards to the recent medication/prior authorization denial for my patient Janine Salas and the denial of liraglutide for weight loss. Per the letter sent, the reason for denial was that she was to have tried phentermine along with lifestyle changes for at least 12 weeks. I can attest for the fact that patient has been trying lifestyle changes for the last several years and has also been on phentermine for well over 12 weeks. We have in fact used phentermine for large periods of time including in 2015, [id number], and again in 2021 through 2022 without the results we would like given her chronic co morbidities. From a wellness/preventative standpoint, lifestyle changes alone or in conjunction with phentermine are not working and will not be enough. It is imperative that we be able to try the medications in the GLP-1 receptor agonist class (semaglutide or liraglutide)          Sincerely,        Kavya Haider PA-C

## 2023-03-20 NOTE — TELEPHONE ENCOUNTER
PRIOR AUTHORIZATION DENIED    Medication: Saxenda 18mg/3ml - DENIED    Denial Date: 3/19/2023    Denial Rational: INSURANCE STATES PATIENT MUST TRY/FAIL PHENTERMINE FOR AT LEAST 12 WEEKS.        Appeal Information:  IF PATIENT IS UNABLE TO TRY/FAIL ALTERNATIVE(S) PLEASE SUPPLY PA TEAM WITH A LETTER OF MEDICAL NECESSITY WITH CLINICAL REASON.

## 2023-03-21 NOTE — TELEPHONE ENCOUNTER
It looks like on the denial the reasoning is she needs to try phentermine with lifestyle measures. She has been on phentermine in 2015, [id number] and more recently tried again in [id number] along with attempted diet and exercise. Is this something I need to write a letter to appeal?     Kavya

## 2023-03-23 NOTE — TELEPHONE ENCOUNTER
Yes, unfortunately once a denial is on file an appeal needs to be initiated with a letter.    CLAUDIA GORDON

## 2023-03-24 DIAGNOSIS — M54.50 CHRONIC BILATERAL LOW BACK PAIN WITHOUT SCIATICA: ICD-10-CM

## 2023-03-24 DIAGNOSIS — G89.29 CHRONIC BILATERAL LOW BACK PAIN WITHOUT SCIATICA: ICD-10-CM

## 2023-03-24 RX ORDER — BACLOFEN 10 MG/1
TABLET ORAL
Qty: 120 TABLET | Refills: 0 | Status: SHIPPED | OUTPATIENT
Start: 2023-03-24 | End: 2023-05-04

## 2023-03-24 NOTE — TELEPHONE ENCOUNTER
Routing refill request to provider for review/approval because:  PCP to determine  Car Gage RN

## 2023-04-16 DIAGNOSIS — G89.29 CHRONIC BILATERAL LOW BACK PAIN WITHOUT SCIATICA: ICD-10-CM

## 2023-04-16 DIAGNOSIS — M54.50 CHRONIC BILATERAL LOW BACK PAIN WITHOUT SCIATICA: ICD-10-CM

## 2023-04-17 NOTE — TELEPHONE ENCOUNTER
Routing refill request to provider for review/approval because:  Labs out of range/not current:  CBC, ALT, AST,   BP does not meet parameters.   02/13/23 (!) 144/94   12/26/22 (!) 153/92     Karen Mcgee RN on 4/17/2023 at 12:08 PM

## 2023-04-18 RX ORDER — NABUMETONE 500 MG/1
TABLET, FILM COATED ORAL
Qty: 180 TABLET | Refills: 1 | Status: SHIPPED | OUTPATIENT
Start: 2023-04-18 | End: 2023-10-17

## 2023-04-18 NOTE — TELEPHONE ENCOUNTER
Medication Appeal Initiation    We have initiated an appeal for the requested medication:  Medication: Saxenda 18mg/3ml - DENIED  Appeal Start Date:  4/18/2023  Insurance Company:    Comments:  APPEAL LETTER FAXED.

## 2023-04-20 NOTE — TELEPHONE ENCOUNTER
Prior Authorization Approval    Authorization Effective Date: 3/19/2023  Authorization Expiration Date: 10/19/2023  Medication: Saxenda 18mg/3ml - APPROVED  Approved Dose/Quantity:    Reference #:     Insurance Company:    Expected CoPay:       CoPay Card Available:      Foundation Assistance Needed:    Which Pharmacy is filling the prescription (Not needed for infusion/clinic administered): [facility] [place], [place] - [address]  Pharmacy Notified: Yes  Patient Notified: Yes  **Instructed pharmacy to notify patient when script is ready to /ship.**

## 2023-05-04 ENCOUNTER — NURSE TRIAGE (OUTPATIENT)
Dept: FAMILY MEDICINE | Facility: CLINIC | Age: 38
End: 2023-05-04
Payer: COMMERCIAL

## 2023-05-04 DIAGNOSIS — G89.29 CHRONIC BILATERAL LOW BACK PAIN WITHOUT SCIATICA: ICD-10-CM

## 2023-05-04 DIAGNOSIS — M54.50 CHRONIC BILATERAL LOW BACK PAIN WITHOUT SCIATICA: ICD-10-CM

## 2023-05-04 RX ORDER — BACLOFEN 10 MG/1
TABLET ORAL
Qty: 120 TABLET | Refills: 0 | Status: SHIPPED | OUTPATIENT
Start: 2023-05-04 | End: 2023-06-09

## 2023-05-04 NOTE — TELEPHONE ENCOUNTER
Routing refill request to provider for review/approval because:  Drug not on the [facility] refill protocol     Karen Mcgee RN on 5/4/2023 at 12:21 PM

## 2023-05-04 NOTE — TELEPHONE ENCOUNTER
See eziCONEXhart messages.Called patient.   8/10 pain.  Gets migraines regularly but they have increased with this tooth pain.  No loss of vision or double vision.  Has a new symptom of feeling chilled and nauseated- almost thrown up 4 times today which is unusual with her migraines.  Stated this is one of the top five worst migraines she has had.   Pain behind eyes.  No numbness, tingling, chest pain, shortness of breath, no dizziness or lightheadedness.   RNs concern is uncontrolled pain raising her BP or possible spread of infection and advised her to go to the ED or UC at least.  Patient said she cannot do that because she is borrowing someones car who is watching her children    Reviewed red flags to call 911.     Routing to a provider in clinic    Reason for Disposition    SEVERE headache (e.g., excruciating) and has had severe headaches before    Additional Information    Negative: Difficult to awaken or acting confused (e.g., disoriented, slurred speech)    Negative: Weakness of the face, arm or leg on one side of the body and new-onset    Negative: Numbness of the face, arm or leg on one side of the body and new-onset    Negative: Loss of speech or garbled speech and new-onset    Negative: Passed out (i.e., fainted, collapsed and was not responding)    Negative: Sounds like a life-threatening emergency to the triager    Protocols used: HEADACHE-A-OH

## 2023-05-13 ENCOUNTER — HOSPITAL ENCOUNTER (EMERGENCY)
Facility: CLINIC | Age: 38
Discharge: HOME | End: 2023-05-13
Attending: PHYSICIAN ASSISTANT | Admitting: PHYSICIAN ASSISTANT
Payer: COMMERCIAL

## 2023-05-13 VITALS
RESPIRATION RATE: 18 BRPM | DIASTOLIC BLOOD PRESSURE: 105 MMHG | SYSTOLIC BLOOD PRESSURE: 165 MMHG | OXYGEN SATURATION: 96 % | TEMPERATURE: 98.8 F | HEART RATE: 96 BPM

## 2023-05-13 DIAGNOSIS — K08.89 PAIN, DENTAL: ICD-10-CM

## 2023-05-13 DIAGNOSIS — K04.7 DENTAL INFECTION: ICD-10-CM

## 2023-05-13 PROCEDURE — 99213 OFFICE O/P EST LOW 20 MIN: CPT | Performed by: PHYSICIAN ASSISTANT

## 2023-05-13 PROCEDURE — G0463 HOSPITAL OUTPT CLINIC VISIT: HCPCS | Performed by: PHYSICIAN ASSISTANT

## 2023-05-13 RX ORDER — CEFDINIR 300 MG/1
300 CAPSULE ORAL 2 TIMES DAILY
Qty: 14 CAPSULE | Refills: 0 | Status: SHIPPED | OUTPATIENT
Start: 2023-05-13 | End: 2023-05-20

## 2023-05-13 RX ORDER — HYDROCODONE BITARTRATE AND ACETAMINOPHEN 5; 325 MG/1; MG/1
1 TABLET ORAL EVERY 6 HOURS PRN
Qty: 8 TABLET | Refills: 0 | Status: SHIPPED | OUTPATIENT
Start: 2023-05-13 | End: 2023-05-16

## 2023-05-13 RX ORDER — FLUCONAZOLE 150 MG/1
TABLET ORAL
Qty: 2 TABLET | Refills: 0 | Status: SHIPPED | OUTPATIENT
Start: 2023-05-13 | End: 2023-05-16

## 2023-05-13 RX ORDER — METRONIDAZOLE 500 MG/1
500 TABLET ORAL 2 TIMES DAILY
Qty: 14 TABLET | Refills: 0 | Status: SHIPPED | OUTPATIENT
Start: 2023-05-13 | End: 2023-05-20

## 2023-05-13 ASSESSMENT — ENCOUNTER SYMPTOMS
FEVER: 0
RESPIRATORY NEGATIVE: 1
CONSTITUTIONAL NEGATIVE: 1

## 2023-05-13 ASSESSMENT — ACTIVITIES OF DAILY LIVING (ADL): ADLS_ACUITY_SCORE: 37

## 2023-05-13 NOTE — ED PROVIDER NOTES
History     Chief Complaint   Patient presents with     Dental Pain     Dentist states patient needs antibiotics. Has large abscess under the root of her tooth - left back side of mouth     HPI  Janine Salas is a 37 year old female who presents with complaints of worsening left lower dental pain.  Patient states she was diagnosed with a dental abscess via x-rays at her dentist office a couple weeks ago.  She is currently completing 2-week course of Augmentin and finishes this in 2 days.  She states her symptoms of dental pain were steadily improving while on Augmentin until her symptoms seem to worsen again last night and into today.  She is unable to get into her dentist until Monday.  She complains of a lot of sensitivity and pain to this tooth.  The teeth in front and behind this tooth are now painful as well as experiencing pain along the left lower jawline.  Denies facial swelling, neck pain/stiffness, or difficulties handling secretions.  Denies gingival swelling.  Denies fevers, chills, sore throat, or cough.      Allergies:  Allergies   Allergen Reactions     Codeine Itching     Zofran [Ondansetron] Other (See Comments)     Headaches        Problem List:    Patient Active Problem List    Diagnosis Date Noted     Asthma 2021     Priority: Medium     Cervical high risk HPV (human papillomavirus) test positive 07/15/2021     Priority: Medium     12 NIL  3/10/15 NIL pap, neg HPV  18 NIL pap, neg HPV  7/15/21 NIL pap, +HR HPV (not 16/18). Plan: cotest in 1 year  22 Lost to follow-up for pap tracking              Hip pain, left 2018     Priority: Medium     Bilateral low back pain without sciatica 2018     Priority: Medium     Moderate episode of recurrent major depressive disorder (H) 03/15/2018     Priority: Medium     Acute bilateral low back pain with right-sided sciatica 2017     Priority: Medium     S/P  section 2016     Priority: Medium      Essential hypertension 2016     Priority: Medium     Anxiety 02/10/2016     Priority: Medium     Morbid obesity due to excess calories (H) 2016     Priority: Medium     Back pain associated with peripheral numbness 2016     Priority: Medium     CARDIOVASCULAR SCREENING; LDL GOAL LESS THAN 160 2012     Priority: Medium        Past Medical History:    Past Medical History:   Diagnosis Date     Acute pancreatitis 1/15     Anxiety      Cervical high risk HPV (human papillomavirus) test positive 7/15/2021     Chickenpox      PONV (postoperative nausea and vomiting)      Pregnancy induced hypertension      PTSD (post-traumatic stress disorder)      Severe postpartum depression        Past Surgical History:    Past Surgical History:   Procedure Laterality Date     APPENDECTOMY        SECTION N/A 2016    Procedure:  SECTION;  Surgeon: Marco Marin MD;  Location: [facility]      SECTION N/A 2019    Procedure:  SECTION;  Surgeon: Marco Marin MD;  Location: [facility]     COLONOSCOPY       HC REMOVAL GALLBLADDER      Description: Cholecystectomy;  Recorded: 10/04/2013;     LAPAROSCOPIC CHOLECYSTECTOMY  2013    Procedure: LAPAROSCOPIC CHOLECYSTECTOMY;  Laparoscopic Cholecystectomy;  Surgeon: Lasha Garcias MD;  Location: [facility]     MOUTH SURGERY      wisdom teeth     UPPER GI ENDOSCOPY         Family History:    Family History   Problem Relation Age of Onset     Hypertension Mother      Depression Mother      Osteoporosis Mother      Thyroid Disease Mother      Hypertension Father      Alcohol/Drug Father         recovered alcohol- has fetal alcohol syndrome     Cancer Father         melanoma     Dementia Father      Hypertension Maternal Grandmother      Alzheimer Disease Maternal Grandmother      Cardiovascular Maternal Grandmother         triple bypass     Cancer Maternal Grandfather         lung     Alcohol/Drug Paternal  Grandmother         alcohol     Cancer - colorectal Paternal Grandmother         colon     Alcohol/Drug Paternal Grandfather         alcohol     Cancer Paternal Grandfather         leukemia - adult onset     Cardiovascular Paternal Grandfather         stent     Obesity Sister      Breast Cancer Maternal Aunt      Cancer Sister         cervical cancer, hysterectomy     Substance Abuse Sister         recovered drugs     Bipolar Disorder Sister      Depression Sister      Hypothyroidism Sister      Autism Spectrum Disorder Sister         ASpergers     Bipolar Disorder Other      Kidney Disease Maternal Aunt         Stage 3       Social History:  Marital Status:  Single [1]  Social History     Tobacco Use     Smoking status: Former     Packs/day: 0.50     Types: Cigarettes     Start date: 5/7/2015     Smokeless tobacco: Former   Vaping Use     Vaping status: Some Days     Substances: CBD   Substance Use Topics     Alcohol use: Yes     Alcohol/week: 0.0 standard drinks of alcohol     Comment: Rare     Drug use: No        Medications:    atenolol (TENORMIN) 100 MG tablet  baclofen (LIORESAL) 10 MG tablet  buPROPion (WELLBUTRIN XL) 150 MG 24 hr tablet  cefdinir (OMNICEF) 300 MG capsule  escitalopram (LEXAPRO) 20 MG tablet  fluconazole (DIFLUCAN) 150 MG tablet  HYDROcodone-acetaminophen (NORCO) 5-325 MG tablet  levonorgestrel-ethinyl estradiol (FALMINA) 0.1-20 MG-MCG tablet  liraglutide - Weight Management (SAXENDA) 18 MG/3ML pen  metroNIDAZOLE (FLAGYL) 500 MG tablet  nabumetone (RELAFEN) 500 MG tablet  semaglutide (OZEMPIC) 2 MG/1.5ML SOPN pen          Review of Systems   Constitutional: Negative.  Negative for fever.   HENT: Positive for dental problem.    Respiratory: Negative.    All other systems reviewed and are negative.      Physical Exam   BP: (!) 165/105  Pulse: 96  Temp: 98.8  F (37.1  C)  Resp: 18  SpO2: 96 %      Physical Exam  Constitutional:       General: She is in acute distress.      Appearance: Normal  appearance. She is well-developed. She is not ill-appearing, toxic-appearing or diaphoretic.   HENT:      Head: Normocephalic and atraumatic.      Right Ear: Tympanic membrane, ear canal and external ear normal.      Left Ear: Tympanic membrane, ear canal and external ear normal.      Nose: Nose normal. No rhinorrhea.      Mouth/Throat:      Lips: Pink.      Mouth: Mucous membranes are moist.      Dentition: Abnormal dentition. Dental tenderness present. No gingival swelling or dental abscesses.      Pharynx: Oropharynx is clear. Uvula midline. No pharyngeal swelling, oropharyngeal exudate, posterior oropharyngeal erythema or uvula swelling.      Tonsils: No tonsillar exudate or tonsillar abscesses.      Comments: Tooth #19 is cracked and tender to percussion.  No gingival swelling or abscess.  Tenderness along left lower jaw line.  No facial swelling.  Eyes:      Extraocular Movements: Extraocular movements intact.      Conjunctiva/sclera: Conjunctivae normal.      Pupils: Pupils are equal, round, and reactive to light.   Cardiovascular:      Rate and Rhythm: Normal rate and regular rhythm.      Heart sounds: Normal heart sounds.   Pulmonary:      Effort: Pulmonary effort is normal. No respiratory distress.      Breath sounds: Normal breath sounds. No stridor. No wheezing, rhonchi or rales.   Musculoskeletal:      Cervical back: Normal range of motion and neck supple. No rigidity.   Lymphadenopathy:      Cervical: No cervical adenopathy.   Skin:     General: Skin is warm and dry.   Neurological:      General: No focal deficit present.      Mental Status: She is alert and oriented to person, place, and time.      Cranial Nerves: No cranial nerve deficit.   Psychiatric:         Behavior: Behavior is cooperative.         ED Course                 Procedures      No results found for this or any previous visit (from the past 24 hour(s)).    Medications - No data to display    Procedure Note - Dental Block:  [place]  Protocol:  Patient Identification: Verified  Time Out: Performed    Procedure performed by Tracie Singh PA-C       To achieve control of dental pain, a inferior alveolar nerve block was performed on the left side.  Bupivacaine was injected without complication.  The patient tolerated the procedure well with improvement in pain.      Assessments & Plan (with Medical Decision Making)     Pt is a 37 year old female who presents with complaints of worsening left lower dental pain.  Patient states she was diagnosed with a dental abscess via x-rays at her dentist office a couple weeks ago.  She is currently completing 2-week course of Augmentin and finishes this in 2 days.  She states her symptoms of dental pain were steadily improving while on Augmentin until her symptoms seem to worsen again last night and into today.  She is unable to get into her dentist until Monday.  She complains of a lot of sensitivity and pain to this tooth.  The teeth in front and behind this tooth are now painful as well as experiencing pain along the left lower jawline.     Pt is afebrile on arrival.  Exam as above.  Tooth #19 is cracked and tender to percussion.  No gingival swelling or abscess.  Tenderness along left lower jaw line.  No facial swelling.  Will switch antibiotics until she is able to follow-up with her dentist again on Monday.  Will broaden coverage to Cefdinir and Flagyl.  Inferior alveolar nerve dental block was performed with improvement in pain.  Return precautions reviewed.  Hand-outs provided.      Patient was sent with Cefdinir and Flagyl (and Norco for pain and Diflucan given her history of yeast infections on antibiotics).  Pt was instructed to follow-up with her dentist within the next 2 days for continued care and management.  She is to return to the ED for persistent and/or worsening symptoms.  Patient expressed understanding of the diagnosis and plan and was discharged home.    I have reviewed the nursing  notes.    I have reviewed the findings, diagnosis, plan and need for follow up with the patient.      Discharge Medication List as of 5/13/2023  3:17 PM      START taking these medications    Details   cefdinir (OMNICEF) 300 MG capsule Take 1 capsule (300 mg) by mouth 2 times daily for 7 days, Disp-14 capsule, R-0, E-Prescribe      fluconazole (DIFLUCAN) 150 MG tablet Take one tablet now, and one tablet in three days, Disp-2 tablet, R-0, E-Prescribe      HYDROcodone-acetaminophen (NORCO) 5-325 MG tablet Take 1 tablet by mouth every 6 hours as needed for severe pain, Disp-8 tablet, R-0, Local Print      metroNIDAZOLE (FLAGYL) 500 MG tablet Take 1 tablet (500 mg) by mouth 2 times daily for 7 days, Disp-14 tablet, R-0, E-PrescribeEat yogurt or cottage cheese daily to prevent diarrhea that can be caused by taking this medication.             Final diagnoses:   Dental infection   Pain, dental       5/13/2023   [facility] EMERGENCY DEPT      Disclaimer:  This note consists of symbols derived from keyboarding, dictation and/or voice recognition software.  As a result, there may be errors in the script that have gone undetected.  Please consider this when interpreting information found in this chart.     Tracie Singh PA-C  05/13/23 [id number]

## 2023-05-16 ENCOUNTER — E-VISIT (OUTPATIENT)
Dept: FAMILY MEDICINE | Facility: CLINIC | Age: 38
End: 2023-05-16
Payer: COMMERCIAL

## 2023-05-16 DIAGNOSIS — K04.7 DENTAL ABSCESS: Primary | ICD-10-CM

## 2023-05-16 PROCEDURE — 99421 OL DIG E/M SVC 5-10 MIN: CPT | Performed by: PHYSICIAN ASSISTANT

## 2023-05-16 RX ORDER — HYDROCODONE BITARTRATE AND ACETAMINOPHEN 5; 325 MG/1; MG/1
1 TABLET ORAL EVERY 6 HOURS PRN
Qty: 8 TABLET | Refills: 0 | Status: SHIPPED | OUTPATIENT
Start: 2023-05-16 | End: 2023-06-29

## 2023-06-09 DIAGNOSIS — M54.50 CHRONIC BILATERAL LOW BACK PAIN WITHOUT SCIATICA: ICD-10-CM

## 2023-06-09 DIAGNOSIS — G89.29 CHRONIC BILATERAL LOW BACK PAIN WITHOUT SCIATICA: ICD-10-CM

## 2023-06-09 RX ORDER — BACLOFEN 10 MG/1
TABLET ORAL
Qty: 120 TABLET | Refills: 0 | Status: SHIPPED | OUTPATIENT
Start: 2023-06-09 | End: 2023-07-14

## 2023-06-29 ENCOUNTER — E-VISIT (OUTPATIENT)
Dept: FAMILY MEDICINE | Facility: CLINIC | Age: 38
End: 2023-06-29
Payer: COMMERCIAL

## 2023-06-29 DIAGNOSIS — K04.7 DENTAL ABSCESS: ICD-10-CM

## 2023-06-29 PROCEDURE — 99207 PR NON-BILLABLE SERV PER CHARTING: CPT | Performed by: PHYSICIAN ASSISTANT

## 2023-06-29 RX ORDER — HYDROCODONE BITARTRATE AND ACETAMINOPHEN 5; 325 MG/1; MG/1
1 TABLET ORAL EVERY 6 HOURS PRN
Qty: 8 TABLET | Refills: 0 | Status: SHIPPED | OUTPATIENT
Start: 2023-06-29

## 2023-06-30 ENCOUNTER — OFFICE VISIT (OUTPATIENT)
Dept: FAMILY MEDICINE | Facility: CLINIC | Age: 38
End: 2023-06-30
Payer: COMMERCIAL

## 2023-06-30 VITALS
TEMPERATURE: 98.3 F | SYSTOLIC BLOOD PRESSURE: 136 MMHG | HEART RATE: 88 BPM | DIASTOLIC BLOOD PRESSURE: 82 MMHG | BODY MASS INDEX: 56.76 KG/M2 | OXYGEN SATURATION: 98 % | WEIGHT: 293 LBS

## 2023-06-30 DIAGNOSIS — B96.89 BV (BACTERIAL VAGINOSIS): Primary | ICD-10-CM

## 2023-06-30 DIAGNOSIS — E28.2 PCOS (POLYCYSTIC OVARIAN SYNDROME): ICD-10-CM

## 2023-06-30 DIAGNOSIS — G25.81 RESTLESS LEGS SYNDROME: ICD-10-CM

## 2023-06-30 DIAGNOSIS — H93.12 TINNITUS, LEFT: ICD-10-CM

## 2023-06-30 DIAGNOSIS — M41.34 THORACOGENIC SCOLIOSIS OF THORACIC REGION: ICD-10-CM

## 2023-06-30 DIAGNOSIS — N76.0 BV (BACTERIAL VAGINOSIS): Primary | ICD-10-CM

## 2023-06-30 DIAGNOSIS — N89.8 VAGINAL ODOR: ICD-10-CM

## 2023-06-30 DIAGNOSIS — F11.20 EPISODIC OPIOID DEPENDENCE (H): ICD-10-CM

## 2023-06-30 DIAGNOSIS — M24.9 HYPERMOBILE JOINTS: ICD-10-CM

## 2023-06-30 DIAGNOSIS — M47.816 LUMBAR FACET ARTHROPATHY: ICD-10-CM

## 2023-06-30 DIAGNOSIS — M48.04 THORACIC SPINAL STENOSIS: ICD-10-CM

## 2023-06-30 DIAGNOSIS — G56.01 CARPAL TUNNEL SYNDROME OF RIGHT WRIST: ICD-10-CM

## 2023-06-30 DIAGNOSIS — M65.979 PERONEAL TENOSYNOVITIS: ICD-10-CM

## 2023-06-30 DIAGNOSIS — M67.432 GANGLION CYST OF WRIST, LEFT: ICD-10-CM

## 2023-06-30 DIAGNOSIS — Z12.4 CERVICAL CANCER SCREENING: ICD-10-CM

## 2023-06-30 DIAGNOSIS — M50.30 BULGING OF CERVICAL INTERVERTEBRAL DISC: ICD-10-CM

## 2023-06-30 DIAGNOSIS — M48.02 CERVICAL STENOSIS OF SPINAL CANAL: ICD-10-CM

## 2023-06-30 DIAGNOSIS — G43.109 MIGRAINE WITH AURA AND WITHOUT STATUS MIGRAINOSUS, NOT INTRACTABLE: ICD-10-CM

## 2023-06-30 DIAGNOSIS — M48.061 SPINAL STENOSIS OF LUMBAR REGION WITHOUT NEUROGENIC CLAUDICATION: ICD-10-CM

## 2023-06-30 PROBLEM — M54.41 ACUTE BILATERAL LOW BACK PAIN WITH RIGHT-SIDED SCIATICA: Status: RESOLVED | Noted: 2017-02-01 | Resolved: 2023-06-30

## 2023-06-30 PROCEDURE — 99214 OFFICE O/P EST MOD 30 MIN: CPT | Performed by: PHYSICIAN ASSISTANT

## 2023-06-30 PROCEDURE — 87210 SMEAR WET MOUNT SALINE/INK: CPT | Performed by: PHYSICIAN ASSISTANT

## 2023-06-30 RX ORDER — METRONIDAZOLE 500 MG/1
500 TABLET ORAL 2 TIMES DAILY
Qty: 14 TABLET | Refills: 0 | Status: SHIPPED | OUTPATIENT
Start: 2023-06-30 | End: 2023-07-07

## 2023-06-30 ASSESSMENT — PATIENT HEALTH QUESTIONNAIRE - PHQ9
10. IF YOU CHECKED OFF ANY PROBLEMS, HOW DIFFICULT HAVE THESE PROBLEMS MADE IT FOR YOU TO DO YOUR WORK, TAKE CARE OF THINGS AT HOME, OR GET ALONG WITH OTHER PEOPLE: SOMEWHAT DIFFICULT
SUM OF ALL RESPONSES TO PHQ QUESTIONS 1-9: 4
SUM OF ALL RESPONSES TO PHQ QUESTIONS 1-9: 4

## 2023-06-30 NOTE — PROGRESS NOTES
Assessment & Plan       ICD-10-CM    1. BV (bacterial vaginosis)  N76.0 metroNIDAZOLE (FLAGYL) 500 MG tablet    B96.89       2. Cervical cancer screening  Z12.4       3. Vaginal odor  N89.8 Wet prep - Clinic Collect      4. Cervical stenosis of spinal canal  M48.02       5. Lumbar facet arthropathy  M47.816       6. Spinal stenosis of lumbar region without neurogenic claudication  M48.061       7. Hypermobile joints  M24.9       8. Thoracic spinal stenosis  M48.04       9. Carpal tunnel syndrome of right wrist  G56.01       10. Ganglion cyst of wrist, left  M67.432       11. Peroneal tenosynovitis  M65.9       12. PCOS (polycystic ovarian syndrome)  E28.2       13. Thoracogenic scoliosis of thoracic region  M41.34       14. Tinnitus, left  H93.12       15. Restless legs syndrome  G25.81       16. Bulging of cervical intervertebral disc  M50.30       17. Migraine with aura and without status migrainosus, not intractable  G43.109       18. F11.2 - Episodic opioid dependence (H)  F11.20         Main reason for visit today is she wants to update her problem list with information/records from outside regarding spine which she is currently following the Spine specialist for  Also having vaginal symptoms and wet prep was consistent with BV so treat that  Had tooth extraction - can't afford dental work (c[facility]) so option is to pull teeth      30 minutes spent by me on the date of the encounter doing chart review, review of outside records, review of test results, interpretation of tests, patient visit and documentation         KENZIE Ling [facility] DINA eMhta is a 37 year old, presenting for the following health issues:  Patient Request         No data to display              HPI     Answers for HPI/ROS submitted by the patient on 6/30/2023  If you checked off any problems, how difficult have these problems made it for you to do your work, take care of things at home, or get  "along with other people?: Somewhat difficult  PHQ9 TOTAL SCORE: 4  What is the reason for your visit today? : review and update diagnosises  How many servings of fruits and vegetables do you eat daily?: 0-1  On average, how many sweetened beverages do you drink each day (Examples: soda, juice, sweet tea, etc.  Do NOT count diet or artificially sweetened beverages)?: 3  How many minutes a day do you exercise enough to make your heart beat faster?: 10 to 19  How many days a week do you exercise enough to make your heart beat faster?: 4  How many days per week do you miss taking your medication?: 0    Tooth extraction yesterday - mouth is still sore but better    Has follow up with spine within a week  Continues to struggle with chronic back and ankle/foot pain  Frustrated because as she was reviewing her last MRI's of spine, ankle and foot she feels there is \" a lot\" going on that could be causing her pain  Upset because doctors have just told her to lose weight which she has tried but has struggled with   Met with the social security examiner and had a psych visit with one who made her feel bad about her parenting when she tried to explain how she feels mentally given all her health issues    Also thinks she has a BV infection        Review of Systems   Remainder of ROS obtained and found to be negative other than that which was documented above        Objective    /82   Pulse 88   Temp 98.3  F (36.8  C) (Tympanic)   Wt (!) 161.9 kg (357 lb)   SpO2 98%   BMI 56.76 kg/m    Body mass index is 56.76 kg/m .  Physical Exam   GENERAL: alert, shifting often in seat due to back discomfort  Attitude:  cooperative   Eye Contact:  adequate  Gait and Station: Normal gait  Psychomotor Behavior:  intact station, gait and muscle tone  Oriented to:  time, person, and place  Attention Span and Concentration:  Normal  Speech:   clear, coherent and English  Affect:  mood congruent  Insight:  fair  Judgment:  fair  Impulse " Control:  fair

## 2023-07-07 ENCOUNTER — TRANSFERRED RECORDS (OUTPATIENT)
Dept: HEALTH INFORMATION MANAGEMENT | Facility: CLINIC | Age: 38
End: 2023-07-07
Payer: COMMERCIAL

## 2023-07-10 ENCOUNTER — TRANSFERRED RECORDS (OUTPATIENT)
Dept: HEALTH INFORMATION MANAGEMENT | Facility: CLINIC | Age: 38
End: 2023-07-10
Payer: COMMERCIAL

## 2023-07-12 DIAGNOSIS — E66.01 MORBID OBESITY DUE TO EXCESS CALORIES (H): ICD-10-CM

## 2023-07-12 DIAGNOSIS — M54.50 CHRONIC BILATERAL LOW BACK PAIN WITHOUT SCIATICA: ICD-10-CM

## 2023-07-12 DIAGNOSIS — G89.29 CHRONIC BILATERAL LOW BACK PAIN WITHOUT SCIATICA: ICD-10-CM

## 2023-07-12 NOTE — TELEPHONE ENCOUNTER
"Routing refill request to provider for review/approval because:  Drug not on the [facility] refill protocol   Does not meet RN protocol parameters      Requested Prescriptions   Pending Prescriptions Disp Refills    baclofen (LIORESAL) 10 MG tablet [Pharmacy Med Name: BACLOFEN 10MG TABS] 120 tablet 0     Sig: TAKE ONE TABLET BY MOUTH FOUR TIMES A DAY       There is no refill protocol information for this order       SAXENDA 18 MG/3ML pen [Pharmacy Med Name: SAXENDA 18MG/3ML SOPN] 38 mL 0     Sig: INJECT 0.6 MG UNDER THE SKIN DAILY FOR 7 DAYS, THEN 1.2 MG DAILY FOR 7 DAYS, THEN 1.8 MG DAILY FOR 7 DAYS, THEN 2.4 MG DAILY FOR 7 DAYS, THEN 3 MG DAILY FOR 62 DAYS.       GLP-1 Agonists Protocol Failed - 7/12/2023 12:23 PM        Failed - Order for Saxenda with diagnosis of diabetes        Failed - HgbA1C in past 3 or 6 months     If HgbA1C is 8 or greater, it needs to be on file within the past 3 months.  If less than 8, must be on file within the past 6 months.     Recent Labs   Lab Test 09/23/15  1107   A1C 5.1             Failed - Medication is active on med list        Passed - Patient is age 18 or older        Passed - No active pregnancy on record        Passed - Normal serum creatinine on file in past 12 months     Recent Labs   Lab Test 12/26/22 2011   CR 0.78       Ok to refill medication if creatinine is low          Passed - No positive pregnancy test in past 12 months        Passed - Recent (6 mo) or future (30 days) visit within the authorizing provider's specialty     Patient had office visit in the last 6 months or has a visit in the next 30 days with authorizing provider.  See \"Patient Info\" tab in inbasket, or \"Choose Columns\" in Meds & Orders section of the refill encounter.                     Faustino Flores RN 07/12/23 12:40 PM   "

## 2023-07-14 RX ORDER — LIRAGLUTIDE 6 MG/ML
INJECTION, SOLUTION SUBCUTANEOUS
Qty: 38 ML | Refills: 0 | OUTPATIENT
Start: 2023-07-14

## 2023-07-14 RX ORDER — BACLOFEN 10 MG/1
TABLET ORAL
Qty: 120 TABLET | Refills: 0 | Status: SHIPPED | OUTPATIENT
Start: 2023-07-14 | End: 2023-09-08

## 2023-07-14 NOTE — TELEPHONE ENCOUNTER
Noted. Called patient. Never start ozempic- wasn't approved by insurance. Please remove from med list.     Patient is taking 3mg daily of Saxenda.   Karen Mcgee RN on 7/14/2023 at 12:26 PM

## 2023-07-20 ENCOUNTER — MYC MEDICAL ADVICE (OUTPATIENT)
Dept: FAMILY MEDICINE | Facility: CLINIC | Age: 38
End: 2023-07-20
Payer: COMMERCIAL

## 2023-07-20 ENCOUNTER — TRANSFERRED RECORDS (OUTPATIENT)
Dept: HEALTH INFORMATION MANAGEMENT | Facility: CLINIC | Age: 38
End: 2023-07-20
Payer: COMMERCIAL

## 2023-07-20 RX ORDER — PREGABALIN 75 MG/1
75 CAPSULE ORAL 2 TIMES DAILY
COMMUNITY
Start: 2023-07-20 | End: 2023-08-28

## 2023-07-23 ENCOUNTER — TRANSFERRED RECORDS (OUTPATIENT)
Dept: HEALTH INFORMATION MANAGEMENT | Facility: CLINIC | Age: 38
End: 2023-07-23
Payer: COMMERCIAL

## 2023-07-27 ENCOUNTER — TRANSFERRED RECORDS (OUTPATIENT)
Dept: HEALTH INFORMATION MANAGEMENT | Facility: CLINIC | Age: 38
End: 2023-07-27
Payer: COMMERCIAL

## 2023-08-02 ENCOUNTER — TRANSFERRED RECORDS (OUTPATIENT)
Dept: HEALTH INFORMATION MANAGEMENT | Facility: CLINIC | Age: 38
End: 2023-08-02
Payer: COMMERCIAL

## 2023-08-18 DIAGNOSIS — E66.01 MORBID OBESITY DUE TO EXCESS CALORIES (H): Primary | ICD-10-CM

## 2023-08-18 RX ORDER — PEN NEEDLE, DIABETIC 32GX 5/32"
NEEDLE, DISPOSABLE MISCELLANEOUS
Qty: 100 EACH | Refills: 0 | Status: SHIPPED | OUTPATIENT
Start: 2023-08-18 | End: 2023-12-21

## 2023-08-18 NOTE — TELEPHONE ENCOUNTER
"Routing refill request to provider for review/approval because:  Drug not active on patient's medication list        Requested Prescriptions   Pending Prescriptions Disp Refills    BD JUVE U/F 32G X 4 MM insulin pen needle [Pharmacy Med Name: BD PEN NEEDLE JUVE  32G X 4 MM MISC]  0     Sig: USE AS DIRECTED WITH SAXENDA       Diabetic Supplies Protocol Failed - 8/18/2023  2:29 PM        Failed - Medication is active on med list        Passed - Patient is 18 years of age or older        Passed - Recent (6 mo) or future (30 days) visit within the authorizing provider's specialty     Patient had office visit in the last 6 months or has a visit in the next 30 days with authorizing provider.  See \"Patient Info\" tab in inbasket, or \"Choose Columns\" in Meds & Orders section of the refill encounter.                     Karen Mcgee RN 08/18/23 2:45 PM    "

## 2023-08-25 ENCOUNTER — MYC MEDICAL ADVICE (OUTPATIENT)
Dept: FAMILY MEDICINE | Facility: CLINIC | Age: 38
End: 2023-08-25
Payer: COMMERCIAL

## 2023-08-28 PROBLEM — F11.20 EPISODIC OPIOID DEPENDENCE (H): Status: RESOLVED | Noted: 2023-06-30 | Resolved: 2023-08-28

## 2023-08-28 RX ORDER — PREGABALIN 75 MG/1
75 CAPSULE ORAL 3 TIMES DAILY
COMMUNITY
Start: 2023-08-28

## 2023-09-06 DIAGNOSIS — M54.50 CHRONIC BILATERAL LOW BACK PAIN WITHOUT SCIATICA: ICD-10-CM

## 2023-09-06 DIAGNOSIS — G89.29 CHRONIC BILATERAL LOW BACK PAIN WITHOUT SCIATICA: ICD-10-CM

## 2023-09-08 RX ORDER — BACLOFEN 10 MG/1
TABLET ORAL
Qty: 120 TABLET | Refills: 0 | Status: SHIPPED | OUTPATIENT
Start: 2023-09-08 | End: 2023-10-26

## 2023-10-14 DIAGNOSIS — M54.50 CHRONIC BILATERAL LOW BACK PAIN WITHOUT SCIATICA: ICD-10-CM

## 2023-10-14 DIAGNOSIS — G89.29 CHRONIC BILATERAL LOW BACK PAIN WITHOUT SCIATICA: ICD-10-CM

## 2023-10-14 NOTE — PROGRESS NOTES
"KARIN Salas is a 33 year old female presents for post operative check. She is  18  day(s) status post .  She reports that 2 days ago she started having redness of the left breast cephalad of her nipple.  She has had no fever, the redness has been increasing   She continues to breastfeed/ pump.  She has never had mastitis in the past.    O.  Blood pressure 131/81, pulse 83, temperature 98.6  F (37  C), resp. rate 20, height 1.676 m (5' 6\"), weight (!) 155.1 kg (342 lb), last menstrual period 2018, currently breastfeeding.    Left Breast- nipple normal in appearance   Crusted lesion @ 12 :00  Edema present beneath the crusted lesion  No fluctuance  And no obvious areolar or ductal involvement     A. /P.  (L02.92) Furuncle of skin or subcutaneous tissue  (primary encounter diagnosis)  Comment: not a typical mastitis    Plan: hydrOXYzine (ATARAX) 50 MG tablet, cephALEXin         (KEFLEX) 500 MG capsule        Warm packs prn       Follow up prn or when due for next annual exam.    Marco Marin    " Mary Morales    This is to inform you regarding your test result.    Echocardiogram result is normal    Sincerely,      Dr.Nasima Tonya MD,FACP

## 2023-10-16 NOTE — TELEPHONE ENCOUNTER
"Requested Prescriptions   Pending Prescriptions Disp Refills    nabumetone (RELAFEN) 500 MG tablet [Pharmacy Med Name: NABUMETONE 500MG TABS] 180 tablet 1     Sig: TAKE ONE TABLET BY MOUTH TWICE A DAY       NSAID Medications Failed - 10/14/2023 12:26 PM        Failed - Normal ALT on file in past 12 months     Recent Labs   Lab Test 07/08/21  0030   ALT 28             Failed - Normal AST on file in past 12 months     Recent Labs   Lab Test 07/08/21  0030   AST 23             Failed - Normal CBC on file in past 12 months     Recent Labs   Lab Test 12/26/22 2011   WBC 14.9*   RBC 4.46   HGB 14.0   HCT 42.0                    Passed - Blood pressure under 140/90 in past 12 months     BP Readings from Last 3 Encounters:   06/30/23 136/82   05/13/23 (!) 165/105   02/13/23 (!) 144/94                 Passed - Recent (12 mo) or future (30 days) visit within the authorizing provider's specialty     Patient has had an office visit with the authorizing provider or a provider within the authorizing providers department within the previous 12 mos or has a future within next 30 days. See \"Patient Info\" tab in inbasket, or \"Choose Columns\" in Meds & Orders section of the refill encounter.              Passed - Patient is age 6-64 years        Passed - Medication is active on med list        Passed - No active pregnancy on record        Passed - Normal serum creatinine on file in past 12 months     Recent Labs   Lab Test 12/26/22 2011   CR 0.78       Ok to refill medication if creatinine is low          Passed - No positive pregnancy test in past 12 months             "

## 2023-10-17 RX ORDER — NABUMETONE 500 MG/1
TABLET, FILM COATED ORAL
Qty: 180 TABLET | Refills: 1 | Status: SHIPPED | OUTPATIENT
Start: 2023-10-17 | End: 2024-01-16

## 2023-10-30 DIAGNOSIS — E66.01 MORBID OBESITY DUE TO EXCESS CALORIES (H): ICD-10-CM

## 2023-11-01 ENCOUNTER — TELEPHONE (OUTPATIENT)
Dept: FAMILY MEDICINE | Facility: CLINIC | Age: 38
End: 2023-11-01
Payer: COMMERCIAL

## 2023-11-01 RX ORDER — LIRAGLUTIDE 6 MG/ML
INJECTION, SOLUTION SUBCUTANEOUS
Qty: 45 ML | Refills: 0 | Status: SHIPPED | OUTPATIENT
Start: 2023-11-01 | End: 2024-01-16

## 2023-11-01 NOTE — TELEPHONE ENCOUNTER
Prior Authorization Retail Medication Request    Medication/Dose: Renew Prior auth on Saxenda  ICD code (if different than what is on RX):  E66.01  Previously Tried and Failed:    Rationale:      Insurance [facility]:  [facility]  Insurance ID:  [id number]      See Judah  Pharmacy Technician, [place]  [facility]  Phone: [phone]  Fax: [phone]

## 2023-11-08 NOTE — TELEPHONE ENCOUNTER
Patient's call transferred to author  Patient states she needs to complete a task prior to PA being submitted - unsure of what she needs to do    Reviewed chart  Relayed that a current height and weight is needed to update the chart    Patient does not have a scale at home  Unsure of her 's work schedule and will mychart with a timeframe she can come to clinic for weight and height check tomorrow     Faustino Flores RN     15-Apr-2019 19:16

## 2023-11-10 NOTE — TELEPHONE ENCOUNTER
Central Prior Authorization Team   Phone: [phone]    PA Initiation    Medication: Renew Prior auth on Saxenda  Insurance Company: [employer] - Phone [phone] Fax [phone]  Pharmacy Filling the Rx: [facility] - [id number]  Filling Pharmacy Phone: [phone]  Filling Pharmacy Fax:    Start Date: 11/3/2023

## 2023-11-10 NOTE — TELEPHONE ENCOUNTER
Hello. I was told I need to give an updated weight for my saxenda. My mother-in-law borrowed me her scale. I'm down 7lbs since June. New weight 350.2 lbs.     See message above  Patient sent updated weight through [employer] encounter     Faustino Flores RN

## 2023-11-10 NOTE — TELEPHONE ENCOUNTER
Prior Authorization Approval    Authorization Effective Date: 10/11/2023  Authorization Expiration Date: 11/9/2024  Medication: Renew Prior auth on [employer]  Approved Dose/Quantity:   Reference #:     Insurance Company: [employer] - Phone [phone] Fax [phone]  Expected CoPay:       CoPay Card Available:      Foundation Assistance Needed:    Which Pharmacy is filling the prescription (Not needed for infusion/clinic administered): [facility] [place], [place] - [address]  Pharmacy Notified:  yes  Patient Notified:  yes- Pharmacy will contact patient when ready to /ship

## 2023-12-21 DIAGNOSIS — E66.01 MORBID OBESITY DUE TO EXCESS CALORIES (H): ICD-10-CM

## 2023-12-21 RX ORDER — PEN NEEDLE, DIABETIC 32GX 5/32"
NEEDLE, DISPOSABLE MISCELLANEOUS
Qty: 100 EACH | Refills: 0 | Status: SHIPPED | OUTPATIENT
Start: 2023-12-21 | End: 2024-07-30

## 2024-01-15 ENCOUNTER — PATIENT OUTREACH (OUTPATIENT)
Dept: CARE COORDINATION | Facility: CLINIC | Age: 39
End: 2024-01-15
Payer: COMMERCIAL

## 2024-01-16 DIAGNOSIS — M54.50 CHRONIC BILATERAL LOW BACK PAIN WITHOUT SCIATICA: ICD-10-CM

## 2024-01-16 DIAGNOSIS — G89.29 CHRONIC BILATERAL LOW BACK PAIN WITHOUT SCIATICA: ICD-10-CM

## 2024-01-22 RX ORDER — BACLOFEN 10 MG/1
TABLET ORAL
Qty: 120 TABLET | Refills: 0 | Status: SHIPPED | OUTPATIENT
Start: 2024-01-22 | End: 2024-02-28

## 2024-02-05 DIAGNOSIS — Z30.41 ENCOUNTER FOR SURVEILLANCE OF CONTRACEPTIVE PILLS: ICD-10-CM

## 2024-02-05 RX ORDER — LEVONORGESTREL AND ETHINYL ESTRADIOL 0.1-0.02MG
1 KIT ORAL DAILY
Qty: 84 TABLET | Refills: 2 | Status: SHIPPED | OUTPATIENT
Start: 2024-02-05 | End: 2024-07-29

## 2024-02-21 DIAGNOSIS — I10 ESSENTIAL HYPERTENSION: ICD-10-CM

## 2024-02-21 RX ORDER — ATENOLOL 100 MG/1
100 TABLET ORAL DAILY
Qty: 90 TABLET | Refills: 0 | Status: SHIPPED | OUTPATIENT
Start: 2024-02-21 | End: 2024-05-22

## 2024-02-21 NOTE — TELEPHONE ENCOUNTER
Requested Prescriptions   Pending Prescriptions Disp Refills    atenolol (TENORMIN) 100 MG tablet [Pharmacy Med Name: ATENOLOL 100MG TABS] 90 tablet 0     Sig: TAKE ONE TABLET BY MOUTH ONCE DAILY       Beta-Blockers Protocol Passed - 2/21/2024 12:31 PM        Passed - Blood pressure under 140/90 in past 12 months     BP Readings from Last 3 Encounters:   06/30/23 136/82   05/13/23 (!) 165/105   02/13/23 (!) 144/94                 Passed - Patient is age 6 or older        Passed - Medication is active on med list        Passed - Medication indicated for associated diagnosis     Medication is associated with one or more of the following diagnoses:     Hypertension (HTN)   Atrial fibrillation/flutter   Angina   ASCVD   Migraine   Heart Failure   Tremor   Anxiety   Ocular hypertension   Glaucoma          Passed - Recent (12 mo) or future (90 days) visit within the authorizing provider's specialty     The patient must have completed an in-person or virtual visit within the past 12 months or has a future visit scheduled within the next 90 days with the authorizing provider s specialty.  Urgent care and e-visits do not quality as an office visit for this protocol.                   Faustino Flores, RN 02/21/24 1:06 PM

## 2024-02-28 DIAGNOSIS — G89.29 CHRONIC BILATERAL LOW BACK PAIN WITHOUT SCIATICA: ICD-10-CM

## 2024-02-28 DIAGNOSIS — M54.50 CHRONIC BILATERAL LOW BACK PAIN WITHOUT SCIATICA: ICD-10-CM

## 2024-02-28 RX ORDER — BACLOFEN 10 MG/1
TABLET ORAL
Qty: 120 TABLET | Refills: 0 | Status: SHIPPED | OUTPATIENT
Start: 2024-02-28 | End: 2024-04-18

## 2024-04-14 ENCOUNTER — HEALTH MAINTENANCE LETTER (OUTPATIENT)
Age: 39
End: 2024-04-14

## 2024-04-16 DIAGNOSIS — G89.29 CHRONIC BILATERAL LOW BACK PAIN WITHOUT SCIATICA: ICD-10-CM

## 2024-04-16 DIAGNOSIS — M54.50 CHRONIC BILATERAL LOW BACK PAIN WITHOUT SCIATICA: ICD-10-CM

## 2024-04-18 RX ORDER — BACLOFEN 10 MG/1
TABLET ORAL
Qty: 120 TABLET | Refills: 0 | Status: SHIPPED | OUTPATIENT
Start: 2024-04-18 | End: 2024-06-07

## 2024-05-06 DIAGNOSIS — F41.9 ANXIETY: ICD-10-CM

## 2024-05-06 RX ORDER — ESCITALOPRAM OXALATE 20 MG/1
20 TABLET ORAL DAILY
Qty: 90 TABLET | Refills: 3 | Status: SHIPPED | OUTPATIENT
Start: 2024-05-06

## 2024-05-21 DIAGNOSIS — I10 ESSENTIAL HYPERTENSION: ICD-10-CM

## 2024-05-22 RX ORDER — ATENOLOL 100 MG/1
100 TABLET ORAL DAILY
Qty: 90 TABLET | Refills: 0 | Status: SHIPPED | OUTPATIENT
Start: 2024-05-22 | End: 2024-07-29

## 2024-05-22 NOTE — TELEPHONE ENCOUNTER
Needs in clinic appointment for further refills.   Prescription approved per [facility] Refill Protocol.  Julie Behrendt RN

## 2024-06-04 DIAGNOSIS — M54.50 CHRONIC BILATERAL LOW BACK PAIN WITHOUT SCIATICA: ICD-10-CM

## 2024-06-04 DIAGNOSIS — G89.29 CHRONIC BILATERAL LOW BACK PAIN WITHOUT SCIATICA: ICD-10-CM

## 2024-06-07 RX ORDER — BACLOFEN 10 MG/1
TABLET ORAL
Qty: 120 TABLET | Refills: 0 | Status: SHIPPED | OUTPATIENT
Start: 2024-06-07 | End: 2024-07-29

## 2024-07-15 DIAGNOSIS — M54.50 CHRONIC BILATERAL LOW BACK PAIN WITHOUT SCIATICA: ICD-10-CM

## 2024-07-15 DIAGNOSIS — F33.1 MODERATE EPISODE OF RECURRENT MAJOR DEPRESSIVE DISORDER (H): ICD-10-CM

## 2024-07-15 DIAGNOSIS — G89.29 CHRONIC BILATERAL LOW BACK PAIN WITHOUT SCIATICA: ICD-10-CM

## 2024-07-15 NOTE — TELEPHONE ENCOUNTER
Patient was told in January of this year that she needs to schedule appointment.  This has not been done.  Refills cannot be given until she schedule an appointment.  Please help her schedule and route back to Kavya for limited refills to make it to the appointment.    EB

## 2024-07-19 RX ORDER — NABUMETONE 500 MG/1
500 TABLET, FILM COATED ORAL 2 TIMES DAILY
Qty: 180 TABLET | Refills: 0 | Status: SHIPPED | OUTPATIENT
Start: 2024-07-19 | End: 2024-07-29

## 2024-07-19 RX ORDER — BUPROPION HYDROCHLORIDE 150 MG/1
150 TABLET ORAL EVERY MORNING
Qty: 90 TABLET | Refills: 0 | Status: SHIPPED | OUTPATIENT
Start: 2024-07-19 | End: 2024-07-29

## 2024-07-29 ENCOUNTER — VIRTUAL VISIT (OUTPATIENT)
Dept: FAMILY MEDICINE | Facility: CLINIC | Age: 39
End: 2024-07-29
Payer: COMMERCIAL

## 2024-07-29 DIAGNOSIS — F33.1 MODERATE EPISODE OF RECURRENT MAJOR DEPRESSIVE DISORDER (H): ICD-10-CM

## 2024-07-29 DIAGNOSIS — G89.29 CHRONIC BILATERAL LOW BACK PAIN WITHOUT SCIATICA: ICD-10-CM

## 2024-07-29 DIAGNOSIS — M54.50 CHRONIC BILATERAL LOW BACK PAIN WITHOUT SCIATICA: ICD-10-CM

## 2024-07-29 DIAGNOSIS — E66.01 MORBID OBESITY DUE TO EXCESS CALORIES (H): ICD-10-CM

## 2024-07-29 DIAGNOSIS — Z30.41 ENCOUNTER FOR SURVEILLANCE OF CONTRACEPTIVE PILLS: ICD-10-CM

## 2024-07-29 DIAGNOSIS — I10 ESSENTIAL HYPERTENSION: ICD-10-CM

## 2024-07-29 PROCEDURE — G2211 COMPLEX E/M VISIT ADD ON: HCPCS | Mod: 95 | Performed by: PHYSICIAN ASSISTANT

## 2024-07-29 PROCEDURE — 99214 OFFICE O/P EST MOD 30 MIN: CPT | Mod: 95 | Performed by: PHYSICIAN ASSISTANT

## 2024-07-29 RX ORDER — NABUMETONE 500 MG/1
500 TABLET, FILM COATED ORAL 2 TIMES DAILY
Qty: 180 TABLET | Refills: 3 | Status: SHIPPED | OUTPATIENT
Start: 2024-07-29

## 2024-07-29 RX ORDER — BACLOFEN 10 MG/1
TABLET ORAL
Qty: 120 TABLET | Refills: 0 | OUTPATIENT
Start: 2024-07-29

## 2024-07-29 RX ORDER — BUPROPION HYDROCHLORIDE 150 MG/1
150 TABLET ORAL EVERY MORNING
Qty: 90 TABLET | Refills: 3 | Status: SHIPPED | OUTPATIENT
Start: 2024-07-29

## 2024-07-29 RX ORDER — BACLOFEN 10 MG/1
10 TABLET ORAL 4 TIMES DAILY
Qty: 120 TABLET | Refills: 3 | Status: SHIPPED | OUTPATIENT
Start: 2024-07-29

## 2024-07-29 RX ORDER — LEVONORGESTREL/ETHIN.ESTRADIOL 0.1-0.02MG
1 TABLET ORAL DAILY
Qty: 84 TABLET | Refills: 2 | Status: SHIPPED | OUTPATIENT
Start: 2024-07-29

## 2024-07-29 RX ORDER — LIRAGLUTIDE 6 MG/ML
INJECTION, SOLUTION SUBCUTANEOUS
Qty: 45 ML | Refills: 3 | Status: SHIPPED | OUTPATIENT
Start: 2024-07-29

## 2024-07-29 RX ORDER — ATENOLOL 100 MG/1
100 TABLET ORAL DAILY
Qty: 90 TABLET | Refills: 3 | Status: SHIPPED | OUTPATIENT
Start: 2024-07-29

## 2024-07-29 ASSESSMENT — PATIENT HEALTH QUESTIONNAIRE - PHQ9: SUM OF ALL RESPONSES TO PHQ QUESTIONS 1-9: 9

## 2024-07-29 ASSESSMENT — ASTHMA QUESTIONNAIRES: ACT_TOTALSCORE: 23

## 2024-07-29 NOTE — PROGRESS NOTES
Janine is a 38 year old who is being evaluated via a billable video visit.    How would you like to obtain your AVS? MyChart  If the video visit is dropped, the invitation should be resent by: Text to cell phone: [phone]  Will anyone else be joining your video visit? No    Assessment & Plan     (I10) Essential hypertension  Comment: bp's have been controlled. continue  Plan: atenolol (TENORMIN) 100 MG tablet            (M54.50,  G89.29) Chronic bilateral low back pain without sciatica  Comment:   Plan: baclofen (LIORESAL) 10 MG tablet, nabumetone         (RELAFEN) 500 MG tablet            (F33.1) Moderate episode of recurrent major depressive disorder (H)  Comment: stable per patient  Plan: buPROPion (WELLBUTRIN XL) 150 MG 24 hr tablet            (E66.01) Morbid obesity due to excess calories (H)  Comment: works well for her - unfortunately when she was unable to get it for several months (due to availability) she did regain some weight back. Now back on it  Plan: liraglutide - Weight Management (SAXENDA) 18         MG/3ML pen            (Z30.41) Encounter for surveillance of contraceptive pills  Comment: needs to follow up with OB/GYN  Plan: levonorgestrel-ethinyl estradiol (FALMINA)         0.1-20 MG-MCG tablet                            Subjective   Janine is a 38 year old, presenting for the following health issues:  Recheck Medication        7/29/2024     4:24 PM   Additional Questions   Roomed by BLAKE Dhaliwal     Video Start Time: 5:06 PM    HPI     Medication Followup of Bupropion 150 mg 24 hour tablet  Taking Medication as prescribed: yes  Side Effects:  None  Medication Helping Symptoms:  yes     Medication Followup of Atenolo 100 mg tablet   Taking Medication as prescribed: yes  Side Effects:  None  Medication Helping Symptoms:  yes     Medication Followup of Nabumetone 500 mg tablet  Taking Medication as prescribed: yes  Side Effects:  None  Medication Helping Symptoms:  yes    Doing well for the most -  tired with her kids    Overdue to follow up with OB/GYN - periods have been abnormal even on birth control. Will be light or heavy. Can't really predict it. Feels like she gets cramps often but now always getting a period     Hasn't weighed herself but knows     Review of Systems  Remainder of ROS obtained and found to be negative other than that which was documented above        Objective           Vitals:  No vitals were obtained today due to virtual visit.    Physical Exam   GENERAL: alert and no distress  EYES: Eyes grossly normal to inspection.  No discharge or erythema, or obvious scleral/conjunctival abnormalities.  RESP: No audible wheeze, cough, or visible cyanosis.    SKIN: Visible skin clear. No significant rash, abnormal pigmentation or lesions.  NEURO: Cranial nerves grossly intact.  Mentation and speech appropriate for age.  PSYCH: Appropriate affect, tone, and pace of words          Video-Visit Details    Type of service:  Video Visit   Video End Time:5:15 PM  Originating Location (pt. Location): Home    Distant Location (provider location):  On-site  Platform used for Video Visit: Crys  Signed Electronically by: Kavya Haider PA-C

## 2024-07-30 RX ORDER — PEN NEEDLE, DIABETIC 32GX 5/32"
NEEDLE, DISPOSABLE MISCELLANEOUS
Qty: 100 EACH | Refills: 1 | Status: SHIPPED | OUTPATIENT
Start: 2024-07-30

## 2024-07-30 NOTE — TELEPHONE ENCOUNTER
Requested Prescriptions   Pending Prescriptions Disp Refills    BD JUVE U/F 32G X 4 MM insulin pen needle [Pharmacy Med Name: BD PEN NEEDLE JUVE  32G X 4 MM MISC]  0     Sig: USE AS DIRECTED WITH SAXENDA       Diabetic Supplies Protocol Failed - 7/29/2024  5:21 PM        Failed - Recent (12 month) or future (90 days) visit with authorizing provider s specialty     The patient must have completed an in-person or virtual visit within the past 12 months or has a future visit scheduled within the next 90 days with the authorizing provider s specialty.  Urgent care and e-visits do not quality as an office visit for this protocol.          Failed - Medication indicated for associated diagnosis        Passed - Medication is active on med list        Passed - Patient is 18 years of age or older

## 2024-08-01 ENCOUNTER — TRANSFERRED RECORDS (OUTPATIENT)
Dept: HEALTH INFORMATION MANAGEMENT | Facility: CLINIC | Age: 39
End: 2024-08-01
Payer: COMMERCIAL

## 2024-09-23 ENCOUNTER — HOSPITAL ENCOUNTER (EMERGENCY)
Facility: CLINIC | Age: 39
Discharge: HOME | End: 2024-09-23
Attending: PHYSICIAN ASSISTANT | Admitting: PHYSICIAN ASSISTANT
Payer: COMMERCIAL

## 2024-09-23 VITALS
DIASTOLIC BLOOD PRESSURE: 86 MMHG | HEART RATE: 77 BPM | TEMPERATURE: 97.9 F | SYSTOLIC BLOOD PRESSURE: 145 MMHG | RESPIRATION RATE: 18 BRPM | OXYGEN SATURATION: 95 %

## 2024-09-23 DIAGNOSIS — J32.9 SINUSITIS: ICD-10-CM

## 2024-09-23 LAB — GROUP A STREP BY PCR: NOT DETECTED

## 2024-09-23 PROCEDURE — G0463 HOSPITAL OUTPT CLINIC VISIT: HCPCS | Performed by: PHYSICIAN ASSISTANT

## 2024-09-23 PROCEDURE — 87651 STREP A DNA AMP PROBE: CPT | Performed by: PHYSICIAN ASSISTANT

## 2024-09-23 PROCEDURE — 99213 OFFICE O/P EST LOW 20 MIN: CPT | Performed by: PHYSICIAN ASSISTANT

## 2024-09-23 ASSESSMENT — ACTIVITIES OF DAILY LIVING (ADL): ADLS_ACUITY_SCORE: 37

## 2024-09-23 ASSESSMENT — COLUMBIA-SUICIDE SEVERITY RATING SCALE - C-SSRS
1. IN THE PAST MONTH, HAVE YOU WISHED YOU WERE DEAD OR WISHED YOU COULD GO TO SLEEP AND NOT WAKE UP?: NO
2. HAVE YOU ACTUALLY HAD ANY THOUGHTS OF KILLING YOURSELF IN THE PAST MONTH?: NO
6. HAVE YOU EVER DONE ANYTHING, STARTED TO DO ANYTHING, OR PREPARED TO DO ANYTHING TO END YOUR LIFE?: NO

## 2024-09-23 NOTE — ED PROVIDER NOTES
History     Chief Complaint   Patient presents with    Nausea & Vomiting     Vomiting on Saturday and has been having nausea. Son just tested positive for strep. Has sore throat and swollen glands     HPI  Janine Salas is a 38 year old female who presents to urgent care with concern over possible strep throat after household contact tested positive earlier today.  Patient reports that she has had throat pain on and off for approximately the last month with associated nasal congestion, sinus pressure.  She had episode of emesis 2 days prior to arrival.  She denies any objective fever, chills, significant cough, dyspnea, wheezing, diarrhea or abdominal pain.  She has not attempted any OTC treatments consistently.     Allergies:  Allergies   Allergen Reactions    Codeine Itching    Zofran [Ondansetron] Other (See Comments)     Headaches      Problem List:    Patient Active Problem List    Diagnosis Date Noted    Cervical stenosis of spinal canal 06/30/2023     Priority: Medium     Level C5-6      Lumbar facet arthropathy 06/30/2023     Priority: Medium     L4-5        Spinal stenosis of lumbar region without neurogenic claudication 06/30/2023     Priority: Medium     L5-S1      Hypermobile joints 06/30/2023     Priority: Medium    Thoracic spinal stenosis 06/30/2023     Priority: Medium     T7-T10 on MRI on 9/2022      Carpal tunnel syndrome of right wrist 06/30/2023     Priority: Medium    Ganglion cyst of wrist, left 06/30/2023     Priority: Medium    Peroneal tenosynovitis 06/30/2023     Priority: Medium     Right ankle      PCOS (polycystic ovarian syndrome) 06/30/2023     Priority: Medium    Thoracogenic scoliosis of thoracic region 06/30/2023     Priority: Medium    Tinnitus, left 06/30/2023     Priority: Medium    Restless legs syndrome 06/30/2023     Priority: Medium    Bulging of cervical intervertebral disc 06/30/2023     Priority: Medium    Migraine with aura and without status migrainosus, not  intractable 2023     Priority: Medium    Asthma 2021     Priority: Medium    Cervical high risk HPV (human papillomavirus) test positive 07/15/2021     Priority: Medium     12 NIL  3/10/15 NIL pap, neg HPV  18 NIL pap, neg HPV  7/15/21 NIL pap, +HR HPV (not 16/18). Plan: cotest in 1 year  22 Lost to follow-up for pap tracking             Hip pain, left 2018     Priority: Medium    Bilateral low back pain without sciatica 2018     Priority: Medium    Moderate episode of recurrent major depressive disorder (H) 03/15/2018     Priority: Medium    Essential hypertension 2016     Priority: Medium    Anxiety 02/10/2016     Priority: Medium    Morbid obesity due to excess calories (H) 2016     Priority: Medium    Back pain associated with peripheral numbness 2016     Priority: Medium    CARDIOVASCULAR SCREENING; LDL GOAL LESS THAN 160 2012     Priority: Medium        Past Medical History:    Past Medical History:   Diagnosis Date    Acute pancreatitis 1/15    Anxiety     Cervical high risk HPV (human papillomavirus) test positive 7/15/2021    Chickenpox     PONV (postoperative nausea and vomiting)     Pregnancy induced hypertension     PTSD (post-traumatic stress disorder)     Severe postpartum depression      Past Surgical History:    Past Surgical History:   Procedure Laterality Date    APPENDECTOMY       SECTION N/A 2016    Procedure:  SECTION;  Surgeon: Marco Marin MD;  Location: [facility] OR     SECTION N/A 2019    Procedure:  SECTION;  Surgeon: Marco Marin MD;  Location: [facility] OR    COLONOSCOPY      HC REMOVAL GALLBLADDER      Description: Cholecystectomy;  Recorded: 10/04/2013;    LAPAROSCOPIC CHOLECYSTECTOMY  2013    Procedure: LAPAROSCOPIC CHOLECYSTECTOMY;  Laparoscopic Cholecystectomy;  Surgeon: Lasha Gracias MD;  Location: [facility] OR    MOUTH SURGERY      wisdom teeth    UPPER GI  ENDOSCOPY       Family History:    Family History   Problem Relation Age of Onset    Hypertension Mother     Depression Mother     Osteoporosis Mother     Thyroid Disease Mother     Hypertension Father     Alcohol/Drug Father         recovered alcohol- has fetal alcohol syndrome    Cancer Father         melanoma    Dementia Father     Hypertension Maternal Grandmother     Alzheimer Disease Maternal Grandmother     Cardiovascular Maternal Grandmother         triple bypass    Cancer Maternal Grandfather         lung    Alcohol/Drug Paternal Grandmother         alcohol    Cancer - colorectal Paternal Grandmother         colon    Alcohol/Drug Paternal Grandfather         alcohol    Cancer Paternal Grandfather         leukemia - adult onset    Cardiovascular Paternal Grandfather         stent    Obesity Sister     Breast Cancer Maternal Aunt     Cancer Sister         cervical cancer, hysterectomy    Substance Abuse Sister         recovered drugs    Bipolar Disorder Sister     Depression Sister     Hypothyroidism Sister     Autism Spectrum Disorder Sister         ASpergers    Bipolar Disorder Other     Kidney Disease Maternal Aunt         Stage 3       Social History:  Marital Status:  Single [1]  Social History     Tobacco Use    Smoking status: Former     Current packs/day: 0.50     Average packs/day: 0.5 packs/day for 9.4 years (4.7 ttl pk-yrs)     Types: Cigarettes     Start date: 5/7/2015    Smokeless tobacco: Former   Vaping Use    Vaping status: Some Days    Substances: CBD   Substance Use Topics    Alcohol use: Yes     Alcohol/week: 0.0 standard drinks of alcohol     Comment: Rare    Drug use: No      Medications:    atenolol (TENORMIN) 100 MG tablet  baclofen (LIORESAL) 10 MG tablet  buPROPion (WELLBUTRIN XL) 150 MG 24 hr tablet  escitalopram (LEXAPRO) 20 MG tablet  HYDROcodone-acetaminophen (NORCO) 5-325 MG tablet  insulin pen needle (BD JUVE U/F) 32G X 4 MM miscellaneous  levonorgestrel-ethinyl estradiol  (FALMINA) 0.1-20 MG-MCG tablet  liraglutide - Weight Management (SAXENDA) 18 MG/3ML pen  nabumetone (RELAFEN) 500 MG tablet  pregabalin (LYRICA) 75 MG capsule      Review of Systems  CONSTITUTIONAL:NEGATIVE for fever, chills, change in weight  INTEGUMENTARY/SKIN: NEGATIVE for worrisome rashes, moles or lesions  EYES: NEGATIVE for vision changes or irritation  ENT/MOUTH: POSITIVE for nasal congestion, sinus pressure and NEGATIVE for ear pain  RESP:NEGATIVE for significant cough or SOB  GI: POSITIVE for resolved vomiting and NEGATIVE for diarrhea, abdominal pain   Physical Exam   BP: (!) 145/86  Pulse: 77  Temp: 97.9  F (36.6  C)  Resp: 18  SpO2: 95 %  Physical Exam  GENERAL APPEARANCE:alert, cooperative and no acute distress  EYES: EOMI,  PERRL, conjunctiva clear  HENT: ear canals and TM's normal.  Nose and mouth without ulcers, erythema or lesions, tenderness to palpation to maxillary, frontal sinuses.    NECK: supple, nontender, no lymphadenopathy  RESP: lungs clear to auscultation - no rales, rhonchi or wheezes  CV: regular rates and rhythm, normal S1 S2, no murmur noted  SKIN: no suspicious lesions or rashes  ED Course        Procedures       Critical Care time:  none        Results for orders placed or performed during the hospital encounter of 09/23/24 (from the past 24 hour(s))   Group A Streptococcus PCR Throat Swab    Specimen: Throat; Swab   Result Value Ref Range    Group A strep by PCR Not Detected Not Detected    Narrative    The Xpert Xpress Strep A test, performed on the [employer], is a rapid, qualitative in vitro diagnostic test for the detection of Streptococcus pyogenes (Group A ß-hemolytic Streptococcus, Strep A) in throat swab specimens from patients with signs and symptoms of pharyngitis. The Xpert Xpress Strep A test can be used as an aid in the diagnosis of Group A Streptococcal pharyngitis. The assay is not intended to monitor treatment for Group A Streptococcus infections.  The Xpert Xpress Strep A test utilizes an automated real-time polymerase chain reaction (PCR) to detect Streptococcus pyogenes DNA.     Medications - No data to display    Assessments & Plan (with Medical Decision Making)     I have reviewed the nursing notes.  I have reviewed the findings, diagnosis, plan and need for follow up with the patient.     Discharge Medication List as of 9/23/2024  4:56 PM        START taking these medications    Details   amoxicillin-clavulanate (AUGMENTIN) 875-125 MG tablet Take 1 tablet by mouth 2 times daily for 10 days., Disp-20 tablet, R-0, E-Prescribe           Final diagnoses:   Sinusitis     38-year-old female presents urgent care with concern of possible strep throat after household contact tested positive earlier today.  She has 1 month history of throat pain, nasal congestion, sinus pressure, episode of emesis 2 days ago.  She had elevated blood pressure upon arrival, remainder vital signs stable.  Physical exam findings are benign other than maxillary, frontal sinus tenderness palpation.  She did have negative strep test.  I discussed with patient that generally sinus infections are caused by viruses/allergy associated inflammation however given duration of symptoms did agree to initiate antibiotic for sinusitis differential for her symptoms would also include allergic rhinitis with postnasal drainage.  She was discharged home stable with prescription for Augmentin.  Follow-up with primary care provider if no improvement within the next week.  Worrisome reasons to return to the ER/UC sooner discussed.    Disclaimer: This note consists of symbols derived from keyboarding, dictation, and/or voice recognition software. As a result, there may be errors in the script that have gone undetected.  Please consider this when interpreting information found in the chart.      9/23/2024   [facility] EMERGENCY DEPT       Phoebe Peter PA-C  09/23/24 [id number]

## 2024-10-03 ENCOUNTER — E-VISIT (OUTPATIENT)
Dept: FAMILY MEDICINE | Facility: CLINIC | Age: 39
End: 2024-10-03
Payer: COMMERCIAL

## 2024-10-03 DIAGNOSIS — B37.31 CANDIDAL VULVOVAGINITIS: Primary | ICD-10-CM

## 2024-10-03 PROCEDURE — 99421 OL DIG E/M SVC 5-10 MIN: CPT | Performed by: PHYSICIAN ASSISTANT

## 2024-10-03 RX ORDER — FLUCONAZOLE 150 MG/1
150 TABLET ORAL ONCE
Qty: 1 TABLET | Refills: 0 | Status: SHIPPED | OUTPATIENT
Start: 2024-10-03 | End: 2024-10-03

## 2024-12-30 ENCOUNTER — MYC MEDICAL ADVICE (OUTPATIENT)
Dept: FAMILY MEDICINE | Facility: CLINIC | Age: 39
End: 2024-12-30
Payer: COMMERCIAL

## 2024-12-30 ENCOUNTER — TRANSFERRED RECORDS (OUTPATIENT)
Dept: HEALTH INFORMATION MANAGEMENT | Facility: CLINIC | Age: 39
End: 2024-12-30
Payer: COMMERCIAL

## 2024-12-30 DIAGNOSIS — G43.109 MIGRAINE WITH AURA AND WITHOUT STATUS MIGRAINOSUS, NOT INTRACTABLE: Primary | ICD-10-CM

## 2024-12-31 RX ORDER — UBROGEPANT 50 MG/1
50 TABLET ORAL
COMMUNITY
Start: 2024-09-13 | End: 2024-12-31

## 2024-12-31 RX ORDER — UBROGEPANT 50 MG/1
50 TABLET ORAL
Qty: 20 TABLET | Refills: 0 | Status: SHIPPED | OUTPATIENT
Start: 2024-12-31

## 2025-01-04 ENCOUNTER — HEALTH MAINTENANCE LETTER (OUTPATIENT)
Age: 40
End: 2025-01-04

## 2025-01-04 DIAGNOSIS — M54.50 CHRONIC BILATERAL LOW BACK PAIN WITHOUT SCIATICA: ICD-10-CM

## 2025-01-04 DIAGNOSIS — G89.29 CHRONIC BILATERAL LOW BACK PAIN WITHOUT SCIATICA: ICD-10-CM

## 2025-01-06 RX ORDER — BACLOFEN 10 MG/1
10 TABLET ORAL 4 TIMES DAILY
Qty: 120 TABLET | Refills: 3 | Status: SHIPPED | OUTPATIENT
Start: 2025-01-06

## 2025-01-17 ENCOUNTER — TELEPHONE (OUTPATIENT)
Dept: FAMILY MEDICINE | Facility: CLINIC | Age: 40
End: 2025-01-17

## 2025-01-17 DIAGNOSIS — E66.01 MORBID OBESITY (H): ICD-10-CM

## 2025-01-17 NOTE — TELEPHONE ENCOUNTER
Prior Authorization Retail Medication Request    Medication/Dose: Zepbound 2.5 mg  Diagnosis and ICD code (if different than what is on RX):    New/renewal/insurance change PA/secondary ins. PA:  Previously Tried and Failed:    Rationale:      Insurance   Primary: [employer]  Insurance ID:  [id number]    Secondary (if applicable):  Insurance ID:      Pharmacy Information (if different than what is on RX)  Name:    Phone:    Fax:    Clinic Information  Preferred routing pool for dept communication:

## 2025-01-20 ENCOUNTER — PATIENT OUTREACH (OUTPATIENT)
Dept: CARE COORDINATION | Facility: CLINIC | Age: 40
End: 2025-01-20
Payer: COMMERCIAL

## 2025-01-21 NOTE — TELEPHONE ENCOUNTER
PA Initiation    Medication: ZEPBOUND 2.5 MG/0.5ML SC SOAJ  Insurance Company: [employer] - Phone [phone] Fax [phone]  Pharmacy Filling the Rx: [facility] - [id number]  Filling Pharmacy Phone: [phone]  Filling Pharmacy Fax: [phone]  Start Date: 1/20/2025

## 2025-01-22 NOTE — TELEPHONE ENCOUNTER
PRIOR AUTHORIZATION DENIED    Medication: ZEPBOUND 2.5 MG/0.5ML SC SOAJ    Insurance Company: [employer] - Phone [phone] Fax [phone]    Denial Date: 1/21/2025    Denial Reason(s): Patient needs to try and fail Wegovy.     Appeal Information:

## 2025-01-27 NOTE — CONFIDENTIAL NOTE
Please notify patient insurance denied zepbound (tirzepatide) but it appears they will cover wegovy (semaglutide) so a script for that was sent to the [place] pharmacy instead. Please remind patient that IF approved/covered she will do this IN PLACE OF liraglutide (saxenda)    Kavya

## 2025-02-26 ENCOUNTER — MYC MEDICAL ADVICE (OUTPATIENT)
Dept: FAMILY MEDICINE | Facility: CLINIC | Age: 40
End: 2025-02-26
Payer: COMMERCIAL

## 2025-02-28 PROBLEM — M54.40 BILATERAL LOW BACK PAIN WITH SCIATICA: Status: ACTIVE | Noted: 2018-08-23

## 2025-02-28 PROBLEM — M79.7 FIBROMYALGIA: Status: ACTIVE | Noted: 2025-02-28

## 2025-03-28 ENCOUNTER — MYC MEDICAL ADVICE (OUTPATIENT)
Dept: FAMILY MEDICINE | Facility: CLINIC | Age: 40
End: 2025-03-28
Payer: COMMERCIAL

## 2025-03-31 DIAGNOSIS — M47.816 LUMBAR FACET ARTHROPATHY: ICD-10-CM

## 2025-03-31 DIAGNOSIS — M48.061 SPINAL STENOSIS OF LUMBAR REGION WITHOUT NEUROGENIC CLAUDICATION: ICD-10-CM

## 2025-03-31 DIAGNOSIS — M48.04 THORACIC SPINAL STENOSIS: ICD-10-CM

## 2025-03-31 DIAGNOSIS — M48.02 CERVICAL STENOSIS OF SPINAL CANAL: ICD-10-CM

## 2025-03-31 RX ORDER — PREGABALIN 75 MG/1
75 CAPSULE ORAL 3 TIMES DAILY
Qty: 90 CAPSULE | Refills: 5 | OUTPATIENT
Start: 2025-03-31

## 2025-04-07 ENCOUNTER — HOSPITAL ENCOUNTER (EMERGENCY)
Facility: CLINIC | Age: 40
Discharge: HOME | End: 2025-04-07
Payer: COMMERCIAL

## 2025-04-07 VITALS
DIASTOLIC BLOOD PRESSURE: 89 MMHG | SYSTOLIC BLOOD PRESSURE: 168 MMHG | RESPIRATION RATE: 20 BRPM | HEART RATE: 97 BPM | TEMPERATURE: 98.4 F | OXYGEN SATURATION: 97 %

## 2025-04-07 DIAGNOSIS — J02.0 STREP PHARYNGITIS: ICD-10-CM

## 2025-04-07 LAB — S PYO DNA THROAT QL NAA+PROBE: DETECTED

## 2025-04-07 PROCEDURE — G0463 HOSPITAL OUTPT CLINIC VISIT: HCPCS

## 2025-04-07 PROCEDURE — 87651 STREP A DNA AMP PROBE: CPT

## 2025-04-07 PROCEDURE — 99213 OFFICE O/P EST LOW 20 MIN: CPT

## 2025-04-07 RX ORDER — PENICILLIN V POTASSIUM 500 MG/1
500 TABLET, FILM COATED ORAL 2 TIMES DAILY
Qty: 20 TABLET | Refills: 0 | Status: SHIPPED | OUTPATIENT
Start: 2025-04-07 | End: 2025-04-17

## 2025-04-07 RX ORDER — FLUCONAZOLE 150 MG/1
TABLET ORAL
Qty: 2 TABLET | Refills: 0 | Status: SHIPPED | OUTPATIENT
Start: 2025-04-07 | End: 2025-04-10

## 2025-04-07 ASSESSMENT — ACTIVITIES OF DAILY LIVING (ADL): ADLS_ACUITY_SCORE: 42

## 2025-04-08 NOTE — DISCHARGE INSTRUCTIONS
Self-care:   -Continue tylenol and ibuprofen.  Alternate these medications every three hours as needed for fever.  (For example, tylenol at 8am, ibuprofen at 11am, tylenol at 2pm, ibuprofen at 5pm, tylenol at 8pm...).  -Drink Plenty of Liquids: Cold or warm drinks may help soothe your throat. Drinking liquids will also help prevent dehydration.  -Gargle Salt Water: Mix   teaspoon salt in a glass of warm water and gargle. This may help reduce pain and swelling in your throat.  -Lemon can help sooth the throat pain. Lemon drops.  -Chloraseptic Spray/Lozenges: Available over the counter; will cause numbing sensation temporarily to soothe your sore throat.  -Chamomile tea with Honey and lemon: Chamomile may help with an upset stomach, honey may help control the cough, lemon can help sooth the throat.  -Humidify Room: Use a cool-steam humidifier to help moisten the air in your room and calm your cough.   -Other: Cough drops, ice, soft foods, or popsicles may be helpful.    Return if you have difficulty with breathing or swallowing or anything else that concerns you

## 2025-04-08 NOTE — ED PROVIDER NOTES
Chief Complaint:   Chief Complaint   Patient presents with    Pharyngitis         HPI:   Janine Salas is a 39 year old female who presents to   for evaluation of sore throat and pain with swallowing. Symptoms initially began 1 week ago and were accompanied by myalgias, chills and tactile fevers which have since resolved.  She has tried warm teas with honey, throat lozenges, oral analgesics without sustained relief of symptoms.  She denies aching, chest congestion, chest pain, cough, decreased appetite, decreased urine output, diarrhea, dizziness, ear pain, fever, headache, nasal congestion, nausea, shortness of breath, vomiting, and wheezing.      Problem List:    Patient Active Problem List    Diagnosis Date Noted    Fibromyalgia 02/28/2025     Priority: Medium    Cervical stenosis of spinal canal 06/30/2023     Priority: Medium     Level C5-6      Lumbar facet arthropathy 06/30/2023     Priority: Medium     L4-5        Spinal stenosis of lumbar region without neurogenic claudication 06/30/2023     Priority: Medium     L5-S1      Hypermobile joints 06/30/2023     Priority: Medium    Thoracic spinal stenosis 06/30/2023     Priority: Medium     T7-T10 on MRI on 9/2022      Carpal tunnel syndrome of right wrist 06/30/2023     Priority: Medium    Ganglion cyst of wrist, left 06/30/2023     Priority: Medium    Peroneal tenosynovitis 06/30/2023     Priority: Medium     Right ankle      PCOS (polycystic ovarian syndrome) 06/30/2023     Priority: Medium    Thoracogenic scoliosis of thoracic region 06/30/2023     Priority: Medium    Tinnitus, left 06/30/2023     Priority: Medium    Restless legs syndrome 06/30/2023     Priority: Medium    Bulging of cervical intervertebral disc 06/30/2023     Priority: Medium    Migraine with aura and without status migrainosus, not intractable 06/30/2023     Priority: Medium    Asthma 08/20/2021     Priority: Medium    Cervical high risk HPV (human papillomavirus) test positive  07/15/2021     Priority: Medium     12 NIL  3/10/15 NIL pap, neg HPV  18 NIL pap, neg HPV  7/15/21 NIL pap, +HR HPV (not 16/18). Plan: cotest in 1 year  22 Lost to follow-up for pap tracking             Hip pain, left 2018     Priority: Medium    Bilateral low back pain with sciatica 2018     Priority: Medium    Moderate episode of recurrent major depressive disorder (H) 03/15/2018     Priority: Medium    Essential hypertension 2016     Priority: Medium    Anxiety 02/10/2016     Priority: Medium    Morbid obesity due to excess calories (H) 2016     Priority: Medium    Back pain associated with peripheral numbness 2016     Priority: Medium    CARDIOVASCULAR SCREENING; LDL GOAL LESS THAN 160 2012     Priority: Medium        Past Medical History:    Past Medical History:   Diagnosis Date    Acute pancreatitis 1/15    Anxiety     Cervical high risk HPV (human papillomavirus) test positive 7/15/2021    Chickenpox     PONV (postoperative nausea and vomiting)     Pregnancy induced hypertension     PTSD (post-traumatic stress disorder)     Severe postpartum depression        Past Surgical History:    Past Surgical History:   Procedure Laterality Date    APPENDECTOMY       SECTION N/A 2016    Procedure:  SECTION;  Surgeon: Marco Marin MD;  Location: [facility]     SECTION N/A 2019    Procedure:  SECTION;  Surgeon: Marco Marin MD;  Location: [facility]    COLONOSCOPY      HC REMOVAL GALLBLADDER      Description: Cholecystectomy;  Recorded: 10/04/2013;    LAPAROSCOPIC CHOLECYSTECTOMY  2013    Procedure: LAPAROSCOPIC CHOLECYSTECTOMY;  Laparoscopic Cholecystectomy;  Surgeon: Lasha Garcias MD;  Location: [facility]    MOUTH SURGERY  1999    wisdom teeth    UPPER GI ENDOSCOPY         Meds:   Current Outpatient Medications   Medication Sig Dispense Refill    fluconazole (DIFLUCAN) 150 MG tablet Take one tablet  now, and one tablet in three days 2 tablet 0    penicillin V (VEETID) 500 MG tablet Take 1 tablet (500 mg) by mouth 2 times daily for 10 days. 20 tablet 0    atenolol (TENORMIN) 100 MG tablet Take 1 tablet (100 mg) by mouth daily. 90 tablet 3    baclofen (LIORESAL) 10 MG tablet TAKE ONE TABLET BY MOUTH FOUR TIMES A  tablet 3    buPROPion (WELLBUTRIN XL) 150 MG 24 hr tablet Take 1 tablet (150 mg) by mouth every morning 90 tablet 3    escitalopram (LEXAPRO) 20 MG tablet Take 1 tablet (20 mg) by mouth daily. 90 tablet 3    insulin pen needle (BD JUVE U/F) 32G X 4 MM miscellaneous USE AS DIRECTED WITH SAXENDA 100 each 1    levonorgestrel-ethinyl estradiol (FALMINA) 0.1-20 MG-MCG tablet Take 1 tablet by mouth daily. 84 tablet 3    liraglutide - Weight Management (SAXENDA) 18 MG/3ML pen INJECT 3 MG UNDER THE SKIN DAILY 45 mL 3    nabumetone (RELAFEN) 500 MG tablet Take 1 tablet (500 mg) by mouth 2 times daily 180 tablet 3    pregabalin (LYRICA) 75 MG capsule Take 1 capsule (75 mg) by mouth 3 times daily. 90 capsule 5    semaglutide-weight management (WEGOVY) 0.25 MG/0.5ML pen Inject 0.5 mLs (0.25 mg) subcutaneously once a week. 2 mL 0    UBRELVY 50 MG tablet Take 1 tablet (50 mg) by mouth at onset of headache. 20 tablet 3       Allergies:   Allergies   Allergen Reactions    Codeine Itching    Zofran [Ondansetron] Other (See Comments)     Headaches        Medications updated and reviewed.  Past, family and surgical history is updated and reviewed in the record.     Review of Systems:  Pertinent review of systems as documented per HPI above.    Physical Exam:   BP (!) 168/89   Pulse 97   Temp 98.4  F (36.9  C)   Resp 20   SpO2 97%    General: alert and no acute distress  Eyes: negative, conjunctivae not injected /corneas clear. PERRL, EOM's intact.  Ears: negative, External ears normal. Canals clear. TM's normal.  Nose: Nares normal and no rhinorrhea  Mouth/Throat: moist mucus membranes, moderate oropharyngeal  erythema with exudate.  No PTA.  Voice is not muffled.  Tolerate secretions.  Neck: Neck supple, tender anterior cervical lymphadenopathy present  Chest/Pulmonary: CTAB without wheezes, rales, or rhonchi. No signs of respiratory distress.  Cardiovascular: RRR normal S1S2  Abdomen: Bowel sounds normoactive, abdomen soft without tenderness, guarding or rigidity. No HSM.   Skin:  Skin color, texture, turgor normal. No rashes or lesions.    Results for orders placed or performed during the hospital encounter of 04/07/25 (from the past 48 hours)   Group A Streptococcus PCR Throat Swab    Specimen: Throat; Swab   Result Value Ref Range    Group A strep by PCR Detected (A) Not Detected    Narrative    The Xpert Xpress Strep A test, performed on the [employer], is a rapid, qualitative in vitro diagnostic test for the detection of Streptococcus pyogenes (Group A ß-hemolytic Streptococcus, Strep A) in throat swab specimens from patients with signs and symptoms of pharyngitis. The Xpert Xpress Strep A test can be used as an aid in the diagnosis of Group A Streptococcal pharyngitis. The assay is not intended to monitor treatment for Group A Streptococcus infections. The Xpert Xpress Strep A test utilizes an automated real-time polymerase chain reaction (PCR) to detect Streptococcus pyogenes DNA.       Assessment:  Strep pharyngitis    Plan:   39-year-old female presents for evaluation of sore throat and pain with swallowing that began a week ago.    Patient well-appearing on arrival, afebrile.  Examination notable for moderate oropharyngeal erythema and exudate, cervical lymphadenopathy is noted.  She tolerates secretions and voice is not muffled remainder of exam reassuring as above.  Testing positive for strep.  Treatment send with penicillin.  Supportive cares additionally discussed and recommended.    Advised that if symptoms are not improving despite this treatment plan, then they should follow-up with  primary provider for reevaluation. Strict UC/ED return precautions discussed in detail including prolonged fevers, development of shortness of breath or trouble with breathing, weakness or lightheadedness, inability to tolerate oral intake or anything else that is concerning to them. All questions were answered. Pt verbalized understanding and agreement with the above plan.    Condition on disposition: Stable    Disclaimer: This note consists of symbols derived from keyboarding, dictation, and/or voice recognition software. As a result, there may be errors in the script that have gone undetected.  Please consider this when interpreting information found in the chart.         Bobbi Collins PA-C  04/07/25 1952

## 2025-04-09 ENCOUNTER — MYC REFILL (OUTPATIENT)
Dept: FAMILY MEDICINE | Facility: CLINIC | Age: 40
End: 2025-04-09
Payer: COMMERCIAL

## 2025-04-09 DIAGNOSIS — F33.1 MODERATE EPISODE OF RECURRENT MAJOR DEPRESSIVE DISORDER (H): ICD-10-CM

## 2025-04-09 DIAGNOSIS — M54.50 CHRONIC BILATERAL LOW BACK PAIN WITHOUT SCIATICA: ICD-10-CM

## 2025-04-09 DIAGNOSIS — G89.29 CHRONIC BILATERAL LOW BACK PAIN WITHOUT SCIATICA: ICD-10-CM

## 2025-04-09 RX ORDER — BUPROPION HYDROCHLORIDE 150 MG/1
150 TABLET ORAL EVERY MORNING
Qty: 90 TABLET | Refills: 3 | OUTPATIENT
Start: 2025-04-09

## 2025-04-09 RX ORDER — NABUMETONE 500 MG/1
500 TABLET, FILM COATED ORAL 2 TIMES DAILY
Qty: 180 TABLET | Refills: 3 | OUTPATIENT
Start: 2025-04-09

## 2025-04-19 ENCOUNTER — HEALTH MAINTENANCE LETTER (OUTPATIENT)
Age: 40
End: 2025-04-19

## 2025-04-21 ENCOUNTER — MYC MEDICAL ADVICE (OUTPATIENT)
Dept: FAMILY MEDICINE | Facility: CLINIC | Age: 40
End: 2025-04-21
Payer: COMMERCIAL

## 2025-04-21 DIAGNOSIS — E66.01 MORBID OBESITY (H): ICD-10-CM

## 2025-04-21 RX ORDER — SEMAGLUTIDE 0.25 MG/.5ML
INJECTION, SOLUTION SUBCUTANEOUS
Qty: 2 ML | Refills: 0 | OUTPATIENT
Start: 2025-04-21

## 2025-04-24 NOTE — CONFIDENTIAL NOTE
I have no clue what is going on.     She asked for a refill and when asked the dose she said she finished 4 weeks of something but from what I can see the wegovy or semaglutide was denied? Which when I just sent in the refill because she said she was taking it anyway, was denied.     So from what I can see her insurance is not going to cover either? Because we tried the tirzepatide first and they said she needed to try the wegovy (semaglutide) but then the semaglutide was also denied?     So I don't know that she has options but she can call her insurance to get clarification    Kavya

## 2025-05-21 ENCOUNTER — MYC MEDICAL ADVICE (OUTPATIENT)
Dept: FAMILY MEDICINE | Facility: CLINIC | Age: 40
End: 2025-05-21
Payer: COMMERCIAL

## 2025-05-21 DIAGNOSIS — E66.01 MORBID OBESITY (H): ICD-10-CM

## 2025-06-24 DIAGNOSIS — E66.01 MORBID OBESITY (H): ICD-10-CM

## 2025-06-26 RX ORDER — SEMAGLUTIDE 1 MG/.5ML
INJECTION, SOLUTION SUBCUTANEOUS
Qty: 2 ML | Refills: 0 | Status: SHIPPED | OUTPATIENT
Start: 2025-06-26

## 2025-07-22 DIAGNOSIS — E66.01 MORBID OBESITY (H): ICD-10-CM

## 2025-07-23 NOTE — TELEPHONE ENCOUNTER
Requested Prescriptions   Pending Prescriptions Disp Refills    WEGOVY 1 MG/0.5ML pen [Pharmacy Med Name: WEGOVY 1MG/0.5ML SOAJ] 2 mL 0     Sig: INJECT 1 MG UNDER THE SKIN ONCE WEEKLY       GLP-1 Agonists Protocol Failed - 7/23/2025  7:45 AM        Failed - Medication is active on med list and the sig matches. RN to manually verify dose and sig if red X/fail.     If the protocol passes (green check), you do not need to verify med dose and sig.    A prescription matches if they are the same clinical intention.    For Example: once daily and every morning are the same.    The protocol can not identify upper and lower case letters as matching and will fail.     For Example: Take 1 tablet (50 mg) by mouth daily     TAKE 1 TABLET (50 MG) BY MOUTH DAILY    For all fails (red x), verify dose and sig.    If the refill does match what is on file, the RN can still proceed to approve the refill request.       If they do not match, route to the appropriate provider.             Failed - Diagnosis is not related to diabetes mellitus. Send to Provider.        Failed - Recent (6 month) or future (90 days) visit with the authorizing provider's specialty (provided they have been seen in the past 9 months)     The patient must have completed an in-person or virtual visit within the past 6 months or has a future visit scheduled within the next 90 days with the authorizing provider s specialty.  Urgent care and e-visits do not quality as an office visit for this protocol.          Passed - Has GFR on file in past 12 months and most recent value is normal     Recent Labs   Lab Test 01/17/25 0925 12/26/22 2011 07/08/21  0030   GFRESTIMATED 85   < > 70   GFRESTBLACK  --   --  81    < > = values in this interval not displayed.             Passed - Medication indicated for associated diagnosis     Medication is associated with one or more of the following diagnoses:    Type 2 diabetes mellitus  Obesity          Passed - Patient is age 18  or older        Passed - No active pregnancy on record        Passed - No positive pregnancy test in past 12 months

## 2025-07-24 RX ORDER — SEMAGLUTIDE 1 MG/.5ML
INJECTION, SOLUTION SUBCUTANEOUS
Qty: 2 ML | Refills: 0 | Status: SHIPPED | OUTPATIENT
Start: 2025-07-24

## 2025-08-01 ENCOUNTER — TELEPHONE (OUTPATIENT)
Dept: FAMILY MEDICINE | Facility: CLINIC | Age: 40
End: 2025-08-01
Payer: COMMERCIAL

## 2025-08-25 ENCOUNTER — MYC REFILL (OUTPATIENT)
Dept: FAMILY MEDICINE | Facility: CLINIC | Age: 40
End: 2025-08-25
Payer: COMMERCIAL

## 2025-08-25 DIAGNOSIS — E66.01 MORBID OBESITY DUE TO EXCESS CALORIES (H): ICD-10-CM

## 2025-08-25 DIAGNOSIS — I10 ESSENTIAL HYPERTENSION: ICD-10-CM

## 2025-08-26 ENCOUNTER — TELEPHONE (OUTPATIENT)
Dept: FAMILY MEDICINE | Facility: CLINIC | Age: 40
End: 2025-08-26
Payer: COMMERCIAL

## (undated) DEVICE — GLOVE PROTEXIS BLUE W/NEU-THERA 7.0  2D73EB70

## (undated) DEVICE — BLADE CLIPPER 4406

## (undated) DEVICE — SOL WATER IRRIG 1000ML BOTTLE 07139-09

## (undated) DEVICE — SU CHROMIC 0 BP-1 27" 47T

## (undated) DEVICE — SU WND CLOSURE VLOC 90 ABS 3-0 18" P-14 VLOCM0124

## (undated) DEVICE — SU VICRYL 0 CT-1 36" J946H

## (undated) DEVICE — SUCTION KIWI VAC  VAC-6000M

## (undated) DEVICE — SU VICRYL 2-0 CT-1 36" UND J945H

## (undated) DEVICE — GOWN IMPERVIOUS SPECIALTY XLG/XLONG 32474

## (undated) DEVICE — SOL NACL 0.9% IRRIG 1000ML BOTTLE 07138-09

## (undated) DEVICE — GLOVE PROTEXIS BLUE W/NEU-THERA 8.0  2D73EB80

## (undated) DEVICE — PREP DURAPREP 26ML APL 8630

## (undated) DEVICE — LABEL MEDICATION SYSTEM  3304

## (undated) DEVICE — GLOVE PROTEXIS W/NEU-THERA 8.0  2D73TE80

## (undated) DEVICE — ADHESIVE SWIFTSET 0.8ML OCTYL SS6

## (undated) DEVICE — CATH TRAY FOLEY SURESTEP 16FR W/URINE MTR STATLK LF A303416A

## (undated) DEVICE — GLOVE PROTEXIS W/NEU-THERA 7.5  2D73TE75

## (undated) DEVICE — PACK C-SECTION LF PL15OTA83B

## (undated) DEVICE — STOCKING SLEEVE COMPRESSION CALF MED

## (undated) DEVICE — SU VICRYL 1 CT-1 36" UND J947H

## (undated) DEVICE — GLOVE PROTEXIS MICRO 7.0  2D73PM70

## (undated) DEVICE — LINEN BABY BLANKET 5434

## (undated) RX ORDER — BUPIVACAINE HYDROCHLORIDE 2.5 MG/ML
INJECTION, SOLUTION EPIDURAL; INFILTRATION; INTRACAUDAL
Status: DISPENSED
Start: 2019-02-19

## (undated) RX ORDER — ONDANSETRON 2 MG/ML
INJECTION INTRAMUSCULAR; INTRAVENOUS
Status: DISPENSED
Start: 2019-02-11

## (undated) RX ORDER — BUPIVACAINE HYDROCHLORIDE 7.5 MG/ML
INJECTION, SOLUTION INTRASPINAL
Status: DISPENSED
Start: 2019-02-11

## (undated) RX ORDER — PHENYLEPHRINE HCL IN 0.9% NACL 1 MG/10 ML
SYRINGE (ML) INTRAVENOUS
Status: DISPENSED
Start: 2019-02-11

## (undated) RX ORDER — DEXAMETHASONE SODIUM PHOSPHATE 4 MG/ML
INJECTION, SOLUTION INTRA-ARTICULAR; INTRALESIONAL; INTRAMUSCULAR; INTRAVENOUS; SOFT TISSUE
Status: DISPENSED
Start: 2019-02-11

## (undated) RX ORDER — FENTANYL CITRATE 50 UG/ML
INJECTION, SOLUTION INTRAMUSCULAR; INTRAVENOUS
Status: DISPENSED
Start: 2019-02-11

## (undated) RX ORDER — EPINEPHRINE 1 MG/ML(1)
AMPUL (ML) INJECTION
Status: DISPENSED
Start: 2019-02-11

## (undated) RX ORDER — MORPHINE SULFATE 1 MG/ML
INJECTION, SOLUTION EPIDURAL; INTRATHECAL; INTRAVENOUS
Status: DISPENSED
Start: 2019-02-11

## (undated) RX ORDER — EPHEDRINE SULFATE 50 MG/ML
INJECTION, SOLUTION INTRAMUSCULAR; INTRAVENOUS; SUBCUTANEOUS
Status: DISPENSED
Start: 2019-02-11